# Patient Record
Sex: FEMALE | Race: WHITE | Employment: OTHER | URBAN - METROPOLITAN AREA
[De-identification: names, ages, dates, MRNs, and addresses within clinical notes are randomized per-mention and may not be internally consistent; named-entity substitution may affect disease eponyms.]

---

## 2018-04-26 ENCOUNTER — ED HISTORICAL/CONVERTED ENCOUNTER (OUTPATIENT)
Dept: OTHER | Age: 83
End: 2018-04-26

## 2018-10-27 ENCOUNTER — IP HISTORICAL/CONVERTED ENCOUNTER (OUTPATIENT)
Dept: OTHER | Age: 83
End: 2018-10-27

## 2019-01-01 ENCOUNTER — OP HISTORICAL/CONVERTED ENCOUNTER (OUTPATIENT)
Dept: OTHER | Age: 84
End: 2019-01-01

## 2019-01-12 ENCOUNTER — ED HISTORICAL/CONVERTED ENCOUNTER (OUTPATIENT)
Dept: OTHER | Age: 84
End: 2019-01-12

## 2019-11-08 ENCOUNTER — IP HISTORICAL/CONVERTED ENCOUNTER (OUTPATIENT)
Dept: OTHER | Age: 84
End: 2019-11-08

## 2020-12-08 ENCOUNTER — OFFICE VISIT (OUTPATIENT)
Dept: PODIATRY | Age: 85
End: 2020-12-08
Payer: MEDICARE

## 2020-12-08 VITALS
WEIGHT: 150 LBS | OXYGEN SATURATION: 98 % | HEIGHT: 60 IN | BODY MASS INDEX: 29.45 KG/M2 | SYSTOLIC BLOOD PRESSURE: 145 MMHG | TEMPERATURE: 96.7 F | HEART RATE: 61 BPM | DIASTOLIC BLOOD PRESSURE: 56 MMHG

## 2020-12-08 DIAGNOSIS — B35.1 ONYCHOMYCOSIS: Primary | ICD-10-CM

## 2020-12-08 PROCEDURE — 99203 OFFICE O/P NEW LOW 30 MIN: CPT | Performed by: PODIATRIST

## 2020-12-08 PROCEDURE — 11755 BIOPSY NAIL UNIT: CPT | Performed by: PODIATRIST

## 2020-12-08 RX ORDER — LANOLIN ALCOHOL/MO/W.PET/CERES
1000 CREAM (GRAM) TOPICAL DAILY
COMMUNITY
End: 2021-09-21 | Stop reason: ALTCHOICE

## 2020-12-08 RX ORDER — CARVEDILOL 3.12 MG/1
1 TABLET ORAL 2 TIMES DAILY
COMMUNITY
Start: 2020-09-28 | End: 2021-09-27

## 2020-12-08 RX ORDER — FOLIC ACID 1 MG/1
TABLET ORAL
COMMUNITY
Start: 2020-11-09

## 2020-12-08 RX ORDER — ALPRAZOLAM 0.25 MG/1
TABLET ORAL
COMMUNITY
Start: 2020-11-09 | End: 2022-01-10

## 2020-12-08 RX ORDER — LEVOTHYROXINE SODIUM 100 UG/1
TABLET ORAL
COMMUNITY
Start: 2020-11-16

## 2020-12-08 NOTE — PROGRESS NOTES
[facility]    Subjective:         Patient is a 80 y.o. female who is being seen as a new pt for thick/discolored toenails. Patient denies any pedal pain. She states that her toenails are thick/discolored but denies ever having testing performed to confirm a diagnosis. She denies any traumatic events. She denies any systemic signs of infection. She denies any other pedal    Past Medical History:   Diagnosis Date    Anemia     Heart attack ([address]     Hypertension     Thyroid disease     hypothyroidism     Past Surgical History:   Procedure Laterality Date    HX ORTHOPAEDIC  2018    total hip replacement    HX PACEMAKER         Family History   Problem Relation Age of Onset    Stroke Mother     Heart Disease Mother     Stroke Father     Heart Disease Father       Social History     Tobacco Use    Smoking status: Never Smoker   Substance Use Topics    Alcohol use: Never     Frequency: Never     No Known Allergies  Prior to Admission medications    Medication Sig Start Date End Date Taking? Authorizing Provider   carvediloL (COREG) 3.125 mg tablet Take 1 Tab by mouth two (2) times a day. 9/28/20  Yes Provider, Historical   folic acid (FOLVITE) 1 mg tablet TAKE 1 TABLET BY MOUTH EVERY DAY 11/9/20  Yes Provider, Historical   levothyroxine (SYNTHROID) 100 mcg tablet TAKE 1 TABLET BY MOUTH EVERY DAY 11/16/20  Yes Provider, Historical   ALPRAZolam (XANAX) 0.25 mg tablet TAKE 1 TABLET BY MOUTH EVERY 12 HOURS AS NEEDED 11/9/20  Yes Provider, Historical   cyanocobalamin 1,000 mcg tablet Take 1,000 mcg by mouth daily. Yes Provider, Historical       Review of Systems   Constitutional: Negative. HENT: Negative. Eyes: Negative. Respiratory: Negative. Cardiovascular: Negative. Gastrointestinal: Negative. Endocrine: Positive for cold intolerance. Genitourinary: Negative. Musculoskeletal: Positive for arthralgias. Skin: Negative. Allergic/Immunologic: Negative. Neurological: Negative. Hematological: Negative. Psychiatric/Behavioral: The patient is nervous/anxious. All other systems reviewed and are negative. Objective:     Visit Vitals  BP (!) 145/56 (BP 1 Location: Left arm, BP Patient Position: Sitting)   Pulse 61   Temp (!) 96.7 °F (35.9 °C) (Temporal)   Ht 5' (1.524 m)   Wt 150 lb (68 kg)   SpO2 98%   BMI 29.29 kg/m²       Physical Exam  Vitals signs reviewed. Constitutional:       Appearance: She is overweight. Cardiovascular:      Pulses:           Dorsalis pedis pulses are 2+ on the right side and 2+ on the left side. Posterior tibial pulses are 2+ on the right side and 2+ on the left side. Pulmonary:      Effort: Pulmonary effort is normal.   Musculoskeletal:      Right lower leg: No edema. Left lower leg: No edema. Right foot: Decreased range of motion. Deformity present. No bunion or Charcot foot. Left foot: Decreased range of motion. Deformity present. No bunion or Charcot foot. Feet:      Right foot:      Protective Sensation: 10 sites tested. 10 sites sensed. Skin integrity: Skin integrity normal.      Toenail Condition: Right toenails are abnormally thick. Fungal disease present. Left foot:      Protective Sensation: 10 sites tested. 10 sites sensed. Skin integrity: Skin integrity normal.      Toenail Condition: Left toenails are abnormally thick. Fungal disease present. Lymphadenopathy:      Lower Body: No right inguinal adenopathy. No left inguinal adenopathy. Skin:     General: Skin is warm. Capillary Refill: Capillary refill takes 2 to 3 seconds. Neurological:      Mental Status: She is alert and oriented to person, place, and time. Psychiatric:         Mood and Affect: Mood and affect normal.         Behavior: Behavior is cooperative. Data Review: No results found for this or any previous visit (from the past 24 hour(s)). Impression:       ICD-10-CM ICD-9-CM    1. Onychomycosis  B35.1 110.1 BIOPSY, NAIL UNIT         Recommendation:     Patient seen and evaluated in the office  Discussed educated patient regarding her medical condition  It was determined that a nail plate biopsy will be taken of the right hallux to confirm the presence of fungal elements. This was performed with a nail nipper without incident. Patient tolerated well no dressing was needed.   Instructed the patient that when pathology results return, will discuss treatment options in more depth

## 2021-01-14 DIAGNOSIS — B35.1 ONYCHOMYCOSIS: Primary | ICD-10-CM

## 2021-01-15 RX ORDER — CICLOPIROX 80 MG/ML
6.6 SOLUTION TOPICAL
Qty: 1 BOTTLE | Refills: 2 | Status: SHIPPED | OUTPATIENT
Start: 2021-01-15 | End: 2021-06-14

## 2021-01-24 ENCOUNTER — APPOINTMENT (OUTPATIENT)
Dept: GENERAL RADIOLOGY | Age: 86
End: 2021-01-24
Attending: FAMILY MEDICINE
Payer: MEDICARE

## 2021-01-24 ENCOUNTER — HOSPITAL ENCOUNTER (EMERGENCY)
Age: 86
Discharge: HOME | End: 2021-01-24
Attending: FAMILY MEDICINE
Payer: MEDICARE

## 2021-01-24 ENCOUNTER — APPOINTMENT (OUTPATIENT)
Dept: CT IMAGING | Age: 86
End: 2021-01-24
Attending: FAMILY MEDICINE
Payer: MEDICARE

## 2021-01-24 VITALS
TEMPERATURE: 97.9 F | DIASTOLIC BLOOD PRESSURE: 64 MMHG | RESPIRATION RATE: 20 BRPM | OXYGEN SATURATION: 100 % | HEIGHT: 62 IN | BODY MASS INDEX: 27.6 KG/M2 | WEIGHT: 150 LBS | HEART RATE: 87 BPM | SYSTOLIC BLOOD PRESSURE: 170 MMHG

## 2021-01-24 DIAGNOSIS — J44.1 COPD EXACERBATION (HCC): Primary | ICD-10-CM

## 2021-01-24 LAB
ANION GAP SERPL CALC-SCNC: 11 MMOL/L (ref 5–15)
BASOPHILS # BLD: 0 K/UL (ref 0–0.1)
BASOPHILS NFR BLD: 1 % (ref 0–1)
BUN SERPL-MCNC: 19 MG/DL (ref 6–20)
BUN/CREAT SERPL: 15 (ref 12–20)
CA-I BLD-MCNC: 8.8 MG/DL (ref 8.5–10.1)
CHLORIDE SERPL-SCNC: 99 MMOL/L (ref 97–108)
CO2 SERPL-SCNC: 25 MMOL/L (ref 21–32)
CREAT SERPL-MCNC: 1.25 MG/DL (ref 0.55–1.02)
DIFFERENTIAL METHOD BLD: ABNORMAL
EOSINOPHIL # BLD: 0.4 K/UL (ref 0–0.4)
EOSINOPHIL NFR BLD: 4 % (ref 0–7)
ERYTHROCYTE [DISTWIDTH] IN BLOOD BY AUTOMATED COUNT: 12.3 % (ref 11.5–14.5)
GLUCOSE SERPL-MCNC: 106 MG/DL (ref 65–100)
HCT VFR BLD AUTO: 36.7 % (ref 35–47)
HGB BLD-MCNC: 12.6 G/DL (ref 11.5–16)
IMM GRANULOCYTES # BLD AUTO: 0 K/UL (ref 0–0.04)
IMM GRANULOCYTES NFR BLD AUTO: 0 % (ref 0–0.5)
LYMPHOCYTES # BLD: 2 K/UL (ref 0.8–3.5)
LYMPHOCYTES NFR BLD: 24 % (ref 12–49)
MCH RBC QN AUTO: 32.4 PG (ref 26–34)
MCHC RBC AUTO-ENTMCNC: 34.3 G/DL (ref 30–36.5)
MCV RBC AUTO: 94.3 FL (ref 80–99)
MONOCYTES # BLD: 0.9 K/UL (ref 0–1)
MONOCYTES NFR BLD: 11 % (ref 5–13)
NEUTS SEG # BLD: 5 K/UL (ref 1.8–8)
NEUTS SEG NFR BLD: 60 % (ref 32–75)
PLATELET # BLD AUTO: 428 K/UL (ref 150–400)
PMV BLD AUTO: 9.6 FL (ref 8.9–12.9)
POTASSIUM SERPL-SCNC: 3.9 MMOL/L (ref 3.5–5.1)
RBC # BLD AUTO: 3.89 M/UL (ref 3.8–5.2)
SODIUM SERPL-SCNC: 135 MMOL/L (ref 136–145)
WBC # BLD AUTO: 8.4 K/UL (ref 3.6–11)

## 2021-01-24 PROCEDURE — 74011000250 HC RX REV CODE- 250: Performed by: FAMILY MEDICINE

## 2021-01-24 PROCEDURE — 71045 X-RAY EXAM CHEST 1 VIEW: CPT

## 2021-01-24 PROCEDURE — 93005 ELECTROCARDIOGRAM TRACING: CPT

## 2021-01-24 PROCEDURE — 71260 CT THORAX DX C+: CPT

## 2021-01-24 PROCEDURE — 74011000636 HC RX REV CODE- 636: Performed by: FAMILY MEDICINE

## 2021-01-24 PROCEDURE — 96374 THER/PROPH/DIAG INJ IV PUSH: CPT

## 2021-01-24 PROCEDURE — 85025 COMPLETE CBC W/AUTO DIFF WBC: CPT

## 2021-01-24 PROCEDURE — 36415 COLL VENOUS BLD VENIPUNCTURE: CPT

## 2021-01-24 PROCEDURE — 94640 AIRWAY INHALATION TREATMENT: CPT

## 2021-01-24 PROCEDURE — 80048 BASIC METABOLIC PNL TOTAL CA: CPT

## 2021-01-24 PROCEDURE — 74011250636 HC RX REV CODE- 250/636: Performed by: FAMILY MEDICINE

## 2021-01-24 PROCEDURE — 99285 EMERGENCY DEPT VISIT HI MDM: CPT

## 2021-01-24 RX ORDER — ALBUTEROL SULFATE 90 UG/1
2 AEROSOL, METERED RESPIRATORY (INHALATION) ONCE
Status: DISCONTINUED | OUTPATIENT
Start: 2021-01-24 | End: 2021-01-24 | Stop reason: RX

## 2021-01-24 RX ORDER — METHYLPREDNISOLONE 4 MG/1
TABLET ORAL
Qty: 1 DOSE PACK | Refills: 0 | Status: SHIPPED | OUTPATIENT
Start: 2021-01-24 | End: 2021-09-21 | Stop reason: ALTCHOICE

## 2021-01-24 RX ORDER — IPRATROPIUM BROMIDE AND ALBUTEROL SULFATE 2.5; .5 MG/3ML; MG/3ML
3 SOLUTION RESPIRATORY (INHALATION)
Status: DISCONTINUED | OUTPATIENT
Start: 2021-01-24 | End: 2021-01-24 | Stop reason: CLARIF

## 2021-01-24 RX ORDER — IPRATROPIUM BROMIDE AND ALBUTEROL SULFATE 2.5; .5 MG/3ML; MG/3ML
3 SOLUTION RESPIRATORY (INHALATION)
Status: COMPLETED | OUTPATIENT
Start: 2021-01-24 | End: 2021-01-24

## 2021-01-24 RX ORDER — ALBUTEROL SULFATE 90 UG/1
2 AEROSOL, METERED RESPIRATORY (INHALATION)
Qty: 1 INHALER | Refills: 0 | Status: SHIPPED | OUTPATIENT
Start: 2021-01-24 | End: 2021-09-21 | Stop reason: ALTCHOICE

## 2021-01-24 RX ORDER — IPRATROPIUM BROMIDE AND ALBUTEROL SULFATE 2.5; .5 MG/3ML; MG/3ML
3 SOLUTION RESPIRATORY (INHALATION)
Status: DISCONTINUED | OUTPATIENT
Start: 2021-01-24 | End: 2021-01-24 | Stop reason: SDUPTHER

## 2021-01-24 RX ADMIN — METHYLPREDNISOLONE SODIUM SUCCINATE 125 MG: 125 INJECTION, POWDER, FOR SOLUTION INTRAMUSCULAR; INTRAVENOUS at 16:08

## 2021-01-24 RX ADMIN — IPRATROPIUM BROMIDE AND ALBUTEROL SULFATE 3 ML: .5; 3 SOLUTION RESPIRATORY (INHALATION) at 16:08

## 2021-01-24 RX ADMIN — IOPAMIDOL 100 ML: 755 INJECTION, SOLUTION INTRAVENOUS at 17:30

## 2021-01-24 NOTE — ED PROVIDER NOTES
EMERGENCY DEPARTMENT HISTORY AND PHYSICAL EXAM      Date: 1/24/2021  Patient Name: Deyanira Bellamy      History of Presenting Illness     Chief Complaint   Patient presents with    Cough       History Provided By: Patient's Daughter and patient    HPI: Deyanira Bellamy, 80 y.o. female with a past medical history significant as documented below presents to the ED with cc of coughing. Patient had the symptoms for several weeks and was seen by primary care doctor 2 weeks ago was placed on albuterol and other medications. She was not tested for Covid. She ran out of her albuterol inhaler a few days ago and the daughter noticed that she is coughing more than usual. The daughter recently found out that the caretaker was possibly exposed to Covid and may have exposed her mother. Mother does not complain of chest pain, palpitations, weakness, fever, nausea, vomiting. Just feels short of breath from excessive coughing. There are no other complaints, changes, or physical findings at this time. PCP: Jonny Gonzalez MD    Current Outpatient Medications   Medication Sig Dispense Refill    albuterol (ProAir HFA) 90 mcg/actuation inhaler Take 2 Puffs by inhalation every four (4) hours as needed for Wheezing or Shortness of Breath. 1 Inhaler 0    methylPREDNISolone (Medrol, Brent,) 4 mg tablet Take as directed 1 Dose Pack 0    ciclopirox (PENLAC) 8 % solution Apply 6.6 mL to affected area nightly for 90 days. 1 Bottle 2    carvediloL (COREG) 3.125 mg tablet Take 1 Tab by mouth two (2) times a day.  folic acid (FOLVITE) 1 mg tablet TAKE 1 TABLET BY MOUTH EVERY DAY      levothyroxine (SYNTHROID) 100 mcg tablet TAKE 1 TABLET BY MOUTH EVERY DAY      ALPRAZolam (XANAX) 0.25 mg tablet TAKE 1 TABLET BY MOUTH EVERY 12 HOURS AS NEEDED      cyanocobalamin 1,000 mcg tablet Take 1,000 mcg by mouth daily.          Past History     Past Medical History:  Past Medical History:   Diagnosis Date    Anemia     [facility] [address] [address]     Heart attack [address] [address]     Hypertension     Thyroid disease     hypothyroidism       Past Surgical History:  Past Surgical History:   Procedure Laterality Date    HX ORTHOPAEDIC  2018    total hip replacement    HX PACEMAKER         Family History:  Family History   Problem Relation Age of Onset    Stroke Mother     Heart Disease Mother     Stroke Father     Heart Disease Father        Social History:  Social History     Tobacco Use    Smoking status: Never Smoker    Smokeless tobacco: Never Used   Substance Use Topics    Alcohol use: Never     Frequency: Never    Drug use: Never       Allergies:  No Known Allergies      Review of Systems     Review of Systems   Constitutional: Negative for chills and fever. HENT: Negative for congestion and sore throat. Eyes: Negative for photophobia and visual disturbance. Respiratory: Positive for cough and shortness of breath. Cardiovascular: Negative for chest pain and palpitations. Gastrointestinal: Negative for nausea and vomiting. Genitourinary: Negative for dysuria and flank pain. Musculoskeletal: Negative for arthralgias, back pain and myalgias. Skin: Negative for rash and wound. Allergic/Immunologic: Negative for environmental allergies and food allergies. Neurological: Negative for light-headedness and headaches. All other systems reviewed and are negative. Physical Exam     Physical Exam  Vitals signs and nursing note reviewed. Constitutional:       Appearance: Normal appearance. HENT:      Head: Normocephalic and atraumatic. Nose: Nose normal.      Mouth/Throat:      Mouth: Mucous membranes are moist.      Pharynx: Oropharynx is clear. No oropharyngeal exudate or posterior oropharyngeal erythema. Eyes:      Extraocular Movements: Extraocular movements intact. Conjunctiva/sclera: Conjunctivae normal.   Neck:      Musculoskeletal: Normal range of motion and neck supple.  No muscular tenderness. Cardiovascular:      Rate and Rhythm: Normal rate and regular rhythm. Pulses: Normal pulses. Heart sounds: Normal heart sounds. Pulmonary:      Effort: Pulmonary effort is normal.      Breath sounds: Examination of the right-upper field reveals wheezing. Examination of the left-upper field reveals wheezing. Examination of the right-middle field reveals wheezing. Examination of the left-middle field reveals wheezing. Examination of the right-lower field reveals wheezing. Examination of the left-lower field reveals wheezing. Wheezing present. No decreased breath sounds, rhonchi or rales. Abdominal:      General: Abdomen is flat. Bowel sounds are normal.      Palpations: Abdomen is soft. Lymphadenopathy:      Cervical: No cervical adenopathy. Skin:     General: Skin is warm. Capillary Refill: Capillary refill takes less than 2 seconds. Neurological:      General: No focal deficit present. Mental Status: She is alert and oriented to person, place, and time. Psychiatric:         Mood and Affect: Mood normal.         Behavior: Behavior normal.         Diagnostic Study Results     Labs -     Recent Results (from the past 12 hour(s))   CBC WITH AUTOMATED DIFF    Collection Time: 01/24/21  4:30 PM   Result Value Ref Range    WBC 8.4 3.6 - 11.0 K/uL    RBC 3.89 3.80 - 5.20 M/uL    HGB 12.6 11.5 - 16.0 g/dL    HCT 36.7 35.0 - 47.0 %    MCV 94.3 80.0 - 99.0 FL    MCH 32.4 26.0 - 34.0 PG    MCHC 34.3 30.0 - 36.5 g/dL    RDW 12.3 11.5 - 14.5 %    PLATELET 190 (H) 632 - 400 K/uL    MPV 9.6 8.9 - 12.9 FL    NEUTROPHILS 60 32 - 75 %    LYMPHOCYTES 24 12 - 49 %    MONOCYTES 11 5 - 13 %    EOSINOPHILS 4 0 - 7 %    BASOPHILS 1 0 - 1 %    IMMATURE GRANULOCYTES 0 0.0 - 0.5 %    ABS. NEUTROPHILS 5.0 1.8 - 8.0 K/UL    ABS. LYMPHOCYTES 2.0 0.8 - 3.5 K/UL    ABS. MONOCYTES 0.9 0.0 - 1.0 K/UL    ABS. EOSINOPHILS 0.4 0.0 - 0.4 K/UL    ABS. BASOPHILS 0.0 0.0 - 0.1 K/UL    ABS. IMM. GRANS. 0.0 0.00 - 0.04 K/UL    DF AUTOMATED     METABOLIC PANEL, BASIC    Collection Time: 01/24/21  4:30 PM   Result Value Ref Range    Sodium 135 (L) 136 - 145 mmol/L    Potassium 3.9 3.5 - 5.1 mmol/L    Chloride 99 97 - 108 mmol/L    CO2 25 21 - 32 mmol/L    Anion gap 11 5 - 15 mmol/L    Glucose 106 (H) 65 - 100 mg/dL    BUN 19 6 - 20 mg/dL    Creatinine 1.25 (H) 0.55 - 1.02 mg/dL    BUN/Creatinine ratio 15 12 - 20      GFR est AA 49 (L) >60 ml/min/1.73m2    GFR est non-AA 40 (L) >60 ml/min/1.73m2    Calcium 8.8 8.5 - 10.1 mg/dL       Radiologic Studies -   [unfilled]  CT Results  (Last 48 hours)               01/24/21 1730  CT CHEST W CONT Final result    Impression:  Clear lungs. No evidence of PE. 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 Narrative:  Contrast study was cc [id number]       Very mild subpleural fibrosis with otherwise completely clear lungs and patent   central airways. Pulmonary arteries are well-opacified and show no filling defect or distortion. Aorta shows normal dimensions, without dissection. Moderate atherosclerosis with   penetrating ulcer off the left side of the arch. 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 The aortic maximum diameter at this level is 3.3 cm. Patient age noted. No   mediastinal or hilar adenopathy. Coronary calcification. No cardiac finding. No pleural pericardial effusion. No   significant upper abdominal finding               CXR Results  (Last 48 hours)               01/24/21 1552  XR CHEST PORT Final result    Impression:  No evidence of focal airspace pneumonia. Narrative:  Portable chest, AP upright, 1546 hours. Comparison with 11/19/2019. Stable heart size. Calcified thoracic aorta. The lungs appear clear.  No evidence   of pulmonary edema, air space pneumonia, or pleural effusion. No evidence of   pneumothorax. Medical Decision Making and ED Course     Vital Signs-Reviewed the patient's vital signs. Patient Vitals for the past 12 hrs:   Temp Pulse Resp BP SpO2   01/24/21 1759  87 20 (!) 170/64 100 %   01/24/21 1648  60 20  100 %   01/24/21 1545     100 %   01/24/21 1537 97.9 °F (36.6 °C) 65  (!) 187/62 100 %         EKG interpretation: (Preliminary): Performed at 1/24/21 and read at 1641  Rhythm: sinus bradycardia; and regular . Rate (approx.): 48; Axis: normal; AZ interval: normal; QRS interval: normal ; ST/T wave: normal; Other findings: normal.      Records Reviewed: Nursing Notes and Old Medical Records    Provider Notes (Medical Decision Making):     MDM  Number of Diagnoses or Management Options  COPD exacerbation ([facility] [address]  Diagnosis management comments: Patient was treated with Solu-Medrol and albuterol nebulizer. Her symptoms improved. She was stable at 100% on room air. She was able to ambulate around without inducing dyspnea. Per H&P, is likely COPD exacerbation. Refilled patient's albuterol inhaler and started on Medrol dose pack. Patient is stable with no marked toxicity or distress. Results reviewed with the patient. All questions were answered and there are no apparent barriers to comprehension or communication. The patient verbalizes agreement with the diagnosis, treatment plan, and understanding of the follow-up instructions. The patient is appropriate for discharge; leaves the Emergency Department walking with a stable gait. Patient understands to return to the ED in 12-24 hours for any new or worsening symptoms or no  expected timely resolution of symptoms on mediations prescribed. ED Course:     - I am the first and primary provider for this patient.     - I reviewed the vital signs, available nursing notes, past medical history, past surgical history, family history and social history. Initial assessment performed. The patients presenting problems have been discussed, and they are in agreement with the care plan formulated and outlined with them. I have encouraged them to ask questions as they arise throughout their visit. Disposition     Disposition: Condition improved  DC- Adult Discharges: All of the diagnostic tests were reviewed and questions answered. Diagnosis, care plan and treatment options were discussed. The patient understands the instructions and will follow up as directed. The patients results have been reviewed with them. They have been counseled regarding their diagnosis. The patient verbally convey understanding and agreement of the signs, symptoms, diagnosis, treatment and prognosis and additionally agrees to follow up as recommended with their PCP in 24 - 48 hours. They also agree with the care-plan and convey that all of their questions have been answered. I have also put together some discharge instructions for them that include: 1) educational information regarding their diagnosis, 2) how to care for their diagnosis at home, as well a 3) list of reasons why they would want to return to the ED prior to their follow-up appointment, should their condition change. DISCHARGE PLAN:  1. Current Discharge Medication List      CONTINUE these medications which have NOT CHANGED    Details   ciclopirox (PENLAC) 8 % solution Apply 6.6 mL to affected area nightly for 90 days. Qty: 1 Bottle, Refills: 2    Associated Diagnoses: Onychomycosis      carvediloL (COREG) 3.125 mg tablet Take 1 Tab by mouth two (2) times a day. folic acid (FOLVITE) 1 mg tablet TAKE 1 TABLET BY MOUTH EVERY DAY      levothyroxine (SYNTHROID) 100 mcg tablet TAKE 1 TABLET BY MOUTH EVERY DAY      ALPRAZolam (XANAX) 0.25 mg tablet TAKE 1 TABLET BY MOUTH EVERY 12 HOURS AS NEEDED      cyanocobalamin 1,000 mcg tablet Take 1,000 mcg by mouth daily.            2.   Follow-up Information Follow up With Specialties Details Why Contact Info    Ana Simons MD Pulmonary Disease Schedule an appointment as soon as possible for a visit in 3 days  [address] [facility]  [phone]          3. Return to ED if worse   4. Discharge Medication List as of 1/24/2021  6:35 PM      START taking these medications    Details   albuterol (ProAir HFA) 90 mcg/actuation inhaler Take 2 Puffs by inhalation every four (4) hours as needed for Wheezing or Shortness of Breath., Normal, Disp-1 Inhaler, R-0      methylPREDNISolone (Medrol, Brent,) 4 mg tablet Take as directed, Normal, Disp-1 Dose Pack, R-0         CONTINUE these medications which have NOT CHANGED    Details   ciclopirox (PENLAC) 8 % solution Apply 6.6 mL to affected area nightly for 90 days. , Normal, Disp-1 Bottle, R-2      carvediloL (COREG) 3.125 mg tablet Take 1 Tab by mouth two (2) times a day., Historical Med      folic acid (FOLVITE) 1 mg tablet TAKE 1 TABLET BY MOUTH EVERY DAY, Historical Med      levothyroxine (SYNTHROID) 100 mcg tablet TAKE 1 TABLET BY MOUTH EVERY DAY, Historical Med      ALPRAZolam (XANAX) 0.25 mg tablet TAKE 1 TABLET BY MOUTH EVERY 12 HOURS AS NEEDED, Historical Med      cyanocobalamin 1,000 mcg tablet Take 1,000 mcg by mouth daily. , Historical Med             Diagnosis     Clinical Impression:   1. COPD exacerbation ([facility] [address]        Attestations:    Debbie Rodriguez, DO    Please note that this dictation was completed with [employer], the computer voice recognition software. Quite often unanticipated grammatical, syntax, homophones, and other interpretive errors are inadvertently transcribed by the computer software. Please disregard these errors. Please excuse any errors that have escaped final proofreading. Thank you.

## 2021-01-24 NOTE — ED TRIAGE NOTES
PT. TO ED WITH C/O COUGH FOR ABOUT 7 WEEKS. PT. WAS SEEN BY LUNG DOCTOR GIVEN PRESCRIPTIONS. LAST NEBULIZER TREATMENT  2 - 3 DAYS AGO, PER DAUGHTER , RAN OUT OF MEDICATION. QUESTIONABLE IF PT. WAS EXPOSED TO COVID BY CARGIVER. PT. HARD OF HEARING.

## 2021-01-24 NOTE — DISCHARGE INSTRUCTIONS
Thank you! Thank you for allowing me to care for you in the emergency department. I sincerely hope that you are satisfied with your visit today. It is my goal to provide you with excellent care. Below you will find a list of your labs and imaging from your visit today. Should you have any questions regarding these results please do not hesitate to call the emergency department. Labs -     Recent Results (from the past 12 hour(s))   CBC WITH AUTOMATED DIFF    Collection Time: 01/24/21  4:30 PM   Result Value Ref Range    WBC 8.4 3.6 - 11.0 K/uL    RBC 3.89 3.80 - 5.20 M/uL    HGB 12.6 11.5 - 16.0 g/dL    HCT 36.7 35.0 - 47.0 %    MCV 94.3 80.0 - 99.0 FL    MCH 32.4 26.0 - 34.0 PG    MCHC 34.3 30.0 - 36.5 g/dL    RDW 12.3 11.5 - 14.5 %    PLATELET 705 (H) 431 - 400 K/uL    MPV 9.6 8.9 - 12.9 FL    NEUTROPHILS 60 32 - 75 %    LYMPHOCYTES 24 12 - 49 %    MONOCYTES 11 5 - 13 %    EOSINOPHILS 4 0 - 7 %    BASOPHILS 1 0 - 1 %    IMMATURE GRANULOCYTES 0 0.0 - 0.5 %    ABS. NEUTROPHILS 5.0 1.8 - 8.0 K/UL    ABS. LYMPHOCYTES 2.0 0.8 - 3.5 K/UL    ABS. MONOCYTES 0.9 0.0 - 1.0 K/UL    ABS. EOSINOPHILS 0.4 0.0 - 0.4 K/UL    ABS. BASOPHILS 0.0 0.0 - 0.1 K/UL    ABS. IMM. GRANS. 0.0 0.00 - 0.04 K/UL    DF AUTOMATED     METABOLIC PANEL, BASIC    Collection Time: 01/24/21  4:30 PM   Result Value Ref Range    Sodium 135 (L) 136 - 145 mmol/L    Potassium 3.9 3.5 - 5.1 mmol/L    Chloride 99 97 - 108 mmol/L    CO2 25 21 - 32 mmol/L    Anion gap 11 5 - 15 mmol/L    Glucose 106 (H) 65 - 100 mg/dL    BUN 19 6 - 20 mg/dL    Creatinine 1.25 (H) 0.55 - 1.02 mg/dL    BUN/Creatinine ratio 15 12 - 20      GFR est AA 49 (L) >60 ml/min/1.73m2    GFR est non-AA 40 (L) >60 ml/min/1.73m2    Calcium 8.8 8.5 - 10.1 mg/dL       Radiologic Studies -   CT CHEST W CONT   Final Result   Clear lungs. No evidence of PE. 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       XR CHEST PORT   Final Result   No evidence of focal airspace pneumonia. CT Results  (Last 48 hours)                 01/24/21 1730  CT CHEST W CONT Final result    Impression:  Clear lungs. No evidence of PE. 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 Narrative:  Contrast study was cc [id number]       Very mild subpleural fibrosis with otherwise completely clear lungs and patent   central airways. Pulmonary arteries are well-opacified and show no filling defect or distortion. Aorta shows normal dimensions, without dissection. Moderate atherosclerosis with   penetrating ulcer off the left side of the arch. 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 The aortic maximum diameter at this level is 3.3 cm. Patient age noted. No   mediastinal or hilar adenopathy. Coronary calcification. No cardiac finding. No pleural pericardial effusion. No   significant upper abdominal finding                 CXR Results  (Last 48 hours)                 01/24/21 1552  XR CHEST PORT Final result    Impression:  No evidence of focal airspace pneumonia. Narrative:  Portable chest, AP upright, 1546 hours. Comparison with 11/19/2019. Stable heart size. Calcified thoracic aorta. The lungs appear clear. No evidence   of pulmonary edema, air space pneumonia, or pleural effusion. No evidence of   pneumothorax. If you feel that you have not received excellent quality care or timely care, please ask to speak to the nurse manager. Please choose us in the future for your continued health care needs.    ------------------------------------------------------------------------------------------------------------  The exam and treatment you received in the Emergency Department were for an urgent problem and are not intended as complete care. It is important that you follow-up with a doctor, nurse practitioner, or physician assistant to:  (1) confirm your diagnosis,  (2) re-evaluation of changes in your illness and treatment, and  (3) for ongoing care. If your symptoms become worse or you do not improve as expected and you are unable to reach your usual health care provider, you should return to the Emergency Department. We are available 24 hours a day. Please take your discharge instructions with you when you go to your follow-up appointment. If you have any problem arranging a follow-up appointment, contact the Emergency Department immediately. If a prescription has been provided, please have it filled as soon as possible to prevent a delay in treatment. Read the entire medication instruction sheet provided to you by the pharmacy. If you have any questions or reservations about taking the medication due to side effects or interactions with other medications, please call your primary care physician or contact the ER to speak with the charge nurse. Make an appointment with your family doctor or the physician you were referred to for follow-up of this visit as instructed on your discharge paperwork, as this is a mandatory follow-up. Return to the ER if you are unable to be seen or if you are unable to be seen in a timely manner. If you have any problem arranging the follow-up visit, contact the Emergency Department immediately.

## 2021-01-25 LAB
ATRIAL RATE: 48 BPM
CALCULATED P AXIS, ECG09: 88 DEGREES
CALCULATED R AXIS, ECG10: 3 DEGREES
CALCULATED T AXIS, ECG11: 25 DEGREES
DIAGNOSIS, 93000: NORMAL
P-R INTERVAL, ECG05: 204 MS
Q-T INTERVAL, ECG07: 460 MS
QRS DURATION, ECG06: 70 MS
QTC CALCULATION (BEZET), ECG08: 410 MS
VENTRICULAR RATE, ECG03: 48 BPM

## 2021-02-26 ENCOUNTER — VIRTUAL VISIT (OUTPATIENT)
Dept: PODIATRY | Age: 86
End: 2021-02-26
Payer: MEDICARE

## 2021-02-26 DIAGNOSIS — B35.1 ONYCHOMYCOSIS: Primary | ICD-10-CM

## 2021-02-26 PROCEDURE — 1090F PRES/ABSN URINE INCON ASSESS: CPT | Performed by: PODIATRIST

## 2021-02-26 PROCEDURE — 99212 OFFICE O/P EST SF 10 MIN: CPT | Performed by: PODIATRIST

## 2021-02-26 PROCEDURE — G8432 DEP SCR NOT DOC, RNG: HCPCS | Performed by: PODIATRIST

## 2021-02-26 PROCEDURE — G8428 CUR MEDS NOT DOCUMENT: HCPCS | Performed by: PODIATRIST

## 2021-02-26 PROCEDURE — G8536 NO DOC ELDER MAL SCRN: HCPCS | Performed by: PODIATRIST

## 2021-02-26 PROCEDURE — 1101F PT FALLS ASSESS-DOCD LE1/YR: CPT | Performed by: PODIATRIST

## 2021-02-26 PROCEDURE — G8419 CALC BMI OUT NRM PARAM NOF/U: HCPCS | Performed by: PODIATRIST

## 2021-02-28 NOTE — PROGRESS NOTES
[facility]    Subjective:         Patient is a 80 y.o. female who is being seen as a returning pt for continued care regarding her onychomycosis. Patient denies any pedal pain. She states she has been applying the topical nail lacquer that was prescribed last office visit. She denies any traumatic events. She denies any systemic signs of infection. She denies any other pedal    Past Medical History:   Diagnosis Date    Anemia     Chronic obstructive pulmonary disease ([address]     Heart attack ([address]     Hypertension     Thyroid disease     hypothyroidism     Past Surgical History:   Procedure Laterality Date    HX ORTHOPAEDIC  2018    total hip replacement    HX PACEMAKER         Family History   Problem Relation Age of Onset    Stroke Mother     Heart Disease Mother     Stroke Father     Heart Disease Father       Social History     Tobacco Use    Smoking status: Never Smoker    Smokeless tobacco: Never Used   Substance Use Topics    Alcohol use: Never     Frequency: Never     No Known Allergies  Prior to Admission medications    Medication Sig Start Date End Date Taking? Authorizing Provider   albuterol (ProAir HFA) 90 mcg/actuation inhaler Take 2 Puffs by inhalation every four (4) hours as needed for Wheezing or Shortness of Breath. 1/24/21   Abelino Floyd DO   methylPREDNISolone (Medrol, Brent,) 4 mg tablet Take as directed 1/24/21   Abelino Floyd DO   ciclopirox (PENLAC) 8 % solution Apply 6.6 mL to affected area nightly for 90 days. 1/15/21 4/15/21  Jeramie Garsia DPM   carvediloL (COREG) 3.125 mg tablet Take 1 Tab by mouth two (2) times a day.  9/28/20   Provider, Historical   folic acid (FOLVITE) 1 mg tablet TAKE 1 TABLET BY MOUTH EVERY DAY 11/9/20   Provider, Historical   levothyroxine (SYNTHROID) 100 mcg tablet TAKE 1 TABLET BY MOUTH EVERY DAY 11/16/20   Provider, Historical   ALPRAZolam (XANAX) 0.25 mg tablet TAKE 1 TABLET BY MOUTH EVERY 12 HOURS AS NEEDED 11/9/20   Provider, Historical   cyanocobalamin 1,000 mcg tablet Take 1,000 mcg by mouth daily. Provider, Historical       Review of Systems   Constitutional: Negative. HENT: Negative. Eyes: Negative. Respiratory: Negative. Cardiovascular: Negative. Gastrointestinal: Negative. Endocrine: Positive for cold intolerance. Genitourinary: Negative. Musculoskeletal: Positive for arthralgias. Skin: Negative. Allergic/Immunologic: Negative. Neurological: Negative. Hematological: Negative. Psychiatric/Behavioral: The patient is nervous/anxious. All other systems reviewed and are negative. Objective: There were no vitals taken for this visit. Physical Exam  Vitals signs reviewed. Constitutional:       Appearance: She is overweight. Pulmonary:      Effort: Pulmonary effort is normal.   Musculoskeletal:      Right lower leg: No edema. Left lower leg: No edema. Right foot: Decreased range of motion. Deformity present. No bunion or Charcot foot. Left foot: Decreased range of motion. Deformity present. No bunion or Charcot foot. Feet:      Right foot:      Skin integrity: Skin integrity normal.      Toenail Condition: Right toenails are abnormally thick. Fungal disease present. Left foot:      Skin integrity: Skin integrity normal.      Toenail Condition: Left toenails are abnormally thick. Fungal disease present. Skin:     Coloration: Skin is not ashen or cyanotic. Neurological:      Mental Status: She is alert and oriented to person, place, and time. Psychiatric:         Mood and Affect: Mood and affect normal.         Behavior: Behavior is cooperative. Data Review: No results found for this or any previous visit (from the past 24 hour(s)).       Impression:     Onychomycosis    Recommendation:     Patient seen and evaluated in the office  Discussed educated patient regarding her medical condition  Instructed patient/her daughter that they needed to remove the topical nail lacquer every week. They need to continue with the topical nail lacquer for a minimum of 9 months to achieve symptomatic relief      This visit was performed virtually with audio and visual capabilities via [employer] me

## 2021-06-02 ENCOUNTER — APPOINTMENT (OUTPATIENT)
Dept: GENERAL RADIOLOGY | Age: 86
End: 2021-06-02
Attending: EMERGENCY MEDICINE
Payer: MEDICARE

## 2021-06-02 ENCOUNTER — HOSPITAL ENCOUNTER (EMERGENCY)
Age: 86
Discharge: HOME | End: 2021-06-02
Attending: STUDENT IN AN ORGANIZED HEALTH CARE EDUCATION/TRAINING PROGRAM
Payer: MEDICARE

## 2021-06-02 VITALS
HEART RATE: 67 BPM | HEIGHT: 62 IN | DIASTOLIC BLOOD PRESSURE: 49 MMHG | TEMPERATURE: 98.2 F | OXYGEN SATURATION: 100 % | SYSTOLIC BLOOD PRESSURE: 120 MMHG | WEIGHT: 150 LBS | BODY MASS INDEX: 27.6 KG/M2 | RESPIRATION RATE: 20 BRPM

## 2021-06-02 DIAGNOSIS — F43.0 ANXIETY IN ACUTE STRESS REACTION: Primary | ICD-10-CM

## 2021-06-02 DIAGNOSIS — F41.1 ANXIETY IN ACUTE STRESS REACTION: Primary | ICD-10-CM

## 2021-06-02 LAB
ALBUMIN SERPL-MCNC: 3.4 G/DL (ref 3.5–5)
ALBUMIN/GLOB SERPL: 1 {RATIO} (ref 1.1–2.2)
ALP SERPL-CCNC: 87 U/L (ref 45–117)
ALT SERPL-CCNC: 15 U/L (ref 12–78)
ANION GAP SERPL CALC-SCNC: 9 MMOL/L (ref 5–15)
AST SERPL W P-5'-P-CCNC: 13 U/L (ref 15–37)
BASOPHILS # BLD: 0.1 K/UL (ref 0–0.1)
BASOPHILS NFR BLD: 1 % (ref 0–1)
BILIRUB SERPL-MCNC: 0.2 MG/DL (ref 0.2–1)
BUN SERPL-MCNC: 20 MG/DL (ref 6–20)
BUN/CREAT SERPL: 15 (ref 12–20)
CA-I BLD-MCNC: 8.8 MG/DL (ref 8.5–10.1)
CHLORIDE SERPL-SCNC: 109 MMOL/L (ref 97–108)
CK SERPL-CCNC: 127 NG/ML (ref 26–192)
CO2 SERPL-SCNC: 19 MMOL/L (ref 21–32)
CREAT SERPL-MCNC: 1.33 MG/DL (ref 0.55–1.02)
DIFFERENTIAL METHOD BLD: ABNORMAL
EOSINOPHIL # BLD: 0.6 K/UL (ref 0–0.4)
EOSINOPHIL NFR BLD: 6 % (ref 0–7)
ERYTHROCYTE [DISTWIDTH] IN BLOOD BY AUTOMATED COUNT: 11.9 % (ref 11.5–14.5)
GLOBULIN SER CALC-MCNC: 3.5 G/DL (ref 2–4)
GLUCOSE SERPL-MCNC: 102 MG/DL (ref 65–100)
HCT VFR BLD AUTO: 39.4 % (ref 35–47)
HGB BLD-MCNC: 13.3 G/DL (ref 11.5–16)
IMM GRANULOCYTES # BLD AUTO: 0 K/UL (ref 0–0.04)
IMM GRANULOCYTES NFR BLD AUTO: 0 % (ref 0–0.5)
LYMPHOCYTES # BLD: 2.6 K/UL (ref 0.8–3.5)
LYMPHOCYTES NFR BLD: 26 % (ref 12–49)
MCH RBC QN AUTO: 31.4 PG (ref 26–34)
MCHC RBC AUTO-ENTMCNC: 33.8 G/DL (ref 30–36.5)
MCV RBC AUTO: 92.9 FL (ref 80–99)
MONOCYTES # BLD: 1 K/UL (ref 0–1)
MONOCYTES NFR BLD: 10 % (ref 5–13)
NEUTS SEG # BLD: 5.9 K/UL (ref 1.8–8)
NEUTS SEG NFR BLD: 57 % (ref 32–75)
NRBC # BLD: 0 K/UL (ref 0–0.01)
NRBC BLD-RTO: 0 PER 100 WBC
PLATELET # BLD AUTO: 366 K/UL (ref 150–400)
PMV BLD AUTO: 9.9 FL (ref 8.9–12.9)
POTASSIUM SERPL-SCNC: 4.5 MMOL/L (ref 3.5–5.1)
PROT SERPL-MCNC: 6.9 G/DL (ref 6.4–8.2)
RBC # BLD AUTO: 4.24 M/UL (ref 3.8–5.2)
SODIUM SERPL-SCNC: 137 MMOL/L (ref 136–145)
TROPONIN I SERPL-MCNC: <0.05 NG/ML
WBC # BLD AUTO: 10.1 K/UL (ref 3.6–11)

## 2021-06-02 PROCEDURE — 93005 ELECTROCARDIOGRAM TRACING: CPT

## 2021-06-02 PROCEDURE — 71045 X-RAY EXAM CHEST 1 VIEW: CPT

## 2021-06-02 PROCEDURE — 84484 ASSAY OF TROPONIN QUANT: CPT

## 2021-06-02 PROCEDURE — 74011000250 HC RX REV CODE- 250: Performed by: STUDENT IN AN ORGANIZED HEALTH CARE EDUCATION/TRAINING PROGRAM

## 2021-06-02 PROCEDURE — 85025 COMPLETE CBC W/AUTO DIFF WBC: CPT

## 2021-06-02 PROCEDURE — 80053 COMPREHEN METABOLIC PANEL: CPT

## 2021-06-02 PROCEDURE — 36415 COLL VENOUS BLD VENIPUNCTURE: CPT

## 2021-06-02 PROCEDURE — 99284 EMERGENCY DEPT VISIT MOD MDM: CPT

## 2021-06-02 PROCEDURE — 94640 AIRWAY INHALATION TREATMENT: CPT

## 2021-06-02 PROCEDURE — 82550 ASSAY OF CK (CPK): CPT

## 2021-06-02 RX ORDER — ALBUTEROL SULFATE 2.5 MG/.5ML
2.5 SOLUTION RESPIRATORY (INHALATION)
Status: COMPLETED | OUTPATIENT
Start: 2021-06-02 | End: 2021-06-02

## 2021-06-02 RX ORDER — NEBULIZER AND COMPRESSOR
1 EACH MISCELLANEOUS
Qty: 1 EACH | Refills: 0 | Status: SHIPPED | OUTPATIENT
Start: 2021-06-02 | End: 2021-09-21 | Stop reason: ALTCHOICE

## 2021-06-02 RX ADMIN — ALBUTEROL SULFATE 2.5 MG: 2.5 SOLUTION RESPIRATORY (INHALATION) at 19:22

## 2021-06-02 NOTE — ED TRIAGE NOTES
Reported pt was given Delsym by caregiver this morning then every since pt has been c/o feeling anxious and sob

## 2021-06-03 NOTE — ED PROVIDER NOTES
EMERGENCY DEPARTMENT HISTORY AND PHYSICAL EXAM      Date: 6/2/2021  Patient Name: Juancarlos Hsu    History of Presenting Illness     Chief Complaint   Patient presents with    Anxiety    Shortness of Breath       History Provided By: Patient    HPI: Juancarlos Hsu, 80 y.o. female with a past medical history significant diabetes, hypertension and COPD presents to the ED with cc of shortness of breath, cough. Patient has history of anxiety, COPD, as per caretaker patient was with a different caretaker today who aggravated her, suddenly complained of shortness of breath. Denies any fevers chills, no chest pains, no cough. Simply states that she is having difficulty breathing. There are no other complaints, changes, or physical findings at this time. PCP: None    Current Outpatient Medications   Medication Sig Dispense Refill    Nebulizer & Compressor machine 1 Each by Does Not Apply route every six (6) hours as needed for Wheezing or Shortness of Breath. 1 Each 0    albuterol (ProAir HFA) 90 mcg/actuation inhaler Take 2 Puffs by inhalation every four (4) hours as needed for Wheezing or Shortness of Breath. 1 Inhaler 0    methylPREDNISolone (Medrol, Brent,) 4 mg tablet Take as directed 1 Dose Pack 0    carvediloL (COREG) 3.125 mg tablet Take 1 Tab by mouth two (2) times a day.  folic acid (FOLVITE) 1 mg tablet TAKE 1 TABLET BY MOUTH EVERY DAY      levothyroxine (SYNTHROID) 100 mcg tablet TAKE 1 TABLET BY MOUTH EVERY DAY      ALPRAZolam (XANAX) 0.25 mg tablet TAKE 1 TABLET BY MOUTH EVERY 12 HOURS AS NEEDED      cyanocobalamin 1,000 mcg tablet Take 1,000 mcg by mouth daily.          Past History     Past Medical History:  Past Medical History:   Diagnosis Date    Anemia     Chronic obstructive pulmonary disease ([address]     Heart attack ([address]     Hypertension     Thyroid disease     hypothyroidism       Past Surgical History:  Past Surgical History:   Procedure Laterality Date    HX ORTHOPAEDIC  2018    total hip replacement    HX PACEMAKER         Family History:  Family History   Problem Relation Age of Onset    Stroke Mother     Heart Disease Mother     Stroke Father     Heart Disease Father        Social History:  Social History     Tobacco Use    Smoking status: Never Smoker    Smokeless tobacco: Never Used   Substance Use Topics    Alcohol use: Never    Drug use: Never       Allergies:  No Known Allergies      Review of Systems     Review of Systems   Constitutional: Negative for activity change, chills, fatigue and fever. HENT: Negative for congestion and trouble swallowing. Eyes: Negative for photophobia and visual disturbance. Respiratory: Positive for shortness of breath. Negative for cough and chest tightness. Cardiovascular: Negative for chest pain and palpitations. Gastrointestinal: Negative for abdominal distention, abdominal pain, diarrhea, nausea and vomiting. Genitourinary: Negative for dysuria, flank pain and frequency. Musculoskeletal: Negative for arthralgias, back pain and myalgias. Skin: Negative for color change, pallor and rash. Neurological: Negative for dizziness, tremors, weakness and headaches. Psychiatric/Behavioral: Negative for confusion. Physical Exam     Physical Exam  Vitals and nursing note reviewed. Constitutional:       General: She is not in acute distress. Appearance: Normal appearance. She is normal weight. She is not ill-appearing. HENT:      Head: Normocephalic and atraumatic. Nose: Nose normal.      Mouth/Throat:      Mouth: Mucous membranes are moist.   Eyes:      Extraocular Movements: Extraocular movements intact. Pupils: Pupils are equal, round, and reactive to light. Cardiovascular:      Rate and Rhythm: Normal rate and regular rhythm. Pulses: Normal pulses. Pulmonary:      Effort: Pulmonary effort is normal. No tachypnea. Breath sounds: Decreased breath sounds present.    Chest: Chest wall: No tenderness or edema. Abdominal:      General: Abdomen is flat. Bowel sounds are normal.      Palpations: Abdomen is soft. Tenderness: There is no abdominal tenderness. There is no guarding. Musculoskeletal:         General: No tenderness. Normal range of motion. Cervical back: Normal range of motion and neck supple. No muscular tenderness. Skin:     General: Skin is warm and dry. Neurological:      General: No focal deficit present. Mental Status: She is alert and oriented to person, place, and time. Sensory: No sensory deficit. Motor: No weakness. Psychiatric:         Mood and Affect: Mood is anxious. Diagnostic Study Results     Labs -     Recent Results (from the past 12 hour(s))   CBC WITH AUTOMATED DIFF    Collection Time: 06/02/21  7:04 PM   Result Value Ref Range    WBC 10.1 3.6 - 11.0 K/uL    RBC 4.24 3.80 - 5.20 M/uL    HGB 13.3 11.5 - 16.0 g/dL    HCT 39.4 35.0 - 47.0 %    MCV 92.9 80.0 - 99.0 FL    MCH 31.4 26.0 - 34.0 PG    MCHC 33.8 30.0 - 36.5 g/dL    RDW 11.9 11.5 - 14.5 %    PLATELET 741 068 - 612 K/uL    MPV 9.9 8.9 - 12.9 FL    NRBC 0.0 0.0  WBC    ABSOLUTE NRBC 0.00 0.00 - 0.01 K/uL    NEUTROPHILS 57 32 - 75 %    LYMPHOCYTES 26 12 - 49 %    MONOCYTES 10 5 - 13 %    EOSINOPHILS 6 0 - 7 %    BASOPHILS 1 0 - 1 %    IMMATURE GRANULOCYTES 0 0 - 0.5 %    ABS. NEUTROPHILS 5.9 1.8 - 8.0 K/UL    ABS. LYMPHOCYTES 2.6 0.8 - 3.5 K/UL    ABS. MONOCYTES 1.0 0.0 - 1.0 K/UL    ABS. EOSINOPHILS 0.6 (H) 0.0 - 0.4 K/UL    ABS. BASOPHILS 0.1 0.0 - 0.1 K/UL    ABS. IMM.  GRANS. 0.0 0.00 - 0.04 K/UL    DF AUTOMATED     METABOLIC PANEL, COMPREHENSIVE    Collection Time: 06/02/21  7:04 PM   Result Value Ref Range    Sodium 137 136 - 145 mmol/L    Potassium 4.5 3.5 - 5.1 mmol/L    Chloride 109 (H) 97 - 108 mmol/L    CO2 19 (L) 21 - 32 mmol/L    Anion gap 9 5 - 15 mmol/L    Glucose 102 (H) 65 - 100 mg/dL    BUN 20 6 - 20 mg/dL    Creatinine 1.33 (H) 0.55 - 1.02 mg/dL    BUN/Creatinine ratio 15 12 - 20      GFR est AA 45 (L) >60 ml/min/1.73m2    GFR est non-AA 37 (L) >60 ml/min/1.73m2    Calcium 8.8 8.5 - 10.1 mg/dL    Bilirubin, total 0.2 0.2 - 1.0 mg/dL    AST (SGOT) 13 (L) 15 - 37 U/L    ALT (SGPT) 15 12 - 78 U/L    Alk. phosphatase 87 45 - 117 U/L    Protein, total 6.9 6.4 - 8.2 g/dL    Albumin 3.4 (L) 3.5 - 5.0 g/dL    Globulin 3.5 2.0 - 4.0 g/dL    A-G Ratio 1.0 (L) 1.1 - 2.2     CK W/ REFLX CKMB    Collection Time: 06/02/21  7:04 PM   Result Value Ref Range    .0 26 - 192 ng/mL   TROPONIN I    Collection Time: 06/02/21  7:04 PM   Result Value Ref Range    Troponin-I, Qt. <0.05 <0.05 ng/mL       Radiologic Studies -   [unfilled]  CT Results  (Last 48 hours)    None        CXR Results  (Last 48 hours)               06/02/21 1856  XR CHEST PORT Final result    Impression:  1. There are moderate atherosclerotic vascular changes of the thoracic aorta. The patient may also have coronary artery atherosclerotic vascular changes which   could be a source for chest pain. 2.  This examination is negative for acute pulmonary parenchymal pathology. There is a mild degree of chronic baseline peribronchial cuffing without change. Narrative: The study is a single view chest radiographic examination dated 6/2/2021. HISTORY: Shortness of breath. COMPARISON:  1/24/2021. FINDINGS: The heart size is normal. The pulmonary vessels are normal in caliber. The lung parenchyma is clear without an infiltrate or atelectasis. There is a   mild degree of chronic baseline peribronchial cuffing. The costophrenic angles   are maintained without pleural effusions or a pneumothorax. There is a midline   position to the trachea. There are atherosclerotic vascular changes of the   thoracic aorta. The pulmonary bia are symmetrical.       The bony thorax is intact without fractures/acute osseous abnormalities.                   Medical Decision Making and ED Course   I am the first provider for this patient. I reviewed the vital signs, available nursing notes, past medical history, past surgical history, family history and social history. Vital Signs-Reviewed the patient's vital signs. Patient Vitals for the past 12 hrs:   Temp Pulse Resp BP SpO2   06/02/21 1937     100 %   06/02/21 1923     100 %   06/02/21 1922  79      06/02/21 1833     100 %   06/02/21 1829 98.2 °F (36.8 °C) 69 20 (!) 120/49 99 %       EKG interpretation: (Preliminary)  Normal sinus rhythm 62, no ST elevations, normal axis      Records Reviewed: Nursing Notes and Old Medical Records    The patient presents with shortness of breath with a differential diagnosis of acute asthma exacerbation, ACS, pneumonia, anxiety reaction      Provider Notes (Medical Decision Making):     MDM   80-year-old female, history of COPD, hypertension, anxiety, presents to emergency department for shortness of breath acute anxiety reaction. Exam shows anxious female, complaining of shortness of breath however vital signs remained stable, oxygenating at 99 to 100% on room air, clear breath sounds bilateral    Basic lab work drawn including cardiac enzymes, chest x-ray ordered, 1 albuterol nebulizer ordered        ED Course:   Initial assessment performed. The patients presenting problems have been discussed, and they are in agreement with the care plan formulated and outlined with them. I have encouraged them to ask questions as they arise throughout their visit. 9:15 PM  Patient's lab work resulting, CMP shows normal electrolytes, CBC shows no leukocytosis, troponin negative, chest x-ray shows no acute abnormalities. Status post nebulizer treatment patient calm no distress, resting comfortably.   Discussed results with caretaker and with patient's daughter, will discharge patient home, caretaker requesting additional prescription for nebulizer machine as states her daughter has taken the machine. will discharge patient home with prescription for nebulizer machine, instructed to follow-up with primary care physician within next week as needed. Procedures         Disposition     Discharge      Remove if not discharged  DISCHARGE PLAN:  1. Current Discharge Medication List      CONTINUE these medications which have NOT CHANGED    Details   albuterol (ProAir HFA) 90 mcg/actuation inhaler Take 2 Puffs by inhalation every four (4) hours as needed for Wheezing or Shortness of Breath. Qty: 1 Inhaler, Refills: 0      methylPREDNISolone (Medrol, Brent,) 4 mg tablet Take as directed  Qty: 1 Dose Pack, Refills: 0      carvediloL (COREG) 3.125 mg tablet Take 1 Tab by mouth two (2) times a day. folic acid (FOLVITE) 1 mg tablet TAKE 1 TABLET BY MOUTH EVERY DAY      levothyroxine (SYNTHROID) 100 mcg tablet TAKE 1 TABLET BY MOUTH EVERY DAY      ALPRAZolam (XANAX) 0.25 mg tablet TAKE 1 TABLET BY MOUTH EVERY 12 HOURS AS NEEDED      cyanocobalamin 1,000 mcg tablet Take 1,000 mcg by mouth daily. 2.   Follow-up Information     Follow up With Specialties Details Why Contact Info    Your primary care physician            3. Return to ED if worse     Diagnosis     Clinical Impression:   1. Anxiety in acute stress reaction        Attestations:    Gustavo Vásquez MD    Please note that this dictation was completed with Webstep, the computer voice recognition software. Quite often unanticipated grammatical, syntax, homophones, and other interpretive errors are inadvertently transcribed by the computer software. Please disregard these errors. Please excuse any errors that have escaped final proofreading. Thank you.

## 2021-06-04 LAB
ATRIAL RATE: 62 BPM
CALCULATED P AXIS, ECG09: 63 DEGREES
CALCULATED R AXIS, ECG10: 11 DEGREES
CALCULATED T AXIS, ECG11: 57 DEGREES
DIAGNOSIS, 93000: NORMAL
P-R INTERVAL, ECG05: 162 MS
Q-T INTERVAL, ECG07: 444 MS
QRS DURATION, ECG06: 74 MS
QTC CALCULATION (BEZET), ECG08: 450 MS
VENTRICULAR RATE, ECG03: 62 BPM

## 2021-06-13 DIAGNOSIS — B35.1 ONYCHOMYCOSIS: ICD-10-CM

## 2021-06-14 RX ORDER — CICLOPIROX 80 MG/ML
6.6 SOLUTION TOPICAL
Qty: 6.6 ML | Refills: 2 | Status: SHIPPED | OUTPATIENT
Start: 2021-06-14 | End: 2021-09-12

## 2021-09-20 ENCOUNTER — APPOINTMENT (OUTPATIENT)
Dept: GENERAL RADIOLOGY | Age: 86
DRG: 280 | End: 2021-09-20
Attending: NURSE PRACTITIONER
Payer: MEDICARE

## 2021-09-20 ENCOUNTER — HOSPITAL ENCOUNTER (INPATIENT)
Age: 86
LOS: 7 days | Discharge: HOME HEALTH | DRG: 280 | End: 2021-09-27
Attending: FAMILY MEDICINE | Admitting: FAMILY MEDICINE
Payer: MEDICARE

## 2021-09-20 DIAGNOSIS — J44.1 CHRONIC OBSTRUCTIVE PULMONARY DISEASE WITH ACUTE EXACERBATION (HCC): ICD-10-CM

## 2021-09-20 DIAGNOSIS — R06.02 SOB (SHORTNESS OF BREATH): Primary | ICD-10-CM

## 2021-09-20 DIAGNOSIS — M79.606 PAIN OF LOWER EXTREMITY, UNSPECIFIED LATERALITY: ICD-10-CM

## 2021-09-20 DIAGNOSIS — R77.8 ELEVATED TROPONIN: ICD-10-CM

## 2021-09-20 PROBLEM — J44.9 COPD (CHRONIC OBSTRUCTIVE PULMONARY DISEASE) (HCC): Status: ACTIVE | Noted: 2021-09-20

## 2021-09-20 PROBLEM — I21.4 NSTEMI (NON-ST ELEVATED MYOCARDIAL INFARCTION) (HCC): Status: ACTIVE | Noted: 2021-09-20

## 2021-09-20 LAB
ABO + RH BLD: NORMAL
ALBUMIN SERPL-MCNC: 3.6 G/DL (ref 3.5–5)
ALBUMIN/GLOB SERPL: 0.8 {RATIO} (ref 1.1–2.2)
ALP SERPL-CCNC: 90 U/L (ref 45–117)
ALT SERPL-CCNC: 17 U/L (ref 12–78)
ANION GAP SERPL CALC-SCNC: 4 MMOL/L (ref 5–15)
APPEARANCE UR: CLEAR
APTT PPP: 31.2 SEC (ref 21.2–34.1)
ARTERIAL PATENCY WRIST A: ABNORMAL
AST SERPL W P-5'-P-CCNC: 34 U/L (ref 15–37)
BACTERIA URNS QL MICRO: ABNORMAL /HPF
BACTERIA URNS QL MICRO: ABNORMAL /HPF
BASE DEFICIT BLDA-SCNC: 0.3 MMOL/L (ref 0–2)
BASOPHILS # BLD: 0.1 K/UL (ref 0–0.1)
BASOPHILS NFR BLD: 0 % (ref 0–1)
BDY SITE: ABNORMAL
BILIRUB SERPL-MCNC: 0.4 MG/DL (ref 0.2–1)
BILIRUB UR QL: NEGATIVE
BLOOD GROUP ANTIBODIES SERPL: NEGATIVE
BNP SERPL-MCNC: 606 PG/ML
BUN SERPL-MCNC: 22 MG/DL (ref 6–20)
BUN/CREAT SERPL: 16 (ref 12–20)
CA-I BLD-MCNC: 9.1 MG/DL (ref 8.5–10.1)
CHLORIDE SERPL-SCNC: 105 MMOL/L (ref 97–108)
CO2 SERPL-SCNC: 25 MMOL/L (ref 21–32)
COLOR UR: ABNORMAL
CREAT SERPL-MCNC: 1.38 MG/DL (ref 0.55–1.02)
DIFFERENTIAL METHOD BLD: ABNORMAL
EOSINOPHIL # BLD: 0.6 K/UL (ref 0–0.4)
EOSINOPHIL NFR BLD: 3 % (ref 0–7)
ERYTHROCYTE [DISTWIDTH] IN BLOOD BY AUTOMATED COUNT: 12.4 % (ref 11.5–14.5)
FIO2 ON VENT: 21 %
GLOBULIN SER CALC-MCNC: 4.3 G/DL (ref 2–4)
GLUCOSE SERPL-MCNC: 207 MG/DL (ref 65–100)
GLUCOSE UR STRIP.AUTO-MCNC: NEGATIVE MG/DL
HCO3 BLDA-SCNC: 24 MMOL/L (ref 22–26)
HCT VFR BLD AUTO: 41.9 % (ref 35–47)
HGB BLD-MCNC: 13.7 G/DL (ref 11.5–16)
HGB UR QL STRIP: NEGATIVE
IMM GRANULOCYTES # BLD AUTO: 0.1 K/UL (ref 0–0.04)
IMM GRANULOCYTES NFR BLD AUTO: 0 % (ref 0–0.5)
INR PPP: 1 (ref 0.9–1.1)
KETONES UR QL STRIP.AUTO: NEGATIVE MG/DL
LEUKOCYTE ESTERASE UR QL STRIP.AUTO: ABNORMAL
LYMPHOCYTES # BLD: 2 K/UL (ref 0.8–3.5)
LYMPHOCYTES NFR BLD: 10 % (ref 12–49)
MCH RBC QN AUTO: 31.5 PG (ref 26–34)
MCHC RBC AUTO-ENTMCNC: 32.7 G/DL (ref 30–36.5)
MCV RBC AUTO: 96.3 FL (ref 80–99)
MONOCYTES # BLD: 1.5 K/UL (ref 0–1)
MONOCYTES NFR BLD: 7 % (ref 5–13)
NEUTS SEG # BLD: 15.9 K/UL (ref 1.8–8)
NEUTS SEG NFR BLD: 80 % (ref 32–75)
NITRITE UR QL STRIP.AUTO: POSITIVE
NRBC # BLD: 0 K/UL (ref 0–0.01)
NRBC BLD-RTO: 0 PER 100 WBC
PCO2 BLDA: 35 MMHG (ref 35–45)
PH BLDA: 7.44 [PH] (ref 7.35–7.45)
PH UR STRIP: 5 [PH] (ref 5–8)
PLATELET # BLD AUTO: 334 K/UL (ref 150–400)
PMV BLD AUTO: 11 FL (ref 8.9–12.9)
PO2 BLDA: 62 MMHG (ref 75–100)
POTASSIUM SERPL-SCNC: 5.8 MMOL/L (ref 3.5–5.1)
PROT SERPL-MCNC: 7.9 G/DL (ref 6.4–8.2)
PROT UR STRIP-MCNC: NEGATIVE MG/DL
PROTHROMBIN TIME: 12.8 SEC (ref 11.9–14.7)
RBC # BLD AUTO: 4.35 M/UL (ref 3.8–5.2)
RBC #/AREA URNS HPF: ABNORMAL /HPF (ref 0–5)
RBC #/AREA URNS HPF: ABNORMAL /HPF (ref 0–5)
SAO2 % BLD: 94 %
SAO2% DEVICE SAO2% SENSOR NAME: ABNORMAL
SODIUM SERPL-SCNC: 134 MMOL/L (ref 136–145)
SP GR UR REFRACTOMETRY: 1 (ref 1–1.03)
SPECIMEN EXP DATE BLD: NORMAL
THERAPEUTIC RANGE,PTTT: NORMAL SEC (ref 82–109)
TROPONIN I SERPL-MCNC: 0.19 NG/ML
UROBILINOGEN UR QL STRIP.AUTO: 0.1 EU/DL (ref 0.1–1)
WBC # BLD AUTO: 20.1 K/UL (ref 3.6–11)
WBC URNS QL MICRO: ABNORMAL /HPF (ref 0–4)
WBC URNS QL MICRO: ABNORMAL /HPF (ref 0–4)

## 2021-09-20 PROCEDURE — 82803 BLOOD GASES ANY COMBINATION: CPT

## 2021-09-20 PROCEDURE — 74011250637 HC RX REV CODE- 250/637: Performed by: NURSE PRACTITIONER

## 2021-09-20 PROCEDURE — 84484 ASSAY OF TROPONIN QUANT: CPT

## 2021-09-20 PROCEDURE — 74011250637 HC RX REV CODE- 250/637: Performed by: FAMILY MEDICINE

## 2021-09-20 PROCEDURE — 85025 COMPLETE CBC W/AUTO DIFF WBC: CPT

## 2021-09-20 PROCEDURE — 74011250636 HC RX REV CODE- 250/636: Performed by: INTERNAL MEDICINE

## 2021-09-20 PROCEDURE — 71045 X-RAY EXAM CHEST 1 VIEW: CPT

## 2021-09-20 PROCEDURE — 85730 THROMBOPLASTIN TIME PARTIAL: CPT

## 2021-09-20 PROCEDURE — 65270000029 HC RM PRIVATE

## 2021-09-20 PROCEDURE — 83880 ASSAY OF NATRIURETIC PEPTIDE: CPT

## 2021-09-20 PROCEDURE — 74011250636 HC RX REV CODE- 250/636: Performed by: NURSE PRACTITIONER

## 2021-09-20 PROCEDURE — 93005 ELECTROCARDIOGRAM TRACING: CPT

## 2021-09-20 PROCEDURE — 80053 COMPREHEN METABOLIC PANEL: CPT

## 2021-09-20 PROCEDURE — 85610 PROTHROMBIN TIME: CPT

## 2021-09-20 PROCEDURE — 74011250636 HC RX REV CODE- 250/636: Performed by: FAMILY MEDICINE

## 2021-09-20 PROCEDURE — 96374 THER/PROPH/DIAG INJ IV PUSH: CPT

## 2021-09-20 PROCEDURE — 86901 BLOOD TYPING SEROLOGIC RH(D): CPT

## 2021-09-20 PROCEDURE — 36600 WITHDRAWAL OF ARTERIAL BLOOD: CPT

## 2021-09-20 PROCEDURE — 74011000250 HC RX REV CODE- 250: Performed by: FAMILY MEDICINE

## 2021-09-20 PROCEDURE — 36415 COLL VENOUS BLD VENIPUNCTURE: CPT

## 2021-09-20 PROCEDURE — 99285 EMERGENCY DEPT VISIT HI MDM: CPT

## 2021-09-20 PROCEDURE — 81001 URINALYSIS AUTO W/SCOPE: CPT

## 2021-09-20 RX ORDER — FUROSEMIDE 10 MG/ML
40 INJECTION INTRAMUSCULAR; INTRAVENOUS
Status: COMPLETED | OUTPATIENT
Start: 2021-09-20 | End: 2021-09-20

## 2021-09-20 RX ORDER — ACETAMINOPHEN 650 MG/1
650 SUPPOSITORY RECTAL
Status: DISCONTINUED | OUTPATIENT
Start: 2021-09-20 | End: 2021-09-27 | Stop reason: HOSPADM

## 2021-09-20 RX ORDER — IPRATROPIUM BROMIDE AND ALBUTEROL SULFATE 2.5; .5 MG/3ML; MG/3ML
3 SOLUTION RESPIRATORY (INHALATION)
Status: DISCONTINUED | OUTPATIENT
Start: 2021-09-20 | End: 2021-09-23

## 2021-09-20 RX ORDER — ACETAMINOPHEN 325 MG/1
650 TABLET ORAL
Status: DISCONTINUED | OUTPATIENT
Start: 2021-09-20 | End: 2021-09-27 | Stop reason: HOSPADM

## 2021-09-20 RX ORDER — ATORVASTATIN CALCIUM 40 MG/1
40 TABLET, FILM COATED ORAL
Status: DISCONTINUED | OUTPATIENT
Start: 2021-09-20 | End: 2021-09-27 | Stop reason: HOSPADM

## 2021-09-20 RX ORDER — FOLIC ACID 1 MG/1
1 TABLET ORAL DAILY
Status: DISCONTINUED | OUTPATIENT
Start: 2021-09-21 | End: 2021-09-27 | Stop reason: HOSPADM

## 2021-09-20 RX ORDER — POLYETHYLENE GLYCOL 3350 17 G/17G
17 POWDER, FOR SOLUTION ORAL DAILY PRN
Status: DISCONTINUED | OUTPATIENT
Start: 2021-09-20 | End: 2021-09-27 | Stop reason: HOSPADM

## 2021-09-20 RX ORDER — LEVOFLOXACIN 5 MG/ML
500 INJECTION, SOLUTION INTRAVENOUS ONCE
Status: COMPLETED | OUTPATIENT
Start: 2021-09-20 | End: 2021-09-20

## 2021-09-20 RX ORDER — CARVEDILOL 3.12 MG/1
3.12 TABLET ORAL 2 TIMES DAILY
Status: DISCONTINUED | OUTPATIENT
Start: 2021-09-20 | End: 2021-09-22

## 2021-09-20 RX ORDER — ALPRAZOLAM 0.5 MG/1
0.25 TABLET ORAL 2 TIMES DAILY
Status: DISCONTINUED | OUTPATIENT
Start: 2021-09-20 | End: 2021-09-27 | Stop reason: HOSPADM

## 2021-09-20 RX ORDER — LANOLIN ALCOHOL/MO/W.PET/CERES
1000 CREAM (GRAM) TOPICAL DAILY
Status: DISCONTINUED | OUTPATIENT
Start: 2021-09-21 | End: 2021-09-20

## 2021-09-20 RX ORDER — BUDESONIDE AND FORMOTEROL FUMARATE DIHYDRATE 160; 4.5 UG/1; UG/1
2 AEROSOL RESPIRATORY (INHALATION) 2 TIMES DAILY
Status: DISCONTINUED | OUTPATIENT
Start: 2021-09-20 | End: 2021-09-21

## 2021-09-20 RX ORDER — ONDANSETRON 2 MG/ML
4 INJECTION INTRAMUSCULAR; INTRAVENOUS
Status: DISCONTINUED | OUTPATIENT
Start: 2021-09-20 | End: 2021-09-27 | Stop reason: HOSPADM

## 2021-09-20 RX ORDER — ASPIRIN 325 MG
325 TABLET ORAL DAILY
Status: DISCONTINUED | OUTPATIENT
Start: 2021-09-21 | End: 2021-09-21

## 2021-09-20 RX ORDER — ASPIRIN 325 MG
325 TABLET ORAL
Status: COMPLETED | OUTPATIENT
Start: 2021-09-20 | End: 2021-09-20

## 2021-09-20 RX ORDER — HEPARIN SODIUM 10000 [USP'U]/100ML
12-25 INJECTION, SOLUTION INTRAVENOUS
Status: DISCONTINUED | OUTPATIENT
Start: 2021-09-20 | End: 2021-09-24

## 2021-09-20 RX ORDER — LEVOTHYROXINE SODIUM 100 UG/1
100 TABLET ORAL DAILY
Status: DISCONTINUED | OUTPATIENT
Start: 2021-09-21 | End: 2021-09-27 | Stop reason: HOSPADM

## 2021-09-20 RX ORDER — ONDANSETRON 4 MG/1
4 TABLET, ORALLY DISINTEGRATING ORAL
Status: DISCONTINUED | OUTPATIENT
Start: 2021-09-20 | End: 2021-09-27 | Stop reason: HOSPADM

## 2021-09-20 RX ADMIN — ALPRAZOLAM 0.25 MG: 0.5 TABLET ORAL at 23:35

## 2021-09-20 RX ADMIN — METHYLPREDNISOLONE SODIUM SUCCINATE 125 MG: 125 INJECTION, POWDER, FOR SOLUTION INTRAMUSCULAR; INTRAVENOUS at 21:17

## 2021-09-20 RX ADMIN — LEVOFLOXACIN 500 MG: 5 INJECTION, SOLUTION INTRAVENOUS at 20:31

## 2021-09-20 RX ADMIN — CARVEDILOL 3.12 MG: 3.12 TABLET, FILM COATED ORAL at 23:35

## 2021-09-20 RX ADMIN — IPRATROPIUM BROMIDE AND ALBUTEROL SULFATE 3 ML: .5; 2.5 SOLUTION RESPIRATORY (INHALATION) at 21:17

## 2021-09-20 RX ADMIN — ATORVASTATIN CALCIUM 40 MG: 40 TABLET, FILM COATED ORAL at 23:35

## 2021-09-20 RX ADMIN — ASPIRIN 325 MG ORAL TABLET 325 MG: 325 PILL ORAL at 18:10

## 2021-09-20 RX ADMIN — FUROSEMIDE 40 MG: 10 INJECTION, SOLUTION INTRAMUSCULAR; INTRAVENOUS at 18:32

## 2021-09-20 RX ADMIN — HEPARIN SODIUM AND DEXTROSE 12 UNITS/KG/HR: 10000; 5 INJECTION INTRAVENOUS at 23:39

## 2021-09-20 NOTE — ED TRIAGE NOTES
EMS dispatched for c/o SOB onset today. Ems' arrival pt was tripoding. IV started and Magnesium given as well as NRB initiated by ems.

## 2021-09-20 NOTE — Clinical Note
Status[de-identified] INPATIENT [101]   Type of Bed: Remote Telemetry [29]   Cardiac Monitoring Required?: Yes   Inpatient Hospitalization Certified Necessary for the Following Reasons: 3.  Patient receiving treatment that can only be provided in an inpatient setting (further clarification in H&P documentation)   Admitting Diagnosis: SOB (shortness of breath) [069085]   Admitting Diagnosis: COPD exacerbation [facility]) [[id number]]   Admitting Physician: Yuko Trotter [[id number]]   Attending Physician: Yuko Trotter [[id number]]   Estimated Length of Stay: 3-4 Midnights   Discharge Plan[de-identified] Home with Office Follow-up

## 2021-09-20 NOTE — ED NOTES
1900 assumed care of pt. Pt appears to be resting comfortably. Denies pain currently. No signs of acute or respiratory distress noted. VS stable    2045 contacted Dr. Bennett Geiger in regards to pt condition. Orders received. Mya Geiger in regards to pt elevated troponin, on-call cardiologist LIDIA Wong paged with no answer. 0170 contacted Dr. Bennett Geiger in regards to pt PTT results.  No orders

## 2021-09-21 ENCOUNTER — APPOINTMENT (OUTPATIENT)
Dept: NON INVASIVE DIAGNOSTICS | Age: 86
DRG: 280 | End: 2021-09-21
Attending: FAMILY MEDICINE
Payer: MEDICARE

## 2021-09-21 LAB
ALBUMIN SERPL-MCNC: 3 G/DL (ref 3.5–5)
ALBUMIN/GLOB SERPL: 0.8 {RATIO} (ref 1.1–2.2)
ALP SERPL-CCNC: 75 U/L (ref 45–117)
ALT SERPL-CCNC: 14 U/L (ref 12–78)
ANION GAP SERPL CALC-SCNC: 8 MMOL/L (ref 5–15)
APTT PPP: 32.7 SEC (ref 21.2–34.1)
APTT PPP: 45.2 SEC (ref 21.2–34.1)
APTT PPP: 72.1 SEC (ref 21.2–34.1)
APTT PPP: >153 SEC (ref 21.2–34.1)
AST SERPL W P-5'-P-CCNC: 18 U/L (ref 15–37)
ATRIAL RATE: 104 BPM
BASOPHILS # BLD: 0 K/UL (ref 0–0.1)
BASOPHILS NFR BLD: 0 % (ref 0–1)
BILIRUB SERPL-MCNC: 0.3 MG/DL (ref 0.2–1)
BNP SERPL-MCNC: 4201 PG/ML
BUN SERPL-MCNC: 22 MG/DL (ref 6–20)
BUN/CREAT SERPL: 16 (ref 12–20)
CA-I BLD-MCNC: 8.4 MG/DL (ref 8.5–10.1)
CALCULATED T AXIS, ECG11: 94 DEGREES
CHLORIDE SERPL-SCNC: 104 MMOL/L (ref 97–108)
CO2 SERPL-SCNC: 24 MMOL/L (ref 21–32)
CREAT SERPL-MCNC: 1.41 MG/DL (ref 0.55–1.02)
D DIMER PPP FEU-MCNC: 0.93 UG/ML(FEU)
DIAGNOSIS, 93000: NORMAL
DIFFERENTIAL METHOD BLD: ABNORMAL
ECHO AR MAX VEL PISA: 384 CM/S
ECHO AV AREA PEAK VELOCITY: 1.03 CM2
ECHO AV AREA VTI: 1.11 CM2
ECHO AV AREA/BSA PEAK VELOCITY: 0.6 CM2/M2
ECHO AV AREA/BSA VTI: 0.6 CM2/M2
ECHO AV MEAN GRADIENT: 5 MMHG
ECHO AV MEAN VELOCITY: 94.8 CM/S
ECHO AV PEAK GRADIENT: 9 MMHG
ECHO AV PEAK VELOCITY: 154 CM/S
ECHO AV REGURGITANT PHT: 321 MS
ECHO AV VTI: 27.7 CM
ECHO EST RA PRESSURE: 3 MMHG
ECHO LA AREA 4C: 12.8 CM2
ECHO LA MAJOR AXIS: 3.2 CM
ECHO LA MINOR AXIS: 1.86 CM
ECHO LV E' SEPTAL VELOCITY: 5.17 CM/S
ECHO LV EDV A2C: 65.9 CM3
ECHO LV EDV A4C: 48 CM3
ECHO LV ESV A2C: 27 CM3
ECHO LV ESV A4C: 36 CM3
ECHO LV INTERNAL DIMENSION DIASTOLIC: 4.04 CM (ref 3.9–5.3)
ECHO LV INTERNAL DIMENSION SYSTOLIC: 3 CM
ECHO LV IVSD: 1.24 CM (ref 0.6–0.9)
ECHO LV MASS 2D: 154.8 G (ref 67–162)
ECHO LV MASS INDEX 2D: 90 G/M2 (ref 43–95)
ECHO LV POSTERIOR WALL DIASTOLIC: 1.03 CM (ref 0.6–0.9)
ECHO LVOT DIAM: 1.4 CM
ECHO LVOT PEAK GRADIENT: 4 MMHG
ECHO LVOT PEAK VELOCITY: 103 CM/S
ECHO LVOT SV: 31 CM3
ECHO LVOT VTI: 20 CM
ECHO MV A VELOCITY: 168 CM/S
ECHO MV AREA PHT: 4.49 CM2
ECHO MV E VELOCITY: 97.5 CM/S
ECHO MV E/A RATIO: 0.58
ECHO MV E/E' SEPTAL: 18.86
ECHO MV PRESSURE HALF TIME (PHT): 49 MS
ECHO MV REGURGITANT VTIA: 181 CM
ECHO PV MAX VELOCITY: 102 CM/S
ECHO PV MEAN GRADIENT: 3 MMHG
ECHO PV PEAK INSTANTANEOUS GRADIENT SYSTOLIC: 4 MMHG
ECHO PV VTI: 17.2 CM
ECHO RA AREA 4C: 8.84 CM2
ECHO RIGHT VENTRICULAR SYSTOLIC PRESSURE (RVSP): 32 MMHG
ECHO RV TAPSE: 1.9 CM (ref 1.5–2)
ECHO TV MAX VELOCITY: 271 CM/S
ECHO TV MEAN GRADIENT: 68 MMHG
ECHO TV REGURGITANT PEAK GRADIENT: 29 MMHG
EOSINOPHIL # BLD: 0 K/UL (ref 0–0.4)
EOSINOPHIL NFR BLD: 0 % (ref 0–7)
ERYTHROCYTE [DISTWIDTH] IN BLOOD BY AUTOMATED COUNT: 12.3 % (ref 11.5–14.5)
GLOBULIN SER CALC-MCNC: 3.8 G/DL (ref 2–4)
GLUCOSE SERPL-MCNC: 235 MG/DL (ref 65–100)
HCT VFR BLD AUTO: 37.6 % (ref 35–47)
HGB BLD-MCNC: 12.5 G/DL (ref 11.5–16)
IMM GRANULOCYTES # BLD AUTO: 0 K/UL (ref 0–0.04)
IMM GRANULOCYTES NFR BLD AUTO: 0 % (ref 0–0.5)
LA VOL DISK BP: 30 CM3 (ref 22–52)
LVOT MG: 2 MMHG
LYMPHOCYTES # BLD: 1.1 K/UL (ref 0.8–3.5)
LYMPHOCYTES NFR BLD: 11 % (ref 12–49)
MCH RBC QN AUTO: 31.2 PG (ref 26–34)
MCHC RBC AUTO-ENTMCNC: 33.2 G/DL (ref 30–36.5)
MCV RBC AUTO: 93.8 FL (ref 80–99)
MONOCYTES # BLD: 0.1 K/UL (ref 0–1)
MONOCYTES NFR BLD: 1 % (ref 5–13)
MV DEC SLOPE: 5820 MM/S2
MV DEC SLOPE: 5820 MM/S2
NEUTS SEG # BLD: 8.4 K/UL (ref 1.8–8)
NEUTS SEG NFR BLD: 88 % (ref 32–75)
NRBC # BLD: 0 K/UL (ref 0–0.01)
NRBC BLD-RTO: 0 PER 100 WBC
P-R INTERVAL, ECG05: 208 MS
PLATELET # BLD AUTO: 316 K/UL (ref 150–400)
PMV BLD AUTO: 10.3 FL (ref 8.9–12.9)
POTASSIUM SERPL-SCNC: 4 MMOL/L (ref 3.5–5.1)
PROT SERPL-MCNC: 6.8 G/DL (ref 6.4–8.2)
Q-T INTERVAL, ECG07: 370 MS
QRS DURATION, ECG06: 122 MS
QTC CALCULATION (BEZET), ECG08: 486 MS
RBC # BLD AUTO: 4.01 M/UL (ref 3.8–5.2)
SODIUM SERPL-SCNC: 136 MMOL/L (ref 136–145)
THERAPEUTIC RANGE,PTTT: ABNORMAL SEC (ref 82–109)
THERAPEUTIC RANGE,PTTT: NORMAL SEC (ref 82–109)
TROPONIN I SERPL-MCNC: 2.51 NG/ML
VENTRICULAR RATE, ECG03: 104 BPM
WBC # BLD AUTO: 9.6 K/UL (ref 3.6–11)

## 2021-09-21 PROCEDURE — 84484 ASSAY OF TROPONIN QUANT: CPT

## 2021-09-21 PROCEDURE — 74011000250 HC RX REV CODE- 250: Performed by: FAMILY MEDICINE

## 2021-09-21 PROCEDURE — 94640 AIRWAY INHALATION TREATMENT: CPT

## 2021-09-21 PROCEDURE — 36415 COLL VENOUS BLD VENIPUNCTURE: CPT

## 2021-09-21 PROCEDURE — 85025 COMPLETE CBC W/AUTO DIFF WBC: CPT

## 2021-09-21 PROCEDURE — 83880 ASSAY OF NATRIURETIC PEPTIDE: CPT

## 2021-09-21 PROCEDURE — 74011250637 HC RX REV CODE- 250/637: Performed by: FAMILY MEDICINE

## 2021-09-21 PROCEDURE — 93970 EXTREMITY STUDY: CPT

## 2021-09-21 PROCEDURE — 85379 FIBRIN DEGRADATION QUANT: CPT

## 2021-09-21 PROCEDURE — 94640 AIRWAY INHALATION TREATMENT: CPT | Performed by: FAMILY MEDICINE

## 2021-09-21 PROCEDURE — 65270000029 HC RM PRIVATE

## 2021-09-21 PROCEDURE — 85730 THROMBOPLASTIN TIME PARTIAL: CPT

## 2021-09-21 PROCEDURE — 93306 TTE W/DOPPLER COMPLETE: CPT

## 2021-09-21 PROCEDURE — 80053 COMPREHEN METABOLIC PANEL: CPT

## 2021-09-21 PROCEDURE — 93005 ELECTROCARDIOGRAM TRACING: CPT

## 2021-09-21 PROCEDURE — 74011250636 HC RX REV CODE- 250/636: Performed by: FAMILY MEDICINE

## 2021-09-21 RX ORDER — GUAIFENESIN 100 MG/5ML
81 LIQUID (ML) ORAL DAILY
Status: DISCONTINUED | OUTPATIENT
Start: 2021-09-22 | End: 2021-09-27 | Stop reason: HOSPADM

## 2021-09-21 RX ORDER — LEVOFLOXACIN 5 MG/ML
750 INJECTION, SOLUTION INTRAVENOUS
Status: DISCONTINUED | OUTPATIENT
Start: 2021-09-22 | End: 2021-09-23

## 2021-09-21 RX ORDER — CIPROFLOXACIN 500 MG/1
500 TABLET ORAL 2 TIMES DAILY
COMMUNITY
Start: 2021-09-16 | End: 2021-09-27

## 2021-09-21 RX ORDER — LEVOFLOXACIN 5 MG/ML
500 INJECTION, SOLUTION INTRAVENOUS EVERY 24 HOURS
Status: DISCONTINUED | OUTPATIENT
Start: 2021-09-21 | End: 2021-09-21

## 2021-09-21 RX ORDER — ALBUTEROL SULFATE 0.83 MG/ML
3 SOLUTION RESPIRATORY (INHALATION)
COMMUNITY
Start: 2021-09-17

## 2021-09-21 RX ORDER — BUDESONIDE AND FORMOTEROL FUMARATE DIHYDRATE 160; 4.5 UG/1; UG/1
2 AEROSOL RESPIRATORY (INHALATION)
Status: DISCONTINUED | OUTPATIENT
Start: 2021-09-21 | End: 2021-09-27 | Stop reason: HOSPADM

## 2021-09-21 RX ADMIN — BUDESONIDE AND FORMOTEROL FUMARATE DIHYDRATE 2 PUFF: 160; 4.5 AEROSOL RESPIRATORY (INHALATION) at 07:54

## 2021-09-21 RX ADMIN — METHYLPREDNISOLONE SODIUM SUCCINATE 60 MG: 40 INJECTION, POWDER, FOR SOLUTION INTRAMUSCULAR; INTRAVENOUS at 18:12

## 2021-09-21 RX ADMIN — ALPRAZOLAM 0.25 MG: 0.5 TABLET ORAL at 20:54

## 2021-09-21 RX ADMIN — METHYLPREDNISOLONE SODIUM SUCCINATE 60 MG: 40 INJECTION, POWDER, FOR SOLUTION INTRAMUSCULAR; INTRAVENOUS at 05:10

## 2021-09-21 RX ADMIN — METHYLPREDNISOLONE SODIUM SUCCINATE 60 MG: 40 INJECTION, POWDER, FOR SOLUTION INTRAMUSCULAR; INTRAVENOUS at 12:24

## 2021-09-21 RX ADMIN — BUDESONIDE AND FORMOTEROL FUMARATE DIHYDRATE 2 PUFF: 160; 4.5 AEROSOL RESPIRATORY (INHALATION) at 20:59

## 2021-09-21 RX ADMIN — IPRATROPIUM BROMIDE AND ALBUTEROL SULFATE 3 ML: .5; 2.5 SOLUTION RESPIRATORY (INHALATION) at 07:54

## 2021-09-21 RX ADMIN — BUDESONIDE AND FORMOTEROL FUMARATE DIHYDRATE 2 PUFF: 160; 4.5 AEROSOL RESPIRATORY (INHALATION) at 00:14

## 2021-09-21 RX ADMIN — ACETAMINOPHEN 650 MG: 325 TABLET ORAL at 22:34

## 2021-09-21 RX ADMIN — CARVEDILOL 3.12 MG: 3.12 TABLET, FILM COATED ORAL at 09:38

## 2021-09-21 RX ADMIN — ALPRAZOLAM 0.25 MG: 0.5 TABLET ORAL at 09:38

## 2021-09-21 RX ADMIN — FOLIC ACID 1 MG: 1 TABLET ORAL at 09:38

## 2021-09-21 RX ADMIN — IPRATROPIUM BROMIDE AND ALBUTEROL SULFATE 3 ML: .5; 2.5 SOLUTION RESPIRATORY (INHALATION) at 00:28

## 2021-09-21 RX ADMIN — IPRATROPIUM BROMIDE AND ALBUTEROL SULFATE 3 ML: .5; 2.5 SOLUTION RESPIRATORY (INHALATION) at 20:59

## 2021-09-21 RX ADMIN — ASPIRIN 325 MG ORAL TABLET 325 MG: 325 PILL ORAL at 09:38

## 2021-09-21 RX ADMIN — CARVEDILOL 3.12 MG: 3.12 TABLET, FILM COATED ORAL at 20:53

## 2021-09-21 RX ADMIN — ATORVASTATIN CALCIUM 40 MG: 40 TABLET, FILM COATED ORAL at 22:34

## 2021-09-21 RX ADMIN — IPRATROPIUM BROMIDE AND ALBUTEROL SULFATE 3 ML: .5; 2.5 SOLUTION RESPIRATORY (INHALATION) at 13:02

## 2021-09-21 NOTE — ED PROVIDER NOTES
EMERGENCY DEPARTMENT HISTORY AND PHYSICAL EXAM      Date: 9/20/2021  Patient Name: True Hanson    History of Presenting Illness     Chief Complaint   Patient presents with    Shortness of Breath       History Provided By: Patient    HPI: True Hanson, 80 y.o. female with a past medical history significant COPD and Hypertension, hypothyroid presents to the ED with cc of shortness of breath. Patient started having shortness of breath today. Per EMS patient was tripoding when they arrived. Patient was given IV magnesium and put on a nonrebreather by EMS. Moderate severity, no known exacerbating or relieving factors, no other associated signs and symptoms. Patient specifically denies fever, chills, chest pain, shortness of breath, abdominal pain, nausea, vomiting, diarrhea. There are no other complaints, changes, or physical findings at this time. PCP: None    No current facility-administered medications on file prior to encounter. Current Outpatient Medications on File Prior to Encounter   Medication Sig Dispense Refill    Nebulizer & Compressor machine 1 Each by Does Not Apply route every six (6) hours as needed for Wheezing or Shortness of Breath. 1 Each 0    albuterol (ProAir HFA) 90 mcg/actuation inhaler Take 2 Puffs by inhalation every four (4) hours as needed for Wheezing or Shortness of Breath. 1 Inhaler 0    methylPREDNISolone (Medrol, Brent,) 4 mg tablet Take as directed 1 Dose Pack 0    carvediloL (COREG) 3.125 mg tablet Take 1 Tab by mouth two (2) times a day.  folic acid (FOLVITE) 1 mg tablet TAKE 1 TABLET BY MOUTH EVERY DAY      levothyroxine (SYNTHROID) 100 mcg tablet TAKE 1 TABLET BY MOUTH EVERY DAY      ALPRAZolam (XANAX) 0.25 mg tablet TAKE 1 TABLET BY MOUTH EVERY 12 HOURS AS NEEDED      cyanocobalamin 1,000 mcg tablet Take 1,000 mcg by mouth daily.          Past History     Past Medical History:  Past Medical History:   Diagnosis Date    Anemia     [facility] disease ([facility] [address]     Heart attack ([facility] [address]     Hypertension     Thyroid disease     hypothyroidism       Past Surgical History:  Past Surgical History:   Procedure Laterality Date    HX ORTHOPAEDIC  2018    total hip replacement    HX PACEMAKER         Family History:  Family History   Problem Relation Age of Onset    Stroke Mother     Heart Disease Mother     Stroke Father     Heart Disease Father        Social History:  Social History     Tobacco Use    Smoking status: Never Smoker    Smokeless tobacco: Never Used   Substance Use Topics    Alcohol use: Never    Drug use: Never       Allergies:  No Known Allergies      Review of Systems     Review of Systems   Constitutional: Negative for chills, fatigue and fever. HENT: Negative for congestion, sinus pressure and trouble swallowing. Eyes: Negative for photophobia and pain. Respiratory: Positive for cough and shortness of breath. Cardiovascular: Negative for chest pain and leg swelling. Gastrointestinal: Negative for abdominal pain, diarrhea, nausea and vomiting. Endocrine: Negative for polydipsia, polyphagia and polyuria. Genitourinary: Negative for decreased urine volume, difficulty urinating, dysuria, hematuria and urgency. Musculoskeletal: Negative for back pain, gait problem, myalgias and neck pain. Skin: Negative for pallor and rash. Allergic/Immunologic: Negative for environmental allergies and food allergies. Neurological: Negative for dizziness, facial asymmetry, speech difficulty, numbness and headaches. Hematological: Negative for adenopathy. Does not bruise/bleed easily. Psychiatric/Behavioral: Negative for agitation, self-injury and suicidal ideas. The patient is not nervous/anxious. Physical Exam     Physical Exam  Vitals and nursing note reviewed. Constitutional:       Appearance: Normal appearance. HENT:      Head: Atraumatic.       Right Ear: Tympanic membrane and external ear normal.      Left Ear: Tympanic membrane and external ear normal.      Nose: Nose normal.      Mouth/Throat:      Mouth: Mucous membranes are moist.   Eyes:      Extraocular Movements: Extraocular movements intact. Pupils: Pupils are equal, round, and reactive to light. Cardiovascular:      Rate and Rhythm: Normal rate and regular rhythm. Pulses: Normal pulses. Heart sounds: Normal heart sounds. Pulmonary:      Effort: Tachypnea, accessory muscle usage and respiratory distress present. Breath sounds: Decreased breath sounds present. Abdominal:      General: Abdomen is flat. Palpations: Abdomen is soft. Musculoskeletal:         General: Normal range of motion. Cervical back: Normal range of motion and neck supple. Skin:     General: Skin is warm and dry. Capillary Refill: Capillary refill takes less than 2 seconds. Neurological:      General: No focal deficit present. Mental Status: She is alert and oriented to person, place, and time. Mental status is at baseline. Psychiatric:         Mood and Affect: Mood normal.         Behavior: Behavior normal.         Lab and Diagnostic Study Results     Labs -     Recent Results (from the past 12 hour(s))   CBC WITH AUTOMATED DIFF    Collection Time: 09/20/21  6:21 PM   Result Value Ref Range    WBC 20.1 (H) 3.6 - 11.0 K/uL    RBC 4.35 3.80 - 5.20 M/uL    HGB 13.7 11.5 - 16.0 g/dL    HCT 41.9 35.0 - 47.0 %    MCV 96.3 80.0 - 99.0 FL    MCH 31.5 26.0 - 34.0 PG    MCHC 32.7 30.0 - 36.5 g/dL    RDW 12.4 11.5 - 14.5 %    PLATELET 734 666 - 958 K/uL    MPV 11.0 8.9 - 12.9 FL    NRBC 0.0 0.0  WBC    ABSOLUTE NRBC 0.00 0.00 - 0.01 K/uL    NEUTROPHILS 80 (H) 32 - 75 %    LYMPHOCYTES 10 (L) 12 - 49 %    MONOCYTES 7 5 - 13 %    EOSINOPHILS 3 0 - 7 %    BASOPHILS 0 0 - 1 %    IMMATURE GRANULOCYTES 0 0 - 0.5 %    ABS. NEUTROPHILS 15.9 (H) 1.8 - 8.0 K/UL    ABS. LYMPHOCYTES 2.0 0.8 - 3.5 K/UL    ABS.  MONOCYTES 1.5 (H) 0.0 - 1.0 K/UL    ABS. EOSINOPHILS 0.6 (H) 0.0 - 0.4 K/UL    ABS. BASOPHILS 0.1 0.0 - 0.1 K/UL    ABS. IMM. GRANS. 0.1 (H) 0.00 - 0.04 K/UL    DF AUTOMATED     TROPONIN I    Collection Time: 09/20/21  6:21 PM   Result Value Ref Range    Troponin-I, Qt. 0.19 (H) <7.54 ng/mL   METABOLIC PANEL, COMPREHENSIVE    Collection Time: 09/20/21  6:21 PM   Result Value Ref Range    Sodium 134 (L) 136 - 145 mmol/L    Potassium 5.8 (H) 3.5 - 5.1 mmol/L    Chloride 105 97 - 108 mmol/L    CO2 25 21 - 32 mmol/L    Anion gap 4 (L) 5 - 15 mmol/L    Glucose 207 (H) 65 - 100 mg/dL    BUN 22 (H) 6 - 20 mg/dL    Creatinine 1.38 (H) 0.55 - 1.02 mg/dL    BUN/Creatinine ratio 16 12 - 20      GFR est AA 43 (L) >60 ml/min/1.73m2    GFR est non-AA 36 (L) >60 ml/min/1.73m2    Calcium 9.1 8.5 - 10.1 mg/dL    Bilirubin, total 0.4 0.2 - 1.0 mg/dL    AST (SGOT) 34 15 - 37 U/L    ALT (SGPT) 17 12 - 78 U/L    Alk. phosphatase 90 45 - 117 U/L    Protein, total 7.9 6.4 - 8.2 g/dL    Albumin 3.6 3.5 - 5.0 g/dL    Globulin 4.3 (H) 2.0 - 4.0 g/dL    A-G Ratio 0.8 (L) 1.1 - 2.2     BNP    Collection Time: 09/20/21  6:21 PM   Result Value Ref Range    NT pro- (H) <450 pg/mL   PROTHROMBIN TIME + INR    Collection Time: 09/20/21  7:00 PM   Result Value Ref Range    Prothrombin time 12.8 11.9 - 14.7 sec    INR 1.0 0.9 - 1.1     TYPE & SCREEN    Collection Time: 09/20/21  7:12 PM   Result Value Ref Range    Crossmatch Expiration 09/23/2021,2359     ABO/Rh(D) [place] Positive     Antibody screen Negative        Radiologic Studies -   @[email or web address]@  CT Results  (Last 48 hours)    None        CXR Results  (Last 48 hours)               09/20/21 1816  XR CHEST PORT Final result    Impression:  Normal findings. Narrative:  History: Evidence of breath       A single view of the chest was obtained. Heart size is stable. There is some   atherosclerotic change of the aorta. Lungs are clear. No effusions or   pneumothorax. Bony structures are within normal limits. Medical Decision Making   - I am the first provider for this patient. - I reviewed the vital signs, available nursing notes, past medical history, past surgical history, family history and social history. - Initial assessment performed. The patients presenting problems have been discussed, and they are in agreement with the care plan formulated and outlined with them. I have encouraged them to ask questions as they arise throughout their visit. Vital Signs-Reviewed the patient's vital signs. Patient Vitals for the past 12 hrs:   Temp Pulse Resp BP SpO2   09/20/21 2050 -- 94 19 130/61 97 %   09/20/21 1919 -- -- -- -- 97 %   09/20/21 1918 -- 98 27 (!) 144/59 97 %   09/20/21 1832 -- (!) 106 -- (!) 157/54 --   09/20/21 1825 -- (!) 105 24 (!) 167/73 96 %   09/20/21 1804 -- (!) 102 25 (!) 150/62 100 %   09/20/21 1740 97.5 °F (36.4 °C) (!) 101 25 (!) 145/57 100 %       Records Reviewed: Nursing Notes and Old Medical Records          ED Course:          Provider Notes (Medical Decision Making):   DDx includes STEMI, NSTEMI, Angina, PE, Aortic Pathology, Chest Wall Pain, Pleurisy, Pneumonia, GERD/esophagitis, Anxiety. No cough/fever or focal lung findings to suggest pneumonia. No tachycardia, hypoxia or pleuritic component to suggest PE. Pulses symmetric and no extremely elevated BP/asymmetry or classic tearing sensation to suggest Aortic Dissection. Also, no neuro findings. No wretching/forceful vomiting to suggest esophageal disaster. Denies IV drug abuse, has native valves, no fevers/murmurs or skin lesions to suggest endocarditis. Will evaluate with EKG, labs, cardiac enzymes, chest x-ray. Will provide pain control and reassess.     MDM       Procedures   Medical Decision Makingedical Decision Making  Performed by: Maris Dubois NP  PROCEDURES:  Procedures       Disposition   Disposition: Admitted to Floor Stepdown Unit the case was discussed with the admitting physician Admitted    DISCHARGE PLAN:  1. Current Discharge Medication List      CONTINUE these medications which have NOT CHANGED    Details   Nebulizer & Compressor machine 1 Each by Does Not Apply route every six (6) hours as needed for Wheezing or Shortness of Breath. Qty: 1 Each, Refills: 0      albuterol (ProAir HFA) 90 mcg/actuation inhaler Take 2 Puffs by inhalation every four (4) hours as needed for Wheezing or Shortness of Breath. Qty: 1 Inhaler, Refills: 0      methylPREDNISolone (Medrol, Brent,) 4 mg tablet Take as directed  Qty: 1 Dose Pack, Refills: 0      carvediloL (COREG) 3.125 mg tablet Take 1 Tab by mouth two (2) times a day. folic acid (FOLVITE) 1 mg tablet TAKE 1 TABLET BY MOUTH EVERY DAY      levothyroxine (SYNTHROID) 100 mcg tablet TAKE 1 TABLET BY MOUTH EVERY DAY      ALPRAZolam (XANAX) 0.25 mg tablet TAKE 1 TABLET BY MOUTH EVERY 12 HOURS AS NEEDED      cyanocobalamin 1,000 mcg tablet Take 1,000 mcg by mouth daily. 2.   Follow-up Information    None       3. Return to ED if worse   4. Current Discharge Medication List            Diagnosis     Clinical Impression:   1. SOB (shortness of breath)    2. Chronic obstructive pulmonary disease with acute exacerbation (HCC)    3. Elevated troponin        Attestations:    Martell Cohn NP    Please note that this dictation was completed with "[employer]", the computer voice recognition software. Quite often unanticipated grammatical, syntax, homophones, and other interpretive errors are inadvertently transcribed by the computer software. Please disregard these errors. Please excuse any errors that have escaped final proofreading. Thank you.

## 2021-09-21 NOTE — ED NOTES
Bedside shift change report given to Natalie Bryan RN  (oncoming nurse) by James Sawant RN  (offgoing nurse). Report included the following information SBAR and ED Summary.

## 2021-09-21 NOTE — H&P
History and Physical    Subjective:     Jessi Harris is a 80 y.o. white female with a history of COPD, HTN, hypothyroid who presents with SOB x 1 day. Patient notes that the onset was sudden. She endorses having dizziness. Patient lives alone, with daughter nearby. She explains that she called her daughter when she began having the SOB. EMS services were then contacted. On arrival it was noted that the patient was tripoding. She was given IV magnesium and placed on a non rebreathe. Patient denies fever, chills, chest pain, or abdominal pain. On admission: WBC 20.1  K+- 5.8  AST- 34  Troponin 0.19    GFR: 36    Blood gases: pH: 7.44, CO2:35 PO2: 62 Bicarb: 24 O2 sat: 94 RA   CXR: nl    UA: + nitrites, trace Leukocyte esterase  5-10 WBC  1+ bacteria      INR: 1.0  PTT: 12.8  aPTT: 31.2  Past Medical History:   Diagnosis Date    Anemia     [facility] ([facility] [address]     Heart attack ([facility] [address]     Hypertension     Thyroid disease     hypothyroidism      Past Surgical History:   Procedure Laterality Date    HX ORTHOPAEDIC  2018    total hip replacement    HX PACEMAKER       Family History   Problem Relation Age of Onset    Stroke Mother     Heart Disease Mother     Stroke Father     Heart Disease Father       Social History     Tobacco Use    Smoking status: Never Smoker    Smokeless tobacco: Never Used   Substance Use Topics    Alcohol use: Never       Prior to Admission medications    Medication Sig Start Date End Date Taking? Authorizing Provider   Nebulizer & Compressor machine 1 Each by Does Not Apply route every six (6) hours as needed for Wheezing or Shortness of Breath.  6/2/21   Claudette Days, MD   albuterol (ProAir HFA) 90 mcg/actuation inhaler Take 2 Puffs by inhalation every four (4) hours as needed for Wheezing or Shortness of Breath. 1/24/21   Brenda Rodriguez DO   methylPREDNISolone (Medrol, Brent,) 4 mg tablet Take as directed 1/24/21   Brenda Rodriguez DO   carvediloL (COREG) 3.125 mg tablet Take 1 Tab by mouth two (2) times a day. 9/28/20   Provider, Historical   folic acid (FOLVITE) 1 mg tablet TAKE 1 TABLET BY MOUTH EVERY DAY 11/9/20   Provider, Historical   levothyroxine (SYNTHROID) 100 mcg tablet TAKE 1 TABLET BY MOUTH EVERY DAY 11/16/20   Provider, Historical   ALPRAZolam (XANAX) 0.25 mg tablet TAKE 1 TABLET BY MOUTH EVERY 12 HOURS AS NEEDED 11/9/20   Provider, Historical   cyanocobalamin 1,000 mcg tablet Take 1,000 mcg by mouth daily. Provider, Historical     No Known Allergies     Review of Systems:      Review of Systems   Constitutional: Negative for chills, fatigue and fever. HENT: Negative for congestion, sinus pressure and trouble swallowing. Eyes: Negative for photophobia and pain. Respiratory: Positive for cough and shortness of breath. Cardiovascular: Negative for chest pain and leg swelling. Gastrointestinal: Negative for abdominal pain, diarrhea, nausea and vomiting. Endocrine: Negative for polydipsia, polyphagia and polyuria. Genitourinary: Negative for decreased urine volume, difficulty urinating, dysuria, hematuria and urgency. Musculoskeletal: Negative for back pain, gait problem, myalgias and neck pain. Skin: Negative for pallor and rash. Allergic/Immunologic: Negative for environmental allergies and food allergies. Neurological: Negative for dizziness, facial asymmetry, speech difficulty, numbness and headaches. Hematological: Negative for adenopathy. Does not bruise/bleed easily. Psychiatric/Behavioral: Negative for agitation, self-injury and suicidal ideas. The patient is not nervous/anxious.         Objective:   Physical Exam  Vitals and nursing note reviewed. Constitutional:       Appearance: Normal appearance. HENT:      Head: Atraumatic.       Right Ear: Tympanic membrane and external ear normal.      Left Ear: Tympanic membrane and external ear normal.      Nose: Nose normal.      Mouth/Throat: Mouth: Mucous membranes are moist.   Eyes:      Extraocular Movements: Extraocular movements intact. Pupils: Pupils are equal, round, and reactive to light. Cardiovascular:      Rate and Rhythm: Normal rate and regular rhythm. Pulses: Normal pulses. Heart sounds: Normal heart sounds. Pulmonary:      Effort: Tachypnea, accessory muscle usage and respiratory distress present. Breath sounds: Decreased breath sounds present. Abdominal:      General: Abdomen is flat. Palpations: Abdomen is soft. Musculoskeletal:         General: Normal range of motion. Cervical back: Normal range of motion and neck supple. Skin:     General: Skin is warm and dry. Capillary Refill: Capillary refill takes less than 2 seconds. Neurological:      General: No focal deficit present. Mental Status: She is alert and oriented to person, place, and time. Mental status is at baseline. Psychiatric:         Mood and Affect: Mood normal.         Behavior: Behavior normal.         Intake and Output:    No intake/output data recorded. No intake/output data recorded. Data Review:   Recent Results (from the past 24 hour(s))   CBC WITH AUTOMATED DIFF    Collection Time: 09/20/21  6:21 PM   Result Value Ref Range    WBC 20.1 (H) 3.6 - 11.0 K/uL    RBC 4.35 3.80 - 5.20 M/uL    HGB 13.7 11.5 - 16.0 g/dL    HCT 41.9 35.0 - 47.0 %    MCV 96.3 80.0 - 99.0 FL    MCH 31.5 26.0 - 34.0 PG    MCHC 32.7 30.0 - 36.5 g/dL    RDW 12.4 11.5 - 14.5 %    PLATELET 380 430 - 029 K/uL    MPV 11.0 8.9 - 12.9 FL    NRBC 0.0 0.0  WBC    ABSOLUTE NRBC 0.00 0.00 - 0.01 K/uL    NEUTROPHILS 80 (H) 32 - 75 %    LYMPHOCYTES 10 (L) 12 - 49 %    MONOCYTES 7 5 - 13 %    EOSINOPHILS 3 0 - 7 %    BASOPHILS 0 0 - 1 %    IMMATURE GRANULOCYTES 0 0 - 0.5 %    ABS. NEUTROPHILS 15.9 (H) 1.8 - 8.0 K/UL    ABS. LYMPHOCYTES 2.0 0.8 - 3.5 K/UL    ABS. MONOCYTES 1.5 (H) 0.0 - 1.0 K/UL    ABS.  EOSINOPHILS 0.6 (H) 0.0 - 0.4 K/UL ABS. BASOPHILS 0.1 0.0 - 0.1 K/UL    ABS. IMM. GRANS. 0.1 (H) 0.00 - 0.04 K/UL    DF AUTOMATED     TROPONIN I    Collection Time: 09/20/21  6:21 PM   Result Value Ref Range    Troponin-I, Qt. 0.19 (H) <3.13 ng/mL   METABOLIC PANEL, COMPREHENSIVE    Collection Time: 09/20/21  6:21 PM   Result Value Ref Range    Sodium 134 (L) 136 - 145 mmol/L    Potassium 5.8 (H) 3.5 - 5.1 mmol/L    Chloride 105 97 - 108 mmol/L    CO2 25 21 - 32 mmol/L    Anion gap 4 (L) 5 - 15 mmol/L    Glucose 207 (H) 65 - 100 mg/dL    BUN 22 (H) 6 - 20 mg/dL    Creatinine 1.38 (H) 0.55 - 1.02 mg/dL    BUN/Creatinine ratio 16 12 - 20      GFR est AA 43 (L) >60 ml/min/1.73m2    GFR est non-AA 36 (L) >60 ml/min/1.73m2    Calcium 9.1 8.5 - 10.1 mg/dL    Bilirubin, total 0.4 0.2 - 1.0 mg/dL    AST (SGOT) 34 15 - 37 U/L    ALT (SGPT) 17 12 - 78 U/L    Alk.  phosphatase 90 45 - 117 U/L    Protein, total 7.9 6.4 - 8.2 g/dL    Albumin 3.6 3.5 - 5.0 g/dL    Globulin 4.3 (H) 2.0 - 4.0 g/dL    A-G Ratio 0.8 (L) 1.1 - 2.2     BNP    Collection Time: 09/20/21  6:21 PM   Result Value Ref Range    NT pro- (H) <450 pg/mL   PROTHROMBIN TIME + INR    Collection Time: 09/20/21  7:00 PM   Result Value Ref Range    Prothrombin time 12.8 11.9 - 14.7 sec    INR 1.0 0.9 - 1.1     TYPE & SCREEN    Collection Time: 09/20/21  7:12 PM   Result Value Ref Range    Crossmatch Expiration 09/23/2021,2359     ABO/Rh(D) [place] Positive     Antibody screen Negative    URINALYSIS W/ RFLX MICROSCOPIC    Collection Time: 09/20/21  8:52 PM   Result Value Ref Range    Color Yellow/Straw      Appearance Clear Clear      Specific gravity 1.005 1.003 - 1.030      pH (UA) 5.0 5.0 - 8.0      Protein Negative Negative mg/dL    Glucose Negative Negative mg/dL    Ketone Negative Negative mg/dL    Bilirubin Negative Negative      Blood Negative Negative      Urobilinogen 0.1 0.1 - 1.0 EU/dL    Nitrites Positive (A) Negative      Leukocyte Esterase Trace (A) Negative      WBC 5-10 0 - 4 /hpf RBC 0-5 0 - 5 /hpf    Bacteria 1+ (A) Negative /hpf   URINE MICROSCOPIC    Collection Time: 09/20/21  8:52 PM   Result Value Ref Range    WBC 5-10 0 - 4 /hpf    RBC 0-5 0 - 5 /hpf    Bacteria 1+ (A) Negative /hpf   BLOOD GAS, ARTERIAL    Collection Time: 09/20/21  9:30 PM   Result Value Ref Range    pH 7.44 7.35 - 7.45      PCO2 35 35 - 45 mmHg    PO2 62 (L) 75 - 100 mmHg    O2 SAT 94 (L) >95 %    BICARBONATE 24 22 - 26 mmol/L    BASE DEFICIT 0.3 0 - 2 mmol/L    O2 METHOD Room air      FIO2 21.0 %    SITE Right Radial      YAQUELIN'S TEST PASS     PTT    Collection Time: 09/20/21 10:42 PM   Result Value Ref Range    aPTT 31.2 21.2 - 34.1 sec    aPTT, therapeutic range   82 - 109 sec   TROPONIN I    Collection Time: 09/21/21  2:53 AM   Result Value Ref Range    Troponin-I, Qt. 2.51 (H) <0.05 ng/mL   PTT    Collection Time: 09/21/21  4:00 AM   Result Value Ref Range    aPTT >153.0 (HH) 21.2 - 34.1 sec    aPTT, therapeutic range   82 - 072 sec   METABOLIC PANEL, COMPREHENSIVE    Collection Time: 09/21/21  5:00 AM   Result Value Ref Range    Sodium 136 136 - 145 mmol/L    Potassium 4.0 3.5 - 5.1 mmol/L    Chloride 104 97 - 108 mmol/L    CO2 24 21 - 32 mmol/L    Anion gap 8 5 - 15 mmol/L    Glucose 235 (H) 65 - 100 mg/dL    BUN 22 (H) 6 - 20 mg/dL    Creatinine 1.41 (H) 0.55 - 1.02 mg/dL    BUN/Creatinine ratio 16 12 - 20      GFR est AA 42 (L) >60 ml/min/1.73m2    GFR est non-AA 35 (L) >60 ml/min/1.73m2    Calcium 8.4 (L) 8.5 - 10.1 mg/dL    Bilirubin, total 0.3 0.2 - 1.0 mg/dL    AST (SGOT) 18 15 - 37 U/L    ALT (SGPT) 14 12 - 78 U/L    Alk.  phosphatase 75 45 - 117 U/L    Protein, total 6.8 6.4 - 8.2 g/dL    Albumin 3.0 (L) 3.5 - 5.0 g/dL    Globulin 3.8 2.0 - 4.0 g/dL    A-G Ratio 0.8 (L) 1.1 - 2.2     CBC WITH AUTOMATED DIFF    Collection Time: 09/21/21  5:00 AM   Result Value Ref Range    WBC 9.6 3.6 - 11.0 K/uL    RBC 4.01 3.80 - 5.20 M/uL    HGB 12.5 11.5 - 16.0 g/dL    HCT 37.6 35.0 - 47.0 %    MCV 93.8 80.0 - 99.0 FL    MCH 31.2 26.0 - 34.0 PG    MCHC 33.2 30.0 - 36.5 g/dL    RDW 12.3 11.5 - 14.5 %    PLATELET 465 141 - 849 K/uL    MPV 10.3 8.9 - 12.9 FL    NRBC 0.0 0.0  WBC    ABSOLUTE NRBC 0.00 0.00 - 0.01 K/uL    NEUTROPHILS 88 (H) 32 - 75 %    LYMPHOCYTES 11 (L) 12 - 49 %    MONOCYTES 1 (L) 5 - 13 %    EOSINOPHILS 0 0 - 7 %    BASOPHILS 0 0 - 1 %    IMMATURE GRANULOCYTES 0 0 - 0.5 %    ABS. NEUTROPHILS 8.4 (H) 1.8 - 8.0 K/UL    ABS. LYMPHOCYTES 1.1 0.8 - 3.5 K/UL    ABS. MONOCYTES 0.1 0.0 - 1.0 K/UL    ABS. EOSINOPHILS 0.0 0.0 - 0.4 K/UL    ABS. BASOPHILS 0.0 0.0 - 0.1 K/UL    ABS. IMM.  GRANS. 0.0 0.00 - 0.04 K/UL    DF AUTOMATED     PTT    Collection Time: 09/21/21  9:30 AM   Result Value Ref Range    aPTT 32.7 21.2 - 34.1 sec    aPTT, therapeutic range   82 - 109 sec           Assessment:   NSTEMI (elevated troponin)  Troponin 0.19    Acute Hypoxemic respiratory distress  Blood gases: pH: 7.44, CO2:35 PO2: 62 Bicarb: 24 O2 sat: 94 RA  COPD exacerbation  HTN  Hypothyroidism  History of MI  Anemia  Asymptomatic Urinary tract infection  Leukocytosis  WBC: 20.1    Bilateral leg pains            Plan:   EKG  ECHO  D-dimer  Order Doppler studies    Begin aspirin 325 mg PO every day  Begin Lipitor 40 mg PO QHS  Begin heparin 25.000 U in D5W 250 ml infusion  Begin Levaquin 500 mg in D5W IVPB 500 mg, IV once  Begin duo-neb 2.5 mg- 0.5 mg/ 3ml neb Q6H  Begin symbicort 160-4.5 mcg 2 puff BID  Begin synthroid tablet 100 mcg PO every day  Begin solumedrol 60 mg IV Q6H  Begin folvite tablet 1 mg PO every day  Begin Carvedilol tablet 3.125 mg PO BID    Monitor aPTT levels, monitor for signs of bleeding  Continue to monitor troponin levels  Monitor leukocytosis        Consult pulmonology  Consult cardiology

## 2021-09-21 NOTE — CONSULTS
PULMONARY CONSULT  [facility]    Name: [facility] MRN: [id number]   : 1930 Hospital: [address] [place] [address]   Date: 2021  Admission date: 2021 Hospital Day: 2       HPI:     Hospital Problems  Date Reviewed: 2020        Codes Class Noted POA    SOB (shortness of breath) ICD-10-CM: R06.02  ICD-9-CM: 786.05  2021 Unknown        COPD exacerbation ([facility] ICD-10-CM: J44.1  ICD-9-CM: 491.21  2021 Unknown        COPD (chronic obstructive pulmonary disease) ([facility] ICD-10-CM: J44.9  ICD-9-CM: 417  2021 Unknown        NSTEMI (non-ST elevated myocardial infarction) [facility]) ICD-10-CM: I21.4  ICD-9-CM: 410.70  2021 Unknown                   [x] High complexity decision making was performed  [x] See my orders for details      Subjective/Initial History:     I was asked by Lucie Mercedes MD to see [facility]  a 80 y.o.  female in consultation     Excerpts from admission 2021 or consult notes as follows:   80 y.o. female with PMH significant for COPD, HTN, diabetes, and hypothyroidism who presented to the ED via EMS with chief complaint of SOB. Patient developed onset of moderate SOB sometime earlier today without any relieving/palliative factors. Patient additionally mentioned some left-sided chest pain. Upon EMS arrival, patient was tripoding -- he received IV Magnesium and placed on NRB. At the time, patient denied any fever, chills, abdominal pain, nausea/vomiting, or diarrhea. Labs in the ED were significant for troponin 0.19 and Pro-. CXR revealed no acute findings. ABG showed pO2 62. Patient was admitted to [address] [place] [address] for further evaluation and Pulmonology was consulted regarding additional management. Patient is very hard of hearing and somewhat of a poor historian.       No Known Allergies     MAR reviewed and pertinent medications noted or modified as needed     Current Facility-Administered Medications Medication    budesonide-formoteroL (SYMBICORT) 160-4.5 mcg/actuation HFA inhaler 2 Puff    methylPREDNISolone (PF) (SOLU-MEDROL) injection 60 mg    albuterol-ipratropium (DUO-NEB) 2.5 MG-0.5 MG/3 ML    ALPRAZolam (XANAX) tablet 0.25 mg    carvediloL (COREG) tablet 0.763 mg    folic acid (FOLVITE) tablet 1 mg    levothyroxine (SYNTHROID) tablet 100 mcg    acetaminophen (TYLENOL) tablet 650 mg    Or    acetaminophen (TYLENOL) suppository 650 mg    polyethylene glycol (MIRALAX) packet 17 g    ondansetron (ZOFRAN ODT) tablet 4 mg    Or    ondansetron (ZOFRAN) injection 4 mg    aspirin tablet 325 mg    atorvastatin (LIPITOR) tablet 40 mg    heparin 25,000 units in D5W 250 ml infusion     Current Outpatient Medications   Medication Sig    Nebulizer & Compressor machine 1 Each by Does Not Apply route every six (6) hours as needed for Wheezing or Shortness of Breath.  albuterol (ProAir HFA) 90 mcg/actuation inhaler Take 2 Puffs by inhalation every four (4) hours as needed for Wheezing or Shortness of Breath.  methylPREDNISolone (Medrol, Brent,) 4 mg tablet Take as directed    carvediloL (COREG) 3.125 mg tablet Take 1 Tab by mouth two (2) times a day.  folic acid (FOLVITE) 1 mg tablet TAKE 1 TABLET BY MOUTH EVERY DAY    levothyroxine (SYNTHROID) 100 mcg tablet TAKE 1 TABLET BY MOUTH EVERY DAY    ALPRAZolam (XANAX) 0.25 mg tablet TAKE 1 TABLET BY MOUTH EVERY 12 HOURS AS NEEDED    cyanocobalamin 1,000 mcg tablet Take 1,000 mcg by mouth daily. Patient PCP: None  PMH:  has a past medical history of Anemia, Chronic obstructive pulmonary disease ([address] Heart attack ([address] Hypertension, and Thyroid disease. PSH:   has a past surgical history that includes hx orthopaedic (2018) and hx pacemaker. FHX: family history includes Heart Disease in her father and mother; Stroke in her father and mother. SHX:  reports that she has never smoked.  She has never used smokeless tobacco. She reports that she does not drink alcohol and does not use drugs. ROS:  Constitutional: Negative for chills and fever. Eyes: Negative for discharge. Respiratory: Positive for shortness of breath. Cardiovascular: Positive for chest pain. Gastrointestinal: Negative for abdominal pain, nausea and vomiting. Skin: Negative for rash. Neurological: Negative for headaches. Objective:     Vital Signs: Telemetry:    normal sinus rhythm Intake/Output:   Visit Vitals  BP (!) 140/58   Pulse 84   Temp 97.5 °F (36.4 °C)   Resp 18   Ht 5' 2\" (1.575 m)   Wt 70.3 kg (155 lb)   SpO2 99%   BMI 28.35 kg/m²       Temp (24hrs), Av.5 °F (36.4 °C), Min:97.5 °F (36.4 °C), Max:97.5 °F (36.4 °C)        O2 Device: Nasal cannula O2 Flow Rate (L/min): 2 l/min       Wt Readings from Last 4 Encounters:   21 70.3 kg (155 lb)   21 68 kg (150 lb)   21 68 kg (150 lb)   20 68 kg (150 lb)        No intake or output data in the 24 hours ending 21 1018    Last shift:      No intake/output data recorded. Last 3 shifts: No intake/output data recorded. Physical Exam:   Constitutional:       General: She is not in acute distress. Comments: Elderly   HENT:      Head: Normocephalic and atraumatic. Ears:      Comments: Hard of hearing. Eyes:      Extraocular Movements: Extraocular movements intact. Pupils: Pupils are equal, round, and reactive to light. Cardiovascular:      Rate and Rhythm: Normal rate and regular rhythm. Pulses: Normal pulses. Heart sounds: Murmur (2/6 systolic murmur, LUSB) heard. Pulmonary:      Effort: Pulmonary effort is normal.      Breath sounds: Wheezing present. Abdominal:      Palpations: Abdomen is soft. Tenderness: There is no abdominal tenderness. Musculoskeletal:         General: Swelling and tenderness present. Normal range of motion. Cervical back: Normal range of motion. Skin:     Coloration: Skin is not jaundiced.    Neurological: General: No focal deficit present. Mental Status: She is alert. Psychiatric:         Mood and Affect: Mood normal.       Labs:    Recent Labs     09/21/21  0500 09/21/21  0400 09/20/21  2242 09/20/21  1900 09/20/21  1821   WBC 9.6  --   --   --  20.1*   HGB 12.5  --   --   --  13.7     --   --   --  334   INR  --   --   --  1.0  --    APTT  --  >153.0* 31.2  --   --      Recent Labs     09/21/21  0500 09/20/21  1821    134*   K 4.0 5.8*    105   CO2 24 25   * 207*   BUN 22* 22*   CREA 1.41* 1.38*   CA 8.4* 9.1   ALB 3.0* 3.6   ALT 14 17     Recent Labs     09/20/21  2130   PH 7.44   PCO2 35   PO2 62*   HCO3 24   FIO2 21.0     Recent Labs     09/21/21  0253 09/20/21  1821   TROIQ 2.51* 0.19*     No results found for: BNPP, BNP   No results found for: CULTNo results found for: TSH, TSHEXT    Imaging:    CXR Results  (Last 48 hours)               09/20/21 1816  XR CHEST PORT Final result    Impression:  Normal findings. Narrative:  History: Evidence of breath       A single view of the chest was obtained. Heart size is stable. There is some   atherosclerotic change of the aorta. Lungs are clear. No effusions or   pneumothorax. Bony structures are within normal limits. Results from Hospital Encounter encounter on 09/20/21    XR CHEST PORT    Narrative  History: Evidence of breath    A single view of the chest was obtained. Heart size is stable. There is some  atherosclerotic change of the aorta. Lungs are clear. No effusions or  pneumothorax. Bony structures are within normal limits. Impression  Normal findings. Results from Hospital Encounter encounter on 06/02/21    XR CHEST PORT    Narrative  The study is a single view chest radiographic examination dated 6/2/2021. HISTORY: Shortness of breath. COMPARISON:  1/24/2021. FINDINGS: The heart size is normal. The pulmonary vessels are normal in caliber.     The lung parenchyma is clear without an infiltrate or atelectasis. There is a  mild degree of chronic baseline peribronchial cuffing. The costophrenic angles  are maintained without pleural effusions or a pneumothorax. There is a midline  position to the trachea. There are atherosclerotic vascular changes of the  thoracic aorta. The pulmonary bia are symmetrical.    The bony thorax is intact without fractures/acute osseous abnormalities. Impression  1. There are moderate atherosclerotic vascular changes of the thoracic aorta. The patient may also have coronary artery atherosclerotic vascular changes which  could be a source for chest pain. 2.  This examination is negative for acute pulmonary parenchymal pathology. There is a mild degree of chronic baseline peribronchial cuffing without change. Results from [place] encounter on 01/24/21    XR CHEST PORT    Narrative  Portable chest, AP upright, 1546 hours. Comparison with 11/19/2019. Stable heart size. Calcified thoracic aorta. The lungs appear clear. No evidence  of pulmonary edema, air space pneumonia, or pleural effusion. No evidence of  pneumothorax. Impression  No evidence of focal airspace pneumonia. Results from [place] encounter on 01/24/21    CT CHEST W CONT    Addendum 2/25/2021 12:51 PM  Addendum: CT dose reduction was achieved through use of a standardized protocol  tailored for this examination and automatic exposure control for dose  modulation. Narrative  Contrast study was cc [id number]    Very mild subpleural fibrosis with otherwise completely clear lungs and patent  central airways. Pulmonary arteries are well-opacified and show no filling defect or distortion. Aorta shows normal dimensions, without dissection. Moderate atherosclerosis with  penetrating ulcer off the left side of the arch.  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 The aortic maximum diameter at this level is 3.3 cm. Patient age noted. No  mediastinal or hilar adenopathy. Coronary calcification. No cardiac finding. No pleural pericardial effusion. No  significant upper abdominal finding    Impression  Clear lungs. No evidence of PE. 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 IMPRESSION:   1. Acute on chronic respiratory failure with hypoxia  · ABG (9/20): Room Air // pH 7.44 // pCO2 35 // pO2 62 // Bicarb 24 // SpO2 94%  2. COPD exacerbation  · CXR (9/20): normal findings. 3. NSTEMI, likely type 2.  4. H/o MI and s/p pacemaker. 5. H/o anemia  6. H/o HTN  7. H/o Hypothyroidism  8. Diabetes Mellitus  9. Hypocalcemia, 9.2 mg/dL when corrected for hypoalbuminemia. 10. Hypoalbuminemia  11. Pt is requiring Drug therapy requiring intensive monitoring for toxicity  12. Prognosis guarded      RECOMMENDATIONS/PLAN:   1. Currently on 2 L/min NC oxygen as salvage oxygen delivery device to provide high concentration of oxygen to overcome refractory hypoxia. 2. Agree with Empiric IV antibiotics pending culture results   3. Follow culture results  4. Awaiting echo results. 5. Order ECG. 6. Current Breathing Management  · DUO-NEB  · SYMBICORT  · SOLU-MEDROL  7. Current Antimicrobials  · LEVAQUIN  8. Supplemental O2 to keep sats > 93%  9. Aspiration precautions  10. Labs to follow electrolytes, renal function and and blood counts  11. Glucose monitoring and SSI  12. Bronchial hygiene with respiratory therapy techniques, bronchodilators  13.  DVT, SUP prophylaxis       This care involved high complexity medical decision making: I personally:  · Reviewed the flowsheet and previous days notes  · Reviewed and summarized records or history from previous days note or discussions with staff, family  · High Risk Drug therapy requiring intensive monitoring for toxicity: eg steroids, pressors, antibiotics  · Reviewed and/or ordered Clinical lab tests  · Reviewed images and/or ordered Radiology tests  · Reviewed the patients ECG / Telemetry  · Reviewed and/or adjusted NiPPV settings  · Called and arranged for Radiologic procedures or interventions  · performed or ordered Diagnostic endoscopies with identified risk factors.   · discussed my assessment/management with : Nursing, Hospitalist and Family for coordination of care          Peng Becker MD

## 2021-09-21 NOTE — ACP (ADVANCE CARE PLANNING)
Advance Care Planning   Healthcare Decision Maker:       Primary Decision Maker: Tiffany Counter - Daughter - [phone] English

## 2021-09-21 NOTE — CONSULTS
Consult    Patient: Urmila Elizabeth MRN: [id number]  SSN: [id number]    YOB: 1930  Age: 80 y.o. Sex: female       Subjective:      Date of  Admission: 9/20/2021     Admission type: Emergency    Urmila Elizabeth is a 80 y.o. female with a history of hypertension, COPD who was admitted to the hospital with complaints of chest pain, shortness of breath. Patient is very hard of hearing and is somewhat poor historian. History is obtained from chart review. Patient admits to having chest pain, unclear if it is related to exertion. She also complains of shortness of breath. There is no history of orthopnea, PND, dizziness, lightheadedness or syncope. She was evaluated by pulmonary and is on empiric antibiotics pending blood cultures as well as on steroids.     Primary Care Provider: None  Past Medical History:   Diagnosis Date    Anemia     Chronic obstructive pulmonary disease ([address]     Heart attack ([address]     Hypertension     Thyroid disease     hypothyroidism      Past Surgical History:   Procedure Laterality Date    HX ORTHOPAEDIC  2018    total hip replacement    HX PACEMAKER       Family History   Problem Relation Age of Onset    Stroke Mother     Heart Disease Mother     Stroke Father     Heart Disease Father       Social History     Tobacco Use    Smoking status: Never Smoker    Smokeless tobacco: Never Used   Substance Use Topics    Alcohol use: Never      Current Facility-Administered Medications   Medication Dose Route Frequency    budesonide-formoteroL (SYMBICORT) 160-4.5 mcg/actuation HFA inhaler 2 Puff  2 Puff Inhalation BID RT    [START ON 9/22/2021] levoFLOXacin (LEVAQUIN) 750 mg in D5W IVPB  750 mg IntraVENous Q48H    methylPREDNISolone (PF) (SOLU-MEDROL) injection 60 mg  60 mg IntraVENous Q6H    albuterol-ipratropium (DUO-NEB) 2.5 MG-0.5 MG/3 ML  3 mL Nebulization Q6H RT    ALPRAZolam (XANAX) tablet 0.25 mg  0.25 mg Oral BID    carvediloL (COREG) tablet 3.125 mg 3.125 mg Oral BID    folic acid (FOLVITE) tablet 1 mg  1 mg Oral DAILY    levothyroxine (SYNTHROID) tablet 100 mcg  100 mcg Oral DAILY    acetaminophen (TYLENOL) tablet 650 mg  650 mg Oral Q6H PRN    Or    acetaminophen (TYLENOL) suppository 650 mg  650 mg Rectal Q6H PRN    polyethylene glycol (MIRALAX) packet 17 g  17 g Oral DAILY PRN    ondansetron (ZOFRAN ODT) tablet 4 mg  4 mg Oral Q8H PRN    Or    ondansetron (ZOFRAN) injection 4 mg  4 mg IntraVENous Q6H PRN    aspirin tablet 325 mg  325 mg Oral DAILY    atorvastatin (LIPITOR) tablet 40 mg  40 mg Oral QHS    heparin 25,000 units in D5W 250 ml infusion  12-25 Units/kg/hr IntraVENous TITRATE     Current Outpatient Medications   Medication Sig    Nebulizer & Compressor machine 1 Each by Does Not Apply route every six (6) hours as needed for Wheezing or Shortness of Breath.  albuterol (ProAir HFA) 90 mcg/actuation inhaler Take 2 Puffs by inhalation every four (4) hours as needed for Wheezing or Shortness of Breath.  methylPREDNISolone (Medrol, Brent,) 4 mg tablet Take as directed    carvediloL (COREG) 3.125 mg tablet Take 1 Tab by mouth two (2) times a day.  folic acid (FOLVITE) 1 mg tablet TAKE 1 TABLET BY MOUTH EVERY DAY    levothyroxine (SYNTHROID) 100 mcg tablet TAKE 1 TABLET BY MOUTH EVERY DAY    ALPRAZolam (XANAX) 0.25 mg tablet TAKE 1 TABLET BY MOUTH EVERY 12 HOURS AS NEEDED    cyanocobalamin 1,000 mcg tablet Take 1,000 mcg by mouth daily. No Known Allergies     Review of Systems:  Review of systems not obtained due to patient factors.        Subjective:     Visit Vitals  BP (!) 140/58   Pulse 70   Temp 97.5 °F (36.4 °C)   Resp 22   Ht 5' 2\" (1.575 m)   Wt 70.3 kg (155 lb)   SpO2 97%   BMI 28.35 kg/m²        Physical Exam:  Visit Vitals  BP (!) 140/58   Pulse 70   Temp 97.5 °F (36.4 °C)   Resp 22   Ht 5' 2\" (1.575 m)   Wt 70.3 kg (155 lb)   SpO2 97%   BMI 28.35 kg/m²     General Appearance:  Well developed, well nourished,alert and oriented x 3, and individual in no acute distress. Ears/Nose/Mouth/Throat:   Hearing grossly normal.         Neck: Supple. Chest:   Reduced sounds b/l   Cardiovascular:  Regular rate and rhythm, S1, S2 normal, diastolic murmur in aortic area   Abdomen:   Soft, non-tender, bowel sounds are active. Extremities: No edema bilaterally. Skin: Warm and dry.              Neck: supple, symmetrical, trachea midline and no JVD  Heart: S1, S2 normal  Lungs: diminished breath sounds bilaterally    Cardiographics:  Telemetry: normal sinus rhythm  ECG: normal EKG, normal sinus rhythm, septal infarct (unchanged from prior study)  Echo (2015): Normal LVEF 55 to 60%, aortic sclerosis without stenosis, moderate AI    Data Reviewed:   BMP:   Lab Results   Component Value Date/Time     09/21/2021 05:00 AM    K 4.0 09/21/2021 05:00 AM     09/21/2021 05:00 AM    CO2 24 09/21/2021 05:00 AM    AGAP 8 09/21/2021 05:00 AM     (H) 09/21/2021 05:00 AM    BUN 22 (H) 09/21/2021 05:00 AM    CREA 1.41 (H) 09/21/2021 05:00 AM    GFRAA 42 (L) 09/21/2021 05:00 AM    GFRNA 35 (L) 09/21/2021 05:00 AM     CMP:   Lab Results   Component Value Date/Time     09/21/2021 05:00 AM    K 4.0 09/21/2021 05:00 AM     09/21/2021 05:00 AM    CO2 24 09/21/2021 05:00 AM    AGAP 8 09/21/2021 05:00 AM     (H) 09/21/2021 05:00 AM    BUN 22 (H) 09/21/2021 05:00 AM    CREA 1.41 (H) 09/21/2021 05:00 AM    GFRAA 42 (L) 09/21/2021 05:00 AM    GFRNA 35 (L) 09/21/2021 05:00 AM    CA 8.4 (L) 09/21/2021 05:00 AM    ALB 3.0 (L) 09/21/2021 05:00 AM    TP 6.8 09/21/2021 05:00 AM    GLOB 3.8 09/21/2021 05:00 AM    AGRAT 0.8 (L) 09/21/2021 05:00 AM    ALT 14 09/21/2021 05:00 AM     CBC:   Lab Results   Component Value Date/Time    WBC 9.6 09/21/2021 05:00 AM    HGB 12.5 09/21/2021 05:00 AM    HCT 37.6 09/21/2021 05:00 AM     09/21/2021 05:00 AM     All Cardiac Markers in the last 24 hours:   Lab Results   Component Value Date/Time    TROIQ 2.51 (H) 09/21/2021 02:53 AM    TROIQ 0.19 (H) 09/20/2021 06:21 PM     Recent Glucose Results:   Lab Results   Component Value Date/Time     (H) 09/21/2021 05:00 AM     (H) 09/20/2021 06:21 PM     ABG:   Lab Results   Component Value Date/Time    PH 7.44 09/20/2021 09:30 PM    PCO2 35 09/20/2021 09:30 PM    PO2 62 (L) 09/20/2021 09:30 PM    HCO3 24 09/20/2021 09:30 PM    FIO2 21.0 09/20/2021 09:30 PM     COAGS:   Lab Results   Component Value Date/Time    APTT 32.7 09/21/2021 09:30 AM    PTP 12.8 09/20/2021 07:00 PM    INR 1.0 09/20/2021 07:00 PM     Liver Panel:   Lab Results   Component Value Date/Time    ALB 3.0 (L) 09/21/2021 05:00 AM    TP 6.8 09/21/2021 05:00 AM    GLOB 3.8 09/21/2021 05:00 AM    AGRAT 0.8 (L) 09/21/2021 05:00 AM    ALT 14 09/21/2021 05:00 AM    AP 75 09/21/2021 05:00 AM        Assessment:         Hospital Problems  Date Reviewed: 12/16/2020        Codes Class Noted POA    SOB (shortness of breath) ICD-10-CM: R06.02  ICD-9-CM: 786.05  9/20/2021 Unknown        COPD exacerbation ([facility] ICD-10-CM: J44.1  ICD-9-CM: 491.21  9/20/2021 Unknown        COPD (chronic obstructive pulmonary disease) ([facility] ICD-10-CM: J44.9  ICD-9-CM: 496  9/20/2021 Unknown        NSTEMI (non-ST elevated myocardial infarction) [facility]) ICD-10-CM: I21.4  ICD-9-CM: 410.70  9/20/2021 Unknown               Plan:   NSTEMI Type I vs Type II  Acute on COPD  Hypertension  Moderate AI    -Agree with heparin infusion for now. Continue to trend troponins. She is chest pain-free for now.  -Continue aspirin at 81 mg p.o. daily. Continue Coreg, high intensity statin.  -I reviewed her outpatient cardiology notes as well as health telephone conversation with her daughter Alex Trejo. As per outpatient records, patient and her daughter have not been interested in pursuing invasive procedures given her age, elevated risk. I discussed this again with the patient as well as her daughter.   For now we will get a transthoracic echocardiogram to evaluate LV and RV function. PE is also in the differential however she would be at increased risk of contrast-induced nephropathy due to chronic kidney disease.  -she has a prior history of a penetrating aortic ulcer which will increase her risk during cardiac catheterization  -She is chest pain-free at this time and on appropriate medical therapy. We will decide on further management based on clinical course, echo findings. Patient and her daughter are in agreement. Thank you for allowing us to participate in the care of your patient. Please do not hesitate to contact me with any questions. Jeet HIGGINS  [facility]  [phone]

## 2021-09-21 NOTE — H&P
History and Physical    Subjective:     Evens Echavarria is a 80 y.o. white female with a history of COPD, HTN, hypothyroid who presents with SOB x 1 day. Patient notes that the onset was sudden. She endorses having dizziness. Patient lives alone, with daughter nearby. She explains that she called her daughter when she began having the SOB. EMS services were then contacted. On arrival it was noted that the patient was tripoding. She was given IV magnesium and placed on a non rebreathe. Patient denies fever, chills, chest pain, or abdominal pain. On admission: WBC 20.1  K+- 5.8  AST- 34  Troponin 0.19    GFR: 36    Blood gases: pH: 7.44, CO2:35 PO2: 62 Bicarb: 24 O2 sat: 94 RA   CXR: nl    UA: + nitrites, trace Leukocyte esterase  5-10 WBC  1+ bacteria      INR: 1.0  PTT: 12.8  aPTT: 31.2  Past Medical History:   Diagnosis Date    Anemia     [facility] obstructive [facility] [address]     Heart attack [facility] [address]     Hypertension     Thyroid disease     hypothyroidism      Past Surgical History:   Procedure Laterality Date    HX ORTHOPAEDIC  2018    total hip replacement    HX PACEMAKER       Family History   Problem Relation Age of Onset    Stroke Mother     Heart Disease Mother     Stroke Father     Heart Disease Father       Social History     Tobacco Use    Smoking status: Never Smoker    Smokeless tobacco: Never Used   Substance Use Topics    Alcohol use: Never       Prior to Admission medications    Medication Sig Start Date End Date Taking? Authorizing Provider   Nebulizer & Compressor machine 1 Each by Does Not Apply route every six (6) hours as needed for Wheezing or Shortness of Breath.  6/2/21   Nahid Mustafa MD   albuterol (ProAir HFA) 90 mcg/actuation inhaler Take 2 Puffs by inhalation every four (4) hours as needed for Wheezing or Shortness of Breath. 1/24/21   Ilean Rae, DO   methylPREDNISolone (Medrol, Brent,) 4 mg tablet Take as directed 1/24/21   Ilean Rae, DO   carvediloL (COREG) 3.125 mg tablet Take 1 Tab by mouth two (2) times a day. 9/28/20   Provider, Historical   folic acid (FOLVITE) 1 mg tablet TAKE 1 TABLET BY MOUTH EVERY DAY 11/9/20   Provider, Historical   levothyroxine (SYNTHROID) 100 mcg tablet TAKE 1 TABLET BY MOUTH EVERY DAY 11/16/20   Provider, Historical   ALPRAZolam (XANAX) 0.25 mg tablet TAKE 1 TABLET BY MOUTH EVERY 12 HOURS AS NEEDED 11/9/20   Provider, Historical   cyanocobalamin 1,000 mcg tablet Take 1,000 mcg by mouth daily. Provider, Historical     No Known Allergies     Review of Systems:      Review of Systems   Constitutional: Negative for chills, fatigue and fever. HENT: Negative for congestion, sinus pressure and trouble swallowing. Eyes: Negative for photophobia and pain. Respiratory: Positive for cough and shortness of breath. Cardiovascular: Negative for chest pain and leg swelling. Gastrointestinal: Negative for abdominal pain, diarrhea, nausea and vomiting. Endocrine: Negative for polydipsia, polyphagia and polyuria. Genitourinary: Negative for decreased urine volume, difficulty urinating, dysuria, hematuria and urgency. Musculoskeletal: Negative for back pain, gait problem, myalgias and neck pain. Skin: Negative for pallor and rash. Allergic/Immunologic: Negative for environmental allergies and food allergies. Neurological: Negative for dizziness, facial asymmetry, speech difficulty, numbness and headaches. Hematological: Negative for adenopathy. Does not bruise/bleed easily. Psychiatric/Behavioral: Negative for agitation, self-injury and suicidal ideas. The patient is not nervous/anxious.         Objective:   Physical Exam  Vitals and nursing note reviewed. Constitutional:       Appearance: Normal appearance. HENT:      Head: Atraumatic.       Right Ear: Tympanic membrane and external ear normal.      Left Ear: Tympanic membrane and external ear normal.      Nose: Nose normal.      Mouth/Throat: Mouth: Mucous membranes are moist.   Eyes:      Extraocular Movements: Extraocular movements intact. Pupils: Pupils are equal, round, and reactive to light. Cardiovascular:      Rate and Rhythm: Normal rate and regular rhythm. Pulses: Normal pulses. Heart sounds: Normal heart sounds. Pulmonary:      Effort: Tachypnea, accessory muscle usage and respiratory distress present. Breath sounds: Decreased breath sounds present. Abdominal:      General: Abdomen is flat. Palpations: Abdomen is soft. Musculoskeletal:         General: Normal range of motion. Cervical back: Normal range of motion and neck supple. Skin:     General: Skin is warm and dry. Capillary Refill: Capillary refill takes less than 2 seconds. Neurological:      General: No focal deficit present. Mental Status: She is alert and oriented to person, place, and time. Mental status is at baseline. Psychiatric:         Mood and Affect: Mood normal.         Behavior: Behavior normal.         Intake and Output:    No intake/output data recorded. No intake/output data recorded. Data Review:   Recent Results (from the past 24 hour(s))   CBC WITH AUTOMATED DIFF    Collection Time: 09/20/21  6:21 PM   Result Value Ref Range    WBC 20.1 (H) 3.6 - 11.0 K/uL    RBC 4.35 3.80 - 5.20 M/uL    HGB 13.7 11.5 - 16.0 g/dL    HCT 41.9 35.0 - 47.0 %    MCV 96.3 80.0 - 99.0 FL    MCH 31.5 26.0 - 34.0 PG    MCHC 32.7 30.0 - 36.5 g/dL    RDW 12.4 11.5 - 14.5 %    PLATELET 627 711 - 635 K/uL    MPV 11.0 8.9 - 12.9 FL    NRBC 0.0 0.0  WBC    ABSOLUTE NRBC 0.00 0.00 - 0.01 K/uL    NEUTROPHILS 80 (H) 32 - 75 %    LYMPHOCYTES 10 (L) 12 - 49 %    MONOCYTES 7 5 - 13 %    EOSINOPHILS 3 0 - 7 %    BASOPHILS 0 0 - 1 %    IMMATURE GRANULOCYTES 0 0 - 0.5 %    ABS. NEUTROPHILS 15.9 (H) 1.8 - 8.0 K/UL    ABS. LYMPHOCYTES 2.0 0.8 - 3.5 K/UL    ABS. MONOCYTES 1.5 (H) 0.0 - 1.0 K/UL    ABS.  EOSINOPHILS 0.6 (H) 0.0 - 0.4 K/UL ABS. BASOPHILS 0.1 0.0 - 0.1 K/UL    ABS. IMM. GRANS. 0.1 (H) 0.00 - 0.04 K/UL    DF AUTOMATED     TROPONIN I    Collection Time: 09/20/21  6:21 PM   Result Value Ref Range    Troponin-I, Qt. 0.19 (H) <5.97 ng/mL   METABOLIC PANEL, COMPREHENSIVE    Collection Time: 09/20/21  6:21 PM   Result Value Ref Range    Sodium 134 (L) 136 - 145 mmol/L    Potassium 5.8 (H) 3.5 - 5.1 mmol/L    Chloride 105 97 - 108 mmol/L    CO2 25 21 - 32 mmol/L    Anion gap 4 (L) 5 - 15 mmol/L    Glucose 207 (H) 65 - 100 mg/dL    BUN 22 (H) 6 - 20 mg/dL    Creatinine 1.38 (H) 0.55 - 1.02 mg/dL    BUN/Creatinine ratio 16 12 - 20      GFR est AA 43 (L) >60 ml/min/1.73m2    GFR est non-AA 36 (L) >60 ml/min/1.73m2    Calcium 9.1 8.5 - 10.1 mg/dL    Bilirubin, total 0.4 0.2 - 1.0 mg/dL    AST (SGOT) 34 15 - 37 U/L    ALT (SGPT) 17 12 - 78 U/L    Alk.  phosphatase 90 45 - 117 U/L    Protein, total 7.9 6.4 - 8.2 g/dL    Albumin 3.6 3.5 - 5.0 g/dL    Globulin 4.3 (H) 2.0 - 4.0 g/dL    A-G Ratio 0.8 (L) 1.1 - 2.2     BNP    Collection Time: 09/20/21  6:21 PM   Result Value Ref Range    NT pro- (H) <450 pg/mL   PROTHROMBIN TIME + INR    Collection Time: 09/20/21  7:00 PM   Result Value Ref Range    Prothrombin time 12.8 11.9 - 14.7 sec    INR 1.0 0.9 - 1.1     TYPE & SCREEN    Collection Time: 09/20/21  7:12 PM   Result Value Ref Range    Crossmatch Expiration 09/23/2021,2359     ABO/Rh(D) Kathee Cruise Positive     Antibody screen Negative    URINALYSIS W/ RFLX MICROSCOPIC    Collection Time: 09/20/21  8:52 PM   Result Value Ref Range    Color Yellow/Straw      Appearance Clear Clear      Specific gravity 1.005 1.003 - 1.030      pH (UA) 5.0 5.0 - 8.0      Protein Negative Negative mg/dL    Glucose Negative Negative mg/dL    Ketone Negative Negative mg/dL    Bilirubin Negative Negative      Blood Negative Negative      Urobilinogen 0.1 0.1 - 1.0 EU/dL    Nitrites Positive (A) Negative      Leukocyte Esterase Trace (A) Negative      WBC 5-10 0 - 4 /hpf RBC 0-5 0 - 5 /hpf    Bacteria 1+ (A) Negative /hpf   URINE MICROSCOPIC    Collection Time: 09/20/21  8:52 PM   Result Value Ref Range    WBC 5-10 0 - 4 /hpf    RBC 0-5 0 - 5 /hpf    Bacteria 1+ (A) Negative /hpf   BLOOD GAS, ARTERIAL    Collection Time: 09/20/21  9:30 PM   Result Value Ref Range    pH 7.44 7.35 - 7.45      PCO2 35 35 - 45 mmHg    PO2 62 (L) 75 - 100 mmHg    O2 SAT 94 (L) >95 %    BICARBONATE 24 22 - 26 mmol/L    BASE DEFICIT 0.3 0 - 2 mmol/L    O2 METHOD Room air      FIO2 21.0 %    SITE Right Radial      YAQUELIN'S TEST PASS     PTT    Collection Time: 09/20/21 10:42 PM   Result Value Ref Range    aPTT 31.2 21.2 - 34.1 sec    aPTT, therapeutic range   82 - 109 sec   TROPONIN I    Collection Time: 09/21/21  2:53 AM   Result Value Ref Range    Troponin-I, Qt. 2.51 (H) <0.05 ng/mL   PTT    Collection Time: 09/21/21  4:00 AM   Result Value Ref Range    aPTT >153.0 (HH) 21.2 - 34.1 sec    aPTT, therapeutic range   82 - 522 sec   METABOLIC PANEL, COMPREHENSIVE    Collection Time: 09/21/21  5:00 AM   Result Value Ref Range    Sodium 136 136 - 145 mmol/L    Potassium 4.0 3.5 - 5.1 mmol/L    Chloride 104 97 - 108 mmol/L    CO2 24 21 - 32 mmol/L    Anion gap 8 5 - 15 mmol/L    Glucose 235 (H) 65 - 100 mg/dL    BUN 22 (H) 6 - 20 mg/dL    Creatinine 1.41 (H) 0.55 - 1.02 mg/dL    BUN/Creatinine ratio 16 12 - 20      GFR est AA 42 (L) >60 ml/min/1.73m2    GFR est non-AA 35 (L) >60 ml/min/1.73m2    Calcium 8.4 (L) 8.5 - 10.1 mg/dL    Bilirubin, total 0.3 0.2 - 1.0 mg/dL    AST (SGOT) 18 15 - 37 U/L    ALT (SGPT) 14 12 - 78 U/L    Alk.  phosphatase 75 45 - 117 U/L    Protein, total 6.8 6.4 - 8.2 g/dL    Albumin 3.0 (L) 3.5 - 5.0 g/dL    Globulin 3.8 2.0 - 4.0 g/dL    A-G Ratio 0.8 (L) 1.1 - 2.2     CBC WITH AUTOMATED DIFF    Collection Time: 09/21/21  5:00 AM   Result Value Ref Range    WBC 9.6 3.6 - 11.0 K/uL    RBC 4.01 3.80 - 5.20 M/uL    HGB 12.5 11.5 - 16.0 g/dL    HCT 37.6 35.0 - 47.0 %    MCV 93.8 80.0 - 99.0 FL    MCH 31.2 26.0 - 34.0 PG    MCHC 33.2 30.0 - 36.5 g/dL    RDW 12.3 11.5 - 14.5 %    PLATELET 760 981 - 466 K/uL    MPV 10.3 8.9 - 12.9 FL    NRBC 0.0 0.0  WBC    ABSOLUTE NRBC 0.00 0.00 - 0.01 K/uL    NEUTROPHILS 88 (H) 32 - 75 %    LYMPHOCYTES 11 (L) 12 - 49 %    MONOCYTES 1 (L) 5 - 13 %    EOSINOPHILS 0 0 - 7 %    BASOPHILS 0 0 - 1 %    IMMATURE GRANULOCYTES 0 0 - 0.5 %    ABS. NEUTROPHILS 8.4 (H) 1.8 - 8.0 K/UL    ABS. LYMPHOCYTES 1.1 0.8 - 3.5 K/UL    ABS. MONOCYTES 0.1 0.0 - 1.0 K/UL    ABS. EOSINOPHILS 0.0 0.0 - 0.4 K/UL    ABS. BASOPHILS 0.0 0.0 - 0.1 K/UL    ABS. IMM.  GRANS. 0.0 0.00 - 0.04 K/UL    DF AUTOMATED             Assessment:   NSTEMI (elevated troponin)  Troponin 0.19    Hypoxemic respiratory distress  Blood gases: pH: 7.44, CO2:35 PO2: 62 Bicarb: 24 O2 sat: 94 RA  R/O PE  CXR nl  SOB  COPD exacerbation  HTN  Hypothyroidism  History of MI  Anemia  Asymptomatic Urinary tract infection  Leukocytosis  WBC: 20.1            Plan:   EKG  ECHO  D-dimer  CTA    Begin aspirin 325 mg PO every day  Begin Lipitor 40 mg PO QHS  Begin heparin 25.000 U in D5W 250 ml infusion  Begin Levaquin 500 mg in D5W IVPB 500 mg, IV once  Begin duo-neb 2.5 mg- 0.5 mg/ 3ml neb Q6H  Begin symbicort 160-4.5 mcg 2 puff BID  Begin synthroid tablet 100 mcg PO every day  Begin solumedrol 60 mg IV Q6H  Begin folvite tablet 1 mg PO every day  Begin Carvedilol tablet 3.125 mg PO BID    Monitor aPTT levels, monitor for signs of bleeding  Continue to monitor troponin levels  Monitor leukocytosis    Consider beginning fosfomycin 3grams PO x 1 dose for UTI    Consult pulmonology  Consult cardiology

## 2021-09-21 NOTE — MED STUDENT NOTES
PULMONARY CONSULT  [facility]    Name: Nelson Bobby MRN: [id number]   : 1930 Hospital: [facility]   Date: 2021  Admission date: 2021 Hospital Day: 2       HPI:     Hospital Problems  Date Reviewed: 2020        Codes Class Noted POA    SOB (shortness of breath) ICD-10-CM: R06.02  ICD-9-CM: 786.05  2021 Unknown        COPD exacerbation ([facility] ICD-10-CM: J44.1  ICD-9-CM: 491.21  2021 Unknown        COPD (chronic obstructive pulmonary disease) ([facility] ICD-10-CM: J44.9  ICD-9-CM: 724  2021 Unknown        NSTEMI (non-ST elevated myocardial infarction) [facility]) ICD-10-CM: I21.4  ICD-9-CM: 410.70  2021 Unknown                   [x] High complexity decision making was performed  [x] See my orders for details      Subjective/Initial History:     I was asked by Saul Hawk MD to see Nelson Bobby  a 80 y.o.  female in consultation     Excerpts from admission 2021 or consult notes as follows:     Nelson Bobby is a 80 y.o. female with PMH significant for COPD, HTN, diabetes, and hypothyroidism who presented to the ED via EMS with chief complaint of SOB. Patient developed onset of moderate SOB sometime earlier today without any relieving/palliative factors. Patient additionally mentioned some left-sided chest pain. Upon EMS arrival, patient was tripoding -- he received IV Magnesium and placed on NRB. At the time, patient denied any fever, chills, abdominal pain, nausea/vomiting, or diarrhea. Labs in the ED were significant for troponin 0.19 and Pro-. CXR revealed no acute findings. ABG showed pO2 62. Patient was admitted to [address] [place] [address] for further evaluation and Pulmonology was consulted regarding additional management.   Patient is very hard of hearing and somewhat of a poor historian.      ---------------------------------------------------------------------------------------------------------  PROGRESS NOTE (9/21/2021 9:42 AM)    Patient is currently on 2 L/min NC and does not complain of any SOB or chest pain at this time. She does, however, complain of bilateral \"aching\" calf pain. Today, her labs are significant for aPTT >153, glucose 235, and troponin increased to 2.51. No Known Allergies     MAR reviewed and pertinent medications noted or modified as needed     Current Facility-Administered Medications   Medication    budesonide-formoteroL (SYMBICORT) 160-4.5 mcg/actuation HFA inhaler 2 Puff    methylPREDNISolone (PF) (SOLU-MEDROL) injection 60 mg    albuterol-ipratropium (DUO-NEB) 2.5 MG-0.5 MG/3 ML    ALPRAZolam (XANAX) tablet 0.25 mg    carvediloL (COREG) tablet 1.228 mg    folic acid (FOLVITE) tablet 1 mg    levothyroxine (SYNTHROID) tablet 100 mcg    acetaminophen (TYLENOL) tablet 650 mg    Or    acetaminophen (TYLENOL) suppository 650 mg    polyethylene glycol (MIRALAX) packet 17 g    ondansetron (ZOFRAN ODT) tablet 4 mg    Or    ondansetron (ZOFRAN) injection 4 mg    aspirin tablet 325 mg    atorvastatin (LIPITOR) tablet 40 mg    heparin 25,000 units in D5W 250 ml infusion     Current Outpatient Medications   Medication Sig    Nebulizer & Compressor machine 1 Each by Does Not Apply route every six (6) hours as needed for Wheezing or Shortness of Breath.  albuterol (ProAir HFA) 90 mcg/actuation inhaler Take 2 Puffs by inhalation every four (4) hours as needed for Wheezing or Shortness of Breath.  methylPREDNISolone (Medrol, Brent,) 4 mg tablet Take as directed    carvediloL (COREG) 3.125 mg tablet Take 1 Tab by mouth two (2) times a day.  folic acid (FOLVITE) 1 mg tablet TAKE 1 TABLET BY MOUTH EVERY DAY    levothyroxine (SYNTHROID) 100 mcg tablet TAKE 1 TABLET BY MOUTH EVERY DAY    ALPRAZolam (XANAX) 0.25 mg tablet TAKE 1 TABLET BY MOUTH EVERY 12 HOURS AS NEEDED    cyanocobalamin 1,000 mcg tablet Take 1,000 mcg by mouth daily.       Patient PCP: None  PMH:  has a past medical history of Anemia, Chronic obstructive pulmonary disease ([facility] Heart attack ([facility] Hypertension, and Thyroid disease. PSH:   has a past surgical history that includes hx orthopaedic (2018) and hx pacemaker. FHX: family history includes Heart Disease in her father and mother; Stroke in her father and mother. SHX:  reports that she has never smoked. She has never used smokeless tobacco. She reports that she does not drink alcohol and does not use drugs. ROS:    Review of Systems   Constitutional: Negative for chills and fever. Eyes: Negative for discharge. Respiratory: Positive for shortness of breath. Cardiovascular: Positive for chest pain. Gastrointestinal: Negative for abdominal pain, nausea and vomiting. Skin: Negative for rash. Neurological: Negative for headaches. Objective:     Vital Signs: Telemetry:    normal sinus rhythm Intake/Output:   Visit Vitals  BP (!) 140/73 (BP 1 Location: Right upper arm, BP Patient Position: At rest)   Pulse 80   Temp 97.5 °F (36.4 °C)   Resp 18   Ht 5' 2\" (1.575 m)   Wt 155 lb (70.3 kg)   SpO2 99%   BMI 28.35 kg/m²       Temp (24hrs), Av.5 °F (36.4 °C), Min:97.5 °F (36.4 °C), Max:97.5 °F (36.4 °C)        O2 Device: Nasal cannula O2 Flow Rate (L/min): 2 l/min       Wt Readings from Last 4 Encounters:   21 155 lb (70.3 kg)   21 150 lb (68 kg)   21 150 lb (68 kg)   20 150 lb (68 kg)        No intake or output data in the 24 hours ending 21 0747    Last shift:      No intake/output data recorded. Last 3 shifts: No intake/output data recorded. Physical Exam:     Physical Exam  Vitals and nursing note reviewed. Constitutional:       General: She is not in acute distress. Comments: Elderly   HENT:      Head: Normocephalic and atraumatic. Ears:      Comments: Hard of hearing. Eyes:      Extraocular Movements: Extraocular movements intact. Pupils: Pupils are equal, round, and reactive to light. Cardiovascular:      Rate and Rhythm: Normal rate and regular rhythm. Pulses: Normal pulses. Heart sounds: Murmur (2/6 systolic murmur, LUSB) heard. Pulmonary:      Effort: Pulmonary effort is normal.      Breath sounds: Wheezing present. Abdominal:      Palpations: Abdomen is soft. Tenderness: There is no abdominal tenderness. Musculoskeletal:         General: Swelling and tenderness present. Normal range of motion. Cervical back: Normal range of motion. Skin:     Coloration: Skin is not jaundiced. Neurological:      General: No focal deficit present. Mental Status: She is alert. Psychiatric:         Mood and Affect: Mood normal.          Labs:    Recent Labs     09/21/21  0500 09/21/21  0400 09/20/21  2242 09/20/21  1900 09/20/21  1821   WBC 9.6  --   --   --  20.1*   HGB 12.5  --   --   --  13.7     --   --   --  334   INR  --   --   --  1.0  --    APTT  --  >153.0* 31.2  --   --      Recent Labs     09/21/21  0500 09/20/21  1821    134*   K 4.0 5.8*    105   CO2 24 25   * 207*   BUN 22* 22*   CREA 1.41* 1.38*   CA 8.4* 9.1   ALB 3.0* 3.6   ALT 14 17     Recent Labs     09/20/21  2130   PH 7.44   PCO2 35   PO2 62*   HCO3 24   FIO2 21.0     Recent Labs     09/21/21  0253 09/20/21  1821   TROIQ 2.51* 0.19*     No results found for: BNPP, BNP   No results found for: CULTNo results found for: TSH, TSHEXT    Imaging:    CXR Results  (Last 48 hours)               09/20/21 1816  XR CHEST PORT Final result    Impression:  Normal findings. Narrative:  History: Evidence of breath       A single view of the chest was obtained. Heart size is stable. There is some   atherosclerotic change of the aorta. Lungs are clear. No effusions or   pneumothorax. Bony structures are within normal limits.                Results from Hospital Encounter encounter on 09/20/21    XR CHEST PORT    Narrative  History: Evidence of breath    A single view of the chest was obtained. Heart size is stable. There is some  atherosclerotic change of the aorta. Lungs are clear. No effusions or  pneumothorax. Bony structures are within normal limits. Impression  Normal findings. Results from Hospital Encounter encounter on 06/02/21    XR CHEST PORT    Narrative  The study is a single view chest radiographic examination dated 6/2/2021. HISTORY: Shortness of breath. COMPARISON:  1/24/2021. FINDINGS: The heart size is normal. The pulmonary vessels are normal in caliber. The lung parenchyma is clear without an infiltrate or atelectasis. There is a  mild degree of chronic baseline peribronchial cuffing. The costophrenic angles  are maintained without pleural effusions or a pneumothorax. There is a midline  position to the trachea. There are atherosclerotic vascular changes of the  thoracic aorta. The pulmonary bia are symmetrical.    The bony thorax is intact without fractures/acute osseous abnormalities. Impression  1. There are moderate atherosclerotic vascular changes of the thoracic aorta. The patient may also have coronary artery atherosclerotic vascular changes which  could be a source for chest pain. 2.  This examination is negative for acute pulmonary parenchymal pathology. There is a mild degree of chronic baseline peribronchial cuffing without change. Results from [place] encounter on 01/24/21    XR CHEST PORT    Narrative  Portable chest, AP upright, 1546 hours. Comparison with 11/19/2019. Stable heart size. Calcified thoracic aorta. The lungs appear clear. No evidence  of pulmonary edema, air space pneumonia, or pleural effusion. No evidence of  pneumothorax. Impression  No evidence of focal airspace pneumonia.     Results from [place] encounter on 01/24/21    CT CHEST W CONT    Addendum 2/25/2021 12:51 PM  Addendum: CT dose reduction was achieved through use of a standardized protocol  tailored for this examination and automatic exposure control for dose  modulation. Narrative  Contrast study was cc [id number]    Very mild subpleural fibrosis with otherwise completely clear lungs and patent  central airways. Pulmonary arteries are well-opacified and show no filling defect or distortion. Aorta shows normal dimensions, without dissection. Moderate atherosclerosis with  penetrating ulcer off the left side of the arch. 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 The aortic maximum diameter at this level is 3.3 cm. Patient age noted. No  mediastinal or hilar adenopathy. Coronary calcification. No cardiac finding. No pleural pericardial effusion. No  significant upper abdominal finding    Impression  Clear lungs. No evidence of PE. 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 IMPRESSION:     1. Acute on chronic respiratory failure with hypoxia  · ABG (9/20): Room Air // pH 7.44 // pCO2 35 // pO2 62 // Bicarb 24 // SpO2 94%  2. COPD exacerbation  · CXR (9/20): normal findings. 3. NSTEMI, likely type 2.  4. H/o MI and s/p pacemaker. 5. H/o anemia  6. H/o HTN  7. H/o Hypothyroidism  8. Diabetes Mellitus  9. Hypocalcemia, 9.2 mg/dL when corrected for hypoalbuminemia. 10. Hypoalbuminemia  11. Pt is requiring Drug therapy requiring intensive monitoring for toxicity  12. Prognosis guarded        RECOMMENDATIONS/PLAN:     1. Currently on 2 L/min NC oxygen as salvage oxygen delivery device to provide high concentration of oxygen to overcome refractory hypoxia. 2. Agree with Empiric IV antibiotics pending culture results   3. Follow culture results  4. Awaiting echo results. 5. Order ECG. 6. Current Breathing Management  · DUO-NEB  · SYMBICORT  · SOLU-MEDROL  7. Current Antimicrobials  · LEVAQUIN  8.  Supplemental O2 to keep sats > 93%  9. Aspiration precautions  10. Labs to follow electrolytes, renal function and and blood counts  11. Glucose monitoring and SSI  12. Bronchial hygiene with respiratory therapy techniques, bronchodilators  13. DVT, SUP prophylaxis  14. Smoking cessation counseling done  15. Pt needs IV fluids with additives and Drug therapy requiring intensive monitoring for toxicity  16. Prescription drug management with home med reconciliation reviewed         This care involved high complexity medical decision making: I personally:  · Reviewed the flowsheet and previous days notes  · Reviewed and summarized records or history from previous days note or discussions with staff, family  · High Risk Drug therapy requiring intensive monitoring for toxicity: eg steroids, pressors, antibiotics  · Reviewed and/or ordered Clinical lab tests  · Reviewed images and/or ordered Radiology tests  · Reviewed the patients ECG / Telemetry  · Reviewed and/or adjusted NiPPV settings  · Called and arranged for Radiologic procedures or interventions  · performed or ordered Diagnostic endoscopies with identified risk factors.   · discussed my assessment/management with : Nursing, Hospitalist and Family for coordination of care          Brittany Uribe

## 2021-09-22 LAB
ALBUMIN SERPL-MCNC: 3.3 G/DL (ref 3.5–5)
ALBUMIN/GLOB SERPL: 0.8 {RATIO} (ref 1.1–2.2)
ALP SERPL-CCNC: 79 U/L (ref 45–117)
ALT SERPL-CCNC: 13 U/L (ref 12–78)
ANION GAP SERPL CALC-SCNC: 9 MMOL/L (ref 5–15)
APTT PPP: 84.5 SEC (ref 21.2–34.1)
APTT PPP: >153 SEC (ref 21.2–34.1)
AST SERPL W P-5'-P-CCNC: 25 U/L (ref 15–37)
BILIRUB SERPL-MCNC: 0.2 MG/DL (ref 0.2–1)
BUN SERPL-MCNC: 30 MG/DL (ref 6–20)
BUN/CREAT SERPL: 23 (ref 12–20)
CA-I BLD-MCNC: 8.7 MG/DL (ref 8.5–10.1)
CHLORIDE SERPL-SCNC: 101 MMOL/L (ref 97–108)
CO2 SERPL-SCNC: 24 MMOL/L (ref 21–32)
CREAT SERPL-MCNC: 1.29 MG/DL (ref 0.55–1.02)
ERYTHROCYTE [DISTWIDTH] IN BLOOD BY AUTOMATED COUNT: 12.2 % (ref 11.5–14.5)
GLOBULIN SER CALC-MCNC: 4 G/DL (ref 2–4)
GLUCOSE SERPL-MCNC: 163 MG/DL (ref 65–100)
HCT VFR BLD AUTO: 38.3 % (ref 35–47)
HGB BLD-MCNC: 12.8 G/DL (ref 11.5–16)
MAGNESIUM SERPL-MCNC: 2.5 MG/DL (ref 1.6–2.4)
MCH RBC QN AUTO: 31 PG (ref 26–34)
MCHC RBC AUTO-ENTMCNC: 33.4 G/DL (ref 30–36.5)
MCV RBC AUTO: 92.7 FL (ref 80–99)
NRBC # BLD: 0 K/UL (ref 0–0.01)
NRBC BLD-RTO: 0 PER 100 WBC
PLATELET # BLD AUTO: 303 K/UL (ref 150–400)
PMV BLD AUTO: 10.8 FL (ref 8.9–12.9)
POTASSIUM SERPL-SCNC: 3.9 MMOL/L (ref 3.5–5.1)
PROT SERPL-MCNC: 7.3 G/DL (ref 6.4–8.2)
RBC # BLD AUTO: 4.13 M/UL (ref 3.8–5.2)
SODIUM SERPL-SCNC: 134 MMOL/L (ref 136–145)
THERAPEUTIC RANGE,PTTT: ABNORMAL SEC (ref 82–109)
THERAPEUTIC RANGE,PTTT: ABNORMAL SEC (ref 82–109)
TROPONIN I SERPL-MCNC: 1 NG/ML
TROPONIN I SERPL-MCNC: 1.27 NG/ML
WBC # BLD AUTO: 20.2 K/UL (ref 3.6–11)

## 2021-09-22 PROCEDURE — 77010033678 HC OXYGEN DAILY

## 2021-09-22 PROCEDURE — 83735 ASSAY OF MAGNESIUM: CPT

## 2021-09-22 PROCEDURE — 65270000029 HC RM PRIVATE

## 2021-09-22 PROCEDURE — 94640 AIRWAY INHALATION TREATMENT: CPT

## 2021-09-22 PROCEDURE — 74011250637 HC RX REV CODE- 250/637: Performed by: INTERNAL MEDICINE

## 2021-09-22 PROCEDURE — 85730 THROMBOPLASTIN TIME PARTIAL: CPT

## 2021-09-22 PROCEDURE — 36415 COLL VENOUS BLD VENIPUNCTURE: CPT

## 2021-09-22 PROCEDURE — 74011000250 HC RX REV CODE- 250: Performed by: FAMILY MEDICINE

## 2021-09-22 PROCEDURE — 74011250636 HC RX REV CODE- 250/636: Performed by: FAMILY MEDICINE

## 2021-09-22 PROCEDURE — 93005 ELECTROCARDIOGRAM TRACING: CPT

## 2021-09-22 PROCEDURE — 93970 EXTREMITY STUDY: CPT | Performed by: SURGERY

## 2021-09-22 PROCEDURE — 94760 N-INVAS EAR/PLS OXIMETRY 1: CPT

## 2021-09-22 PROCEDURE — 85027 COMPLETE CBC AUTOMATED: CPT

## 2021-09-22 PROCEDURE — 74011250637 HC RX REV CODE- 250/637: Performed by: FAMILY MEDICINE

## 2021-09-22 PROCEDURE — 80053 COMPREHEN METABOLIC PANEL: CPT

## 2021-09-22 PROCEDURE — 74011000250 HC RX REV CODE- 250: Performed by: INTERNAL MEDICINE

## 2021-09-22 PROCEDURE — 84484 ASSAY OF TROPONIN QUANT: CPT

## 2021-09-22 RX ORDER — CHOLECALCIFEROL (VITAMIN D3) 125 MCG
5 CAPSULE ORAL
Status: DISCONTINUED | OUTPATIENT
Start: 2021-09-22 | End: 2021-09-27 | Stop reason: HOSPADM

## 2021-09-22 RX ORDER — IPRATROPIUM BROMIDE AND ALBUTEROL SULFATE 2.5; .5 MG/3ML; MG/3ML
3 SOLUTION RESPIRATORY (INHALATION)
Status: DISCONTINUED | OUTPATIENT
Start: 2021-09-22 | End: 2021-09-23

## 2021-09-22 RX ORDER — METOPROLOL TARTRATE 25 MG/1
25 TABLET, FILM COATED ORAL 2 TIMES DAILY
Status: DISCONTINUED | OUTPATIENT
Start: 2021-09-22 | End: 2021-09-27

## 2021-09-22 RX ORDER — HEPARIN SODIUM 1000 [USP'U]/ML
4000 INJECTION, SOLUTION INTRAVENOUS; SUBCUTANEOUS AS NEEDED
Status: DISCONTINUED | OUTPATIENT
Start: 2021-09-22 | End: 2021-09-27 | Stop reason: HOSPADM

## 2021-09-22 RX ORDER — METOPROLOL TARTRATE 5 MG/5ML
5 INJECTION INTRAVENOUS ONCE
Status: COMPLETED | OUTPATIENT
Start: 2021-09-22 | End: 2021-09-22

## 2021-09-22 RX ORDER — HEPARIN SODIUM 1000 [USP'U]/ML
2000 INJECTION, SOLUTION INTRAVENOUS; SUBCUTANEOUS AS NEEDED
Status: DISCONTINUED | OUTPATIENT
Start: 2021-09-22 | End: 2021-09-27 | Stop reason: HOSPADM

## 2021-09-22 RX ADMIN — METHYLPREDNISOLONE SODIUM SUCCINATE 60 MG: 40 INJECTION, POWDER, FOR SOLUTION INTRAMUSCULAR; INTRAVENOUS at 13:26

## 2021-09-22 RX ADMIN — FOLIC ACID 1 MG: 1 TABLET ORAL at 08:46

## 2021-09-22 RX ADMIN — METOPROLOL TARTRATE 25 MG: 25 TABLET, FILM COATED ORAL at 21:48

## 2021-09-22 RX ADMIN — IPRATROPIUM BROMIDE AND ALBUTEROL SULFATE 3 ML: .5; 2.5 SOLUTION RESPIRATORY (INHALATION) at 08:08

## 2021-09-22 RX ADMIN — ALPRAZOLAM 0.25 MG: 0.5 TABLET ORAL at 21:48

## 2021-09-22 RX ADMIN — METHYLPREDNISOLONE SODIUM SUCCINATE 60 MG: 40 INJECTION, POWDER, FOR SOLUTION INTRAMUSCULAR; INTRAVENOUS at 18:15

## 2021-09-22 RX ADMIN — BUDESONIDE AND FORMOTEROL FUMARATE DIHYDRATE 2 PUFF: 160; 4.5 AEROSOL RESPIRATORY (INHALATION) at 08:08

## 2021-09-22 RX ADMIN — MELATONIN TAB 5 MG 5 MG: 5 TAB at 21:47

## 2021-09-22 RX ADMIN — LEVOFLOXACIN 750 MG: 5 INJECTION, SOLUTION INTRAVENOUS at 08:48

## 2021-09-22 RX ADMIN — LEVOTHYROXINE SODIUM 100 MCG: 0.1 TABLET ORAL at 08:46

## 2021-09-22 RX ADMIN — ALPRAZOLAM 0.25 MG: 0.5 TABLET ORAL at 08:46

## 2021-09-22 RX ADMIN — HEPARIN SODIUM AND DEXTROSE 13 UNITS/KG/HR: 10000; 5 INJECTION INTRAVENOUS at 09:00

## 2021-09-22 RX ADMIN — IPRATROPIUM BROMIDE AND ALBUTEROL SULFATE 3 ML: .5; 2.5 SOLUTION RESPIRATORY (INHALATION) at 19:40

## 2021-09-22 RX ADMIN — METHYLPREDNISOLONE SODIUM SUCCINATE 60 MG: 40 INJECTION, POWDER, FOR SOLUTION INTRAMUSCULAR; INTRAVENOUS at 06:09

## 2021-09-22 RX ADMIN — CARVEDILOL 3.12 MG: 3.12 TABLET, FILM COATED ORAL at 08:46

## 2021-09-22 RX ADMIN — HEPARIN SODIUM AND DEXTROSE 10 UNITS/KG/HR: 10000; 5 INJECTION INTRAVENOUS at 19:10

## 2021-09-22 RX ADMIN — METOPROLOL TARTRATE 5 MG: 1 INJECTION, SOLUTION INTRAVENOUS at 13:49

## 2021-09-22 RX ADMIN — ATORVASTATIN CALCIUM 40 MG: 40 TABLET, FILM COATED ORAL at 21:47

## 2021-09-22 RX ADMIN — ASPIRIN 81 MG CHEWABLE TABLET 81 MG: 81 TABLET CHEWABLE at 08:46

## 2021-09-22 RX ADMIN — METHYLPREDNISOLONE SODIUM SUCCINATE 60 MG: 40 INJECTION, POWDER, FOR SOLUTION INTRAMUSCULAR; INTRAVENOUS at 00:19

## 2021-09-22 RX ADMIN — IPRATROPIUM BROMIDE AND ALBUTEROL SULFATE 3 ML: .5; 2.5 SOLUTION RESPIRATORY (INHALATION) at 14:02

## 2021-09-22 RX ADMIN — HEPARIN SODIUM 2000 UNITS: 1000 INJECTION, SOLUTION INTRAVENOUS; SUBCUTANEOUS at 01:44

## 2021-09-22 RX ADMIN — IPRATROPIUM BROMIDE AND ALBUTEROL SULFATE 3 ML: .5; 2.5 SOLUTION RESPIRATORY (INHALATION) at 01:30

## 2021-09-22 RX ADMIN — BUDESONIDE AND FORMOTEROL FUMARATE DIHYDRATE 2 PUFF: 160; 4.5 AEROSOL RESPIRATORY (INHALATION) at 19:41

## 2021-09-22 NOTE — PROGRESS NOTES
09/22/21 1358   Vital Signs   Temp 97.6 °F (36.4 °C)   Pulse (Heart Rate) (!) 117   Heart Rate Source Monitor   Cardiac Rhythm Atrial Fib; Atrial Flutter   Resp Rate 20   O2 Sat (%) 97 %   Level of Consciousness Alert (0)   BP (!) 142/65   MAP (Calculated) 91   BP 1 Method Automatic   BP 1 Location Right upper arm   BP Patient Position Semi fowlers   MEWS Score 3     Post Lopressor IV patient reports some relief.

## 2021-09-22 NOTE — PROGRESS NOTES
Patient report SOB, trouble sleeping, patient went into AFIB, on call provider and cardiology notified, advised to monitor patient, 5 mg melatonin ordered and DouNeb nebulization treatment ordered PRN. Will continue to monitor.

## 2021-09-22 NOTE — PROGRESS NOTES
Hospitalist Progress Note    Subjective:   Daily Progress Note: 9/22/2021 10:03 AM    Vicki Cruz is a 80 y.o. white female with a history of COPD, HTN, hypothyroid who presents with SOB x 1 day. Patient notes that the onset was sudden. She endorses having dizziness. Patient lives alone, with daughter nearby. She explains that she called her daughter when she began having the SOB. EMS services were then contacted. On arrival it was noted that the patient was tripoding. She was given IV magnesium and placed on a non rebreathe. Patient denies fever, chills, chest pain, or abdominal pain.      Today, patient is lying in bed sleeping. On awakening she notes that she is not having chest pain, her breathing has improved. She has no further complaints at this time.     Current Facility-Administered Medications   Medication Dose Route Frequency    heparin (porcine) 1,000 unit/mL injection 4,000 Units  4,000 Units IntraVENous PRN    Or    heparin (porcine) 1,000 unit/mL injection 2,000 Units  2,000 Units IntraVENous PRN    melatonin tablet 5 mg  5 mg Oral QHS    albuterol-ipratropium (DUO-NEB) 2.5 MG-0.5 MG/3 ML  3 mL Nebulization Q6H PRN    budesonide-formoteroL (SYMBICORT) 160-4.5 mcg/actuation HFA inhaler 2 Puff  2 Puff Inhalation BID RT    levoFLOXacin (LEVAQUIN) 750 mg in D5W IVPB  750 mg IntraVENous Q48H    aspirin chewable tablet 81 mg  81 mg Oral DAILY    methylPREDNISolone (PF) (SOLU-MEDROL) injection 60 mg  60 mg IntraVENous Q6H    albuterol-ipratropium (DUO-NEB) 2.5 MG-0.5 MG/3 ML  3 mL Nebulization Q6H RT    ALPRAZolam (XANAX) tablet 0.25 mg  0.25 mg Oral BID    carvediloL (COREG) tablet 3.125 mg  3.125 mg Oral BID    folic acid (FOLVITE) tablet 1 mg  1 mg Oral DAILY    levothyroxine (SYNTHROID) tablet 100 mcg  100 mcg Oral DAILY    acetaminophen (TYLENOL) tablet 650 mg  650 mg Oral Q6H PRN    Or    acetaminophen (TYLENOL) suppository 650 mg  650 mg Rectal Q6H PRN    polyethylene glycol (MIRALAX) packet 17 g  17 g Oral DAILY PRN    ondansetron (ZOFRAN ODT) tablet 4 mg  4 mg Oral Q8H PRN    Or    ondansetron (ZOFRAN) injection 4 mg  4 mg IntraVENous Q6H PRN    atorvastatin (LIPITOR) tablet 40 mg  40 mg Oral QHS    heparin 25,000 units in D5W 250 ml infusion  12-25 Units/kg/hr IntraVENous TITRATE        Review of Systems  Review of Systems   Constitutional: Negative for chills, fatigue and fever. HENT: Negative for congestion, sinus pressure and trouble swallowing.    Eyes: Negative for photophobia and pain. Respiratory: + cough, + minimal SOB    Cardiovascular: Negative for chest pain and leg swelling. Gastrointestinal: Negative for abdominal pain, diarrhea, nausea and vomiting. Endocrine: Negative for polydipsia, polyphagia and polyuria. Genitourinary: Negative for decreased urine volume, difficulty urinating, dysuria, hematuria and urgency. Musculoskeletal: Negative for back pain, gait problem, myalgias and neck pain. Skin: Negative for pallor and rash. Allergic/Immunologic: Negative for environmental allergies and food allergies. Neurological: Negative for dizziness, facial asymmetry, speech difficulty, numbness and headaches. Hematological: Negative for adenopathy. Does not bruise/bleed easily. Psychiatric/Behavioral: Negative for agitation, self-injury and suicidal ideas. The patient is not nervous/anxious. Objective:     Constitutional:       General: She is not in acute distress. HENT:      Head: Normocephalic and atraumatic.      Ears: Patient is unable to hear well, must speak directly into her ears. Eyes:      Extraocular Movements: Extraocular movements intact.      Pupils: Pupils are equal, round, and reactive to light. Cardiovascular:      Rate and Rhythm: Normal rate and regular rhythm.      Pulses: Normal pulses.      Heart sounds:Murmur. Pulmonary:      Effort: Pulmonary effort is normal.      Breath sounds: mild wheezing present.    Abdominal:    Palpations: Abdomen is soft.      Tenderness: There is no abdominal tenderness. .   Neurological:      General: No focal deficit present.      Mental Status: She is alert. Psychiatric:         Mood and Affect: Mood normal.      Visit Vitals  BP (!) 140/65 (BP Patient Position: Lying; At rest)   Pulse 89   Temp 98 °F (36.7 °C)   Resp 20   Ht 5' 2\" (1.575 m)   Wt 155 lb (70.3 kg)   SpO2 99%   BMI 28.35 kg/m²    O2 Flow Rate (L/min): 2 l/min O2 Device: Nasal cannula    Temp (24hrs), Av.8 °F (36.6 °C), Min:97.6 °F (36.4 °C), Max:98 °F (36.7 °C)      No intake/output data recorded.    1901 -  0700  In: -   Out: 1000 [Urine:1000]      Data Review    Recent Results (from the past 24 hour(s))   ECHO ADULT COMPLETE    Collection Time: 21 11:00 AM   Result Value Ref Range    LV ED Vol A4C 48.00 cm3    LV ED Vol A2C 65.90 cm3    LV ES Vol A4C 36.00 cm3    LV ES Vol A2C 27.00 cm3    IVSd 1.24 (A) 0.60 - 0.90 cm    LVIDd 4.04 3.90 - 5.30 cm    LVIDs 3.00 cm    LVOT d 1.40 cm    Left Ventricular Outflow Tract Mean Gradient 2.00 mmHg    LVOT VTI 20.00 cm    LVOT Peak Velocity 103.00 cm/s    LVOT Peak Gradient 4.00 mmHg    LVPWd 1.03 (A) 0.60 - 0.90 cm    LV E' Septal Velocity 5.17 cm/s    E/E' septal 18.86     LVOT SV 31.0 cm3    Left Atrium Major Axis 3.20 cm    LA Area 4C 12.80 cm2    LA Volume DISK BP 30.00 22.0 - 52.0 cm3    Aortic Regurgitant Pressure Half-time 321.00 ms    AR Max Reed 384.00 cm/s    Aortic Valve Systolic Peak Velocity 353.19 cm/s    AoV PG 9.00 mmHg    Aortic Valve Systolic Mean Gradient 8.68 mmHg    AoV VTI 27.70 cm    Aortic valve mean velocity 94.80 cm/s    Aortic Valve Area by Continuity of VTI 1.11 cm2    Aortic Valve Area by Continuity of Peak Velocity 1.03 cm2    TV MG 68.00 mmHg    Mitral Regurgitant Velocity Time Integral 181.00 cm    Mitral Valve Deceleration Sharp 5,820.00 mm/s2    Mitral Valve Deceleration Sharp 5,820.00 mm/s2    Mitral Valve Pressure Half-time 49.00 ms MV A Reed 168.00 cm/s    MV E Reed 97.50 cm/s    MVA (PHT) 4.49 cm2    MV E/A 0.58     Pulmonic Valve Systolic Mean Gradient 0.85 mmHg    Pulmonic Valve Systolic Velocity Time Integral 17.20 cm    Pulmonic Valve Max Velocity 102.00 cm/s    Pulmonic Valve Systolic Peak Instantaneous Gradient 4.00 mmHg    Tricuspid Valve Max Velocity 271.00 cm/s    Triscuspid Valve Regurgitation Peak Gradient 29.00 mmHg    Tapse 1.90 1.50 - 2.00 cm    Right Atrial Area 4C 8.84 cm2    LV Mass .8 67.0 - 162.0 g    LV Mass AL Index 90.0 43.0 - 95.0 g/m2    RVSP 32.0 mmHg    Est. RA Pressure 3.0 mmHg    Left Atrium Minor Axis 1.86 cm    PRO/BSA Pk Reed 0.6 cm2/m2    PRO/BSA VTI 0.6 cm2/m2   PTT    Collection Time: 09/21/21  4:30 PM   Result Value Ref Range    aPTT 45.2 (H) 21.2 - 34.1 sec    aPTT, therapeutic range   82 - 109 sec   PTT    Collection Time: 09/21/21 10:07 PM   Result Value Ref Range    aPTT 72.1 (H) 21.2 - 34.1 sec    aPTT, therapeutic range   82 - 109 sec   CBC W/O DIFF    Collection Time: 09/22/21  8:41 AM   Result Value Ref Range    WBC 20.2 (H) 3.6 - 11.0 K/uL    RBC 4.13 3.80 - 5.20 M/uL    HGB 12.8 11.5 - 16.0 g/dL    HCT 38.3 35.0 - 47.0 %    MCV 92.7 80.0 - 99.0 FL    MCH 31.0 26.0 - 34.0 PG    MCHC 33.4 30.0 - 36.5 g/dL    RDW 12.2 11.5 - 14.5 %    PLATELET 240 274 - 664 K/uL    MPV 10.8 8.9 - 12.9 FL    NRBC 0.0 0.0  WBC    ABSOLUTE NRBC 0.00 0.00 - 4.75 K/uL   METABOLIC PANEL, COMPREHENSIVE    Collection Time: 09/22/21  8:41 AM   Result Value Ref Range    Sodium 134 (L) 136 - 145 mmol/L    Potassium 3.9 3.5 - 5.1 mmol/L    Chloride 101 97 - 108 mmol/L    CO2 24 21 - 32 mmol/L    Anion gap 9 5 - 15 mmol/L    Glucose 163 (H) 65 - 100 mg/dL    BUN 30 (H) 6 - 20 mg/dL    Creatinine 1.29 (H) 0.55 - 1.02 mg/dL    BUN/Creatinine ratio 23 (H) 12 - 20      GFR est AA 47 (L) >60 ml/min/1.73m2    GFR est non-AA 39 (L) >60 ml/min/1.73m2    Calcium 8.7 8.5 - 10.1 mg/dL    Bilirubin, total 0.2 0.2 - 1.0 mg/dL    AST (SGOT) 25 15 - 37 U/L    ALT (SGPT) 13 12 - 78 U/L    Alk. phosphatase 79 45 - 117 U/L    Protein, total 7.3 6.4 - 8.2 g/dL    Albumin 3.3 (L) 3.5 - 5.0 g/dL    Globulin 4.0 2.0 - 4.0 g/dL    A-G Ratio 0.8 (L) 1.1 - 2.2     TROPONIN I    Collection Time: 09/22/21  8:41 AM   Result Value Ref Range    Troponin-I, Qt. 1.27 (H) <0.05 ng/mL         Assessment/Plan:     NSTEMI (elevated troponin)  Troponin today 1.27 vs yesterday 2.51  BNP yesterday 4,201 vs previous 606  CXR: Nl  EKG: sinus tachy, LBBB  · ECHO: LV: Estimated LVEF is 40 - 45%. Normal cavity size, wall thickness and systolic function (ejection fraction normal). Moderate hypokinesis of the mid anteroseptal, apical septal and apical anterior wall(s). Mild (grade 1) left ventricular diastolic dysfunction. · AV: Aortic valve leaflet calcification present without reduced excursion. Mild to moderate aortic valve regurgitation is present. · MV: Mild to moderate mitral valve regurgitation is present. · TV: Right Ventricular Arterial Pressure (RVSP) is 32 mmHg. Acute Hypoxemic respiratory distress  Blood gases: pH: 7.44, CO2:35 PO2: 62 Bicarb: 24 O2 sat: 94 RA  COPD exacerbation  HTN  Hypothyroidism  History of MI  Anemia  Asymptomatic Urinary tract infection  Levoquin 750 mg in D5W IVPB  Leukocytosis  WBC: 20.2     Bilateral leg pains  D-dimer 0.93  Doppler studies show no evidence of DVT in L LE or R LE      Plan:       Per pulm: Continue empiric AB, pending culture     Per Cardiology: Agree with heparin infusion for now. Continue to trend troponins. Continue medication. DEV to evaluate LV/RV function. Will decide further management based on clinical course and echo findings.     Order Blood cultures    Continue:   aspirin 325 mg PO every day   Lipitor 40 mg PO QHS   heparin 25.000 U in D5W 250 ml infusion   Levaquin 750 mg in D5W IVPB  Q48 hours x 3 doses    duo-neb 2.5 mg- 0.5 mg/ 3ml neb Q6H   symbicort 160-4.5 mcg 2 puff BID   synthroid tablet 100 mcg PO every day   solumedrol 60 mg IV Q6H   folvite tablet 1 mg PO every day   Carvedilol tablet 3.125 mg PO BID  Xanax tablet 0/25 mg PO BID  melatonin tablet PO 5 mg QHS     Monitor aPTT levels, monitor for signs of bleeding  Continue to monitor troponin levels  Monitor leukocytosis             Continue to follow with pulmonology  Continue to follow with cardiology

## 2021-09-22 NOTE — PROGRESS NOTES
Hospitalist Progress Note    Subjective:   Daily Progress Note: 9/22/2021 10:03 AM    Abril Hansen is a 80 y.o. white female with a history of COPD, HTN, hypothyroid who presents with SOB x 1 day. Patient notes that the onset was sudden. She endorses having dizziness. Patient lives alone, with daughter nearby. She explains that she called her daughter when she began having the SOB. EMS services were then contacted. On arrival it was noted that the patient was tripoding. She was given IV magnesium and placed on a non rebreathe. Patient denies fever, chills, chest pain, or abdominal pain.      Today, patient is lying in bed sleeping. On awakening she notes that she is not having chest pain, her breathing has improved. She has no further complaints at this time.     Current Facility-Administered Medications   Medication Dose Route Frequency    heparin (porcine) 1,000 unit/mL injection 4,000 Units  4,000 Units IntraVENous PRN    Or    heparin (porcine) 1,000 unit/mL injection 2,000 Units  2,000 Units IntraVENous PRN    melatonin tablet 5 mg  5 mg Oral QHS    albuterol-ipratropium (DUO-NEB) 2.5 MG-0.5 MG/3 ML  3 mL Nebulization Q6H PRN    budesonide-formoteroL (SYMBICORT) 160-4.5 mcg/actuation HFA inhaler 2 Puff  2 Puff Inhalation BID RT    levoFLOXacin (LEVAQUIN) 750 mg in D5W IVPB  750 mg IntraVENous Q48H    aspirin chewable tablet 81 mg  81 mg Oral DAILY    methylPREDNISolone (PF) (SOLU-MEDROL) injection 60 mg  60 mg IntraVENous Q6H    albuterol-ipratropium (DUO-NEB) 2.5 MG-0.5 MG/3 ML  3 mL Nebulization Q6H RT    ALPRAZolam (XANAX) tablet 0.25 mg  0.25 mg Oral BID    carvediloL (COREG) tablet 3.125 mg  3.125 mg Oral BID    folic acid (FOLVITE) tablet 1 mg  1 mg Oral DAILY    levothyroxine (SYNTHROID) tablet 100 mcg  100 mcg Oral DAILY    acetaminophen (TYLENOL) tablet 650 mg  650 mg Oral Q6H PRN    Or    acetaminophen (TYLENOL) suppository 650 mg  650 mg Rectal Q6H PRN    polyethylene glycol (MIRALAX) packet 17 g  17 g Oral DAILY PRN    ondansetron (ZOFRAN ODT) tablet 4 mg  4 mg Oral Q8H PRN    Or    ondansetron (ZOFRAN) injection 4 mg  4 mg IntraVENous Q6H PRN    atorvastatin (LIPITOR) tablet 40 mg  40 mg Oral QHS    heparin 25,000 units in D5W 250 ml infusion  12-25 Units/kg/hr IntraVENous TITRATE        Review of Systems  Review of Systems   Constitutional: Negative for chills, fatigue and fever. HENT: Negative for congestion, sinus pressure and trouble swallowing.    Eyes: Negative for photophobia and pain. Respiratory: + cough, + minimal SOB    Cardiovascular: Negative for chest pain and leg swelling. Gastrointestinal: Negative for abdominal pain, diarrhea, nausea and vomiting. Endocrine: Negative for polydipsia, polyphagia and polyuria. Genitourinary: Negative for decreased urine volume, difficulty urinating, dysuria, hematuria and urgency. Musculoskeletal: Negative for back pain, gait problem, myalgias and neck pain. Skin: Negative for pallor and rash. Allergic/Immunologic: Negative for environmental allergies and food allergies. Neurological: Negative for dizziness, facial asymmetry, speech difficulty, numbness and headaches. Hematological: Negative for adenopathy. Does not bruise/bleed easily. Psychiatric/Behavioral: Negative for agitation, self-injury and suicidal ideas. The patient is not nervous/anxious. Objective:     Constitutional:       General: She is not in acute distress. HENT:      Head: Normocephalic and atraumatic.      Ears: Patient is unable to hear well, must speak directly into her ears. Eyes:      Extraocular Movements: Extraocular movements intact.      Pupils: Pupils are equal, round, and reactive to light. Cardiovascular:      Rate and Rhythm: Normal rate and regular rhythm.      Pulses: Normal pulses.      Heart sounds:Murmur. Pulmonary:      Effort: Pulmonary effort is normal.      Breath sounds: mild wheezing present.    Abdominal:    Palpations: Abdomen is soft.      Tenderness: There is no abdominal tenderness. .   Neurological:      General: No focal deficit present.      Mental Status: She is alert. Psychiatric:         Mood and Affect: Mood normal.      Visit Vitals  BP (!) 140/65 (BP Patient Position: Lying; At rest)   Pulse 89   Temp 98 °F (36.7 °C)   Resp 20   Ht 5' 2\" (1.575 m)   Wt 155 lb (70.3 kg)   SpO2 99%   BMI 28.35 kg/m²    O2 Flow Rate (L/min): 2 l/min O2 Device: Nasal cannula    Temp (24hrs), Av.8 °F (36.6 °C), Min:97.6 °F (36.4 °C), Max:98 °F (36.7 °C)      No intake/output data recorded.    1901 -  0700  In: -   Out: 1000 [Urine:1000]      Data Review    Recent Results (from the past 24 hour(s))   ECHO ADULT COMPLETE    Collection Time: 21 11:00 AM   Result Value Ref Range    LV ED Vol A4C 48.00 cm3    LV ED Vol A2C 65.90 cm3    LV ES Vol A4C 36.00 cm3    LV ES Vol A2C 27.00 cm3    IVSd 1.24 (A) 0.60 - 0.90 cm    LVIDd 4.04 3.90 - 5.30 cm    LVIDs 3.00 cm    LVOT d 1.40 cm    Left Ventricular Outflow Tract Mean Gradient 2.00 mmHg    LVOT VTI 20.00 cm    LVOT Peak Velocity 103.00 cm/s    LVOT Peak Gradient 4.00 mmHg    LVPWd 1.03 (A) 0.60 - 0.90 cm    LV E' Septal Velocity 5.17 cm/s    E/E' septal 18.86     LVOT SV 31.0 cm3    Left Atrium Major Axis 3.20 cm    LA Area 4C 12.80 cm2    LA Volume DISK BP 30.00 22.0 - 52.0 cm3    Aortic Regurgitant Pressure Half-time 321.00 ms    AR Max Reed 384.00 cm/s    Aortic Valve Systolic Peak Velocity 162.79 cm/s    AoV PG 9.00 mmHg    Aortic Valve Systolic Mean Gradient 0.80 mmHg    AoV VTI 27.70 cm    Aortic valve mean velocity 94.80 cm/s    Aortic Valve Area by Continuity of VTI 1.11 cm2    Aortic Valve Area by Continuity of Peak Velocity 1.03 cm2    TV MG 68.00 mmHg    Mitral Regurgitant Velocity Time Integral 181.00 cm    Mitral Valve Deceleration [place] 5,820.00 mm/s2    Mitral Valve Deceleration [place] 5,820.00 mm/s2    Mitral Valve Pressure Half-time 49.00 ms MV A Reed 168.00 cm/s    MV E Reed 97.50 cm/s    MVA (PHT) 4.49 cm2    MV E/A 0.58     Pulmonic Valve Systolic Mean Gradient 5.71 mmHg    Pulmonic Valve Systolic Velocity Time Integral 17.20 cm    Pulmonic Valve Max Velocity 102.00 cm/s    Pulmonic Valve Systolic Peak Instantaneous Gradient 4.00 mmHg    Tricuspid Valve Max Velocity 271.00 cm/s    Triscuspid Valve Regurgitation Peak Gradient 29.00 mmHg    Tapse 1.90 1.50 - 2.00 cm    Right Atrial Area 4C 8.84 cm2    LV Mass .8 67.0 - 162.0 g    LV Mass AL Index 90.0 43.0 - 95.0 g/m2    RVSP 32.0 mmHg    Est. RA Pressure 3.0 mmHg    Left Atrium Minor Axis 1.86 cm    PRO/BSA Pk Reed 0.6 cm2/m2    PRO/BSA VTI 0.6 cm2/m2   PTT    Collection Time: 09/21/21  4:30 PM   Result Value Ref Range    aPTT 45.2 (H) 21.2 - 34.1 sec    aPTT, therapeutic range   82 - 109 sec   PTT    Collection Time: 09/21/21 10:07 PM   Result Value Ref Range    aPTT 72.1 (H) 21.2 - 34.1 sec    aPTT, therapeutic range   82 - 109 sec   CBC W/O DIFF    Collection Time: 09/22/21  8:41 AM   Result Value Ref Range    WBC 20.2 (H) 3.6 - 11.0 K/uL    RBC 4.13 3.80 - 5.20 M/uL    HGB 12.8 11.5 - 16.0 g/dL    HCT 38.3 35.0 - 47.0 %    MCV 92.7 80.0 - 99.0 FL    MCH 31.0 26.0 - 34.0 PG    MCHC 33.4 30.0 - 36.5 g/dL    RDW 12.2 11.5 - 14.5 %    PLATELET 694 395 - 208 K/uL    MPV 10.8 8.9 - 12.9 FL    NRBC 0.0 0.0  WBC    ABSOLUTE NRBC 0.00 0.00 - 0.65 K/uL   METABOLIC PANEL, COMPREHENSIVE    Collection Time: 09/22/21  8:41 AM   Result Value Ref Range    Sodium 134 (L) 136 - 145 mmol/L    Potassium 3.9 3.5 - 5.1 mmol/L    Chloride 101 97 - 108 mmol/L    CO2 24 21 - 32 mmol/L    Anion gap 9 5 - 15 mmol/L    Glucose 163 (H) 65 - 100 mg/dL    BUN 30 (H) 6 - 20 mg/dL    Creatinine 1.29 (H) 0.55 - 1.02 mg/dL    BUN/Creatinine ratio 23 (H) 12 - 20      GFR est AA 47 (L) >60 ml/min/1.73m2    GFR est non-AA 39 (L) >60 ml/min/1.73m2    Calcium 8.7 8.5 - 10.1 mg/dL    Bilirubin, total 0.2 0.2 - 1.0 mg/dL    AST (SGOT) 25 15 - 37 U/L    ALT (SGPT) 13 12 - 78 U/L    Alk. phosphatase 79 45 - 117 U/L    Protein, total 7.3 6.4 - 8.2 g/dL    Albumin 3.3 (L) 3.5 - 5.0 g/dL    Globulin 4.0 2.0 - 4.0 g/dL    A-G Ratio 0.8 (L) 1.1 - 2.2     TROPONIN I    Collection Time: 09/22/21  8:41 AM   Result Value Ref Range    Troponin-I, Qt. 1.27 (H) <0.05 ng/mL         Assessment/Plan:     NSTEMI (elevated troponin)  Troponin today 1.27 vs yesterday 2.51  BNP yesterday 4,201 vs previous 606  CXR: Nl  EKG: sinus tachy, LBBB  · ECHO: LV: Estimated LVEF is 40 - 45%. Normal cavity size, wall thickness and systolic function (ejection fraction normal). Moderate hypokinesis of the mid anteroseptal, apical septal and apical anterior wall(s). Mild (grade 1) left ventricular diastolic dysfunction. · AV: Aortic valve leaflet calcification present without reduced excursion. Mild to moderate aortic valve regurgitation is present. · MV: Mild to moderate mitral valve regurgitation is present. · TV: Right Ventricular Arterial Pressure (RVSP) is 32 mmHg. Acute Hypoxemic respiratory distress  Blood gases: pH: 7.44, CO2:35 PO2: 62 Bicarb: 24 O2 sat: 94 RA  COPD exacerbation  HTN  Hypothyroidism  History of MI  Anemia  Asymptomatic Urinary tract infection  Levoquin 750 mg in D5W IVPB  Leukocytosis  WBC: 20.2     Bilateral leg pains  D-dimer 0.93  Doppler studies show no evidence of DVT in L LE or R LE      Plan:       Per pulm: Continue empiric AB, pending culture     Per Cardiology: Agree with heparin infusion for now. Continue to trend troponins. Continue medication. DEV to evaluate LV/RV function. Will decide further management based on clinical course and echo findings.       Continue:   aspirin 325 mg PO every day   Lipitor 40 mg PO QHS   heparin 25.000 U in D5W 250 ml infusion   Levaquin 750 mg in D5W IVPB  Q48 hours x 3 doses    duo-neb 2.5 mg- 0.5 mg/ 3ml neb Q6H   symbicort 160-4.5 mcg 2 puff BID   synthroid tablet 100 mcg PO every day solumedrol 60 mg IV Q6H   folvite tablet 1 mg PO every day   Carvedilol tablet 3.125 mg PO BID  Xanax tablet 0/25 mg PO BID  melatonin tablet PO 5 mg QHS     Monitor aPTT levels, monitor for signs of bleeding  Continue to monitor troponin levels  Monitor leukocytosis           Continue to follow with pulmonology  Continue to follow with cardiology

## 2021-09-22 NOTE — PROGRESS NOTES
09/22/21 1320   Vital Signs   Temp 97.7 °F (36.5 °C)   Pulse (Heart Rate) (!) 136   Heart Rate Source Monitor   Resp Rate 18   O2 Sat (%) 97 %   Level of Consciousness Alert (0)   /72   MAP (Calculated) 90   MEWS Score 4   Patient reports bilateral arm burning, EKG ordered signifying aflutter with RVR. Cardiologist notified and Lopressor 5 mg IV administered.

## 2021-09-22 NOTE — PROGRESS NOTES
Hospitalist Progress Note    Subjective:   Daily Progress Note: 9/22/2021 10:03 AM    Raleigh Valverde is a 80 y.o. white female with a history of COPD, HTN, hypothyroid who presents with SOB x 1 day. Patient notes that the onset was sudden. She endorses having dizziness. Patient lives alone, with daughter nearby. She explains that she called her daughter when she began having the SOB. EMS services were then contacted. On arrival it was noted that the patient was tripoding. She was given IV magnesium and placed on a non rebreathe. Patient denies fever, chills, chest pain, or abdominal pain.      Today, patient is lying in bed sleeping. On awakening she notes that she is not having chest pain, her breathing has improved. She has no further complaints at this time.     Current Facility-Administered Medications   Medication Dose Route Frequency    heparin (porcine) 1,000 unit/mL injection 4,000 Units  4,000 Units IntraVENous PRN    Or    heparin (porcine) 1,000 unit/mL injection 2,000 Units  2,000 Units IntraVENous PRN    melatonin tablet 5 mg  5 mg Oral QHS    albuterol-ipratropium (DUO-NEB) 2.5 MG-0.5 MG/3 ML  3 mL Nebulization Q6H PRN    budesonide-formoteroL (SYMBICORT) 160-4.5 mcg/actuation HFA inhaler 2 Puff  2 Puff Inhalation BID RT    levoFLOXacin (LEVAQUIN) 750 mg in D5W IVPB  750 mg IntraVENous Q48H    aspirin chewable tablet 81 mg  81 mg Oral DAILY    methylPREDNISolone (PF) (SOLU-MEDROL) injection 60 mg  60 mg IntraVENous Q6H    albuterol-ipratropium (DUO-NEB) 2.5 MG-0.5 MG/3 ML  3 mL Nebulization Q6H RT    ALPRAZolam (XANAX) tablet 0.25 mg  0.25 mg Oral BID    carvediloL (COREG) tablet 3.125 mg  3.125 mg Oral BID    folic acid (FOLVITE) tablet 1 mg  1 mg Oral DAILY    levothyroxine (SYNTHROID) tablet 100 mcg  100 mcg Oral DAILY    acetaminophen (TYLENOL) tablet 650 mg  650 mg Oral Q6H PRN    Or    acetaminophen (TYLENOL) suppository 650 mg  650 mg Rectal Q6H PRN    polyethylene glycol (MIRALAX) packet 17 g  17 g Oral DAILY PRN    ondansetron (ZOFRAN ODT) tablet 4 mg  4 mg Oral Q8H PRN    Or    ondansetron (ZOFRAN) injection 4 mg  4 mg IntraVENous Q6H PRN    atorvastatin (LIPITOR) tablet 40 mg  40 mg Oral QHS    heparin 25,000 units in D5W 250 ml infusion  12-25 Units/kg/hr IntraVENous TITRATE        Review of Systems  Review of Systems   Constitutional: Negative for chills, fatigue and fever. HENT: Negative for congestion, sinus pressure and trouble swallowing.    Eyes: Negative for photophobia and pain. Respiratory: + cough, + minimal SOB    Cardiovascular: Negative for chest pain and leg swelling. Gastrointestinal: Negative for abdominal pain, diarrhea, nausea and vomiting. Endocrine: Negative for polydipsia, polyphagia and polyuria. Genitourinary: Negative for decreased urine volume, difficulty urinating, dysuria, hematuria and urgency. Musculoskeletal: Negative for back pain, gait problem, myalgias and neck pain. Skin: Negative for pallor and rash. Allergic/Immunologic: Negative for environmental allergies and food allergies. Neurological: Negative for dizziness, facial asymmetry, speech difficulty, numbness and headaches. Hematological: Negative for adenopathy. Does not bruise/bleed easily. Psychiatric/Behavioral: Negative for agitation, self-injury and suicidal ideas. The patient is not nervous/anxious. Objective:     Constitutional:       General: She is not in acute distress. HENT:      Head: Normocephalic and atraumatic.      Ears: Patient is unable to hear well, must speak directly into her ears. Eyes:      Extraocular Movements: Extraocular movements intact.      Pupils: Pupils are equal, round, and reactive to light. Cardiovascular:      Rate and Rhythm: Normal rate and regular rhythm.      Pulses: Normal pulses.      Heart sounds:Murmur. Pulmonary:      Effort: Pulmonary effort is normal.      Breath sounds: mild wheezing present.    Abdominal:    Palpations: Abdomen is soft.      Tenderness: There is no abdominal tenderness. .   Neurological:      General: No focal deficit present.      Mental Status: She is alert. Psychiatric:         Mood and Affect: Mood normal.      Visit Vitals  BP (!) 129/55 (BP Patient Position: Semi fowlers; At rest)   Pulse 82   Temp 97.6 °F (36.4 °C)   Resp 20   Ht 5' 2\" (1.575 m)   Wt 70.3 kg (155 lb)   SpO2 98%   BMI 28.35 kg/m²    O2 Flow Rate (L/min): 2 l/min O2 Device: Nasal cannula    Temp (24hrs), Av.7 °F (36.5 °C), Min:97.6 °F (36.4 °C), Max:98 °F (36.7 °C)      No intake/output data recorded.    1901 -  0700  In: -   Out: 1000 [Urine:1000]      Data Review    Recent Results (from the past 24 hour(s))   PTT    Collection Time: 21  4:30 PM   Result Value Ref Range    aPTT 45.2 (H) 21.2 - 34.1 sec    aPTT, therapeutic range   82 - 109 sec   PTT    Collection Time: 21 10:07 PM   Result Value Ref Range    aPTT 72.1 (H) 21.2 - 34.1 sec    aPTT, therapeutic range   82 - 109 sec   CBC W/O DIFF    Collection Time: 21  8:41 AM   Result Value Ref Range    WBC 20.2 (H) 3.6 - 11.0 K/uL    RBC 4.13 3.80 - 5.20 M/uL    HGB 12.8 11.5 - 16.0 g/dL    HCT 38.3 35.0 - 47.0 %    MCV 92.7 80.0 - 99.0 FL    MCH 31.0 26.0 - 34.0 PG    MCHC 33.4 30.0 - 36.5 g/dL    RDW 12.2 11.5 - 14.5 %    PLATELET 116 204 - 259 K/uL    MPV 10.8 8.9 - 12.9 FL    NRBC 0.0 0.0  WBC    ABSOLUTE NRBC 0.00 0.00 - 5.79 K/uL   METABOLIC PANEL, COMPREHENSIVE    Collection Time: 21  8:41 AM   Result Value Ref Range    Sodium 134 (L) 136 - 145 mmol/L    Potassium 3.9 3.5 - 5.1 mmol/L    Chloride 101 97 - 108 mmol/L    CO2 24 21 - 32 mmol/L    Anion gap 9 5 - 15 mmol/L    Glucose 163 (H) 65 - 100 mg/dL    BUN 30 (H) 6 - 20 mg/dL    Creatinine 1.29 (H) 0.55 - 1.02 mg/dL    BUN/Creatinine ratio 23 (H) 12 - 20      GFR est AA 47 (L) >60 ml/min/1.73m2    GFR est non-AA 39 (L) >60 ml/min/1.73m2    Calcium 8.7 8.5 - 10.1 mg/dL Bilirubin, total 0.2 0.2 - 1.0 mg/dL    AST (SGOT) 25 15 - 37 U/L    ALT (SGPT) 13 12 - 78 U/L    Alk. phosphatase 79 45 - 117 U/L    Protein, total 7.3 6.4 - 8.2 g/dL    Albumin 3.3 (L) 3.5 - 5.0 g/dL    Globulin 4.0 2.0 - 4.0 g/dL    A-G Ratio 0.8 (L) 1.1 - 2.2     PTT    Collection Time: 09/22/21  8:41 AM   Result Value Ref Range    aPTT >153.0 (HH) 21.2 - 34.1 sec    aPTT, therapeutic range   82 - 109 sec   TROPONIN I    Collection Time: 09/22/21  8:41 AM   Result Value Ref Range    Troponin-I, Qt. 1.27 (H) <0.05 ng/mL   MAGNESIUM    Collection Time: 09/22/21  8:41 AM   Result Value Ref Range    Magnesium 2.5 (H) 1.6 - 2.4 mg/dL         Assessment/Plan:     NSTEMI (elevated troponin)  Troponin today 1.27 vs yesterday 2.51  BNP yesterday 4,201 vs previous 606  CXR: Nl  EKG: sinus tachy, LBBB  · ECHO: LV: Estimated LVEF is 40 - 45%. Normal cavity size, wall thickness and systolic function (ejection fraction normal). Moderate hypokinesis of the mid anteroseptal, apical septal and apical anterior wall(s). Mild (grade 1) left ventricular diastolic dysfunction. · AV: Aortic valve leaflet calcification present without reduced excursion. Mild to moderate aortic valve regurgitation is present. · MV: Mild to moderate mitral valve regurgitation is present. · TV: Right Ventricular Arterial Pressure (RVSP) is 32 mmHg. Acute Hypoxemic respiratory distress  Blood gases: pH: 7.44, CO2:35 PO2: 62 Bicarb: 24 O2 sat: 94 RA  COPD exacerbation  HTN  Hypothyroidism  History of MI  Anemia  Asymptomatic Urinary tract infection  Levoquin 750 mg in D5W IVPB  Leukocytosis  WBC: 20.2     Bilateral leg pains  D-dimer 0.93  Doppler studies show no evidence of DVT in L LE or R LE      Plan:       Per pulm: Continue empiric AB, pending culture     Per Cardiology: Agree with heparin infusion for now. Continue to trend troponins. Continue medication. DEV to evaluate LV/RV function.  Will decide further management based on clinical course and echo findings.       Continue:   aspirin 325 mg PO every day   Lipitor 40 mg PO QHS   heparin 25.000 U in D5W 250 ml infusion   Levaquin 750 mg in D5W IVPB  Q48 hours x 3 doses    duo-neb 2.5 mg- 0.5 mg/ 3ml neb Q6H   symbicort 160-4.5 mcg 2 puff BID   synthroid tablet 100 mcg PO every day   solumedrol 60 mg IV Q6H   folvite tablet 1 mg PO every day   Carvedilol tablet 3.125 mg PO BID  Xanax tablet 0/25 mg PO BID  melatonin tablet PO 5 mg QHS     Monitor aPTT levels, monitor for signs of bleeding  Continue to monitor troponin levels  Monitor leukocytosis    PT OT consult             Continue to follow with pulmonology  Continue to follow with cardiology

## 2021-09-22 NOTE — ROUTINE PROCESS
REPORT CALLED TO CLAIR Cooney, NOTIFIED THAT REPEAT PTT DUE AT 2230, RESPIRATORY IN ROOM, [facility] PRIOR TO TRANSPORT TO FLOOR, O2 AT 2 LITERS,

## 2021-09-22 NOTE — PROGRESS NOTES
Reason for Admission:  COPD                     RUR Score:          16           Plan for utilizing home health:      Agrees    PCP: First and Last name:  Dr. Dipika Presley      Name of Practice:     Are you a current patient: Yes/No:  Yes    Approximate date of last visit:    Can you participate in a virtual visit with your PCP:                     Current Advanced Directive/Advance Care Plan: Full Code      Healthcare Decision Maker:   Click here to complete [address] including selection of the Healthcare Decision Maker Relationship (ie \"Primary\")             Primary Decision MakerCjeremiah Zamudio - Leon[place] - [phone]                  Transition of Care Plan:                      CM met with patient at bedside to complete bedside assessment. Patient is very hard of hearing. She states she lives alone but she has caregivers that are with her 24/7 including her daughter Jolie Cabrera. Patient states she is able to walk with a walker at home. She thinks she has oxygen at home but is unsure. CM called [address] [place] [address] [phone] to clarify assessment, no answer to telephone, CM left voicemail requesting return telephone call. Current dispo is home. Daughter Jolie Cabrera [phone] called CM back this morning. Patient does not use oxygen at home. She does live at home alone with 24/7 care at home. CM inquired about skilled home health. This was politely declined at this time. Patient will go home, home/self. Family is requesting a podiatry consult while she is inpatient.

## 2021-09-22 NOTE — PROGRESS NOTES
Dual skin assessment done with Kayley Valente RN. Skin concerns noted include blanchable redness to her coccyx. Bruise to her bilateral upper medial thigh, and skin tear to her right thigh.

## 2021-09-22 NOTE — PROGRESS NOTES
Patient reports bilateral arm pain burning sensation. Remote telemetry department reports patient in afib  with a current rate of 117. EKG ordered- aflutter with RVR. Dr. Tori Schlatter notified. Cardiologist Dr. Nikhil Cheung notified and order for Lopressor 5 mg IV provided.

## 2021-09-22 NOTE — PROGRESS NOTES
PULMONARY NOTE  [facility]    Name: Harish Zhao MRN: [id number]   : 1930 Hospital: [facility]   Date: 2021  Admission date: 2021 Hospital Day: 3       HPI:     Hospital Problems  Date Reviewed: 2020        Codes Class Noted POA    SOB (shortness of breath) ICD-10-CM: R06.02  ICD-9-CM: 786.05  2021 Unknown        COPD exacerbation ([facility] [address] ICD-10-CM: J44.1  ICD-9-CM: 491.21  2021 Unknown        COPD (chronic obstructive pulmonary disease) ([facility] ICD-10-CM: J44.9  ICD-9-CM: 610  2021 Unknown        NSTEMI (non-ST elevated myocardial infarction) [facility]) ICD-10-CM: I21.4  ICD-9-CM: 410.70  2021 Unknown                   [x] High complexity decision making was performed  [x] See my orders for details      Subjective/Initial History:     I was asked by Duarte Quesada MD to see Harish Zhao  a 80 y.o.  female in consultation     Excerpts from admission 2021 or consult notes as follows:   80 y.o. female with PMH significant for COPD, HTN, diabetes, and hypothyroidism who presented to the ED via EMS with chief complaint of SOB. Patient developed onset of moderate SOB sometime earlier today without any relieving/palliative factors. Patient additionally mentioned some left-sided chest pain. Upon EMS arrival, patient was tripoding -- he received IV Magnesium and placed on NRB. At the time, patient denied any fever, chills, abdominal pain, nausea/vomiting, or diarrhea. Labs in the ED were significant for troponin 0.19 and Pro-. CXR revealed no acute findings. ABG showed pO2 62. Patient was admitted to [address] [place] [address] for further evaluation and Pulmonology was consulted regarding additional management. Patient is very hard of hearing and somewhat of a poor historian.       No Known Allergies     MAR reviewed and pertinent medications noted or modified as needed     Current Facility-Administered Medications Medication    heparin (porcine) 1,000 unit/mL injection 4,000 Units    Or    heparin (porcine) 1,000 unit/mL injection 2,000 Units    melatonin tablet 5 mg    albuterol-ipratropium (DUO-NEB) 2.5 MG-0.5 MG/3 ML    budesonide-formoteroL (SYMBICORT) 160-4.5 mcg/actuation HFA inhaler 2 Puff    levoFLOXacin (LEVAQUIN) 750 mg in D5W IVPB    aspirin chewable tablet 81 mg    methylPREDNISolone (PF) (SOLU-MEDROL) injection 60 mg    albuterol-ipratropium (DUO-NEB) 2.5 MG-0.5 MG/3 ML    ALPRAZolam (XANAX) tablet 0.25 mg    carvediloL (COREG) tablet 9.858 mg    folic acid (FOLVITE) tablet 1 mg    levothyroxine (SYNTHROID) tablet 100 mcg    acetaminophen (TYLENOL) tablet 650 mg    Or    acetaminophen (TYLENOL) suppository 650 mg    polyethylene glycol (MIRALAX) packet 17 g    ondansetron (ZOFRAN ODT) tablet 4 mg    Or    ondansetron (ZOFRAN) injection 4 mg    atorvastatin (LIPITOR) tablet 40 mg    heparin 25,000 units in D5W 250 ml infusion      Patient PCP: None  PMH:  has a past medical history of Anemia, Chronic obstructive pulmonary disease ([address] Heart attack ([facility] Hypertension, and Thyroid disease. PSH:   has a past surgical history that includes hx orthopaedic (2018) and hx pacemaker. FHX: family history includes Heart Disease in her father and mother; Stroke in her father and mother. SHX:  reports that she has never smoked. She has never used smokeless tobacco. She reports that she does not drink alcohol and does not use drugs. ROS:  Constitutional: Negative for chills and fever. Eyes: Negative for discharge. Respiratory: Positive for shortness of breath. Cardiovascular: Positive for chest pain. Gastrointestinal: Negative for abdominal pain, nausea and vomiting. Skin: Negative for rash. Neurological: Negative for headaches. Objective:     Vital Signs: Telemetry:    normal sinus rhythm Intake/Output:   Visit Vitals  BP (!) 140/65 (BP Patient Position: Lying; At rest) Pulse 89   Temp 98 °F (36.7 °C)   Resp 20   Ht 5' 2\" (1.575 m)   Wt 70.3 kg (155 lb)   SpO2 99%   BMI 28.35 kg/m²       Temp (24hrs), Av.8 °F (36.6 °C), Min:97.6 °F (36.4 °C), Max:98 °F (36.7 °C)        O2 Device: Nasal cannula O2 Flow Rate (L/min): 2 l/min       Wt Readings from Last 4 Encounters:   21 70.3 kg (155 lb)   21 68 kg (150 lb)   21 68 kg (150 lb)   20 68 kg (150 lb)          Intake/Output Summary (Last 24 hours) at 2021 1026  Last data filed at 2021 2100  Gross per 24 hour   Intake --   Output 1000 ml   Net -1000 ml       Last shift:      No intake/output data recorded. Last 3 shifts: 1901 -  0700  In: -   Out: 1000 [Urine:1000]       Physical Exam:   Constitutional:       General: She is not in acute distress. Comments: Elderly   HENT:      Head: Normocephalic and atraumatic. Ears:      Comments: Hard of hearing. Eyes:      Extraocular Movements: Extraocular movements intact. Pupils: Pupils are equal, round, and reactive to light. Cardiovascular:      Rate and Rhythm: Normal rate and regular rhythm. Pulses: Normal pulses. Heart sounds: Murmur (2/6 systolic murmur, LUSB) heard. Pulmonary:      Effort: Pulmonary effort is normal.      Breath sounds: Wheezing present. Abdominal:      Palpations: Abdomen is soft. Tenderness: There is no abdominal tenderness. Musculoskeletal:         General: Swelling and tenderness present. Normal range of motion. Cervical back: Normal range of motion. Skin:     Coloration: Skin is not jaundiced. Neurological:      General: No focal deficit present. Mental Status: She is alert.    Psychiatric:         Mood and Affect: Mood normal.       Labs:    Recent Labs     21  0841 21  2207 21  1630 21  0930 21  0500 21  0400 21  2242 21  1900 21  1821   WBC 20.2*  --   --   --  9.6  --   --   --  20.1*   HGB 12.8  --   --   -- 12. 5  --   --   --  13.7     --   --   --  316  --   --   --  334   INR  --   --   --   --   --   --   --  1.0  --    APTT  --  72.1* 45.2* 32.7  --    < >   < >  --   --     < > = values in this interval not displayed. Recent Labs     09/22/21  0841 09/21/21  0500 09/20/21  1821   * 136 134*   K 3.9 4.0 5.8*    104 105   CO2 24 24 25   * 235* 207*   BUN 30* 22* 22*   CREA 1.29* 1.41* 1.38*   CA 8.7 8.4* 9.1   ALB 3.3* 3.0* 3.6   ALT 13 14 17     Recent Labs     09/20/21  2130   PH 7.44   PCO2 35   PO2 62*   HCO3 24   FIO2 21.0     Recent Labs     09/22/21  0841 09/21/21  0253 09/20/21  1821   TROIQ 1.27* 2.51* 0.19*     No results found for: BNPP, BNP   No results found for: CULTNo results found for: TSH, TSHEXT, TSHEXT    Imaging:    CXR Results  (Last 48 hours)               09/20/21 1816  XR CHEST PORT Final result    Impression:  Normal findings. Narrative:  History: Evidence of breath       A single view of the chest was obtained. Heart size is stable. There is some   atherosclerotic change of the aorta. Lungs are clear. No effusions or   pneumothorax. Bony structures are within normal limits. Results from Hospital Encounter encounter on 09/20/21    XR CHEST PORT    Narrative  History: Evidence of breath    A single view of the chest was obtained. Heart size is stable. There is some  atherosclerotic change of the aorta. Lungs are clear. No effusions or  pneumothorax. Bony structures are within normal limits. Impression  Normal findings. Results from Hospital Encounter encounter on 06/02/21    XR CHEST PORT    Narrative  The study is a single view chest radiographic examination dated 6/2/2021. HISTORY: Shortness of breath. COMPARISON:  1/24/2021. FINDINGS: The heart size is normal. The pulmonary vessels are normal in caliber. The lung parenchyma is clear without an infiltrate or atelectasis.  There is a  mild degree of chronic baseline peribronchial cuffing. The costophrenic angles  are maintained without pleural effusions or a pneumothorax. There is a midline  position to the trachea. There are atherosclerotic vascular changes of the  thoracic aorta. The pulmonary bia are symmetrical.    The bony thorax is intact without fractures/acute osseous abnormalities. Impression  1. There are moderate atherosclerotic vascular changes of the thoracic aorta. The patient may also have coronary artery atherosclerotic vascular changes which  could be a source for chest pain. 2.  This examination is negative for acute pulmonary parenchymal pathology. There is a mild degree of chronic baseline peribronchial cuffing without change. Results from [place] encounter on 01/24/21    XR CHEST PORT    Narrative  Portable chest, AP upright, 1546 hours. Comparison with 11/19/2019. Stable heart size. Calcified thoracic aorta. The lungs appear clear. No evidence  of pulmonary edema, air space pneumonia, or pleural effusion. No evidence of  pneumothorax. Impression  No evidence of focal airspace pneumonia. Results from [place] encounter on 01/24/21    CT CHEST W CONT    Addendum 2/25/2021 12:51 PM  Addendum: CT dose reduction was achieved through use of a standardized protocol  tailored for this examination and automatic exposure control for dose  modulation. Narrative  Contrast study was cc [id number]    Very mild subpleural fibrosis with otherwise completely clear lungs and patent  central airways. Pulmonary arteries are well-opacified and show no filling defect or distortion. Aorta shows normal dimensions, without dissection. Moderate atherosclerosis with  penetrating ulcer off the left side of the arch. 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   The aortic maximum diameter at this level is 3.3 cm. Patient age noted. No  mediastinal or hilar adenopathy. Coronary calcification. No cardiac finding. No pleural pericardial effusion. No  significant upper abdominal finding    Impression  Clear lungs. No evidence of PE. 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 IMPRESSION:   1. Acute on chronic respiratory failure with hypoxia  · ABG (9/20): Room Air // pH 7.44 // pCO2 35 // pO2 62 // Bicarb 24 // SpO2 94%  2. Asthma  · CXR (9/20): normal findings. 3. NSTEMI, likely type 2.  4. H/o MI and s/p pacemaker. 5. H/o anemia  6. H/o HTN  7. H/o Hypothyroidism  8. Diabetes Mellitus  9. Hypocalcemia, 9.2 mg/dL when corrected for hypoalbuminemia. 10. Hypoalbuminemia      RECOMMENDATIONS/PLAN:   1. Currently on 2 L/min NC oxygen as salvage oxygen delivery device to provide high concentration of oxygen to overcome refractory hypoxia. 2. Current Breathing Management  · DUO-NEB  · SYMBICORT  · SOLU-MEDROL  7. Current Antimicrobials  · LEVAQUIN  8. Supplemental O2 to keep sats > 93%  9.  Aspiration precautions         ·           Conrado Beckman MD

## 2021-09-22 NOTE — ED NOTES
Pt c/o itching at tape site of iv, scratched at localized irritation, accidentally pilled iv out left upper arm, no other sites of hives, or sob, cool compresses to lacal site of irritation, pt states she feels better

## 2021-09-22 NOTE — PROGRESS NOTES
Progress Note      9/22/2021 10:00 AM  NAME: Urmila Elizabeth   MRN:  [id number]   Admit Diagnosis: SOB (shortness of breath) [R06.02]  COPD exacerbation (HCC) [J44.1]  NSTEMI (non-ST elevated myocardial infarction) ([facility] [I21.4]  COPD (chronic obstructive pulmonary disease) ([facility] [J44.9]      Problem List:   - SOB  - COPD exacerbation  - NSTEMI  - moderate AI     Assessment/Plan:   9/22/21  - patient observed resting in bed with eyes opened. Denies complaints of chest pain, shortness of breath, palpitations, dizziness. No acute distress noted. - sinus rhythm on telemetry currently with PAC's averaging between 77-97bpm.  - episode of 5 beat run of wide complex tachycardia overnight, will check magnesium level.  - vital signs are hemodynamically stable  - Hgb 12.8/Hct 38.3 9/22/21  - Bun 30/Creat 1.29 9/22/21  - pro BNP 4,201  - troponin trending downward 1.27  - EF of 40%-45% 9/21/21 with mild to moderate aortic valve and mitral valve regurgitation noted. - we will trend cardiac enzymes, check magnesium level, and make changes to plan of care as clinically warranted. - will continue to follow patient during hospitalization along with the team.     I have discussed the patient with MARCY Cool and agree with the note. Please refer to the advanced practitioner's note for detailed plan of care. I have seen and evaluated the patient. Patient remains chest pain free. TTE findings reviewed. Troponins are trending down. Given advanced age, h/o penetrating aortic ulcer, advanced risk of NANCIE due to the above as well as CKD, will manage the patient conservatively. Continue heparin infusion for a total of 48 hours. Continue optimal medical therapy. We will follow. [facility])      [x]       High complexity decision making was performed in this patient at high risk for decompensation with multiple organ involvement.     Subjective:     History of Present Illness: Lakshmi Rivera Charli Koch is a 80 y.o. female with a history of hypertension, COPD who was admitted to the hospital with complaints of chest pain, shortness of breath. Patient is very hard of hearing and is somewhat poor historian. History is obtained from chart review. Patient admits to having chest pain, unclear if it is related to exertion. She also complains of shortness of breath. There is no history of orthopnea, PND, dizziness, lightheadedness or syncope. She was evaluated by pulmonary and is on empiric antibiotics pending blood cultures as well as on steroids. Review of Systems:    Symptom Y/N Comments  Symptom Y/N Comments   Fever/Chills N   Chest Pain N    Poor Appetite N   Edema N    Cough N   Abdominal Pain N    Sputum N   Joint Pain N    SOB/JEFFERSON N   Pruritis/Rash N    Nausea/vomit N   Tolerating PT/OT Y    Diarrhea N   Tolerating Diet Y    Constipation N   Other       Could NOT obtain due to:      Objective:      Physical Exam:    Last 24hrs VS reviewed since prior progress note. Most recent are:    Visit Vitals  BP (!) 140/65 (BP Patient Position: Lying; At rest)   Pulse 89   Temp 98 °F (36.7 °C)   Resp 20   Ht 5' 2\" (1.575 m)   Wt 70.3 kg (155 lb)   SpO2 99%   BMI 28.35 kg/m²       Intake/Output Summary (Last 24 hours) at 9/22/2021 1014  Last data filed at 9/21/2021 2100  Gross per 24 hour   Intake --   Output 1000 ml   Net -1000 ml        General Appearance: frail, oriented to person, no acute distress  Ears/Nose/Mouth/Throat: Hearing grossly normal.  Neck: Supple. Chest: Lungs diminished to auscultation bilaterally. Cardiovascular: Regular rate and rhythm, S1S2 normal, no murmur. Abdomen: Soft, non-tender, bowel sounds are active. Extremities: trace, non-pitting edema bilaterally. Skin: Warm and dry.     PMH/SH reviewed - no change compared to H&P    Data Review    Telemetry: sinus rhythm with PAC's    EKG:   []  No new EKG for review  DUPLEX LOWER EXT VENOUS BILAT   Final Result      XR CHEST PORT Final Result   Normal findings. Echocardiogram 9/21/21 Result status: Final result   · LV: Estimated LVEF is 40 - 45%. Normal cavity size, wall thickness and systolic function (ejection fraction normal). Moderate hypokinesis of the mid anteroseptal, apical septal and apical anterior wall(s). Mild (grade 1) left ventricular diastolic dysfunction. · AV: Aortic valve leaflet calcification present without reduced excursion. Mild to moderate aortic valve regurgitation is present. · MV: Mild to moderate mitral valve regurgitation is present. · TV: Right Ventricular Arterial Pressure (RVSP) is 32 mmHg. Echo Findings    Left Ventricle Normal cavity size, wall thickness and systolic function (ejection fraction normal). Moderate hypokinesis of the mid anteroseptal, apical septal and apical anterior wall(s). The estimated EF is 40 - 45%. There is mild (grade 1) left ventricular diastolic dysfunction. Left Atrium Normal cavity size. Left Atrium volume index is 18.8 mL/m2. The volume is mildly increased. Right Ventricle Normal cavity size, wall thickness and global systolic function. Right Atrium Normal cavity size. Interatrial Septum Interatrial septum not well visualized   Aortic Valve No stenosis. There is leaflet calcification without reduced excursion. Mild to moderate aortic valve regurgitation. Mitral Valve Normal valve structure and no stenosis. Mild to moderate regurgitation. ERO  . 3 cm2   Tricuspid Valve Normal valve structure and no stenosis. Trace regurgitation. Right Ventricular Arterial Pressure (RVSP) is 32 mmHg. Pulmonic Valve Normal valve structure, no stenosis and no regurgitation. Aorta Normal aortic root, ascending aortic, and aortic arch. Normal aortic root. Pulmonary Artery Pulmonary arteries not well visualized. IVC/Hepatic Veins Normal structure. Normal central venous pressure (3 mmHg); IVC diameter is less than 21 mm and collapses more than 50% with respiration. Pericardium No evidence of pericardial effusion. Lab Data Personally Reviewed:    Recent Labs     09/22/21  0841 09/21/21  0500   WBC 20.2* 9.6   HGB 12.8 12.5   HCT 38.3 37.6    316     Recent Labs     09/21/21  2207 09/21/21  1630 09/21/21  0930 09/20/21  2242 09/20/21  1900   INR  --   --   --   --  1.0   PTP  --   --   --   --  12.8   APTT 72.1* 45.2* 32.7   < >  --     < > = values in this interval not displayed.       Recent Labs     09/22/21  0841 09/21/21  0500 09/20/21  1821   * 136 134*   K 3.9 4.0 5.8*    104 105   CO2 24 24 25   BUN 30* 22* 22*   CREA 1.29* 1.41* 1.38*   * 235* 207*   CA 8.7 8.4* 9.1     Recent Labs     09/22/21  0841 09/21/21  0253 09/20/21  1821   TROIQ 1.27* 2.51* 0.19*     No results found for: CHOL, CHOLX, CHLST, CHOLV, HDL, HDLP, LDL, LDLC, DLDLP, Flaquita Sanju, CHHD, CHHDX    Recent Labs     09/22/21  0841 09/21/21  0500 09/20/21  1821   AP 79 75 90   TP 7.3 6.8 7.9   ALB 3.3* 3.0* 3.6   GLOB 4.0 3.8 4.3*     Recent Labs     09/20/21  2130   PH 7.44   PCO2 35   PO2 62*       Medications Personally Reviewed:    Current Facility-Administered Medications   Medication Dose Route Frequency    heparin (porcine) 1,000 unit/mL injection 4,000 Units  4,000 Units IntraVENous PRN    Or    heparin (porcine) 1,000 unit/mL injection 2,000 Units  2,000 Units IntraVENous PRN    melatonin tablet 5 mg  5 mg Oral QHS    albuterol-ipratropium (DUO-NEB) 2.5 MG-0.5 MG/3 ML  3 mL Nebulization Q6H PRN    budesonide-formoteroL (SYMBICORT) 160-4.5 mcg/actuation HFA inhaler 2 Puff  2 Puff Inhalation BID RT    levoFLOXacin (LEVAQUIN) 750 mg in D5W IVPB  750 mg IntraVENous Q48H    aspirin chewable tablet 81 mg  81 mg Oral DAILY    methylPREDNISolone (PF) (SOLU-MEDROL) injection 60 mg  60 mg IntraVENous Q6H    albuterol-ipratropium (DUO-NEB) 2.5 MG-0.5 MG/3 ML  3 mL Nebulization Q6H RT    ALPRAZolam (XANAX) tablet 0.25 mg  0.25 mg Oral BID    carvediloL (COREG) tablet 3.125 mg  3.125 mg Oral BID    folic acid (FOLVITE) tablet 1 mg  1 mg Oral DAILY    levothyroxine (SYNTHROID) tablet 100 mcg  100 mcg Oral DAILY    acetaminophen (TYLENOL) tablet 650 mg  650 mg Oral Q6H PRN    Or    acetaminophen (TYLENOL) suppository 650 mg  650 mg Rectal Q6H PRN    polyethylene glycol (MIRALAX) packet 17 g  17 g Oral DAILY PRN    ondansetron (ZOFRAN ODT) tablet 4 mg  4 mg Oral Q8H PRN    Or    ondansetron (ZOFRAN) injection 4 mg  4 mg IntraVENous Q6H PRN    atorvastatin (LIPITOR) tablet 40 mg  40 mg Oral QHS    heparin 25,000 units in D5W 250 ml infusion  12-25 Units/kg/hr IntraVENous TITRATE         Sally Boyle NP

## 2021-09-23 LAB
ALBUMIN SERPL-MCNC: 2.8 G/DL (ref 3.5–5)
ALBUMIN/GLOB SERPL: 0.8 {RATIO} (ref 1.1–2.2)
ALP SERPL-CCNC: 68 U/L (ref 45–117)
ALT SERPL-CCNC: 14 U/L (ref 12–78)
ANION GAP SERPL CALC-SCNC: 9 MMOL/L (ref 5–15)
APTT PPP: 75.4 SEC (ref 21.2–34.1)
APTT PPP: 84.1 SEC (ref 21.2–34.1)
AST SERPL W P-5'-P-CCNC: 21 U/L (ref 15–37)
ATRIAL RATE: 138 BPM
ATRIAL RATE: 80 BPM
BASOPHILS # BLD: 0 K/UL (ref 0–0.1)
BASOPHILS NFR BLD: 0 % (ref 0–1)
BILIRUB SERPL-MCNC: 0.2 MG/DL (ref 0.2–1)
BUN SERPL-MCNC: 31 MG/DL (ref 6–20)
BUN/CREAT SERPL: 25 (ref 12–20)
CA-I BLD-MCNC: 8.4 MG/DL (ref 8.5–10.1)
CALCULATED P AXIS, ECG09: 83 DEGREES
CALCULATED R AXIS, ECG10: 16 DEGREES
CALCULATED R AXIS, ECG10: 7 DEGREES
CALCULATED T AXIS, ECG11: 133 DEGREES
CALCULATED T AXIS, ECG11: 61 DEGREES
CHLORIDE SERPL-SCNC: 104 MMOL/L (ref 97–108)
CO2 SERPL-SCNC: 24 MMOL/L (ref 21–32)
CREAT SERPL-MCNC: 1.24 MG/DL (ref 0.55–1.02)
DIAGNOSIS, 93000: NORMAL
DIAGNOSIS, 93000: NORMAL
DIFFERENTIAL METHOD BLD: ABNORMAL
EOSINOPHIL # BLD: 0 K/UL (ref 0–0.4)
EOSINOPHIL NFR BLD: 0 % (ref 0–7)
ERYTHROCYTE [DISTWIDTH] IN BLOOD BY AUTOMATED COUNT: 12.3 % (ref 11.5–14.5)
GLOBULIN SER CALC-MCNC: 3.7 G/DL (ref 2–4)
GLUCOSE SERPL-MCNC: 155 MG/DL (ref 65–100)
HCT VFR BLD AUTO: 33.9 % (ref 35–47)
HGB BLD-MCNC: 11.5 G/DL (ref 11.5–16)
IMM GRANULOCYTES # BLD AUTO: 0.1 K/UL (ref 0–0.04)
IMM GRANULOCYTES NFR BLD AUTO: 1 % (ref 0–0.5)
LYMPHOCYTES # BLD: 1 K/UL (ref 0.8–3.5)
LYMPHOCYTES NFR BLD: 6 % (ref 12–49)
MCH RBC QN AUTO: 31.7 PG (ref 26–34)
MCHC RBC AUTO-ENTMCNC: 33.9 G/DL (ref 30–36.5)
MCV RBC AUTO: 93.4 FL (ref 80–99)
MONOCYTES # BLD: 0.4 K/UL (ref 0–1)
MONOCYTES NFR BLD: 3 % (ref 5–13)
NEUTS SEG # BLD: 14.2 K/UL (ref 1.8–8)
NEUTS SEG NFR BLD: 90 % (ref 32–75)
NRBC # BLD: 0 K/UL (ref 0–0.01)
NRBC BLD-RTO: 0 PER 100 WBC
P-R INTERVAL, ECG05: 208 MS
PLATELET # BLD AUTO: 300 K/UL (ref 150–400)
PMV BLD AUTO: 10.8 FL (ref 8.9–12.9)
POTASSIUM SERPL-SCNC: 4 MMOL/L (ref 3.5–5.1)
PROT SERPL-MCNC: 6.5 G/DL (ref 6.4–8.2)
Q-T INTERVAL, ECG07: 368 MS
Q-T INTERVAL, ECG07: 428 MS
QRS DURATION, ECG06: 84 MS
QRS DURATION, ECG06: 90 MS
QTC CALCULATION (BEZET), ECG08: 493 MS
QTC CALCULATION (BEZET), ECG08: 528 MS
RBC # BLD AUTO: 3.63 M/UL (ref 3.8–5.2)
SODIUM SERPL-SCNC: 137 MMOL/L (ref 136–145)
THERAPEUTIC RANGE,PTTT: ABNORMAL SEC (ref 82–109)
THERAPEUTIC RANGE,PTTT: ABNORMAL SEC (ref 82–109)
VENTRICULAR RATE, ECG03: 124 BPM
VENTRICULAR RATE, ECG03: 80 BPM
WBC # BLD AUTO: 15.7 K/UL (ref 3.6–11)

## 2021-09-23 PROCEDURE — 74011250636 HC RX REV CODE- 250/636: Performed by: FAMILY MEDICINE

## 2021-09-23 PROCEDURE — 65270000029 HC RM PRIVATE

## 2021-09-23 PROCEDURE — 85025 COMPLETE CBC W/AUTO DIFF WBC: CPT

## 2021-09-23 PROCEDURE — 74011000250 HC RX REV CODE- 250: Performed by: FAMILY MEDICINE

## 2021-09-23 PROCEDURE — 97165 OT EVAL LOW COMPLEX 30 MIN: CPT

## 2021-09-23 PROCEDURE — 77010033678 HC OXYGEN DAILY

## 2021-09-23 PROCEDURE — 74011000250 HC RX REV CODE- 250: Performed by: INTERNAL MEDICINE

## 2021-09-23 PROCEDURE — 74011250637 HC RX REV CODE- 250/637: Performed by: INTERNAL MEDICINE

## 2021-09-23 PROCEDURE — 97161 PT EVAL LOW COMPLEX 20 MIN: CPT

## 2021-09-23 PROCEDURE — 74011250637 HC RX REV CODE- 250/637: Performed by: FAMILY MEDICINE

## 2021-09-23 PROCEDURE — 94640 AIRWAY INHALATION TREATMENT: CPT

## 2021-09-23 PROCEDURE — 97530 THERAPEUTIC ACTIVITIES: CPT

## 2021-09-23 PROCEDURE — 36415 COLL VENOUS BLD VENIPUNCTURE: CPT

## 2021-09-23 PROCEDURE — 94760 N-INVAS EAR/PLS OXIMETRY 1: CPT

## 2021-09-23 PROCEDURE — 80053 COMPREHEN METABOLIC PANEL: CPT

## 2021-09-23 PROCEDURE — 85730 THROMBOPLASTIN TIME PARTIAL: CPT

## 2021-09-23 RX ORDER — IPRATROPIUM BROMIDE AND ALBUTEROL SULFATE 2.5; .5 MG/3ML; MG/3ML
3 SOLUTION RESPIRATORY (INHALATION)
Status: DISCONTINUED | OUTPATIENT
Start: 2021-09-23 | End: 2021-09-27 | Stop reason: HOSPADM

## 2021-09-23 RX ADMIN — ATORVASTATIN CALCIUM 40 MG: 40 TABLET, FILM COATED ORAL at 22:12

## 2021-09-23 RX ADMIN — LEVOTHYROXINE SODIUM 100 MCG: 0.1 TABLET ORAL at 11:51

## 2021-09-23 RX ADMIN — ALPRAZOLAM 0.25 MG: 0.5 TABLET ORAL at 11:52

## 2021-09-23 RX ADMIN — ALPRAZOLAM 0.25 MG: 0.5 TABLET ORAL at 20:33

## 2021-09-23 RX ADMIN — METOPROLOL TARTRATE 25 MG: 25 TABLET, FILM COATED ORAL at 11:51

## 2021-09-23 RX ADMIN — METHYLPREDNISOLONE SODIUM SUCCINATE 60 MG: 40 INJECTION, POWDER, FOR SOLUTION INTRAMUSCULAR; INTRAVENOUS at 00:51

## 2021-09-23 RX ADMIN — BUDESONIDE AND FORMOTEROL FUMARATE DIHYDRATE 2 PUFF: 160; 4.5 AEROSOL RESPIRATORY (INHALATION) at 09:08

## 2021-09-23 RX ADMIN — METOPROLOL TARTRATE 25 MG: 25 TABLET, FILM COATED ORAL at 20:36

## 2021-09-23 RX ADMIN — BUDESONIDE AND FORMOTEROL FUMARATE DIHYDRATE 2 PUFF: 160; 4.5 AEROSOL RESPIRATORY (INHALATION) at 20:15

## 2021-09-23 RX ADMIN — METHYLPREDNISOLONE SODIUM SUCCINATE 60 MG: 40 INJECTION, POWDER, FOR SOLUTION INTRAMUSCULAR; INTRAVENOUS at 11:50

## 2021-09-23 RX ADMIN — IPRATROPIUM BROMIDE AND ALBUTEROL SULFATE 3 ML: .5; 2.5 SOLUTION RESPIRATORY (INHALATION) at 09:08

## 2021-09-23 RX ADMIN — FOLIC ACID 1 MG: 1 TABLET ORAL at 11:51

## 2021-09-23 RX ADMIN — MELATONIN TAB 5 MG 5 MG: 5 TAB at 22:12

## 2021-09-23 RX ADMIN — ACETAMINOPHEN 650 MG: 325 TABLET ORAL at 20:35

## 2021-09-23 RX ADMIN — IPRATROPIUM BROMIDE AND ALBUTEROL SULFATE 3 ML: .5; 2.5 SOLUTION RESPIRATORY (INHALATION) at 14:04

## 2021-09-23 RX ADMIN — METHYLPREDNISOLONE SODIUM SUCCINATE 60 MG: 40 INJECTION, POWDER, FOR SOLUTION INTRAMUSCULAR; INTRAVENOUS at 18:58

## 2021-09-23 RX ADMIN — ASPIRIN 81 MG CHEWABLE TABLET 81 MG: 81 TABLET CHEWABLE at 11:50

## 2021-09-23 RX ADMIN — METHYLPREDNISOLONE SODIUM SUCCINATE 60 MG: 40 INJECTION, POWDER, FOR SOLUTION INTRAMUSCULAR; INTRAVENOUS at 05:01

## 2021-09-23 NOTE — PROGRESS NOTES
Cardiology Progress Note      9/23/2021 1:49 PM    Admit Date: 9/20/2021      Subjective:     Patient seen and examined. She remains chest pain-free. Went into atrial flutter again last evening, resolved with one dose of IV lopressor. Later she was started on oral lopressor. Visit Vitals  BP (!) 155/59 Comment: rn notfied   Pulse 74   Temp 97.7 °F (36.5 °C)   Resp 16   Ht 5' 2\" (1.575 m)   Wt 64.4 kg (141 lb 15.6 oz)   SpO2 97%   BMI 25.97 kg/m²     Current Facility-Administered Medications   Medication Dose Route Frequency    albuterol-ipratropium (DUO-NEB) 2.5 MG-0.5 MG/3 ML  3 mL Nebulization Q6H PRN    heparin (porcine) 1,000 unit/mL injection 4,000 Units  4,000 Units IntraVENous PRN    Or    heparin (porcine) 1,000 unit/mL injection 2,000 Units  2,000 Units IntraVENous PRN    melatonin tablet 5 mg  5 mg Oral QHS    metoprolol tartrate (LOPRESSOR) tablet 25 mg  25 mg Oral BID    budesonide-formoteroL (SYMBICORT) 160-4.5 mcg/actuation HFA inhaler 2 Puff  2 Puff Inhalation BID RT    aspirin chewable tablet 81 mg  81 mg Oral DAILY    methylPREDNISolone (PF) (SOLU-MEDROL) injection 60 mg  60 mg IntraVENous Q6H    ALPRAZolam (XANAX) tablet 0.25 mg  0.25 mg Oral BID    folic acid (FOLVITE) tablet 1 mg  1 mg Oral DAILY    levothyroxine (SYNTHROID) tablet 100 mcg  100 mcg Oral DAILY    acetaminophen (TYLENOL) tablet 650 mg  650 mg Oral Q6H PRN    Or    acetaminophen (TYLENOL) suppository 650 mg  650 mg Rectal Q6H PRN    polyethylene glycol (MIRALAX) packet 17 g  17 g Oral DAILY PRN    ondansetron (ZOFRAN ODT) tablet 4 mg  4 mg Oral Q8H PRN    Or    ondansetron (ZOFRAN) injection 4 mg  4 mg IntraVENous Q6H PRN    atorvastatin (LIPITOR) tablet 40 mg  40 mg Oral QHS    heparin 25,000 units in D5W 250 ml infusion  12-25 Units/kg/hr IntraVENous TITRATE         Objective:      Physical Exam:  Neck: no JVD  Heart: irregularly irregular rhythm  Lungs: dminished sounds b/l  Abdomen: soft, non-tender.  Bowel sounds normal. No masses,  no organomegaly  Extremities: extremities normal, atraumatic, no cyanosis or edema  Pulses: 2+ and symmetric  Neurologic: Grossly normal    Data Review:   Labs:    Recent Results (from the past 24 hour(s))   TROPONIN I    Collection Time: 09/22/21  3:27 PM   Result Value Ref Range    Troponin-I, Qt. 1.00 (H) <0.05 ng/mL   PTT    Collection Time: 09/22/21  6:49 PM   Result Value Ref Range    aPTT 84.5 (H) 21.2 - 34.1 sec    aPTT, therapeutic range   82 - 109 sec   CBC WITH AUTOMATED DIFF    Collection Time: 09/23/21  8:10 AM   Result Value Ref Range    WBC 15.7 (H) 3.6 - 11.0 K/uL    RBC 3.63 (L) 3.80 - 5.20 M/uL    HGB 11.5 11.5 - 16.0 g/dL    HCT 33.9 (L) 35.0 - 47.0 %    MCV 93.4 80.0 - 99.0 FL    MCH 31.7 26.0 - 34.0 PG    MCHC 33.9 30.0 - 36.5 g/dL    RDW 12.3 11.5 - 14.5 %    PLATELET 671 499 - 377 K/uL    MPV 10.8 8.9 - 12.9 FL    NRBC 0.0 0.0  WBC    ABSOLUTE NRBC 0.00 0.00 - 0.01 K/uL    NEUTROPHILS 90 (H) 32 - 75 %    LYMPHOCYTES 6 (L) 12 - 49 %    MONOCYTES 3 (L) 5 - 13 %    EOSINOPHILS 0 0 - 7 %    BASOPHILS 0 0 - 1 %    IMMATURE GRANULOCYTES 1 (H) 0 - 0.5 %    ABS. NEUTROPHILS 14.2 (H) 1.8 - 8.0 K/UL    ABS. LYMPHOCYTES 1.0 0.8 - 3.5 K/UL    ABS. MONOCYTES 0.4 0.0 - 1.0 K/UL    ABS. EOSINOPHILS 0.0 0.0 - 0.4 K/UL    ABS. BASOPHILS 0.0 0.0 - 0.1 K/UL    ABS. IMM.  GRANS. 0.1 (H) 0.00 - 0.04 K/UL    DF AUTOMATED     METABOLIC PANEL, COMPREHENSIVE    Collection Time: 09/23/21  8:10 AM   Result Value Ref Range    Sodium 137 136 - 145 mmol/L    Potassium 4.0 3.5 - 5.1 mmol/L    Chloride 104 97 - 108 mmol/L    CO2 24 21 - 32 mmol/L    Anion gap 9 5 - 15 mmol/L    Glucose 155 (H) 65 - 100 mg/dL    BUN 31 (H) 6 - 20 mg/dL    Creatinine 1.24 (H) 0.55 - 1.02 mg/dL    BUN/Creatinine ratio 25 (H) 12 - 20      GFR est AA 49 (L) >60 ml/min/1.73m2    GFR est non-AA 41 (L) >60 ml/min/1.73m2    Calcium 8.4 (L) 8.5 - 10.1 mg/dL    Bilirubin, total 0.2 0.2 - 1.0 mg/dL    AST (SGOT) 21 15 - 37 U/L    ALT (SGPT) 14 12 - 78 U/L    Alk. phosphatase 68 45 - 117 U/L    Protein, total 6.5 6.4 - 8.2 g/dL    Albumin 2.8 (L) 3.5 - 5.0 g/dL    Globulin 3.7 2.0 - 4.0 g/dL    A-G Ratio 0.8 (L) 1.1 - 2.2     PTT    Collection Time: 09/23/21  8:10 AM   Result Value Ref Range    aPTT 84.1 (H) 21.2 - 34.1 sec    aPTT, therapeutic range   82 - 109 sec         Assessment:   NSTEMI  Acute on COPD  Atrial Flutter  Hypertension  Moderate AI      Plan:   -Continue heparin infusion for 48 hours. Continue beta-blocker, statin.  -Heart rate is better controlled continue Lopressor for now. Transition to Toprol-XL 75 mg p.o. daily upon discharge  -Kendell Ax is about 5 and she will need to be on anticoagulation for stroke prevention. She would be high risk for bleeding on triple therapy. Starting tomorrow, will transition to aspirin 81 mg p.o. daily and Eliquis 2.5 mg p.o. twice daily  -She has remained chest pain-free, has had no malignant arrhythmias, and troponins have trended down. She also has a history of penetrating aortic ulcer. Given her advanced age, comorbidities and the above, will manage conservatively with no plans for invasive evaluation.   Will observe in the hospital for at least 1 more day  -We will follow

## 2021-09-23 NOTE — PROGRESS NOTES
Hospitalist Progress Note    Subjective:   Daily Progress Note: 9/23/2021 10:34 AM  Otilia Harper is a 80 y. o. white female with a history of COPD, HTN, hypothyroid who presents with SOB x 1 day. Patient notes that the onset was sudden. She endorses having dizziness. Patient lives alone, with daughter nearby. She explains that she called her daughter when she began having the SOB. EMS services were then contacted. On arrival it was noted that the patient was tripoding. She was given IV magnesium and placed on a non rebreathe. Patient denies fever, chills, chest pain, or abdominal pain. Today, Patient is seen lying in her bed, on 1 L NC in no acute distress. She endorses having mild shortness of breath, however denies Chest pain, headache, dizziness or palpitations.     Current Facility-Administered Medications   Medication Dose Route Frequency    albuterol-ipratropium (DUO-NEB) 2.5 MG-0.5 MG/3 ML  3 mL Nebulization Q6H PRN    heparin (porcine) 1,000 unit/mL injection 4,000 Units  4,000 Units IntraVENous PRN    Or    heparin (porcine) 1,000 unit/mL injection 2,000 Units  2,000 Units IntraVENous PRN    melatonin tablet 5 mg  5 mg Oral QHS    metoprolol tartrate (LOPRESSOR) tablet 25 mg  25 mg Oral BID    budesonide-formoteroL (SYMBICORT) 160-4.5 mcg/actuation HFA inhaler 2 Puff  2 Puff Inhalation BID RT    levoFLOXacin (LEVAQUIN) 750 mg in D5W IVPB  750 mg IntraVENous Q48H    aspirin chewable tablet 81 mg  81 mg Oral DAILY    methylPREDNISolone (PF) (SOLU-MEDROL) injection 60 mg  60 mg IntraVENous Q6H    ALPRAZolam (XANAX) tablet 0.25 mg  0.25 mg Oral BID    folic acid (FOLVITE) tablet 1 mg  1 mg Oral DAILY    levothyroxine (SYNTHROID) tablet 100 mcg  100 mcg Oral DAILY    acetaminophen (TYLENOL) tablet 650 mg  650 mg Oral Q6H PRN    Or    acetaminophen (TYLENOL) suppository 650 mg  650 mg Rectal Q6H PRN    polyethylene glycol (MIRALAX) packet 17 g  17 g Oral DAILY PRN    ondansetron (ZOFRAN ODT) tablet 4 mg  4 mg Oral Q8H PRN    Or    ondansetron (ZOFRAN) injection 4 mg  4 mg IntraVENous Q6H PRN    atorvastatin (LIPITOR) tablet 40 mg  40 mg Oral QHS    heparin 25,000 units in D5W 250 ml infusion  12-25 Units/kg/hr IntraVENous TITRATE        Review of Systems  Review of Systems   Constitutional: Negative for chills, fatigue and fever. HENT: Negative for congestion, sinus pressure and trouble swallowing.    Eyes: Negative for photophobia and pain. Respiratory: + cough, + minimal SOB    Cardiovascular: Negative for chest pain and leg swelling. Gastrointestinal: Negative for abdominal pain, diarrhea, nausea and vomiting. Endocrine: Negative for polydipsia, polyphagia and polyuria. Genitourinary: Negative for decreased urine volume, difficulty urinating, dysuria, hematuria and urgency. Musculoskeletal: Negative for back pain, gait problem, myalgias and neck pain. Skin: Negative for pallor and rash. Allergic/Immunologic: Negative for environmental allergies and food allergies. Neurological: Negative for dizziness, facial asymmetry, speech difficulty, numbness and headaches. Hematological: Negative for adenopathy. Does not bruise/bleed easily. Psychiatric/Behavioral: Negative for agitation, self-injury and suicidal ideas. The patient is not nervous/anxious. Objective:     Constitutional:       General: She is not in acute distress. HENT:      Head: Normocephalic and atraumatic.      Ears: Patient is unable to hear well, must speak directly into her ears. Eyes:      Extraocular Movements: Extraocular movements intact.      Pupils: Pupils are equal, round, and reactive to light. Cardiovascular:      Rate and Rhythm: Normal rate and regular rhythm.      Pulses: Normal pulses.      Heart sounds:Murmur. Pulmonary:      Effort: Pulmonary effort is normal.      Breath sounds: mild wheezing present. Abdominal:      Palpations: Abdomen is soft.      Tenderness:  There is no abdominal tenderness. .   Neurological:      General: No focal deficit present.      Mental Status: She is alert. Psychiatric:         Mood and Affect: Mood normal.     Visit Vitals  BP (!) 135/54 (BP 1 Location: Right upper arm, BP Patient Position: Semi fowlers)   Pulse 70   Temp 97.8 °F (36.6 °C)   Resp 17   Ht 5' 2\" (1.575 m)   Wt 155 lb (70.3 kg)   SpO2 97%   BMI 28.35 kg/m²    O2 Flow Rate (L/min): 0 l/min O2 Device: None (Room air)    Temp (24hrs), Av.8 °F (36.6 °C), Min:97.5 °F (36.4 °C), Max:98.1 °F (36.7 °C)      No intake/output data recorded.  1901 -  0700  In: 653.6 [P.O.:240; I.V.:413.6]  Out: 1000 [Urine:1000]    Data Review    Recent Results (from the past 24 hour(s))   TROPONIN I    Collection Time: 21  3:27 PM   Result Value Ref Range    Troponin-I, Qt. 1.00 (H) <0.05 ng/mL   PTT    Collection Time: 21  6:49 PM   Result Value Ref Range    aPTT 84.5 (H) 21.2 - 34.1 sec    aPTT, therapeutic range   82 - 109 sec   CBC WITH AUTOMATED DIFF    Collection Time: 21  8:10 AM   Result Value Ref Range    WBC 15.7 (H) 3.6 - 11.0 K/uL    RBC 3.63 (L) 3.80 - 5.20 M/uL    HGB 11.5 11.5 - 16.0 g/dL    HCT 33.9 (L) 35.0 - 47.0 %    MCV 93.4 80.0 - 99.0 FL    MCH 31.7 26.0 - 34.0 PG    MCHC 33.9 30.0 - 36.5 g/dL    RDW 12.3 11.5 - 14.5 %    PLATELET 830 283 - 929 K/uL    MPV 10.8 8.9 - 12.9 FL    NRBC 0.0 0.0  WBC    ABSOLUTE NRBC 0.00 0.00 - 0.01 K/uL    NEUTROPHILS 90 (H) 32 - 75 %    LYMPHOCYTES 6 (L) 12 - 49 %    MONOCYTES 3 (L) 5 - 13 %    EOSINOPHILS 0 0 - 7 %    BASOPHILS 0 0 - 1 %    IMMATURE GRANULOCYTES 1 (H) 0 - 0.5 %    ABS. NEUTROPHILS 14.2 (H) 1.8 - 8.0 K/UL    ABS. LYMPHOCYTES 1.0 0.8 - 3.5 K/UL    ABS. MONOCYTES 0.4 0.0 - 1.0 K/UL    ABS. EOSINOPHILS 0.0 0.0 - 0.4 K/UL    ABS. BASOPHILS 0.0 0.0 - 0.1 K/UL    ABS. IMM.  GRANS. 0.1 (H) 0.00 - 0.04 K/UL    DF AUTOMATED     METABOLIC PANEL, COMPREHENSIVE    Collection Time: 21  8:10 AM   Result Value Ref Range Sodium 137 136 - 145 mmol/L    Potassium 4.0 3.5 - 5.1 mmol/L    Chloride 104 97 - 108 mmol/L    CO2 24 21 - 32 mmol/L    Anion gap 9 5 - 15 mmol/L    Glucose 155 (H) 65 - 100 mg/dL    BUN 31 (H) 6 - 20 mg/dL    Creatinine 1.24 (H) 0.55 - 1.02 mg/dL    BUN/Creatinine ratio 25 (H) 12 - 20      GFR est AA 49 (L) >60 ml/min/1.73m2    GFR est non-AA 41 (L) >60 ml/min/1.73m2    Calcium 8.4 (L) 8.5 - 10.1 mg/dL    Bilirubin, total 0.2 0.2 - 1.0 mg/dL    AST (SGOT) 21 15 - 37 U/L    ALT (SGPT) 14 12 - 78 U/L    Alk. phosphatase 68 45 - 117 U/L    Protein, total 6.5 6.4 - 8.2 g/dL    Albumin 2.8 (L) 3.5 - 5.0 g/dL    Globulin 3.7 2.0 - 4.0 g/dL    A-G Ratio 0.8 (L) 1.1 - 2.2     PTT    Collection Time: 09/23/21  8:10 AM   Result Value Ref Range    aPTT 84.1 (H) 21.2 - 34.1 sec    aPTT, therapeutic range   82 - 109 sec         Assessment/Plan:     NSTEMI (elevated troponin)  Trending down  Troponin yesterday 1.27  BNP previous: 4,201  CXR: nL  EKG: sinus tachy, LBBB  ECHO: LV: Estimated LVEF is 40 - 45%. Normal cavity size, wall thickness and systolic function (ejection fraction normal). Moderate hypokinesis of the mid anteroseptal, apical septal and apical anterior wall(s). Mild (grade 1) left ventricular diastolic dysfunction. · AV: Aortic valve leaflet calcification present without reduced excursion. Mild to moderate aortic valve regurgitation is present. · MV: Mild to moderate mitral valve regurgitation is present.   · TV: Right Ventricular Arterial Pressure (RVSP) is 32 mmHg.     Acute Hypoxemic respiratory distress  Blood gases: pH: 7.44, CO2:35 PO2: 62 Bicarb: 24 O2 sat: 94 RA  COPD exacerbation  HTN  Hypothyroidism  History of MI  Anemia  Asymptomatic Urinary tract infection  Levoquin 750 mg in D5W IVPB  Leukocytosis  Trending down   WBC: 15.7 today     Bilateral leg pains  D-dimer 0.93  Doppler studies show no evidence of DVT in L LE or R LE        Plan:     Per pulm: Continue empiric AB, pending culture      Per Cardiology: Echo reviewed. Plan to trend cardiac enzymes, and magnesium level, will makes changes as needed.  Manage conservatively based on patient increased risk, continue heparin for total 48 hours          Monitor aPTT levels, monitor for signs of bleeding  REPEAT TROPONIN  MAG level  Monitor leukocytosis     PT OT consult     Continue:   aspirin 325 mg PO every day   Lipitor 40 mg PO QHS   heparin 25.000 U in D5W 250 ml infusion   Levaquin 750 mg in D5W IVPB  Q48 hours x 3 doses    duo-neb 2.5 mg- 0.5 mg/ 3ml neb Q6H   symbicort 160-4.5 mcg 2 puff BID   synthroid tablet 100 mcg PO every day   solumedrol 60 mg IV Q6H   folvite tablet 1 mg PO every day   Carvedilol tablet 3.125 mg PO BID  Xanax tablet 0/25 mg PO BID  melatonin tablet PO 5 mg QHS    Possible discharge in the next 24 hours.

## 2021-09-23 NOTE — PROGRESS NOTES
Hospitalist Progress Note    Subjective:   Daily Progress Note: 9/23/2021 10:34 AM  Otilia Harper is a 80 y. o. white female with a history of COPD, HTN, hypothyroid who presents with SOB x 1 day. Patient notes that the onset was sudden. She endorses having dizziness. Patient lives alone, with daughter nearby. She explains that she called her daughter when she began having the SOB. EMS services were then contacted. On arrival it was noted that the patient was tripoding. She was given IV magnesium and placed on a non rebreathe. Patient denies fever, chills, chest pain, or abdominal pain. Today, Patient is seen lying in her bed, on 1 L NC in no acute distress. She endorses having mild shortness of breath, however denies Chest pain, headache, dizziness or palpitations.     Current Facility-Administered Medications   Medication Dose Route Frequency    albuterol-ipratropium (DUO-NEB) 2.5 MG-0.5 MG/3 ML  3 mL Nebulization Q6H PRN    heparin (porcine) 1,000 unit/mL injection 4,000 Units  4,000 Units IntraVENous PRN    Or    heparin (porcine) 1,000 unit/mL injection 2,000 Units  2,000 Units IntraVENous PRN    melatonin tablet 5 mg  5 mg Oral QHS    metoprolol tartrate (LOPRESSOR) tablet 25 mg  25 mg Oral BID    budesonide-formoteroL (SYMBICORT) 160-4.5 mcg/actuation HFA inhaler 2 Puff  2 Puff Inhalation BID RT    aspirin chewable tablet 81 mg  81 mg Oral DAILY    methylPREDNISolone (PF) (SOLU-MEDROL) injection 60 mg  60 mg IntraVENous Q6H    ALPRAZolam (XANAX) tablet 0.25 mg  0.25 mg Oral BID    folic acid (FOLVITE) tablet 1 mg  1 mg Oral DAILY    levothyroxine (SYNTHROID) tablet 100 mcg  100 mcg Oral DAILY    acetaminophen (TYLENOL) tablet 650 mg  650 mg Oral Q6H PRN    Or    acetaminophen (TYLENOL) suppository 650 mg  650 mg Rectal Q6H PRN    polyethylene glycol (MIRALAX) packet 17 g  17 g Oral DAILY PRN    ondansetron (ZOFRAN ODT) tablet 4 mg  4 mg Oral Q8H PRN    Or    ondansetron (ZOFRAN) injection 4 mg  4 mg IntraVENous Q6H PRN    atorvastatin (LIPITOR) tablet 40 mg  40 mg Oral QHS    heparin 25,000 units in D5W 250 ml infusion  12-25 Units/kg/hr IntraVENous TITRATE        Review of Systems  Review of Systems   Constitutional: Negative for chills, fatigue and fever. HENT: Negative for congestion, sinus pressure and trouble swallowing.    Eyes: Negative for photophobia and pain. Respiratory: + cough, + minimal SOB    Cardiovascular: Negative for chest pain and leg swelling. Gastrointestinal: Negative for abdominal pain, diarrhea, nausea and vomiting. Endocrine: Negative for polydipsia, polyphagia and polyuria. Genitourinary: Negative for decreased urine volume, difficulty urinating, dysuria, hematuria and urgency. Musculoskeletal: Negative for back pain, gait problem, myalgias and neck pain. Skin: Negative for pallor and rash. Allergic/Immunologic: Negative for environmental allergies and food allergies. Neurological: Negative for dizziness, facial asymmetry, speech difficulty, numbness and headaches. Hematological: Negative for adenopathy. Does not bruise/bleed easily. Psychiatric/Behavioral: Negative for agitation, self-injury and suicidal ideas. The patient is not nervous/anxious. Objective:     Constitutional:       General: She is not in acute distress. HENT:      Head: Normocephalic and atraumatic.      Ears: Patient is unable to hear well, must speak directly into her ears. Eyes:      Extraocular Movements: Extraocular movements intact.      Pupils: Pupils are equal, round, and reactive to light. Cardiovascular:      Rate and Rhythm: Normal rate and regular rhythm.      Pulses: Normal pulses.      Heart sounds:Murmur. Pulmonary:      Effort: Pulmonary effort is normal.      Breath sounds: mild wheezing present. Abdominal:      Palpations: Abdomen is soft.      Tenderness: There is no abdominal tenderness.  .   Neurological:      General: No focal deficit present.      Mental Status: She is alert. Psychiatric:         Mood and Affect: Mood normal.     Visit Vitals  BP (!) 155/59 Comment: rn notfied   Pulse 74   Temp 97.7 °F (36.5 °C)   Resp 16   Ht 5' 2\" (1.575 m)   Wt 64.4 kg (141 lb 15.6 oz)   SpO2 97%   BMI 25.97 kg/m²    O2 Flow Rate (L/min): 0 l/min O2 Device: None (Room air)    Temp (24hrs), Av.8 °F (36.6 °C), Min:97.5 °F (36.4 °C), Max:98.1 °F (36.7 °C)      701 - 1900  In: 60 [P.O.:60]  Out: 400 [Urine:400]  1901 -  0700  In: 653.6 [P.O.:240; I.V.:413.6]  Out: 1000 [Urine:1000]    Data Review    Recent Results (from the past 24 hour(s))   TROPONIN I    Collection Time: 21  3:27 PM   Result Value Ref Range    Troponin-I, Qt. 1.00 (H) <0.05 ng/mL   PTT    Collection Time: 21  6:49 PM   Result Value Ref Range    aPTT 84.5 (H) 21.2 - 34.1 sec    aPTT, therapeutic range   82 - 109 sec   CBC WITH AUTOMATED DIFF    Collection Time: 21  8:10 AM   Result Value Ref Range    WBC 15.7 (H) 3.6 - 11.0 K/uL    RBC 3.63 (L) 3.80 - 5.20 M/uL    HGB 11.5 11.5 - 16.0 g/dL    HCT 33.9 (L) 35.0 - 47.0 %    MCV 93.4 80.0 - 99.0 FL    MCH 31.7 26.0 - 34.0 PG    MCHC 33.9 30.0 - 36.5 g/dL    RDW 12.3 11.5 - 14.5 %    PLATELET 409 485 - 514 K/uL    MPV 10.8 8.9 - 12.9 FL    NRBC 0.0 0.0  WBC    ABSOLUTE NRBC 0.00 0.00 - 0.01 K/uL    NEUTROPHILS 90 (H) 32 - 75 %    LYMPHOCYTES 6 (L) 12 - 49 %    MONOCYTES 3 (L) 5 - 13 %    EOSINOPHILS 0 0 - 7 %    BASOPHILS 0 0 - 1 %    IMMATURE GRANULOCYTES 1 (H) 0 - 0.5 %    ABS. NEUTROPHILS 14.2 (H) 1.8 - 8.0 K/UL    ABS. LYMPHOCYTES 1.0 0.8 - 3.5 K/UL    ABS. MONOCYTES 0.4 0.0 - 1.0 K/UL    ABS. EOSINOPHILS 0.0 0.0 - 0.4 K/UL    ABS. BASOPHILS 0.0 0.0 - 0.1 K/UL    ABS. IMM.  GRANS. 0.1 (H) 0.00 - 0.04 K/UL    DF AUTOMATED     METABOLIC PANEL, COMPREHENSIVE    Collection Time: 21  8:10 AM   Result Value Ref Range    Sodium 137 136 - 145 mmol/L    Potassium 4.0 3.5 - 5.1 mmol/L Chloride 104 97 - 108 mmol/L    CO2 24 21 - 32 mmol/L    Anion gap 9 5 - 15 mmol/L    Glucose 155 (H) 65 - 100 mg/dL    BUN 31 (H) 6 - 20 mg/dL    Creatinine 1.24 (H) 0.55 - 1.02 mg/dL    BUN/Creatinine ratio 25 (H) 12 - 20      GFR est AA 49 (L) >60 ml/min/1.73m2    GFR est non-AA 41 (L) >60 ml/min/1.73m2    Calcium 8.4 (L) 8.5 - 10.1 mg/dL    Bilirubin, total 0.2 0.2 - 1.0 mg/dL    AST (SGOT) 21 15 - 37 U/L    ALT (SGPT) 14 12 - 78 U/L    Alk. phosphatase 68 45 - 117 U/L    Protein, total 6.5 6.4 - 8.2 g/dL    Albumin 2.8 (L) 3.5 - 5.0 g/dL    Globulin 3.7 2.0 - 4.0 g/dL    A-G Ratio 0.8 (L) 1.1 - 2.2     PTT    Collection Time: 09/23/21  8:10 AM   Result Value Ref Range    aPTT 84.1 (H) 21.2 - 34.1 sec    aPTT, therapeutic range   82 - 109 sec         Assessment/Plan:     NSTEMI (elevated troponin)  Trending down  Troponin yesterday 1.27  BNP previous: 4,201  CXR: nL  EKG: sinus tachy, LBBB  ECHO: LV: Estimated LVEF is 40 - 45%. Normal cavity size, wall thickness and systolic function (ejection fraction normal). Moderate hypokinesis of the mid anteroseptal, apical septal and apical anterior wall(s). Mild (grade 1) left ventricular diastolic dysfunction. · AV: Aortic valve leaflet calcification present without reduced excursion. Mild to moderate aortic valve regurgitation is present. · MV: Mild to moderate mitral valve regurgitation is present. · TV: Right Ventricular Arterial Pressure (RVSP) is 32 mmHg.     Acute Hypoxemic respiratory distress  Blood gases: pH: 7.44, CO2:35 PO2: 62 Bicarb: 24 O2 sat: 94 RA  COPD exacerbation  HTN  Hypothyroidism  History of MI  Anemia  Asymptomatic Urinary tract infection  Levoquin 750 mg in D5W IVPB  Leukocytosis  Trending down   WBC: 15.7 today     Bilateral leg pains  D-dimer 0.93  Doppler studies show no evidence of DVT in L LE or R LE        Plan:       Per Cardiology: Echo reviewed. Plan to trend cardiac enzymes, and magnesium level, will makes changes as needed.  Manage conservatively based on patient increased risk, continue heparin for total 48 hours          Monitor aPTT levels, monitor for signs of bleeding  REPEAT TROPONIN  MAG level  Monitor leukocytosis     PT OT consult     Continue:   aspirin 325 mg PO every day   Lipitor 40 mg PO QHS   heparin 25.000 U in D5W 250 ml infusion   Levaquin 750 mg in D5W IVPB  Q48 hours x 3 doses    duo-neb 2.5 mg- 0.5 mg/ 3ml neb Q6H   symbicort 160-4.5 mcg 2 puff BID   synthroid tablet 100 mcg PO every day   solumedrol 60 mg IV Q6H   folvite tablet 1 mg PO every day   Carvedilol tablet 3.125 mg PO BID  Xanax tablet 0/25 mg PO BID  melatonin tablet PO 5 mg QHS    Possible discharge in the next 24 hours.     O2 SAT 97% ON ROOM AIR AT REST  PATIENT APPEARED TO BE A LITTLE ANXIOUS, AFTER REMOVING NASAL CANULA,  O2 SATS STILL 97% ON ROOM AIR  NASAL CANULA REPLACED, BUT O2 OFF

## 2021-09-23 NOTE — PROGRESS NOTES
PULMONARY NOTE  [facility]    Name: Brenda Houge MRN: [id number]   : 1930 Hospital: [address] [place] [address]   Date: 2021  Admission date: 2021 Hospital Day: 4       HPI:     Hospital Problems  Date Reviewed: 2020        Codes Class Noted POA    SOB (shortness of breath) ICD-10-CM: R06.02  ICD-9-CM: 786.05  2021 Unknown        COPD exacerbation ([facility] ICD-10-CM: J44.1  ICD-9-CM: 491.21  2021 Unknown        COPD (chronic obstructive pulmonary disease) ([facility] ICD-10-CM: J44.9  ICD-9-CM: 166  2021 Unknown        NSTEMI (non-ST elevated myocardial infarction) [facility]) ICD-10-CM: I21.4  ICD-9-CM: 410.70  2021 Unknown                   [x] High complexity decision making was performed  [x] See my orders for details      Subjective/Initial History:     I was asked by Leonid Edwards MD to see Brenda Hogue  a 80 y.o.  female in consultation     Excerpts from admission 2021 or consult notes as follows:   80 y.o. female with PMH significant for COPD, HTN, diabetes, and hypothyroidism who presented to the ED via EMS with chief complaint of SOB. Patient developed onset of moderate SOB sometime earlier today without any relieving/palliative factors. Patient additionally mentioned some left-sided chest pain. Upon EMS arrival, patient was tripoding -- he received IV Magnesium and placed on NRB. At the time, patient denied any fever, chills, abdominal pain, nausea/vomiting, or diarrhea. Labs in the ED were significant for troponin 0.19 and Pro-. CXR revealed no acute findings. ABG showed pO2 62. Patient was admitted to [address] [place] [address] for further evaluation and Pulmonology was consulted regarding additional management.   Patient is very hard of hearing and somewhat of a poor historian.        ---------------------------------------------------------------------------------------------------------  PROGRESS NOTE (2021 8:41 AM)    Patient went into A-Flutter w/RVR yesterday afternoon (~13:20) and received IV Metoprolol 5 mg. I spoke with the patient today, who appears to be doing better and does not complain of any palpitations or chest pain at this time. She continues to do well on 1 L/min NC O2 and does not appear dyspneic. No new or worsening complaints at this time. No Known Allergies     MAR reviewed and pertinent medications noted or modified as needed     Current Facility-Administered Medications   Medication    albuterol-ipratropium (DUO-NEB) 2.5 MG-0.5 MG/3 ML    heparin (porcine) 1,000 unit/mL injection 4,000 Units    Or    heparin (porcine) 1,000 unit/mL injection 2,000 Units    melatonin tablet 5 mg    metoprolol tartrate (LOPRESSOR) tablet 25 mg    budesonide-formoteroL (SYMBICORT) 160-4.5 mcg/actuation HFA inhaler 2 Puff    levoFLOXacin (LEVAQUIN) 750 mg in D5W IVPB    aspirin chewable tablet 81 mg    methylPREDNISolone (PF) (SOLU-MEDROL) injection 60 mg    ALPRAZolam (XANAX) tablet 7.54 mg    folic acid (FOLVITE) tablet 1 mg    levothyroxine (SYNTHROID) tablet 100 mcg    acetaminophen (TYLENOL) tablet 650 mg    Or    acetaminophen (TYLENOL) suppository 650 mg    polyethylene glycol (MIRALAX) packet 17 g    ondansetron (ZOFRAN ODT) tablet 4 mg    Or    ondansetron (ZOFRAN) injection 4 mg    atorvastatin (LIPITOR) tablet 40 mg    heparin 25,000 units in D5W 250 ml infusion      Patient PCP: None  PMH:  has a past medical history of Anemia, Chronic obstructive pulmonary disease ([address] Heart attack ([address] Hypertension, and Thyroid disease. PSH:   has a past surgical history that includes hx orthopaedic (2018) and hx pacemaker. FHX: family history includes Heart Disease in her father and mother; Stroke in her father and mother. SHX:  reports that she has never smoked. She has never used smokeless tobacco. She reports that she does not drink alcohol and does not use drugs. ROS:  Constitutional: Negative for chills and fever. Eyes: Negative for discharge. Respiratory: Positive for shortness of breath. Cardiovascular: Positive for chest pain. Gastrointestinal: Negative for abdominal pain, nausea and vomiting. Skin: Negative for rash. Neurological: Negative for headaches. Objective:     Vital Signs: Telemetry:    normal sinus rhythm Intake/Output:   Visit Vitals  BP (!) 135/54 (BP 1 Location: Right upper arm, BP Patient Position: Semi fowlers)   Pulse 70   Temp 97.8 °F (36.6 °C)   Resp 17   Ht 5' 2\" (1.575 m)   Wt 64.4 kg (141 lb 15.6 oz)   SpO2 97%   BMI 25.97 kg/m²       Temp (24hrs), Av.8 °F (36.6 °C), Min:97.5 °F (36.4 °C), Max:98.1 °F (36.7 °C)        O2 Device: None (Room air) O2 Flow Rate (L/min): 0 l/min       Wt Readings from Last 4 Encounters:   21 64.4 kg (141 lb 15.6 oz)   21 68 kg (150 lb)   21 68 kg (150 lb)   20 68 kg (150 lb)          Intake/Output Summary (Last 24 hours) at 2021 1101  Last data filed at 2021 1031  Gross per 24 hour   Intake 473.55 ml   Output 400 ml   Net 73.55 ml       Last shift:      701 - 1900  In: 60 [P.O.:60]  Out: 400 [Urine:400]  Last 3 shifts: 1901 -  0700  In: 653.6 [P.O.:240; I.V.:413.6]  Out: 1000 [Urine:1000]       Physical Exam:   Constitutional:       General: She is not in acute distress. Comments: Elderly   HENT:      Head: Normocephalic and atraumatic. Ears:      Comments: Hard of hearing. Eyes:      Extraocular Movements: Extraocular movements intact. Pupils: Pupils are equal, round, and reactive to light. Cardiovascular:      Rate and Rhythm: Normal rate and regular rhythm. Pulses: Normal pulses. Heart sounds: Murmur (2/6 systolic murmur, LUSB) heard. Pulmonary:      Effort: Pulmonary effort is normal.      Breath sounds: coarse breath sounds anteriorly. Abdominal:      Palpations: Abdomen is soft. Tenderness:  There is no abdominal tenderness. Musculoskeletal:         General: Normal range of motion. Cervical back: Normal range of motion. Skin:     Coloration: Skin is not jaundiced. Neurological:      General: No focal deficit present. Mental Status: She is alert. Psychiatric:         Mood and Affect: Mood normal.       Labs:    Recent Labs     09/23/21  0810 09/22/21  1849 09/22/21  0841 09/21/21  0930 09/21/21  0500 09/20/21  2242 09/20/21  1900   WBC 15.7*  --  20.2*  --  9.6  --   --    HGB 11.5  --  12.8  --  12.5  --   --      --  303  --  316  --   --    INR  --   --   --   --   --   --  1.0   APTT 84.1* 84.5* >153.0*   < >  --    < >  --     < > = values in this interval not displayed. Recent Labs     09/23/21  0810 09/22/21  0841 09/21/21  0500    134* 136   K 4.0 3.9 4.0    101 104   CO2 24 24 24   * 163* 235*   BUN 31* 30* 22*   CREA 1.24* 1.29* 1.41*   CA 8.4* 8.7 8.4*   MG  --  2.5*  --    ALB 2.8* 3.3* 3.0*   ALT 14 13 14     Recent Labs     09/20/21  2130   PH 7.44   PCO2 35   PO2 62*   HCO3 24   FIO2 21.0     Recent Labs     09/22/21  1527 09/22/21  0841 09/21/21  0253   TROIQ 1.00* 1.27* 2.51*     No results found for: BNPP, BNP   No results found for: CULTNo results found for: TSH, TSHEXT, TSHEXT    Imaging:    CXR Results  (Last 48 hours)    None        Results from Hospital Encounter encounter on 09/20/21    XR CHEST PORT    Narrative  History: Evidence of breath    A single view of the chest was obtained. Heart size is stable. There is some  atherosclerotic change of the aorta. Lungs are clear. No effusions or  pneumothorax. Bony structures are within normal limits. Impression  Normal findings. Results from Hospital Encounter encounter on 06/02/21    XR CHEST PORT    Narrative  The study is a single view chest radiographic examination dated 6/2/2021. HISTORY: Shortness of breath. COMPARISON:  1/24/2021.     FINDINGS: The heart size is normal. The pulmonary vessels are normal in caliber. The lung parenchyma is clear without an infiltrate or atelectasis. There is a  mild degree of chronic baseline peribronchial cuffing. The costophrenic angles  are maintained without pleural effusions or a pneumothorax. There is a midline  position to the trachea. There are atherosclerotic vascular changes of the  thoracic aorta. The pulmonary bia are symmetrical.    The bony thorax is intact without fractures/acute osseous abnormalities. Impression  1. There are moderate atherosclerotic vascular changes of the thoracic aorta. The patient may also have coronary artery atherosclerotic vascular changes which  could be a source for chest pain. 2.  This examination is negative for acute pulmonary parenchymal pathology. There is a mild degree of chronic baseline peribronchial cuffing without change. Results from [place] encounter on 01/24/21    XR CHEST PORT    Narrative  Portable chest, AP upright, 1546 hours. Comparison with 11/19/2019. Stable heart size. Calcified thoracic aorta. The lungs appear clear. No evidence  of pulmonary edema, air space pneumonia, or pleural effusion. No evidence of  pneumothorax. Impression  No evidence of focal airspace pneumonia. Results from [place] encounter on 01/24/21    CT CHEST W CONT    Addendum 2/25/2021 12:51 PM  Addendum: CT dose reduction was achieved through use of a standardized protocol  tailored for this examination and automatic exposure control for dose  modulation. Narrative  Contrast study was cc [id number]    Very mild subpleural fibrosis with otherwise completely clear lungs and patent  central airways. Pulmonary arteries are well-opacified and show no filling defect or distortion. Aorta shows normal dimensions, without dissection. Moderate atherosclerosis with  penetrating ulcer off the left side of the arch.  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 The aortic maximum diameter at this level is 3.3 cm. Patient age noted. No  mediastinal or hilar adenopathy. Coronary calcification. No cardiac finding. No pleural pericardial effusion. No  significant upper abdominal finding    Impression  Clear lungs. No evidence of PE. 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 IMPRESSION:     1. Acute on chronic respiratory failure with hypoxia  · ABG (9/20): Room Air // pH 7.44 // pCO2 35 // pO2 62 // Bicarb 24 // SpO2 94%  · Venous Duplex (9/21): negative for DVT  2. Asthma  · CXR (9/20): normal findings. 3. NSTEMI, likely type 2.  4. Acute on chronic diastolic CHF  · Echo (8/00): LVEF 40-45%, mild (grade 1) LV diastolic dysfunction, moderate AV leaflet calcification, mild-to-moderate AV & MV regurgitation. RVSP 32 mm Hg. · Pro-BNP 4201  5. H/o MI and s/p pacemaker  6. H/o anemia  7. H/o HTN  8. H/o Hypothyroidism  9. Diabetes Mellitus  10. Hypoalbuminemia        RECOMMENDATIONS/PLAN:     1. Currently doing well on 1 L/min NC O2. Will wean off of oxygen as tolerated. 2. Current Breathing Management  · DUO-NEB  · SYMBICORT  · SOLU-MEDROL  3. Current Antimicrobials  · LEVAQUIN  4. Currently on IV Heparin. 5. Supplemental O2 to keep sats > 93%  6. Aspiration precautions   7.  Will discontinue Archana Brandon MD

## 2021-09-23 NOTE — PROGRESS NOTES
O2 SAT 97% ON ROOM AIR AT REST  PATIENT APPEARED TO BE A LITTLE ANXIOUS, AFTER REMOVING NASAL CANULA,  O2 SATS STILL 97% ON ROOM AIR  NASAL CANULA REPLACED, BUT O2 OFF

## 2021-09-23 NOTE — PROGRESS NOTES
Problem: Falls - Risk of  Goal: *Absence of Falls  Description: Document Nhung Garcia Fall Risk and appropriate interventions in the flowsheet.   Outcome: Progressing Towards Goal  Note: Fall Risk Interventions:  Mobility Interventions: Bed/chair exit alarm         Medication Interventions: Bed/chair exit alarm, Patient to call before getting OOB    Elimination Interventions: Call light in reach, Bed/chair exit alarm, Patient to call for help with toileting needs              Problem: Patient Education: Go to Patient Education Activity  Goal: Patient/Family Education  Outcome: Progressing Towards Goal

## 2021-09-23 NOTE — PROGRESS NOTES
OCCUPATIONAL THERAPY EVALUATION  Patient: Wiliam Calderon (73 y.o. female)  Date: 9/23/2021  Primary Diagnosis: SOB (shortness of breath) [R06.02]  COPD exacerbation (HCC) [J44.1]  NSTEMI (non-ST elevated myocardial infarction) ([facility]) [I21.4]  COPD (chronic obstructive pulmonary disease) ([facility] [J44.9]        Precautions: fall risk      ASSESSMENT  Pt is a 79 y/o F with PMH significant for COPD, HTN, hypothyroidism, presenting to [facility] ED with cc of SOB, admitted 9/20/21 and being treated for SOB, COPD exacerbation, NSTEMI. Pt received semi-supine in bed upon arrival, AXO x4, and agreeable to OT evaluation at this time. Per pt report, pt lives alone, however daughter lives next door and comes by to check on her frequently and pt reports she also has four nurses/caregivers that stay with her 24/7 at [facility]. Pt states her home is one-story with 3 MARINA and B HR, required assist for ADLs from caregivers and was Mod I using SPC/RW for mobility at [facility]. Other DME owned includes: shower chair. Pt currently on 1L O2 via NC, pt states she does not wear O2 at home. Based on current observations, pt presents with deficits in generalized strength/AROM, static/dynamic standing balance, and functional activity tolerance impacting overall performance of ADLs and functional transfers/mobility. Pt currently requires total A to don socks in long sitting, SBA for supine>sit to EOB with good sitting balance noted. Pt STS from EOB with RW, gait belt and min A, ambulated to bathroom with min A for balance/safety and min A for toilet transfer. Pt max for toileting routine/bladder hygiene in standing with UE support on Rw/grab bar. Pt returned to bedside min A at end of session and CGA sit>supine. Overall, pt tolerates session fair today; she appears close to her functional baseline. Pt c/o feeling SOB with activity however O2 sats remained 95%-98% throughout session today.  Pt would benefit from continued skilled OT services to address current impairments and improve overall IND and safety with self cares and functional transfers/mobility. Recommend discharge home with Mally Mole and continued 24/7 family care once medically appropriate. Other factors to consider for discharge: good family/social support, DME, time since onset     Patient will benefit from skilled therapy intervention to address the above noted impairments. PLAN :  Recommendations and Planned Interventions: self care training, functional mobility training, therapeutic exercise, balance training, therapeutic activities, endurance activities, patient education, home safety training, and family training/education    Frequency/Duration: Patient will be followed by physical therapy:  2-3x/week to address goals. Recommendation for discharge: (in order for the patient to meet his/her long term goals)  Home with Mally Mole and continued 24/7 care    This discharge recommendation:  Has been made in collaboration with the attending provider and/or case management    IF patient discharges home will need the following DME: patient owns DME required for discharge       SUBJECTIVE:   Patient stated I feel a little short of breath when I move around a lot.     OBJECTIVE DATA SUMMARY:   HISTORY:   Past Medical History:   Diagnosis Date    Anemia     Chronic obstructive pulmonary disease ([facility]     Heart attack ([facility]     Hypertension     Thyroid disease     hypothyroidism     Past Surgical History:   Procedure Laterality Date    HX ORTHOPAEDIC  2018    total hip replacement    HX PACEMAKER         Expanded or extensive additional review of patient history:     Home Situation  Home Environment: Private residence  [address] [facility] Name: [facility]  # Steps to Enter: 3  Rails to Enter: Yes  [employer] : Bilateral  One/Two Story Residence: One story  Living Alone: Yes (Per pt has 24/7 care)  Support Systems: Caregiver/Home Care Staff, Other Family Member(s)  Patient Expects to be Discharged to[de-identified] House  Current DME Used/Available at Home: Cane, straight, Wheelchair    EXAMINATION OF PERFORMANCE DEFICITS:  Cognitive/Behavioral Status:  Neurologic State: Alert  Orientation Level: Oriented X4  Cognition: Follows commands    Hearing: Auditory  Auditory Impairment: Hard of hearing, bilateral    Range of Motion:  AROM: Within functional limits    Strength:  Strength: Generally decreased, functional    Coordination:  Coordination: Generally decreased, functional  Fine Motor Skills-Upper: Left Intact; Right Intact    Gross Motor Skills-Upper: Left Intact; Right Intact    Balance:  Sitting: Intact; With support  Standing: Impaired; Without support  Standing - Static: Fair  Standing - Dynamic : Fair    Functional Mobility and Transfers for ADLs:  Bed Mobility:  Rolling: Stand-by assistance  Supine to Sit: Stand-by assistance  Sit to Supine: Contact guard assistance  Scooting: Stand-by assistance    Transfers:  Sit to Stand: Minimum assistance  Stand to Sit: Minimum assistance  Bathroom Mobility: Minimum assistance  Toilet Transfer : Minimum assistance    ADL Intervention and task modifications:  Upper Body [address] Gown: Minimum  assistance    Lower Body Dressing Assistance  Socks: Total assistance (dependent)  Position Performed: Long sitting on bed    Toileting  Toileting Assistance: Maximum assistance (standing)  Bladder Hygiene: Maximum assistance  Adaptive Equipment: Grab bars; Walker    Therapeutic Exercise:  Pt would benefit from UE HEP to improve overall UE AROM/strength and can be further educated in next treatment session. Functional Measure:    [address] [facility] [address] [address] AM-PACTM \"6 Clicks\"                                                       Daily Activity Inpatient Short Form  How much help from another person does the patient currently need. .. Total; A Lot A Little None   1. Putting on and taking off regular lower body clothing? []  1 [x]  2 []  3 []  4   2.   Bathing (including washing, rinsing, drying)? []  1 []  2 [x]  3 []  4   3. Toileting, which includes using toilet, bedpan or urinal? [] 1 [x]  2 []  3 []  4   4. Putting on and taking off regular upper body clothing? []  1 []  2 [x]  3 []  4   5. Taking care of personal grooming such as brushing teeth? []  1 []  2 [x]  3 []  4   6. Eating meals? []  1 []  2 [x]  3 []  4   © , Trustees [address] [address], under license to [employer]. All rights reserved     Score:      Interpretation of Tool:  Represents clinically-significant functional categories (i.e. Activities of daily living). Percentage of Impairment CH    0%   CI    1-19% CJ    20-39% CK    40-59% CL    60-79% CM    80-99% CN     100%   [facility]  Score 6-24 24 23 20-22 15-19 10-14 7-9 6        Occupational Therapy Evaluation Charge Determination   History Examination Decision-Making   LOW Complexity : Brief history review  LOW Complexity : 1-3 performance deficits relating to physical, cognitive , or psychosocial skils that result in activity limitations and / or participation restrictions  MEDIUM Complexity : Patient may present with comorbidities that affect occupational performnce. Miniml to moderate modification of tasks or assistance (eg, physical or verbal ) with assesment(s) is necessary to enable patient to complete evaluation       Based on the above components, the patient evaluation is determined to be of the following complexity level: LOW   Pain Ratin/10    Activity Tolerance:   Fair    After treatment patient left in no apparent distress:    Supine in bed, Call bell within reach, Bed / chair alarm activated, and Side rails x 3    COMMUNICATION/EDUCATION:   The patients plan of care was discussed with: Physical therapist, Registered nurse, and Case management. Patient/family have participated as able in goal setting and plan of care. and Patient/family agree to work toward stated goals and plan of care.     This patients plan of care is appropriate for delegation to [facility] [place].     Thank you for this referral.  Minetta Spurling  Time Calculation: 23 mins   Problem: Self Care Deficits Care Plan (Adult)  Goal: *Acute Goals and Plan of Care (Insert Text)  Description: Pt will be IND sup <> sit in prep for EOB ADLs  Pt will be SBA grooming standing sink side LRAD  Pt will be min A LE dressing sitting EOB/long sit  Pt will be SBA sit <>  prep for toileting LRAD  Pt will be SBA toileting/toilet transfer/cloth mgmt LRAD  Pt will be IND following UE HEP in prep for self care tasks   Outcome: Not Met

## 2021-09-23 NOTE — PROGRESS NOTES
PHYSICAL THERAPY EVALUATION  Patient: Alex Garg (89 y.o. female)  Date: 9/23/2021  Primary Diagnosis: SOB (shortness of breath) [R06.02]  COPD exacerbation (HCC) [J44.1]  NSTEMI (non-ST elevated myocardial infarction) ([facility]) [I21.4]  COPD (chronic obstructive pulmonary disease) ([facility] [J44.9]        Precautions: falls    ASSESSMENT  This is a 79 y/o female who came to  [facility]  ED with c/o  SOB x 1 day associated with dizziness , admitted on 9/20/21 for SOB , COPD exacerbation and  NSTEMI . Pt has PMH of COPD, HTN, and  hypothyroidism. Pt received semi-supine in bed , caregiver present in the room , pt agreeable for PT eval/tx . Pt is A& O x 4 , per pt report , she lives  alone (  however daughter lives next door and comes by to check on her frequently and pt reports she also has four nurses/caregivers that stay with her 24/7 ) in a single story home with 3 steps to enter , HR on bilateral sides ,  needed assist with ADL's and mod I for functional transfers/mobility with RW prior to admission . Other DME owned : [facility] , show chair    Based on the objective data described below, currently pt on 1L O2 via NC , presents with  decreased strength , -3/5 grossly in  b/l LE ,[place],  balance deficits , generalized weakness , decreased activity tolerance and increased need for assist with functional mobility ( needs SBA for rolling from side to side  , SBA for supine <>sit transfers , intact static sitting balance , Rambo for sit <>stand and SPT , fair static  standing balance with support, is able to ambulate - 22' with RW, CGA/Rambo with slow kuldeep and  decreased step length  b/l Le ) . Pt would benefit from continued skilled PT services to address current impairments and improve overall IND and safety with  functional transfers/mobility . Recommend d/c to home with HHPT and 24 x7 care when medically appropriate .      Current Level of Function Impacting Discharge (mobility/balance): Requires Rambo for transfers and CGA/Rambo for ambulation    Functional Outcome Measure: The patient scored 18 on the AM PAC basic mobility outcome measure which is indicative of medium complexity. Other factors to consider for discharge: 24 x 7 caregiver  support , PLOF          PLAN :  Recommendations and Planned Interventions: bed mobility training, transfer training, gait training, therapeutic exercises, neuromuscular re-education, patient and family training/education and therapeutic activities      Frequency/Duration: Patient will be followed by physical therapy:  2-3x/week to address goals. Recommendation for discharge: (in order for the patient to meet his/her long term goals)  Home with [address] [place] [address] with 24 x7 care    This discharge recommendation:  Has been made in collaboration with the attending provider and/or case management    IF patient discharges home will need the following DME: none         SUBJECTIVE:   Patient stated I am hard of hearing     OBJECTIVE DATA SUMMARY:   HISTORY:    Past Medical History:   Diagnosis Date    Anemia     Chronic obstructive pulmonary disease ([address]     [facility]     Hypertension     Thyroid disease     hypothyroidism     Past Surgical History:   Procedure Laterality Date    HX ORTHOPAEDIC  2018    total hip replacement    HX PACEMAKER         Personal factors and/or comorbidities impacting plan of care:     Home Situation  Home Environment: Private residence  [address] [place] [address] Name: [facility]  # Steps to Enter: 3  Rails to Enter: Yes  [employer] : Bilateral  One/Two Story Residence: One story  Living Alone: Yes (Per pt has 24/7 care)  [place]: Caregiver/Home Care Staff, Other Family Member(s)  Patient Expects to be Discharged to[de-identified] House  Current DME Used/Available at Home: Cane, straight, Wheelchair    PLOF: Pt needed assist for ADLS/IADLS, mod I for  Mobility with RW prior to admission.      EXAMINATION/PRESENTATION/DECISION MAKING:   Critical Behavior:  Neurologic State: Alert  Orientation Level: Oriented X4  Cognition: Follows commands     Hearing: Auditory  Auditory Impairment: Hard of hearing, bilateral  Skin:  Intact where exposed    Range Of Motion:  AROM: Generally decreased, functional   Strength:    Strength: Generally decreased, functional (-3/5 grossly for b/l LE)    Coordination:  Coordination: Generally decreased, functional  Functional Mobility:  Bed Mobility:  Rolling: Stand-by assistance  Supine to Sit: Stand-by assistance  Sit to Supine: Stand-by assistance  Scooting: Stand-by assistance  Transfers:  Sit to Stand: Minimum assistance  Stand to Sit: Minimum assistance  Stand Pivot Transfers: Minimum assistance     Balance:   Sitting: Intact; With support  Standing: Impaired; Without support  Standing - Static: Fair;Constant support  Standing - Dynamic : Fair;Constant support  Ambulation/Gait Training:  Distance (ft): 25 Feet (ft)  Assistive Device: Walker, rolling  Ambulation - Level of Assistance: Contact guard assistance;Minimal assistance  Base of Support: Narrowed     Speed/Vilma: Slow  Step Length: Left shortened;Right shortened    Functional Measure:    [facility] [address] [address] AM-PAC 6 Clicks         Basic Mobility Inpatient Short Form  How much difficulty does the patient currently have. .. Unable A Lot A Little None   1. Turning over in bed (including adjusting bedclothes, sheets and blankets)? [] 1   [] 2   [x] 3   [] 4   2. Sitting down on and standing up from a chair with arms ( e.g., wheelchair, bedside commode, etc.)   [] 1   [] 2   [x] 3   [] 4   3. Moving from lying on back to sitting on the side of the bed? [] 1   [] 2   [x] 3   [] 4          How much help from another person does the patient currently need. .. Total A Lot A Little None   4. Moving to and from a bed to a chair (including a wheelchair)? [] 1   [] 2   [x] 3   [] 4   5. Need to walk in hospital room? [] 1   [] 2   [x] 3   [] 4   6.   Climbing 3-5 steps with a railing? [] 1   [] 2   [x] 3   [] 4   © , Trustees of [address] [place] [address] [address] [address], under license to [employer]. All rights reserved     Score:  Initial: 18 Most Recent: X (Date: -21- )   Interpretation of Tool:  Represents activities that are increasingly more difficult (i.e. Bed mobility, Transfers, Gait). Score 24 23 22-20 19-15 14-10 9-7 6   Modifier CH CI CJ CK CL CM CN          Physical Therapy Evaluation Charge Determination   History Examination Presentation Decision-Making   MEDIUM  Complexity : 1-2 comorbidities / personal factors will impact the outcome/ POC  MEDIUM Complexity : 3 Standardized tests and measures addressing body structure, function, activity limitation and / or participation in recreation  LOW Complexity : Stable, uncomplicated  Other Functional Measure [facility] 6 medium complexity      Based on the above components, the patient evaluation is determined to be of the following complexity level: LOW     Pain Ratin/10    Activity Tolerance:   Fair  Please refer to the flowsheet for vital signs taken during this treatment. After treatment patient left in no apparent distress:   Supine in bed, Call bell within reach, Bed / chair alarm activated and Caregiver / family present    COMMUNICATION/EDUCATION:   The patients plan of care was discussed with: Registered nurse. Fall prevention education was provided and the patient/caregiver indicated understanding. and Patient/family agree to work toward stated goals and plan of care. Problem: Mobility Impaired (Adult and Pediatric)  Goal: *Acute Goals and Plan of Care (Insert Text)  Description: Pt will be I with LE HEP in 7 days. Pt will perform bed mobility with mod I in 7 days. Pt will perform transfers with mod I in 7 days. Pt will amb -100 feet with LRAD safely with mod I in 7 days.    Outcome: Not Met       Thank you for this referral.  Manuela Petersen   Time Calculation: 43 mins

## 2021-09-23 NOTE — PROGRESS NOTES
O2 SAT 96% ON ROOM AIR AT REST    NASAL CANULA IS IN THE PATIEINT'S NOSE, BUT NO FLOW RATE AT THIS TIME.     PATIENT RESTING COMFORTABLY AT THIS TIME

## 2021-09-23 NOTE — PROGRESS NOTES
PULMONARY NOTE  [facility]    Name: Ray Irizarry MRN: [id number]   : 1930 Hospital: [address] [place] [address]   Date: 2021  Admission date: 2021 Hospital Day: 4       HPI:     Hospital Problems  Date Reviewed: 2020        Codes Class Noted POA    SOB (shortness of breath) ICD-10-CM: R06.02  ICD-9-CM: 786.05  2021 Unknown        COPD exacerbation ([facility] ICD-10-CM: J44.1  ICD-9-CM: 491.21  2021 Unknown        COPD (chronic obstructive pulmonary disease) ([facility] ICD-10-CM: J44.9  ICD-9-CM: 283  2021 Unknown        NSTEMI (non-ST elevated myocardial infarction) [facility]) ICD-10-CM: I21.4  ICD-9-CM: 410.70  2021 Unknown                   [x] High complexity decision making was performed  [x] See my orders for details      Subjective/Initial History:     I was asked by Brady Su MD to see Ray Irizarry  a 80 y.o.  female in consultation     Excerpts from admission 2021 or consult notes as follows:   80 y.o. female with PMH significant for COPD, HTN, diabetes, and hypothyroidism who presented to the ED via EMS with chief complaint of SOB. Patient developed onset of moderate SOB sometime earlier today without any relieving/palliative factors. Patient additionally mentioned some left-sided chest pain. Upon EMS arrival, patient was tripoding -- he received IV Magnesium and placed on NRB. At the time, patient denied any fever, chills, abdominal pain, nausea/vomiting, or diarrhea. Labs in the ED were significant for troponin 0.19 and Pro-. CXR revealed no acute findings. ABG showed pO2 62. Patient was admitted to [address] [place] [address] for further evaluation and Pulmonology was consulted regarding additional management.   Patient is very hard of hearing and somewhat of a poor historian.        ---------------------------------------------------------------------------------------------------------  PROGRESS NOTE (2021 8:41 AM)    Patient went into A-Flutter w/RVR yesterday afternoon (~13:20) and received IV Metoprolol 5 mg. I spoke with the patient today, who appears to be doing better and does not complain of any palpitations or chest pain at this time. She continues to do well on 1 L/min NC O2 and does not appear dyspneic. No new or worsening complaints at this time. No Known Allergies     MAR reviewed and pertinent medications noted or modified as needed     Current Facility-Administered Medications   Medication    heparin (porcine) 1,000 unit/mL injection 4,000 Units    Or    heparin (porcine) 1,000 unit/mL injection 2,000 Units    melatonin tablet 5 mg    albuterol-ipratropium (DUO-NEB) 2.5 MG-0.5 MG/3 ML    metoprolol tartrate (LOPRESSOR) tablet 25 mg    budesonide-formoteroL (SYMBICORT) 160-4.5 mcg/actuation HFA inhaler 2 Puff    levoFLOXacin (LEVAQUIN) 750 mg in D5W IVPB    aspirin chewable tablet 81 mg    methylPREDNISolone (PF) (SOLU-MEDROL) injection 60 mg    albuterol-ipratropium (DUO-NEB) 2.5 MG-0.5 MG/3 ML    ALPRAZolam (XANAX) tablet 3.02 mg    folic acid (FOLVITE) tablet 1 mg    levothyroxine (SYNTHROID) tablet 100 mcg    acetaminophen (TYLENOL) tablet 650 mg    Or    acetaminophen (TYLENOL) suppository 650 mg    polyethylene glycol (MIRALAX) packet 17 g    ondansetron (ZOFRAN ODT) tablet 4 mg    Or    ondansetron (ZOFRAN) injection 4 mg    atorvastatin (LIPITOR) tablet 40 mg    heparin 25,000 units in D5W 250 ml infusion      Patient PCP: None  PMH:  has a past medical history of Anemia, Chronic obstructive pulmonary disease ([address] Heart attack ([address] Hypertension, and Thyroid disease. PSH:   has a past surgical history that includes hx orthopaedic (2018) and hx pacemaker. FHX: family history includes Heart Disease in her father and mother; Stroke in her father and mother. SHX:  reports that she has never smoked.  She has never used smokeless tobacco. She reports that she does not drink alcohol and does not use drugs. ROS:  Constitutional: Negative for chills and fever. Eyes: Negative for discharge. Respiratory: Positive for shortness of breath. Cardiovascular: Positive for chest pain. Gastrointestinal: Negative for abdominal pain, nausea and vomiting. Skin: Negative for rash. Neurological: Negative for headaches. Objective:     Vital Signs: Telemetry:    normal sinus rhythm Intake/Output:   Visit Vitals  BP (!) 130/52 (BP 1 Location: Right upper arm)   Pulse 69   Temp 98.1 °F (36.7 °C)   Resp 16   Ht 5' 2\" (1.575 m)   Wt 155 lb (70.3 kg)   SpO2 98%   BMI 28.35 kg/m²       Temp (24hrs), Av.8 °F (36.6 °C), Min:97.5 °F (36.4 °C), Max:98.1 °F (36.7 °C)        O2 Device: Nasal cannula O2 Flow Rate (L/min): 1 l/min       Wt Readings from Last 4 Encounters:   21 155 lb (70.3 kg)   21 150 lb (68 kg)   21 150 lb (68 kg)   20 150 lb (68 kg)          Intake/Output Summary (Last 24 hours) at 2021 0744  Last data filed at 2021 1207  Gross per 24 hour   Intake 653.55 ml   Output --   Net 653.55 ml       Last shift:      No intake/output data recorded. Last 3 shifts:  1901 -  0700  In: 653.6 [P.O.:240; I.V.:413.6]  Out: 1000 [Urine:1000]       Physical Exam:   Constitutional:       General: She is not in acute distress. Comments: Elderly   HENT:      Head: Normocephalic and atraumatic. Ears:      Comments: Hard of hearing. Eyes:      Extraocular Movements: Extraocular movements intact. Pupils: Pupils are equal, round, and reactive to light. Cardiovascular:      Rate and Rhythm: Normal rate and regular rhythm. Pulses: Normal pulses. Heart sounds: Murmur (2/6 systolic murmur, LUSB) heard. Pulmonary:      Effort: Pulmonary effort is normal.      Breath sounds: coarse breath sounds anteriorly. Abdominal:      Palpations: Abdomen is soft. Tenderness: There is no abdominal tenderness.    Musculoskeletal: General: Normal range of motion. Cervical back: Normal range of motion. Skin:     Coloration: Skin is not jaundiced. Neurological:      General: No focal deficit present. Mental Status: She is alert. Psychiatric:         Mood and Affect: Mood normal.       Labs:    Recent Labs     09/22/21  1849 09/22/21  0841 09/21/21  2207 09/21/21  0930 09/21/21  0500 09/20/21  2242 09/20/21  1900 09/20/21  1821   WBC  --  20.2*  --   --  9.6  --   --  20.1*   HGB  --  12.8  --   --  12.5  --   --  13.7   PLT  --  303  --   --  316  --   --  334   INR  --   --   --   --   --   --  1.0  --    APTT 84.5* >153.0* 72.1*   < >  --    < >  --   --     < > = values in this interval not displayed. Recent Labs     09/22/21  0841 09/21/21  0500 09/20/21  1821   * 136 134*   K 3.9 4.0 5.8*    104 105   CO2 24 24 25   * 235* 207*   BUN 30* 22* 22*   CREA 1.29* 1.41* 1.38*   CA 8.7 8.4* 9.1   MG 2.5*  --   --    ALB 3.3* 3.0* 3.6   ALT 13 14 17     Recent Labs     09/20/21  2130   PH 7.44   PCO2 35   PO2 62*   HCO3 24   FIO2 21.0     Recent Labs     09/22/21  1527 09/22/21  0841 09/21/21  0253   TROIQ 1.00* 1.27* 2.51*     No results found for: BNPP, BNP   No results found for: CULTNo results found for: TSH, TSHEXT, TSHEXT    Imaging:    CXR Results  (Last 48 hours)    None        Results from Hospital Encounter encounter on 09/20/21    XR CHEST PORT    Narrative  History: Evidence of breath    A single view of the chest was obtained. Heart size is stable. There is some  atherosclerotic change of the aorta. Lungs are clear. No effusions or  pneumothorax. Bony structures are within normal limits. Impression  Normal findings. Results from Hospital Encounter encounter on 06/02/21    XR CHEST PORT    Narrative  The study is a single view chest radiographic examination dated 6/2/2021. HISTORY: Shortness of breath. COMPARISON:  1/24/2021.     FINDINGS: The heart size is normal. The pulmonary vessels are normal in caliber. The lung parenchyma is clear without an infiltrate or atelectasis. There is a  mild degree of chronic baseline peribronchial cuffing. The costophrenic angles  are maintained without pleural effusions or a pneumothorax. There is a midline  position to the trachea. There are atherosclerotic vascular changes of the  thoracic aorta. The pulmonary bia are symmetrical.    The bony thorax is intact without fractures/acute osseous abnormalities. Impression  1. There are moderate atherosclerotic vascular changes of the thoracic aorta. The patient may also have coronary artery atherosclerotic vascular changes which  could be a source for chest pain. 2.  This examination is negative for acute pulmonary parenchymal pathology. There is a mild degree of chronic baseline peribronchial cuffing without change. Results from [place] encounter on 01/24/21    XR CHEST PORT    Narrative  Portable chest, AP upright, 1546 hours. Comparison with 11/19/2019. Stable heart size. Calcified thoracic aorta. The lungs appear clear. No evidence  of pulmonary edema, air space pneumonia, or pleural effusion. No evidence of  pneumothorax. Impression  No evidence of focal airspace pneumonia. Results from [place] encounter on 01/24/21    CT CHEST W CONT    Addendum 2/25/2021 12:51 PM  Addendum: CT dose reduction was achieved through use of a standardized protocol  tailored for this examination and automatic exposure control for dose  modulation. Narrative  Contrast study was cc [id number]    Very mild subpleural fibrosis with otherwise completely clear lungs and patent  central airways. Pulmonary arteries are well-opacified and show no filling defect or distortion. Aorta shows normal dimensions, without dissection. Moderate atherosclerosis with  penetrating ulcer off the left side of the arch.  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 The aortic maximum diameter at this level is 3.3 cm. Patient age noted. No  mediastinal or hilar adenopathy. Coronary calcification. No cardiac finding. No pleural pericardial effusion. No  significant upper abdominal finding    Impression  Clear lungs. No evidence of PE. 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 IMPRESSION:     1. Acute on chronic respiratory failure with hypoxia  · ABG (9/20): Room Air // pH 7.44 // pCO2 35 // pO2 62 // Bicarb 24 // SpO2 94%  · Venous Duplex (9/21): negative for DVT  2. Asthma  · CXR (9/20): normal findings. 3. NSTEMI, likely type 2.  4. Acute on chronic diastolic CHF  · Echo (5/21): LVEF 40-45%, mild (grade 1) LV diastolic dysfunction, moderate AV leaflet calcification, mild-to-moderate AV & MV regurgitation. RVSP 32 mm Hg. · Pro-BNP 4201  5. H/o MI and s/p pacemaker  6. H/o anemia  7. H/o HTN  8. H/o Hypothyroidism  9. Diabetes Mellitus  10. Hypoalbuminemia        RECOMMENDATIONS/PLAN:     1. Currently doing well on 1 L/min NC O2. Will wean off of oxygen as tolerated. 2. Current Breathing Management  · DUO-NEB  · SYMBICORT  · SOLU-MEDROL  3. Current Antimicrobials  · LEVAQUIN  4. Currently on IV Heparin. 5. Supplemental O2 to keep sats > 93%  6.  Aspiration precautions            Nunu Murrieta

## 2021-09-24 LAB
ALBUMIN SERPL-MCNC: 2.8 G/DL (ref 3.5–5)
ALBUMIN/GLOB SERPL: 0.9 {RATIO} (ref 1.1–2.2)
ALP SERPL-CCNC: 77 U/L (ref 45–117)
ALT SERPL-CCNC: 19 U/L (ref 12–78)
ANION GAP SERPL CALC-SCNC: 10 MMOL/L (ref 5–15)
APTT PPP: 145.9 SEC (ref 21.2–34.1)
AST SERPL W P-5'-P-CCNC: 22 U/L (ref 15–37)
ATRIAL RATE: 131 BPM
BASOPHILS # BLD: 0 K/UL (ref 0–0.1)
BASOPHILS NFR BLD: 0 % (ref 0–1)
BILIRUB SERPL-MCNC: 0.2 MG/DL (ref 0.2–1)
BUN SERPL-MCNC: 34 MG/DL (ref 6–20)
BUN/CREAT SERPL: 28 (ref 12–20)
CA-I BLD-MCNC: 8.5 MG/DL (ref 8.5–10.1)
CALCULATED R AXIS, ECG10: 21 DEGREES
CALCULATED T AXIS, ECG11: 174 DEGREES
CHLORIDE SERPL-SCNC: 104 MMOL/L (ref 97–108)
CO2 SERPL-SCNC: 24 MMOL/L (ref 21–32)
CREAT SERPL-MCNC: 1.23 MG/DL (ref 0.55–1.02)
DIAGNOSIS, 93000: NORMAL
DIFFERENTIAL METHOD BLD: ABNORMAL
EOSINOPHIL # BLD: 0 K/UL (ref 0–0.4)
EOSINOPHIL NFR BLD: 0 % (ref 0–7)
ERYTHROCYTE [DISTWIDTH] IN BLOOD BY AUTOMATED COUNT: 12.3 % (ref 11.5–14.5)
GLOBULIN SER CALC-MCNC: 3.2 G/DL (ref 2–4)
GLUCOSE SERPL-MCNC: 165 MG/DL (ref 65–100)
HCT VFR BLD AUTO: 33.4 % (ref 35–47)
HGB BLD-MCNC: 11.6 G/DL (ref 11.5–16)
IMM GRANULOCYTES # BLD AUTO: 0.2 K/UL (ref 0–0.04)
IMM GRANULOCYTES NFR BLD AUTO: 1 % (ref 0–0.5)
LYMPHOCYTES # BLD: 0.9 K/UL (ref 0.8–3.5)
LYMPHOCYTES NFR BLD: 6 % (ref 12–49)
MCH RBC QN AUTO: 31.7 PG (ref 26–34)
MCHC RBC AUTO-ENTMCNC: 34.7 G/DL (ref 30–36.5)
MCV RBC AUTO: 91.3 FL (ref 80–99)
MONOCYTES # BLD: 0.5 K/UL (ref 0–1)
MONOCYTES NFR BLD: 3 % (ref 5–13)
NEUTS SEG # BLD: 12.5 K/UL (ref 1.8–8)
NEUTS SEG NFR BLD: 90 % (ref 32–75)
NRBC # BLD: 0 K/UL (ref 0–0.01)
NRBC BLD-RTO: 0 PER 100 WBC
PLATELET # BLD AUTO: 312 K/UL (ref 150–400)
PMV BLD AUTO: 11.2 FL (ref 8.9–12.9)
POTASSIUM SERPL-SCNC: 4.1 MMOL/L (ref 3.5–5.1)
PROT SERPL-MCNC: 6 G/DL (ref 6.4–8.2)
Q-T INTERVAL, ECG07: 368 MS
QRS DURATION, ECG06: 98 MS
QTC CALCULATION (BEZET), ECG08: 498 MS
RBC # BLD AUTO: 3.66 M/UL (ref 3.8–5.2)
SODIUM SERPL-SCNC: 138 MMOL/L (ref 136–145)
THERAPEUTIC RANGE,PTTT: ABNORMAL SEC (ref 82–109)
VENTRICULAR RATE, ECG03: 110 BPM
WBC # BLD AUTO: 14.1 K/UL (ref 3.6–11)

## 2021-09-24 PROCEDURE — 74011250636 HC RX REV CODE- 250/636: Performed by: INTERNAL MEDICINE

## 2021-09-24 PROCEDURE — 74011250637 HC RX REV CODE- 250/637: Performed by: INTERNAL MEDICINE

## 2021-09-24 PROCEDURE — 94640 AIRWAY INHALATION TREATMENT: CPT

## 2021-09-24 PROCEDURE — 94760 N-INVAS EAR/PLS OXIMETRY 1: CPT

## 2021-09-24 PROCEDURE — 36415 COLL VENOUS BLD VENIPUNCTURE: CPT

## 2021-09-24 PROCEDURE — 93005 ELECTROCARDIOGRAM TRACING: CPT

## 2021-09-24 PROCEDURE — 74011000250 HC RX REV CODE- 250: Performed by: INTERNAL MEDICINE

## 2021-09-24 PROCEDURE — 74011250636 HC RX REV CODE- 250/636: Performed by: FAMILY MEDICINE

## 2021-09-24 PROCEDURE — 80053 COMPREHEN METABOLIC PANEL: CPT

## 2021-09-24 PROCEDURE — 97530 THERAPEUTIC ACTIVITIES: CPT | Performed by: PHYSICAL THERAPIST

## 2021-09-24 PROCEDURE — 97116 GAIT TRAINING THERAPY: CPT | Performed by: PHYSICAL THERAPIST

## 2021-09-24 PROCEDURE — 74011250637 HC RX REV CODE- 250/637: Performed by: FAMILY MEDICINE

## 2021-09-24 PROCEDURE — 65270000029 HC RM PRIVATE

## 2021-09-24 PROCEDURE — 85730 THROMBOPLASTIN TIME PARTIAL: CPT

## 2021-09-24 PROCEDURE — 77010033678 HC OXYGEN DAILY

## 2021-09-24 PROCEDURE — 85025 COMPLETE CBC W/AUTO DIFF WBC: CPT

## 2021-09-24 RX ORDER — DIGOXIN 0.25 MG/ML
0.25 INJECTION INTRAMUSCULAR; INTRAVENOUS
Status: COMPLETED | OUTPATIENT
Start: 2021-09-24 | End: 2021-09-24

## 2021-09-24 RX ORDER — PREDNISONE 5 MG/1
10 TABLET ORAL
Status: DISCONTINUED | OUTPATIENT
Start: 2021-09-25 | End: 2021-09-27 | Stop reason: HOSPADM

## 2021-09-24 RX ADMIN — BUDESONIDE AND FORMOTEROL FUMARATE DIHYDRATE 2 PUFF: 160; 4.5 AEROSOL RESPIRATORY (INHALATION) at 20:25

## 2021-09-24 RX ADMIN — ALPRAZOLAM 0.25 MG: 0.5 TABLET ORAL at 21:23

## 2021-09-24 RX ADMIN — ASPIRIN 81 MG CHEWABLE TABLET 81 MG: 81 TABLET CHEWABLE at 09:04

## 2021-09-24 RX ADMIN — FOLIC ACID 1 MG: 1 TABLET ORAL at 09:04

## 2021-09-24 RX ADMIN — ATORVASTATIN CALCIUM 40 MG: 40 TABLET, FILM COATED ORAL at 21:23

## 2021-09-24 RX ADMIN — APIXABAN 2.5 MG: 2.5 TABLET, FILM COATED ORAL at 09:04

## 2021-09-24 RX ADMIN — BUDESONIDE AND FORMOTEROL FUMARATE DIHYDRATE 2 PUFF: 160; 4.5 AEROSOL RESPIRATORY (INHALATION) at 09:21

## 2021-09-24 RX ADMIN — IPRATROPIUM BROMIDE AND ALBUTEROL SULFATE 3 ML: .5; 2.5 SOLUTION RESPIRATORY (INHALATION) at 09:20

## 2021-09-24 RX ADMIN — DIGOXIN 250 MCG: 250 INJECTION, SOLUTION INTRAMUSCULAR; INTRAVENOUS at 17:03

## 2021-09-24 RX ADMIN — HEPARIN SODIUM AND DEXTROSE 12 UNITS/KG/HR: 10000; 5 INJECTION INTRAVENOUS at 03:08

## 2021-09-24 RX ADMIN — METOPROLOL TARTRATE 25 MG: 25 TABLET, FILM COATED ORAL at 09:04

## 2021-09-24 RX ADMIN — METHYLPREDNISOLONE SODIUM SUCCINATE 60 MG: 40 INJECTION, POWDER, FOR SOLUTION INTRAMUSCULAR; INTRAVENOUS at 00:02

## 2021-09-24 RX ADMIN — MELATONIN TAB 5 MG 5 MG: 5 TAB at 21:23

## 2021-09-24 RX ADMIN — LEVOTHYROXINE SODIUM 100 MCG: 0.1 TABLET ORAL at 09:04

## 2021-09-24 RX ADMIN — HEPARIN SODIUM 2000 UNITS: 1000 INJECTION, SOLUTION INTRAVENOUS; SUBCUTANEOUS at 03:17

## 2021-09-24 RX ADMIN — ALPRAZOLAM 0.25 MG: 0.5 TABLET ORAL at 09:05

## 2021-09-24 RX ADMIN — APIXABAN 2.5 MG: 2.5 TABLET, FILM COATED ORAL at 21:23

## 2021-09-24 RX ADMIN — METOPROLOL TARTRATE 25 MG: 25 TABLET, FILM COATED ORAL at 21:23

## 2021-09-24 RX ADMIN — METHYLPREDNISOLONE SODIUM SUCCINATE 60 MG: 40 INJECTION, POWDER, FOR SOLUTION INTRAMUSCULAR; INTRAVENOUS at 05:08

## 2021-09-24 NOTE — PROGRESS NOTES
Patient's Daughter Mrs. Anil Young wants Cardiologist taking care of Patient to give her a call, Please at the [phone]. Please follow up.

## 2021-09-24 NOTE — PROGRESS NOTES
Hospitalist Progress Note    Subjective:   Daily Progress Note: 9/24/2021 10:23 AM  Melissa Harper is a 80 y. o. white female with a history of COPD, HTN, hypothyroid who presents with SOB x 1 day. Patient notes that the onset was sudden. She endorses having dizziness. Patient lives alone, with daughter nearby. She explains that she called her daughter when she began having the SOB. EMS services were then contacted. On arrival it was noted that the patient was tripoding. She was given IV magnesium and placed on a non rebreathe. Patient denies fever, chills, chest pain, or abdominal pain. Today, Patient is seen resting in her bed with NC in nose but no flow rate. Patient is in no acute distress, and does not display any signs of dyspnea. She complains of not being able to breath well, O2 sats this am 95%. She denies any chest pain, dizziness , headache, nausea or vomiting. Patient has no further concerns at this time.     Current Facility-Administered Medications   Medication Dose Route Frequency    apixaban (ELIQUIS) tablet 2.5 mg  2.5 mg Oral BID    albuterol-ipratropium (DUO-NEB) 2.5 MG-0.5 MG/3 ML  3 mL Nebulization Q6H PRN    heparin (porcine) 1,000 unit/mL injection 4,000 Units  4,000 Units IntraVENous PRN    Or    heparin (porcine) 1,000 unit/mL injection 2,000 Units  2,000 Units IntraVENous PRN    melatonin tablet 5 mg  5 mg Oral QHS    metoprolol tartrate (LOPRESSOR) tablet 25 mg  25 mg Oral BID    budesonide-formoteroL (SYMBICORT) 160-4.5 mcg/actuation HFA inhaler 2 Puff  2 Puff Inhalation BID RT    aspirin chewable tablet 81 mg  81 mg Oral DAILY    methylPREDNISolone (PF) (SOLU-MEDROL) injection 60 mg  60 mg IntraVENous Q6H    ALPRAZolam (XANAX) tablet 0.25 mg  0.25 mg Oral BID    folic acid (FOLVITE) tablet 1 mg  1 mg Oral DAILY    levothyroxine (SYNTHROID) tablet 100 mcg  100 mcg Oral DAILY    acetaminophen (TYLENOL) tablet 650 mg  650 mg Oral Q6H PRN    Or    acetaminophen (TYLENOL) suppository 650 mg  650 mg Rectal Q6H PRN    polyethylene glycol (MIRALAX) packet 17 g  17 g Oral DAILY PRN    ondansetron (ZOFRAN ODT) tablet 4 mg  4 mg Oral Q8H PRN    Or    ondansetron (ZOFRAN) injection 4 mg  4 mg IntraVENous Q6H PRN    atorvastatin (LIPITOR) tablet 40 mg  40 mg Oral QHS        Review of Systems  Constitutional: Negative for chills, fatigue and fever. HENT: Negative for congestion, sinus pressure and trouble swallowing.    Eyes: Negative for photophobia and pain. Respiratory: +minimal SOB    Cardiovascular: Negative for chest pain and leg swelling. Gastrointestinal: Negative for abdominal pain, diarrhea, nausea and vomiting. Endocrine: Negative for polydipsia, polyphagia and polyuria. Genitourinary: Negative for decreased urine volume, difficulty urinating, dysuria, hematuria and urgency. Musculoskeletal: Negative for back pain, gait problem, myalgias and neck pain. Skin: Negative for pallor and rash. Allergic/Immunologic: Negative for environmental allergies and food allergies. Neurological: Negative for dizziness, facial asymmetry, speech difficulty, numbness and headaches. Hematological: Negative for adenopathy. Does not bruise/bleed easily. Psychiatric/Behavioral: Negative for agitation, self-injury and suicidal ideas. The patient is not nervous/anxious. Objective:     Constitutional:       General: She is not in acute distress. HENT:      Head: Normocephalic and atraumatic.      Ears: Patient is unable to hear well, must speak directly into her ears. Eyes:      Extraocular Movements: Extraocular movements intact.      Pupils: Pupils are equal, round, and reactive to light. Cardiovascular:      Rate and Rhythm: Normal rate and regular rhythm.      Pulses: Normal pulses.      Heart sounds:Murmur. Pulmonary:      Effort: Pulmonary effort is normal.      Breath sounds: mild wheezing present. Abdominal:      Palpations: Abdomen is soft.      Tenderness:  There is no abdominal tenderness. .   Neurological:      General: No focal deficit present.      Mental Status: She is alert. Psychiatric:         Mood and Affect: Mood normal.   Visit Vitals  BP (!) 165/57 (BP Patient Position: At rest;Lying left side)   Pulse 70   Temp 97.5 °F (36.4 °C)   Resp 18   Ht 5' 2\" (1.575 m)   Wt 141 lb 15.6 oz (64.4 kg)   SpO2 98%   BMI 25.97 kg/m²    O2 Flow Rate (L/min): 1 l/min O2 Device: Nasal cannula    Temp (24hrs), Av.8 °F (36.6 °C), Min:97.5 °F (36.4 °C), Max:98.1 °F (36.7 °C)      No intake/output data recorded.  1901 -  0700  In: 61 [P.O.:60]  Out: 400 [Urine:400]      Data Review    Recent Results (from the past 24 hour(s))   PTT    Collection Time: 21  9:35 PM   Result Value Ref Range    aPTT 75.4 (H) 21.2 - 34.1 sec    aPTT, therapeutic range   82 - 109 sec   CBC WITH AUTOMATED DIFF    Collection Time: 21  7:20 AM   Result Value Ref Range    WBC 14.1 (H) 3.6 - 11.0 K/uL    RBC 3.66 (L) 3.80 - 5.20 M/uL    HGB 11.6 11.5 - 16.0 g/dL    HCT 33.4 (L) 35.0 - 47.0 %    MCV 91.3 80.0 - 99.0 FL    MCH 31.7 26.0 - 34.0 PG    MCHC 34.7 30.0 - 36.5 g/dL    RDW 12.3 11.5 - 14.5 %    PLATELET 609 583 - 185 K/uL    MPV 11.2 8.9 - 12.9 FL    NRBC 0.0 0.0  WBC    ABSOLUTE NRBC 0.00 0.00 - 0.01 K/uL    NEUTROPHILS 90 (H) 32 - 75 %    LYMPHOCYTES 6 (L) 12 - 49 %    MONOCYTES 3 (L) 5 - 13 %    EOSINOPHILS 0 0 - 7 %    BASOPHILS 0 0 - 1 %    IMMATURE GRANULOCYTES 1 (H) 0 - 0.5 %    ABS. NEUTROPHILS 12.5 (H) 1.8 - 8.0 K/UL    ABS. LYMPHOCYTES 0.9 0.8 - 3.5 K/UL    ABS. MONOCYTES 0.5 0.0 - 1.0 K/UL    ABS. EOSINOPHILS 0.0 0.0 - 0.4 K/UL    ABS. BASOPHILS 0.0 0.0 - 0.1 K/UL    ABS. IMM.  GRANS. 0.2 (H) 0.00 - 0.04 K/UL    DF AUTOMATED     METABOLIC PANEL, COMPREHENSIVE    Collection Time: 21  7:20 AM   Result Value Ref Range    Sodium 138 136 - 145 mmol/L    Potassium 4.1 3.5 - 5.1 mmol/L    Chloride 104 97 - 108 mmol/L    CO2 24 21 - 32 mmol/L    Anion gap 10 5 - 15 mmol/L    Glucose 165 (H) 65 - 100 mg/dL    BUN 34 (H) 6 - 20 mg/dL    Creatinine 1.23 (H) 0.55 - 1.02 mg/dL    BUN/Creatinine ratio 28 (H) 12 - 20      GFR est AA 50 (L) >60 ml/min/1.73m2    GFR est non-AA 41 (L) >60 ml/min/1.73m2    Calcium 8.5 8.5 - 10.1 mg/dL    Bilirubin, total 0.2 0.2 - 1.0 mg/dL    AST (SGOT) 22 15 - 37 U/L    ALT (SGPT) 19 12 - 78 U/L    Alk. phosphatase 77 45 - 117 U/L    Protein, total 6.0 (L) 6.4 - 8.2 g/dL    Albumin 2.8 (L) 3.5 - 5.0 g/dL    Globulin 3.2 2.0 - 4.0 g/dL    A-G Ratio 0.9 (L) 1.1 - 2.2           Assessment/Plan:     NSTEMI (elevated troponin)  Trending down  Troponin yesterday 1.27  BNP previous: 4,201  CXR: nL  EKG: sinus tachy, LBBB  ECHO: LV: Estimated LVEF is 40 - 45%. Normal cavity size, wall thickness and systolic function (ejection fraction normal). Moderate hypokinesis of the mid anteroseptal, apical septal and apical anterior wall(s). Mild (grade 1) left ventricular diastolic dysfunction. · AV: Aortic valve leaflet calcification present without reduced excursion. Mild to moderate aortic valve regurgitation is present. · MV: Mild to moderate mitral valve regurgitation is present. · TV: Right Ventricular Arterial Pressure (RVSP) is 32 mmHg.     Acute Hypoxemic respiratory distress  Blood gases: pH: 7.44, CO2:35 PO2: 62 Bicarb: 24 O2 sat: 94 RA  COPD exacerbation  HTN  Hypothyroidism  History of MI  Anemia  Asymptomatic Urinary tract infection  Levoquin 750 mg in D5W IVPB  Leukocytosis  Trending down   WBC: 14.1 today     Bilateral leg pains  D-dimer 0.93  Doppler studies show no evidence of DVT in L LE or R LE    Plan:        Per Cardiology: Continue heparin 48 hours, Continue BB, statin, lopressor for now. Transition to Toprol XL 75 mg P.O on discharge. [id number] = 5, needs anticoagulation. Starting today transition to aspirin 81 mg PO and Eliquis 2.5 mg Po BID.  Observe in hospital for one more day     PER RT:  9/23 O2 sat 96% on RA at rest. NC in nose but no flow at this time.  Patient is resting comfortably.       PT/OT: discharge home with [facility] 24/7 care    Monitor aPTT levels, monitor for signs of bleeding  REPEAT TROPONIN  MAG level  Monitor leukocytosis     Begin aspirin 81 mg PO every day  Begin Eliquis 2.5 mg PO BID      Continue:     Lipitor 40 mg PO QHS   Levaquin 750 mg in D5W IVPB  Q48 hours x 3 doses    duo-neb 2.5 mg- 0.5 mg/ 3ml neb Q6H   symbicort 160-4.5 mcg 2 puff BID   synthroid tablet 100 mcg PO every day   solumedrol 60 mg IV Q6H   folvite tablet 1 mg PO every day  Xanax tablet 0.25 mg PO BID  melatonin tablet PO 5 mg QHS  Lopressor 25 mg PO BID     Consider home O2, PT/OT ambulatory O2 monitoring  Discharge tomorrow

## 2021-09-24 NOTE — PROGRESS NOTES
PULMONARY NOTE  [facility]    Name: Corina Charles MRN: [id number]   : 1930 Hospital: [address] [place] [address]   Date: 2021  Admission date: 2021 Hospital Day: 5       HPI:     Hospital Problems  Date Reviewed: 2020        Codes Class Noted POA    SOB (shortness of breath) ICD-10-CM: R06.02  ICD-9-CM: 786.05  2021 Unknown        COPD exacerbation ([facility] ICD-10-CM: J44.1  ICD-9-CM: 491.21  2021 Unknown        COPD (chronic obstructive pulmonary disease) ([facility] ICD-10-CM: J44.9  ICD-9-CM: 454  2021 Unknown        NSTEMI (non-ST elevated myocardial infarction) [facility]) ICD-10-CM: I21.4  ICD-9-CM: 410.70  2021 Unknown                   [x] High complexity decision making was performed  [x] See my orders for details      Subjective/Initial History:     I was asked by Alex Chambers MD to see Corina Charles  a 80 y.o.  female in consultation     Excerpts from admission 2021 or consult notes as follows:   80 y.o. female with PMH significant for COPD, HTN, diabetes, and hypothyroidism who presented to the ED via EMS with chief complaint of SOB. Patient developed onset of moderate SOB sometime earlier today without any relieving/palliative factors. Patient additionally mentioned some left-sided chest pain. Upon EMS arrival, patient was tripoding -- he received IV Magnesium and placed on NRB. At the time, patient denied any fever, chills, abdominal pain, nausea/vomiting, or diarrhea. Labs in the ED were significant for troponin 0.19 and Pro-. CXR revealed no acute findings. ABG showed pO2 62. Patient was admitted to [address] [place] [address] for further evaluation and Pulmonology was consulted regarding additional management.   Patient is very hard of hearing and somewhat of a poor historian.        ---------------------------------------------------------------------------------------------------------  PROGRESS NOTE (2021 8:41 AM)    Patient went into A-Flutter w/RVR yesterday afternoon (~13:20) and received IV Metoprolol 5 mg. I spoke with the patient today, who appears to be doing better and does not complain of any palpitations or chest pain at this time. She continues to do well on 1 L/min NC O2 and does not appear dyspneic. No new or worsening complaints at this time. No Known Allergies     MAR reviewed and pertinent medications noted or modified as needed     Current Facility-Administered Medications   Medication    apixaban (ELIQUIS) tablet 2.5 mg    albuterol-ipratropium (DUO-NEB) 2.5 MG-0.5 MG/3 ML    heparin (porcine) 1,000 unit/mL injection 4,000 Units    Or    heparin (porcine) 1,000 unit/mL injection 2,000 Units    melatonin tablet 5 mg    metoprolol tartrate (LOPRESSOR) tablet 25 mg    budesonide-formoteroL (SYMBICORT) 160-4.5 mcg/actuation HFA inhaler 2 Puff    aspirin chewable tablet 81 mg    methylPREDNISolone (PF) (SOLU-MEDROL) injection 60 mg    ALPRAZolam (XANAX) tablet 9.55 mg    folic acid (FOLVITE) tablet 1 mg    levothyroxine (SYNTHROID) tablet 100 mcg    acetaminophen (TYLENOL) tablet 650 mg    Or    acetaminophen (TYLENOL) suppository 650 mg    polyethylene glycol (MIRALAX) packet 17 g    ondansetron (ZOFRAN ODT) tablet 4 mg    Or    ondansetron (ZOFRAN) injection 4 mg    atorvastatin (LIPITOR) tablet 40 mg      Patient PCP: None  PMH:  has a past medical history of Anemia, Chronic obstructive pulmonary disease ([address] Heart attack ([facility] Hypertension, and Thyroid disease. PSH:   has a past surgical history that includes hx orthopaedic (2018) and hx pacemaker. FHX: family history includes Heart Disease in her father and mother; Stroke in her father and mother. SHX:  reports that she has never smoked. She has never used smokeless tobacco. She reports that she does not drink alcohol and does not use drugs. ROS:  Constitutional: Negative for chills and fever.    Eyes: Negative for discharge. Respiratory: Positive for shortness of breath. Cardiovascular: Positive for chest pain. Gastrointestinal: Negative for abdominal pain, nausea and vomiting. Skin: Negative for rash. Neurological: Negative for headaches. Objective:     Vital Signs: Telemetry:    normal sinus rhythm Intake/Output:   Visit Vitals  BP (!) 160/55 (BP Patient Position: Semi fowlers)   Pulse 61   Temp 97.4 °F (36.3 °C)   Resp 18   Ht 5' 2\" (1.575 m)   Wt 64.4 kg (141 lb 15.6 oz)   SpO2 96%   BMI 25.97 kg/m²       Temp (24hrs), Av.8 °F (36.6 °C), Min:97.4 °F (36.3 °C), Max:98.1 °F (36.7 °C)        O2 Device: Nasal cannula O2 Flow Rate (L/min): 1 l/min       Wt Readings from Last 4 Encounters:   21 64.4 kg (141 lb 15.6 oz)   21 68 kg (150 lb)   21 68 kg (150 lb)   20 68 kg (150 lb)        No intake or output data in the 24 hours ending 21 1203    Last shift:      No intake/output data recorded. Last 3 shifts:  1901 -  0700  In: 60 [P.O.:60]  Out: 400 [Urine:400]       Physical Exam:   Constitutional:       General: She is not in acute distress. Comments: Elderly   HENT:      Head: Normocephalic and atraumatic. Ears:      Comments: Hard of hearing. Eyes:      Extraocular Movements: Extraocular movements intact. Pupils: Pupils are equal, round, and reactive to light. Cardiovascular:      Rate and Rhythm: Normal rate and regular rhythm. Pulses: Normal pulses. Heart sounds: Murmur (2/6 systolic murmur, LUSB) heard. Pulmonary:      Effort: Pulmonary effort is normal.      Breath sounds: coarse breath sounds anteriorly. Abdominal:      Palpations: Abdomen is soft. Tenderness: There is no abdominal tenderness. Musculoskeletal:         General: Normal range of motion. Cervical back: Normal range of motion. Skin:     Coloration: Skin is not jaundiced. Neurological:      General: No focal deficit present.       Mental Status: She is alert.   Psychiatric:         Mood and Affect: Mood normal.       Labs:    Recent Labs     09/24/21  0935 09/24/21  0720 09/23/21  2135 09/23/21  0810 09/22/21  1849 09/22/21  0841   WBC  --  14.1*  --  15.7*  --  20.2*   HGB  --  11.6  --  11.5  --  12.8   PLT  --  312  --  300  --  303   APTT 145.9*  --  75.4* 84.1*   < > >153.0*    < > = values in this interval not displayed. Recent Labs     09/24/21  0720 09/23/21  0810 09/22/21  0841    137 134*   K 4.1 4.0 3.9    104 101   CO2 24 24 24   * 155* 163*   BUN 34* 31* 30*   CREA 1.23* 1.24* 1.29*   CA 8.5 8.4* 8.7   MG  --   --  2.5*   ALB 2.8* 2.8* 3.3*   ALT 19 14 13     No results for input(s): PH, PCO2, PO2, HCO3, FIO2 in the last 72 hours. Recent Labs     09/22/21  1527 09/22/21  0841   TROIQ 1.00* 1.27*     No results found for: BNPP, BNP   No results found for: CULTNo results found for: TSH, TSHEXT, TSHEXT    Imaging:    CXR Results  (Last 48 hours)    None        Results from Hospital Encounter encounter on 09/20/21    XR CHEST PORT    Narrative  History: Evidence of breath    A single view of the chest was obtained. Heart size is stable. There is some  atherosclerotic change of the aorta. Lungs are clear. No effusions or  pneumothorax. Bony structures are within normal limits. Impression  Normal findings. Results from Hospital Encounter encounter on 06/02/21    XR CHEST PORT    Narrative  The study is a single view chest radiographic examination dated 6/2/2021. HISTORY: Shortness of breath. COMPARISON:  1/24/2021. FINDINGS: The heart size is normal. The pulmonary vessels are normal in caliber. The lung parenchyma is clear without an infiltrate or atelectasis. There is a  mild degree of chronic baseline peribronchial cuffing. The costophrenic angles  are maintained without pleural effusions or a pneumothorax. There is a midline  position to the trachea.  There are atherosclerotic vascular changes of the  thoracic aorta. The pulmonary bia are symmetrical.    The bony thorax is intact without fractures/acute osseous abnormalities. Impression  1. There are moderate atherosclerotic vascular changes of the thoracic aorta. The patient may also have coronary artery atherosclerotic vascular changes which  could be a source for chest pain. 2.  This examination is negative for acute pulmonary parenchymal pathology. There is a mild degree of chronic baseline peribronchial cuffing without change. Results from [place] encounter on 01/24/21    XR CHEST PORT    Narrative  Portable chest, AP upright, 1546 hours. Comparison with 11/19/2019. Stable heart size. Calcified thoracic aorta. The lungs appear clear. No evidence  of pulmonary edema, air space pneumonia, or pleural effusion. No evidence of  pneumothorax. Impression  No evidence of focal airspace pneumonia. Results from [place] encounter on 01/24/21    CT CHEST W CONT    Addendum 2/25/2021 12:51 PM  Addendum: CT dose reduction was achieved through use of a standardized protocol  tailored for this examination and automatic exposure control for dose  modulation. Narrative  Contrast study was cc [id number]    Very mild subpleural fibrosis with otherwise completely clear lungs and patent  central airways. Pulmonary arteries are well-opacified and show no filling defect or distortion. Aorta shows normal dimensions, without dissection. Moderate atherosclerosis with  penetrating ulcer off the left side of the arch. 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 The aortic maximum diameter at this level is 3.3 cm. Patient age noted. No  mediastinal or hilar adenopathy. Coronary calcification. No cardiac finding. No pleural pericardial effusion.  No  significant upper abdominal finding    Impression  Clear lungs. No evidence of PE. 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 IMPRESSION:     1. Acute on chronic respiratory failure with hypoxia  · ABG (9/20): Room Air // pH 7.44 // pCO2 35 // pO2 62 // Bicarb 24 // SpO2 94%  · Venous Duplex (9/21): negative for DVT  2. Asthma  · CXR (9/20): normal findings. 3. NSTEMI, likely type 2.  4. Atrial flutter  5. Acute on chronic diastolic CHF  · Echo (4/16): LVEF 40-45%, mild (grade 1) LV diastolic dysfunction, moderate AV leaflet calcification, mild-to-moderate AV & MV regurgitation. RVSP 32 mm Hg. · Pro-BNP 4201  6. H/o MI and s/p pacemaker  7. H/o anemia  8. H/o HTN  9. H/o Hypothyroidism  10. Diabetes Mellitus  11. Hypoalbuminemia        RECOMMENDATIONS/PLAN:     1. Currently doing well on 1 L/min NC O2. Will wean off of oxygen as tolerated. 2. Atrial flutter patient was put on IV heparin which has been transition to Eliquis  3. Current Breathing Management  · DUO-NEB  · SYMBICORT  · SOLU-MEDROL  · Will start patient on prednisone discontinue Solu-Medrol  4.  Current Antimicrobials  · Levar Munoz MD

## 2021-09-24 NOTE — PROGRESS NOTES
1740- Pt was given lanoxin IV at 1705 and telemetry called to let me know that pt was converted back to SR in the 80's. Pt was eating dinner. No distress or c/o's.

## 2021-09-24 NOTE — PROGRESS NOTES
At 1429 telemetry alert nurse that pt heart rate was irregular and was not on afib. EKG was order per protocol and confirm that the pt was on Afib 110. HR up to 139. Pt was SOB briefly on RA and she was place to 3 lt O2 and that help her improve. BP at 1528 was 93/73. Cardiologist was [paged twice. Dr Juan Francsico Martinez called back and he was made aware of the situation. He order lanoxin 0.25 mg IV x now order and the order was enter and pharmacy was contacted. Pt home cg at the bed side and pt was reposition.

## 2021-09-24 NOTE — PROGRESS NOTES
Hospitalist Progress Note    Subjective:   Daily Progress Note: 9/24/2021 10:23 AM  Beatris Sicard Carpenter is a 80 y. o. white female with a history of COPD, HTN, hypothyroid who presents with SOB x 1 day. Patient notes that the onset was sudden. She endorses having dizziness. Patient lives alone, with daughter nearby. She explains that she called her daughter when she began having the SOB. EMS services were then contacted. On arrival it was noted that the patient was tripoding. She was given IV magnesium and placed on a non rebreathe. Patient denies fever, chills, chest pain, or abdominal pain. Today, Patient is seen resting in her bed with NC in nose but no flow rate. Patient is in no acute distress, and does not display any signs of dyspnea. She complains of not being able to breath well, O2 sats this am 95%. She denies any chest pain, dizziness , headache, nausea or vomiting. Patient has no further concerns at this time.     Current Facility-Administered Medications   Medication Dose Route Frequency    apixaban (ELIQUIS) tablet 2.5 mg  2.5 mg Oral BID    albuterol-ipratropium (DUO-NEB) 2.5 MG-0.5 MG/3 ML  3 mL Nebulization Q6H PRN    heparin (porcine) 1,000 unit/mL injection 4,000 Units  4,000 Units IntraVENous PRN    Or    heparin (porcine) 1,000 unit/mL injection 2,000 Units  2,000 Units IntraVENous PRN    melatonin tablet 5 mg  5 mg Oral QHS    metoprolol tartrate (LOPRESSOR) tablet 25 mg  25 mg Oral BID    budesonide-formoteroL (SYMBICORT) 160-4.5 mcg/actuation HFA inhaler 2 Puff  2 Puff Inhalation BID RT    aspirin chewable tablet 81 mg  81 mg Oral DAILY    methylPREDNISolone (PF) (SOLU-MEDROL) injection 60 mg  60 mg IntraVENous Q6H    ALPRAZolam (XANAX) tablet 0.25 mg  0.25 mg Oral BID    folic acid (FOLVITE) tablet 1 mg  1 mg Oral DAILY    levothyroxine (SYNTHROID) tablet 100 mcg  100 mcg Oral DAILY    acetaminophen (TYLENOL) tablet 650 mg  650 mg Oral Q6H PRN    Or    acetaminophen (TYLENOL) suppository 650 mg  650 mg Rectal Q6H PRN    polyethylene glycol (MIRALAX) packet 17 g  17 g Oral DAILY PRN    ondansetron (ZOFRAN ODT) tablet 4 mg  4 mg Oral Q8H PRN    Or    ondansetron (ZOFRAN) injection 4 mg  4 mg IntraVENous Q6H PRN    atorvastatin (LIPITOR) tablet 40 mg  40 mg Oral QHS        Review of Systems  Constitutional: Negative for chills, fatigue and fever. HENT: Negative for congestion, sinus pressure and trouble swallowing.    Eyes: Negative for photophobia and pain. Respiratory: +minimal SOB    Cardiovascular: Negative for chest pain and leg swelling. Gastrointestinal: Negative for abdominal pain, diarrhea, nausea and vomiting. Endocrine: Negative for polydipsia, polyphagia and polyuria. Genitourinary: Negative for decreased urine volume, difficulty urinating, dysuria, hematuria and urgency. Musculoskeletal: Negative for back pain, gait problem, myalgias and neck pain. Skin: Negative for pallor and rash. Allergic/Immunologic: Negative for environmental allergies and food allergies. Neurological: Negative for dizziness, facial asymmetry, speech difficulty, numbness and headaches. Hematological: Negative for adenopathy. Does not bruise/bleed easily. Psychiatric/Behavioral: Negative for agitation, self-injury and suicidal ideas. The patient is not nervous/anxious. Objective:     Constitutional:       General: She is not in acute distress. HENT:      Head: Normocephalic and atraumatic.      Ears: Patient is unable to hear well, must speak directly into her ears. Eyes:      Extraocular Movements: Extraocular movements intact.      Pupils: Pupils are equal, round, and reactive to light. Cardiovascular:      Rate and Rhythm: Normal rate and regular rhythm.      Pulses: Normal pulses.      Heart sounds:Murmur. Pulmonary:      Effort: Pulmonary effort is normal.      Breath sounds: mild wheezing present. Abdominal:      Palpations: Abdomen is soft.      Tenderness:  There is no abdominal tenderness. .   Neurological:      General: No focal deficit present.      Mental Status: She is alert. Psychiatric:         Mood and Affect: Mood normal.   Visit Vitals  BP (!) 160/55 (BP Patient Position: Semi fowlers)   Pulse 61   Temp 97.4 °F (36.3 °C)   Resp 18   Ht 5' 2\" (1.575 m)   Wt 64.4 kg (141 lb 15.6 oz)   SpO2 96%   BMI 25.97 kg/m²    O2 Flow Rate (L/min): 1 l/min O2 Device: Nasal cannula    Temp (24hrs), Av.8 °F (36.6 °C), Min:97.4 °F (36.3 °C), Max:98.1 °F (36.7 °C)      No intake/output data recorded.  1901 -  0700  In: 61 [P.O.:60]  Out: 400 [Urine:400]      Data Review    Recent Results (from the past 24 hour(s))   PTT    Collection Time: 21  9:35 PM   Result Value Ref Range    aPTT 75.4 (H) 21.2 - 34.1 sec    aPTT, therapeutic range   82 - 109 sec   CBC WITH AUTOMATED DIFF    Collection Time: 21  7:20 AM   Result Value Ref Range    WBC 14.1 (H) 3.6 - 11.0 K/uL    RBC 3.66 (L) 3.80 - 5.20 M/uL    HGB 11.6 11.5 - 16.0 g/dL    HCT 33.4 (L) 35.0 - 47.0 %    MCV 91.3 80.0 - 99.0 FL    MCH 31.7 26.0 - 34.0 PG    MCHC 34.7 30.0 - 36.5 g/dL    RDW 12.3 11.5 - 14.5 %    PLATELET 043 230 - 581 K/uL    MPV 11.2 8.9 - 12.9 FL    NRBC 0.0 0.0  WBC    ABSOLUTE NRBC 0.00 0.00 - 0.01 K/uL    NEUTROPHILS 90 (H) 32 - 75 %    LYMPHOCYTES 6 (L) 12 - 49 %    MONOCYTES 3 (L) 5 - 13 %    EOSINOPHILS 0 0 - 7 %    BASOPHILS 0 0 - 1 %    IMMATURE GRANULOCYTES 1 (H) 0 - 0.5 %    ABS. NEUTROPHILS 12.5 (H) 1.8 - 8.0 K/UL    ABS. LYMPHOCYTES 0.9 0.8 - 3.5 K/UL    ABS. MONOCYTES 0.5 0.0 - 1.0 K/UL    ABS. EOSINOPHILS 0.0 0.0 - 0.4 K/UL    ABS. BASOPHILS 0.0 0.0 - 0.1 K/UL    ABS. IMM.  GRANS. 0.2 (H) 0.00 - 0.04 K/UL    DF AUTOMATED     METABOLIC PANEL, COMPREHENSIVE    Collection Time: 21  7:20 AM   Result Value Ref Range    Sodium 138 136 - 145 mmol/L    Potassium 4.1 3.5 - 5.1 mmol/L    Chloride 104 97 - 108 mmol/L    CO2 24 21 - 32 mmol/L    Anion gap 10 5 - 15 mmol/L Glucose 165 (H) 65 - 100 mg/dL    BUN 34 (H) 6 - 20 mg/dL    Creatinine 1.23 (H) 0.55 - 1.02 mg/dL    BUN/Creatinine ratio 28 (H) 12 - 20      GFR est AA 50 (L) >60 ml/min/1.73m2    GFR est non-AA 41 (L) >60 ml/min/1.73m2    Calcium 8.5 8.5 - 10.1 mg/dL    Bilirubin, total 0.2 0.2 - 1.0 mg/dL    AST (SGOT) 22 15 - 37 U/L    ALT (SGPT) 19 12 - 78 U/L    Alk. phosphatase 77 45 - 117 U/L    Protein, total 6.0 (L) 6.4 - 8.2 g/dL    Albumin 2.8 (L) 3.5 - 5.0 g/dL    Globulin 3.2 2.0 - 4.0 g/dL    A-G Ratio 0.9 (L) 1.1 - 2.2     PTT    Collection Time: 09/24/21  9:35 AM   Result Value Ref Range    aPTT 145.9 (HH) 21.2 - 34.1 sec    aPTT, therapeutic range   82 - 109 sec         Assessment/Plan:     NSTEMI (elevated troponin)  Trending down  Troponin yesterday 1.27  BNP previous: 4,201  CXR: nL  EKG: sinus tachy, LBBB  ECHO: LV: Estimated LVEF is 40 - 45%. Normal cavity size, wall thickness and systolic function (ejection fraction normal). Moderate hypokinesis of the mid anteroseptal, apical septal and apical anterior wall(s). Mild (grade 1) left ventricular diastolic dysfunction. · AV: Aortic valve leaflet calcification present without reduced excursion. Mild to moderate aortic valve regurgitation is present. · MV: Mild to moderate mitral valve regurgitation is present. · TV: Right Ventricular Arterial Pressure (RVSP) is 32 mmHg.     Acute Hypoxemic respiratory distress  Blood gases: pH: 7.44, CO2:35 PO2: 62 Bicarb: 24 O2 sat: 94 RA  COPD exacerbation  HTN  Hypothyroidism  History of MI  Anemia  Asymptomatic Urinary tract infection  Levoquin 750 mg in D5W IVPB  Leukocytosis  Trending down   WBC: 14.1 today     Bilateral leg pains  D-dimer 0.93  Doppler studies show no evidence of DVT in L LE or R LE    Plan:        Per Cardiology: Continue heparin 48 hours, Continue BB, statin, lopressor for now. Transition to Toprol XL 75 mg P.O on discharge. [id number] = 5, needs anticoagulation.  Starting today transition to aspirin 81 mg PO and Eliquis 2.5 mg Po BID. Observe in hospital for one more day     PER RT:  9/23 O2 sat 96% on RA at rest. NC in nose but no flow at this time.  Patient is resting comfortably.       PT/OT: discharge home with [facility] 24/7 care    Monitor aPTT levels, monitor for signs of bleeding  REPEAT TROPONIN  MAG level  Monitor leukocytosis     Begin aspirin 81 mg PO every day  Begin Eliquis 2.5 mg PO BID      Continue:     Lipitor 40 mg PO QHS   Levaquin 750 mg in D5W IVPB  Q48 hours x 3 doses    duo-neb 2.5 mg- 0.5 mg/ 3ml neb Q6H   symbicort 160-4.5 mcg 2 puff BID   synthroid tablet 100 mcg PO every day   solumedrol 60 mg IV Q6H   folvite tablet 1 mg PO every day  Xanax tablet 0.25 mg PO BID  melatonin tablet PO 5 mg QHS  Lopressor 25 mg PO BID     Consider home O2, PT/OT ambulatory O2 monitoring  Discharge tomorrow  Discussed with the cardiology regarding discharge home tomorrow

## 2021-09-24 NOTE — PROGRESS NOTES
Cardiology Progress Note      9/24/2021 1:49 PM    Admit Date: 9/20/2021      Subjective:     Patient seen and examined. She remains chest pain-free. Visit Vitals  BP (!) 160/55 (BP Patient Position: Semi fowlers)   Pulse 61   Temp 97.4 °F (36.3 °C)   Resp 18   Ht 5' 2\" (1.575 m)   Wt 64.4 kg (141 lb 15.6 oz)   SpO2 96%   BMI 25.97 kg/m²     Current Facility-Administered Medications   Medication Dose Route Frequency    apixaban (ELIQUIS) tablet 2.5 mg  2.5 mg Oral BID    [START ON 9/25/2021] predniSONE (DELTASONE) tablet 10 mg  10 mg Oral DAILY WITH BREAKFAST    albuterol-ipratropium (DUO-NEB) 2.5 MG-0.5 MG/3 ML  3 mL Nebulization Q6H PRN    heparin (porcine) 1,000 unit/mL injection 4,000 Units  4,000 Units IntraVENous PRN    Or    heparin (porcine) 1,000 unit/mL injection 2,000 Units  2,000 Units IntraVENous PRN    melatonin tablet 5 mg  5 mg Oral QHS    metoprolol tartrate (LOPRESSOR) tablet 25 mg  25 mg Oral BID    budesonide-formoteroL (SYMBICORT) 160-4.5 mcg/actuation HFA inhaler 2 Puff  2 Puff Inhalation BID RT    aspirin chewable tablet 81 mg  81 mg Oral DAILY    ALPRAZolam (XANAX) tablet 0.25 mg  0.25 mg Oral BID    folic acid (FOLVITE) tablet 1 mg  1 mg Oral DAILY    levothyroxine (SYNTHROID) tablet 100 mcg  100 mcg Oral DAILY    acetaminophen (TYLENOL) tablet 650 mg  650 mg Oral Q6H PRN    Or    acetaminophen (TYLENOL) suppository 650 mg  650 mg Rectal Q6H PRN    polyethylene glycol (MIRALAX) packet 17 g  17 g Oral DAILY PRN    ondansetron (ZOFRAN ODT) tablet 4 mg  4 mg Oral Q8H PRN    Or    ondansetron (ZOFRAN) injection 4 mg  4 mg IntraVENous Q6H PRN    atorvastatin (LIPITOR) tablet 40 mg  40 mg Oral QHS         Objective:      Physical Exam:  Neck: no JVD  Heart: irregularly irregular rhythm  Lungs: dminished sounds b/l  Abdomen: soft, non-tender.  Bowel sounds normal. No masses,  no organomegaly  Extremities: extremities normal, atraumatic, no cyanosis or edema  Pulses: 2+ and symmetric  Neurologic: Grossly normal    Data Review:   Labs:    Recent Results (from the past 24 hour(s))   PTT    Collection Time: 09/23/21  9:35 PM   Result Value Ref Range    aPTT 75.4 (H) 21.2 - 34.1 sec    aPTT, therapeutic range   82 - 109 sec   CBC WITH AUTOMATED DIFF    Collection Time: 09/24/21  7:20 AM   Result Value Ref Range    WBC 14.1 (H) 3.6 - 11.0 K/uL    RBC 3.66 (L) 3.80 - 5.20 M/uL    HGB 11.6 11.5 - 16.0 g/dL    HCT 33.4 (L) 35.0 - 47.0 %    MCV 91.3 80.0 - 99.0 FL    MCH 31.7 26.0 - 34.0 PG    MCHC 34.7 30.0 - 36.5 g/dL    RDW 12.3 11.5 - 14.5 %    PLATELET 158 869 - 829 K/uL    MPV 11.2 8.9 - 12.9 FL    NRBC 0.0 0.0  WBC    ABSOLUTE NRBC 0.00 0.00 - 0.01 K/uL    NEUTROPHILS 90 (H) 32 - 75 %    LYMPHOCYTES 6 (L) 12 - 49 %    MONOCYTES 3 (L) 5 - 13 %    EOSINOPHILS 0 0 - 7 %    BASOPHILS 0 0 - 1 %    IMMATURE GRANULOCYTES 1 (H) 0 - 0.5 %    ABS. NEUTROPHILS 12.5 (H) 1.8 - 8.0 K/UL    ABS. LYMPHOCYTES 0.9 0.8 - 3.5 K/UL    ABS. MONOCYTES 0.5 0.0 - 1.0 K/UL    ABS. EOSINOPHILS 0.0 0.0 - 0.4 K/UL    ABS. BASOPHILS 0.0 0.0 - 0.1 K/UL    ABS. IMM. GRANS. 0.2 (H) 0.00 - 0.04 K/UL    DF AUTOMATED     METABOLIC PANEL, COMPREHENSIVE    Collection Time: 09/24/21  7:20 AM   Result Value Ref Range    Sodium 138 136 - 145 mmol/L    Potassium 4.1 3.5 - 5.1 mmol/L    Chloride 104 97 - 108 mmol/L    CO2 24 21 - 32 mmol/L    Anion gap 10 5 - 15 mmol/L    Glucose 165 (H) 65 - 100 mg/dL    BUN 34 (H) 6 - 20 mg/dL    Creatinine 1.23 (H) 0.55 - 1.02 mg/dL    BUN/Creatinine ratio 28 (H) 12 - 20      GFR est AA 50 (L) >60 ml/min/1.73m2    GFR est non-AA 41 (L) >60 ml/min/1.73m2    Calcium 8.5 8.5 - 10.1 mg/dL    Bilirubin, total 0.2 0.2 - 1.0 mg/dL    AST (SGOT) 22 15 - 37 U/L    ALT (SGPT) 19 12 - 78 U/L    Alk.  phosphatase 77 45 - 117 U/L    Protein, total 6.0 (L) 6.4 - 8.2 g/dL    Albumin 2.8 (L) 3.5 - 5.0 g/dL    Globulin 3.2 2.0 - 4.0 g/dL    A-G Ratio 0.9 (L) 1.1 - 2.2     PTT    Collection Time: 09/24/21 9:35 AM   Result Value Ref Range    aPTT 145.9 () 21.2 - 34.1 sec    aPTT, therapeutic range   82 - 109 sec         Assessment:   NSTEMI  Acute on COPD  Atrial Flutter  Hypertension  Moderate AI      Plan:   -Heart rate is better controlled continue Lopressor for now. Transition to Toprol-XL 75 mg p.o. daily upon discharge  -Gui Galaviz is about 5 and she will need to be on anticoagulation for stroke prevention. She would be high risk for bleeding on triple therapy. Patient was on heparin infusion for 48 hours she will now be on aspirin 81 mg by mouth daily and then increased to 25 mg by mouth twice a day. -She has remained chest pain-free, has had no malignant arrhythmias, and troponins have trended down. She also has a history of penetrating aortic ulcer. Given her advanced age, comorbidities and the above, will manage conservatively with no plans for invasive evaluation. Will observe in the hospital for 1 more day; will add low-dose Imdur for angina as hemodynamically tolerated.   -We will follow    Discussed with primary team.

## 2021-09-24 NOTE — PROGRESS NOTES
Problem: Mobility Impaired (Adult and Pediatric)  Goal: *Acute Goals and Plan of Care (Insert Text)  Description: Pt will be I with LE HEP in 7 days. Pt will perform bed mobility with mod I in 7 days. Pt will perform transfers with mod I in 7 days. Pt will amb -100 feet with LRAD safely with mod I in 7 days. Outcome: Progressing Towards Goal     Problem: Patient Education: Go to Patient Education Activity  Goal: Patient/Family Education  Outcome: Progressing Towards Goal       PHYSICAL THERAPY TREATMENT  Patient: Jordi Rao (47 y.o. female)  Date: 9/24/2021  Diagnosis: SOB (shortness of breath) [R06.02]  COPD exacerbation (HCC) [J44.1]  NSTEMI (non-ST elevated myocardial infarction) ([facility] [address] [I21.4]  COPD (chronic obstructive pulmonary disease) ([email or web address] [J44.9] <principal problem not specified>       Precautions:    Chart, physical therapy assessment, plan of care and goals were reviewed. ASSESSMENT  Patient continues with skilled PT services and is progressing towards goals. Pt found supine in bed with caregiver present. Pt was supine to sit at [facility]. She was able to sit EOB at [facility] with external perturbations place on her without LOB noted. Sit to stand at [facility]. Once she was standing, pt urinated a little so she was asked to sit back down for PT to clean the floor. Sit to stand from bed a second time CGA. Pt then amb 10 feet with FWW at [facility], transfer on/off commode at [facility]. Pt then amb 15 feet in the room at [facility] with FWW. At the end of treatment, sit to supine at [address] [place] [address]. Once in bed, pt coughed causing he rto urinate on herself. Pt jasvir in B directions at [facility] so that chucks pad could be changed. Pt demos weakness, decreased balance and decreased activity tolerance. She would benefit from continued acute PT to address her limitations. PT rec DC [facility] with 24/7 care. PLAN :  Patient continues to benefit from skilled intervention to address the above impairments.   Continue treatment per established plan of care. to address goals. Recommendation for discharge: (in order for the patient to meet his/her long term goals)  Home with [address] [place] [address] Tobias    This discharge recommendation:  Has not yet been discussed the attending provider and/or case management    IF patient discharges home will need the following DME: rolling walker       SUBJECTIVE:   Patient stated I want to go home.     OBJECTIVE DATA SUMMARY:   Critical Behavior:  Neurologic State: Alert  Orientation Level: Oriented X4  Cognition: Appropriate decision making     Functional Mobility Training:  Bed Mobility:     Supine to Sit: Stand-by assistance  Sit to Supine: Minimum assistance  Scooting: Stand-by assistance        Transfers:  Sit to Stand: Contact guard assistance  Stand to Sit: Contact guard assistance                             Balance:  Sitting: Intact; With support  Standing: Impaired  Standing - Static: Constant support;Good  Standing - Dynamic : Constant support; Fair  Ambulation/Gait Training:  Distance (ft): 25 Feet (ft)  Assistive Device: Gait belt;Walker  Ambulation - Level of Assistance: Contact guard assistance                 Base of Support: Narrowed     Speed/Vilma: Slow  Step Length: Left shortened;Right shortened       Pain Rating:  No c/o pain    Activity Tolerance:   Fair  Please refer to the flowsheet for vital signs taken during this treatment. After treatment patient left in no apparent distress:   Supine in bed, Call bell within reach, and Caregiver / family present    COMMUNICATION/COLLABORATION:   The patients plan of care was discussed with: Registered nurse.      Terrance Pond PT, DPT   Time Calculation: 40 mins

## 2021-09-24 NOTE — PROGRESS NOTES
Pt's daughter called and RN spoke to her. She was given an update, but she request for MD to called her. Md has been informed.

## 2021-09-25 PROCEDURE — 74011250637 HC RX REV CODE- 250/637: Performed by: FAMILY MEDICINE

## 2021-09-25 PROCEDURE — 74011636637 HC RX REV CODE- 636/637: Performed by: INTERNAL MEDICINE

## 2021-09-25 PROCEDURE — 94640 AIRWAY INHALATION TREATMENT: CPT

## 2021-09-25 PROCEDURE — 94760 N-INVAS EAR/PLS OXIMETRY 1: CPT

## 2021-09-25 PROCEDURE — 74011250637 HC RX REV CODE- 250/637: Performed by: INTERNAL MEDICINE

## 2021-09-25 PROCEDURE — 65270000029 HC RM PRIVATE

## 2021-09-25 RX ADMIN — METOPROLOL TARTRATE 25 MG: 25 TABLET, FILM COATED ORAL at 09:51

## 2021-09-25 RX ADMIN — FOLIC ACID 1 MG: 1 TABLET ORAL at 09:51

## 2021-09-25 RX ADMIN — BUDESONIDE AND FORMOTEROL FUMARATE DIHYDRATE 2 PUFF: 160; 4.5 AEROSOL RESPIRATORY (INHALATION) at 20:35

## 2021-09-25 RX ADMIN — LEVOTHYROXINE SODIUM 100 MCG: 0.1 TABLET ORAL at 09:51

## 2021-09-25 RX ADMIN — ASPIRIN 81 MG CHEWABLE TABLET 81 MG: 81 TABLET CHEWABLE at 09:51

## 2021-09-25 RX ADMIN — ALPRAZOLAM 0.25 MG: 0.5 TABLET ORAL at 22:09

## 2021-09-25 RX ADMIN — BUDESONIDE AND FORMOTEROL FUMARATE DIHYDRATE 2 PUFF: 160; 4.5 AEROSOL RESPIRATORY (INHALATION) at 08:21

## 2021-09-25 RX ADMIN — ALPRAZOLAM 0.25 MG: 0.5 TABLET ORAL at 09:51

## 2021-09-25 RX ADMIN — APIXABAN 2.5 MG: 2.5 TABLET, FILM COATED ORAL at 09:51

## 2021-09-25 RX ADMIN — APIXABAN 2.5 MG: 2.5 TABLET, FILM COATED ORAL at 22:09

## 2021-09-25 RX ADMIN — PREDNISONE 10 MG: 5 TABLET ORAL at 09:54

## 2021-09-25 RX ADMIN — METOPROLOL TARTRATE 25 MG: 25 TABLET, FILM COATED ORAL at 22:09

## 2021-09-25 RX ADMIN — ATORVASTATIN CALCIUM 40 MG: 40 TABLET, FILM COATED ORAL at 22:09

## 2021-09-25 RX ADMIN — ACETAMINOPHEN 650 MG: 325 TABLET ORAL at 17:51

## 2021-09-25 NOTE — PROGRESS NOTES
Hospitalist Progress Note    Subjective:   Daily Progress Note: 9/25/2021 10:23 AM  Tramaine Harper is a 80 y. o. white female with a history of COPD, HTN, hypothyroid who presents with SOB x 1 day. Patient notes that the onset was sudden. She endorses having dizziness. Patient lives alone, with daughter nearby. She explains that she called her daughter when she began having the SOB. EMS services were then contacted. On arrival it was noted that the patient was tripoding. She was given IV magnesium and placed on a non rebreathe. Patient denies fever, chills, chest pain, or abdominal pain. Patient is seen resting in her bed with NC in nose but no flow rate. Patient is in no acute distress, and does not display any signs of dyspnea. She complains of not being able to breath well, O2 sats this am 95%. She denies any chest pain, dizziness , headache, nausea or vomiting. Patient has no further concerns at this time.     Current Facility-Administered Medications   Medication Dose Route Frequency    apixaban (ELIQUIS) tablet 2.5 mg  2.5 mg Oral BID    predniSONE (DELTASONE) tablet 10 mg  10 mg Oral DAILY WITH BREAKFAST    albuterol-ipratropium (DUO-NEB) 2.5 MG-0.5 MG/3 ML  3 mL Nebulization Q6H PRN    heparin (porcine) 1,000 unit/mL injection 4,000 Units  4,000 Units IntraVENous PRN    Or    heparin (porcine) 1,000 unit/mL injection 2,000 Units  2,000 Units IntraVENous PRN    melatonin tablet 5 mg  5 mg Oral QHS    metoprolol tartrate (LOPRESSOR) tablet 25 mg  25 mg Oral BID    budesonide-formoteroL (SYMBICORT) 160-4.5 mcg/actuation HFA inhaler 2 Puff  2 Puff Inhalation BID RT    aspirin chewable tablet 81 mg  81 mg Oral DAILY    ALPRAZolam (XANAX) tablet 0.25 mg  0.25 mg Oral BID    folic acid (FOLVITE) tablet 1 mg  1 mg Oral DAILY    levothyroxine (SYNTHROID) tablet 100 mcg  100 mcg Oral DAILY    acetaminophen (TYLENOL) tablet 650 mg  650 mg Oral Q6H PRN    Or    acetaminophen (TYLENOL) suppository 650 mg  650 mg Rectal Q6H PRN    polyethylene glycol (MIRALAX) packet 17 g  17 g Oral DAILY PRN    ondansetron (ZOFRAN ODT) tablet 4 mg  4 mg Oral Q8H PRN    Or    ondansetron (ZOFRAN) injection 4 mg  4 mg IntraVENous Q6H PRN    atorvastatin (LIPITOR) tablet 40 mg  40 mg Oral QHS        Review of Systems  Constitutional: Negative for chills, fatigue and fever. HENT: Negative for congestion, sinus pressure and trouble swallowing.    Eyes: Negative for photophobia and pain. Respiratory: +minimal SOB    Cardiovascular: Negative for chest pain and leg swelling. Gastrointestinal: Negative for abdominal pain, diarrhea, nausea and vomiting. Endocrine: Negative for polydipsia, polyphagia and polyuria. Genitourinary: Negative for decreased urine volume, difficulty urinating, dysuria, hematuria and urgency. Musculoskeletal: Negative for back pain, gait problem, myalgias and neck pain. Skin: Negative for pallor and rash. Allergic/Immunologic: Negative for environmental allergies and food allergies. Neurological: Negative for dizziness, facial asymmetry, speech difficulty, numbness and headaches. Hematological: Negative for adenopathy. Does not bruise/bleed easily. Psychiatric/Behavioral: Negative for agitation, self-injury and suicidal ideas. The patient is not nervous/anxious. Objective:     Constitutional:       General: She is not in acute distress. HENT:      Head: Normocephalic and atraumatic.      Ears: Patient is unable to hear well, must speak directly into her ears. Eyes:      Extraocular Movements: Extraocular movements intact.      Pupils: Pupils are equal, round, and reactive to light. Cardiovascular:      Rate and Rhythm: Normal rate and regular rhythm.      Pulses: Normal pulses.      Heart sounds:Murmur. Pulmonary:      Effort: Pulmonary effort is normal.      Breath sounds: mild wheezing present. Abdominal:      Palpations: Abdomen is soft.      Tenderness:  There is no abdominal tenderness. .   Neurological:      General: No focal deficit present.      Mental Status: She is alert. Psychiatric:         Mood and Affect: Mood normal.   Visit Vitals  BP (!) 124/50 (BP 1 Location: Right upper arm, BP Patient Position: Supine)   Pulse 68   Temp 98.7 °F (37.1 °C)   Resp 20   Ht 5' 2\" (1.575 m)   Wt 64.4 kg (141 lb 15.6 oz)   SpO2 95%   BMI 25.97 kg/m²    O2 Flow Rate (L/min):  (was on RM but back on 2 lt for comfort) O2 Device: None (Room air)    Temp (24hrs), Av.9 °F (36.6 °C), Min:97.5 °F (36.4 °C), Max:98.7 °F (37.1 °C)       07 - 1900  In: 720 [P.O.:720]  Out: 1350 [HAILZ:7137]  1901 -  0700  In: 720 [P.O.:720]  Out: 300 [Urine:300]      Data Review    No results found for this or any previous visit (from the past 24 hour(s)). Assessment/Plan:     NSTEMI (elevated troponin)  Trending down  Troponin yesterday 1.27  BNP previous: 4,201  CXR: nL  EKG: sinus tachy, LBBB  ECHO: LV: Estimated LVEF is 40 - 45%. Normal cavity size, wall thickness and systolic function (ejection fraction normal). Moderate hypokinesis of the mid anteroseptal, apical septal and apical anterior wall(s). Mild (grade 1) left ventricular diastolic dysfunction. · AV: Aortic valve leaflet calcification present without reduced excursion. Mild to moderate aortic valve regurgitation is present. · MV: Mild to moderate mitral valve regurgitation is present. · TV: Right Ventricular Arterial Pressure (RVSP) is 32 mmHg.     Acute Hypoxemic respiratory distress  Blood gases: pH: 7.44, CO2:35 PO2: 62 Bicarb: 24 O2 sat: 94 RA  COPD exacerbation  HTN  Hypothyroidism  History of MI  Anemia  Asymptomatic Urinary tract infection  Levoquin 750 mg in D5W IVPB  Leukocytosis  Trending down   WBC: 14.1 today     Bilateral leg pains  D-dimer 0.93  Doppler studies show no evidence of DVT in L LE or R LE    Plan:        Per Cardiology: Continue heparin 48 hours, Continue BB, statin, lopressor for now. Transition to Toprol XL 75 mg P.O on discharge. Xogta2ypsm = 5, needs anticoagulation. Starting today transition to aspirin 81 mg PO and Eliquis 2.5 mg Po BID. Observe in hospital for one more day     PER RT:  9/23 O2 sat 96% on RA at rest. NC in nose but no flow at this time.  Patient is resting comfortably.       PT/OT: discharge home with [facility] 24/7 care    Monitor aPTT levels, monitor for signs of bleeding  REPEAT TROPONIN  MAG level  Monitor leukocytosis     Begin aspirin 81 mg PO every day  Begin Eliquis 2.5 mg PO BID      Continue:     Lipitor 40 mg PO QHS   Levaquin 750 mg in D5W IVPB  Q48 hours x 3 doses    duo-neb 2.5 mg- 0.5 mg/ 3ml neb Q6H   symbicort 160-4.5 mcg 2 puff BID   synthroid tablet 100 mcg PO every day   solumedrol 60 mg IV Q6H   folvite tablet 1 mg PO every day  Xanax tablet 0.25 mg PO BID  melatonin tablet PO 5 mg QHS  Lopressor 25 mg PO BID     Consider home O2, PT/OT ambulatory O2 monitoring  Discharge tomorrow  Discussed with the cardiology regarding discharge home tomorrow

## 2021-09-25 NOTE — PROGRESS NOTES
PULMONARY NOTE  [facility]    Name: Wiliam Calderon MRN: [id number]   : 1930 Hospital: [address] [place] [address]   Date: 2021  Admission date: 2021 Hospital Day: 6       HPI:     Hospital Problems  Date Reviewed: 2020        Codes Class Noted POA    SOB (shortness of breath) ICD-10-CM: R06.02  ICD-9-CM: 786.05  2021 Unknown        COPD exacerbation ([facility] ICD-10-CM: J44.1  ICD-9-CM: 491.21  2021 Unknown        COPD (chronic obstructive pulmonary disease) ([facility] ICD-10-CM: J44.9  ICD-9-CM: 252  2021 Unknown        NSTEMI (non-ST elevated myocardial infarction) [facility]) ICD-10-CM: I21.4  ICD-9-CM: 410.70  2021 Unknown                   [x] High complexity decision making was performed  [x] See my orders for details      Subjective/Initial History:     I was asked by Socorro Tamayo MD to see Wiliam Calderon  a 80 y.o.  female in consultation     Excerpts from admission 2021 or consult notes as follows:   80 y.o. female with PMH significant for COPD, HTN, diabetes, and hypothyroidism who presented to the ED via EMS with chief complaint of SOB. Patient developed onset of moderate SOB sometime earlier today without any relieving/palliative factors. Patient additionally mentioned some left-sided chest pain. Upon EMS arrival, patient was tripoding -- he received IV Magnesium and placed on NRB. At the time, patient denied any fever, chills, abdominal pain, nausea/vomiting, or diarrhea. Labs in the ED were significant for troponin 0.19 and Pro-. CXR revealed no acute findings. ABG showed pO2 62. Patient was admitted to [address] [place] [address] for further evaluation and Pulmonology was consulted regarding additional management.   Patient is very hard of hearing and somewhat of a poor historian.        ---------------------------------------------------------------------------------------------------------  PROGRESS NOTE (2021 8:41 AM)    Patient went into A-Flutter w/RVR yesterday afternoon (~13:20) and received IV Metoprolol 5 mg. I spoke with the patient today, who appears to be doing better and does not complain of any palpitations or chest pain at this time. She continues to do well on 1 L/min NC O2 and does not appear dyspneic. No new or worsening complaints at this time. No Known Allergies     MAR reviewed and pertinent medications noted or modified as needed     Current Facility-Administered Medications   Medication    apixaban (ELIQUIS) tablet 2.5 mg    predniSONE (DELTASONE) tablet 10 mg    albuterol-ipratropium (DUO-NEB) 2.5 MG-0.5 MG/3 ML    heparin (porcine) 1,000 unit/mL injection 4,000 Units    Or    heparin (porcine) 1,000 unit/mL injection 2,000 Units    melatonin tablet 5 mg    metoprolol tartrate (LOPRESSOR) tablet 25 mg    budesonide-formoteroL (SYMBICORT) 160-4.5 mcg/actuation HFA inhaler 2 Puff    aspirin chewable tablet 81 mg    ALPRAZolam (XANAX) tablet 1.07 mg    folic acid (FOLVITE) tablet 1 mg    levothyroxine (SYNTHROID) tablet 100 mcg    acetaminophen (TYLENOL) tablet 650 mg    Or    acetaminophen (TYLENOL) suppository 650 mg    polyethylene glycol (MIRALAX) packet 17 g    ondansetron (ZOFRAN ODT) tablet 4 mg    Or    ondansetron (ZOFRAN) injection 4 mg    atorvastatin (LIPITOR) tablet 40 mg      Patient PCP: None  PMH:  has a past medical history of Anemia, Chronic obstructive pulmonary disease ([address] Heart attack [facility] Hypertension, and Thyroid disease. PSH:   has a past surgical history that includes hx orthopaedic (2018) and hx pacemaker. FHX: family history includes Heart Disease in her father and mother; Stroke in her father and mother. SHX:  reports that she has never smoked. She has never used smokeless tobacco. She reports that she does not drink alcohol and does not use drugs. ROS:  Constitutional: Negative for chills and fever. Eyes: Negative for discharge. Respiratory: Positive for shortness of breath. Cardiovascular: Positive for chest pain. Gastrointestinal: Negative for abdominal pain, nausea and vomiting. Skin: Negative for rash. Neurological: Negative for headaches. Objective:     Vital Signs: Telemetry:    normal sinus rhythm Intake/Output:   Visit Vitals  BP (!) 148/60 (BP 1 Location: Left upper arm, BP Patient Position: At rest;Supine)   Pulse (!) 59   Temp 97.7 °F (36.5 °C)   Resp 20   Ht 5' 2\" (1.575 m)   Wt 64.4 kg (141 lb 15.6 oz)   SpO2 95%   BMI 25.97 kg/m²       Temp (24hrs), Av.5 °F (36.4 °C), Min:97.3 °F (36.3 °C), Max:97.7 °F (36.5 °C)        O2 Device: None (Room air) O2 Flow Rate (L/min): 1 l/min       Wt Readings from Last 4 Encounters:   21 64.4 kg (141 lb 15.6 oz)   21 68 kg (150 lb)   21 68 kg (150 lb)   20 68 kg (150 lb)          Intake/Output Summary (Last 24 hours) at 2021 1044  Last data filed at 2021 1800  Gross per 24 hour   Intake 720 ml   Output 300 ml   Net 420 ml       Last shift:      No intake/output data recorded. Last 3 shifts:  1901 -  0700  In: 5 [P.O.:720]  Out: 300 [Urine:300]       Physical Exam:   Constitutional:       General: She is not in acute distress. Comments: Elderly   HENT:      Head: Normocephalic and atraumatic. Ears:      Comments: Hard of hearing. Eyes:      Extraocular Movements: Extraocular movements intact. Pupils: Pupils are equal, round, and reactive to light. Cardiovascular:      Rate and Rhythm: Normal rate and regular rhythm. Pulses: Normal pulses. Heart sounds: Murmur (2/6 systolic murmur, LUSB) heard. Pulmonary:      Effort: Pulmonary effort is normal.      Breath sounds: coarse breath sounds anteriorly. Abdominal:      Palpations: Abdomen is soft. Tenderness: There is no abdominal tenderness. Musculoskeletal:         General: Normal range of motion. Cervical back: Normal range of motion. Skin:     Coloration: Skin is not jaundiced. Neurological:      General: No focal deficit present. Mental Status: She is alert. Psychiatric:         Mood and Affect: Mood normal.       Labs:    Recent Labs     09/24/21  0935 09/24/21  0720 09/23/21  2135 09/23/21  0810   WBC  --  14.1*  --  15.7*   HGB  --  11.6  --  11.5   PLT  --  312  --  300   APTT 145.9*  --  75.4* 84.1*     Recent Labs     09/24/21  0720 09/23/21  0810    137   K 4.1 4.0    104   CO2 24 24   * 155*   BUN 34* 31*   CREA 1.23* 1.24*   CA 8.5 8.4*   ALB 2.8* 2.8*   ALT 19 14     No results for input(s): PH, PCO2, PO2, HCO3, FIO2 in the last 72 hours. Recent Labs     09/22/21  1527   TROIQ 1.00*     No results found for: BNPP, BNP   No results found for: CULTNo results found for: TSH, TSHEXT, TSHEXT    Imaging:    CXR Results  (Last 48 hours)    None        Results from Hospital Encounter encounter on 09/20/21    XR CHEST PORT    Narrative  History: Evidence of breath    A single view of the chest was obtained. Heart size is stable. There is some  atherosclerotic change of the aorta. Lungs are clear. No effusions or  pneumothorax. Bony structures are within normal limits. Impression  Normal findings. Results from Hospital Encounter encounter on 06/02/21    XR CHEST PORT    Narrative  The study is a single view chest radiographic examination dated 6/2/2021. HISTORY: Shortness of breath. COMPARISON:  1/24/2021. FINDINGS: The heart size is normal. The pulmonary vessels are normal in caliber. The lung parenchyma is clear without an infiltrate or atelectasis. There is a  mild degree of chronic baseline peribronchial cuffing. The costophrenic angles  are maintained without pleural effusions or a pneumothorax. There is a midline  position to the trachea. There are atherosclerotic vascular changes of the  thoracic aorta.  The pulmonary bia are symmetrical.    The bony thorax is intact without fractures/acute osseous abnormalities. Impression  1. There are moderate atherosclerotic vascular changes of the thoracic aorta. The patient may also have coronary artery atherosclerotic vascular changes which  could be a source for chest pain. 2.  This examination is negative for acute pulmonary parenchymal pathology. There is a mild degree of chronic baseline peribronchial cuffing without change. Results from [place] encounter on 01/24/21    XR CHEST PORT    Narrative  Portable chest, AP upright, 1546 hours. Comparison with 11/19/2019. Stable heart size. Calcified thoracic aorta. The lungs appear clear. No evidence  of pulmonary edema, air space pneumonia, or pleural effusion. No evidence of  pneumothorax. Impression  No evidence of focal airspace pneumonia. Results from [place] encounter on 01/24/21    CT CHEST W CONT    Addendum 2/25/2021 12:51 PM  Addendum: CT dose reduction was achieved through use of a standardized protocol  tailored for this examination and automatic exposure control for dose  modulation. Narrative  Contrast study was cc [id number]    Very mild subpleural fibrosis with otherwise completely clear lungs and patent  central airways. Pulmonary arteries are well-opacified and show no filling defect or distortion. Aorta shows normal dimensions, without dissection. Moderate atherosclerosis with  penetrating ulcer off the left side of the arch. 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 The aortic maximum diameter at this level is 3.3 cm. Patient age noted. No  mediastinal or hilar adenopathy. Coronary calcification. No cardiac finding. No pleural pericardial effusion. No  significant upper abdominal finding    Impression  Clear lungs. No evidence of PE.  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 IMPRESSION:     1. Acute on chronic respiratory failure with hypoxia  · ABG (9/20): Room Air // pH 7.44 // pCO2 35 // pO2 62 // Bicarb 24 // SpO2 94%  · Venous Duplex (9/21): negative for DVT  2. Asthma  · CXR (9/20): normal findings. 3. NSTEMI, likely type 2.  4. Atrial flutter  5. Acute on chronic diastolic CHF  · Echo (9/63): LVEF 40-45%, mild (grade 1) LV diastolic dysfunction, moderate AV leaflet calcification, mild-to-moderate AV & MV regurgitation. RVSP 32 mm Hg. · Pro-BNP 4201  6. H/o MI and s/p pacemaker  7. H/o anemia  8. H/o HTN  9. H/o Hypothyroidism  10. Diabetes Mellitus  11. Hypoalbuminemia        RECOMMENDATIONS/PLAN:     1. Currently doing well on 1 L/min NC O2. Will wean off of oxygen as tolerated. 2. Atrial flutter patient was put on IV heparin which has been transition to Eliquis  3.  Current Breathing Management  · DUO-NEB  · SYMBICORT  · Will start patient on prednisone discontinue Solu-Medrol  Pulse oximetry on room air and ambulation no desaturation         Cherri Contreras MD

## 2021-09-25 NOTE — PROGRESS NOTES
Cardiology Progress Note      9/25/2021 1:49 PM    Admit Date: 9/20/2021      Subjective:     Patient seen and examined. She remains chest pain-free. Blood pressure slightly elevated. Visit Vitals  BP (!) 150/58 (BP 1 Location: Right upper arm, BP Patient Position: Semi fowlers)   Pulse 68   Temp 98.7 °F (37.1 °C)   Resp 18   Ht 5' 2\" (1.575 m)   Wt 64.4 kg (141 lb 15.6 oz)   SpO2 97%   BMI 25.97 kg/m²     Current Facility-Administered Medications   Medication Dose Route Frequency    apixaban (ELIQUIS) tablet 2.5 mg  2.5 mg Oral BID    predniSONE (DELTASONE) tablet 10 mg  10 mg Oral DAILY WITH BREAKFAST    albuterol-ipratropium (DUO-NEB) 2.5 MG-0.5 MG/3 ML  3 mL Nebulization Q6H PRN    heparin (porcine) 1,000 unit/mL injection 4,000 Units  4,000 Units IntraVENous PRN    Or    heparin (porcine) 1,000 unit/mL injection 2,000 Units  2,000 Units IntraVENous PRN    melatonin tablet 5 mg  5 mg Oral QHS    metoprolol tartrate (LOPRESSOR) tablet 25 mg  25 mg Oral BID    budesonide-formoteroL (SYMBICORT) 160-4.5 mcg/actuation HFA inhaler 2 Puff  2 Puff Inhalation BID RT    aspirin chewable tablet 81 mg  81 mg Oral DAILY    ALPRAZolam (XANAX) tablet 0.25 mg  0.25 mg Oral BID    folic acid (FOLVITE) tablet 1 mg  1 mg Oral DAILY    levothyroxine (SYNTHROID) tablet 100 mcg  100 mcg Oral DAILY    acetaminophen (TYLENOL) tablet 650 mg  650 mg Oral Q6H PRN    Or    acetaminophen (TYLENOL) suppository 650 mg  650 mg Rectal Q6H PRN    polyethylene glycol (MIRALAX) packet 17 g  17 g Oral DAILY PRN    ondansetron (ZOFRAN ODT) tablet 4 mg  4 mg Oral Q8H PRN    Or    ondansetron (ZOFRAN) injection 4 mg  4 mg IntraVENous Q6H PRN    atorvastatin (LIPITOR) tablet 40 mg  40 mg Oral QHS         Objective:      Physical Exam:  Neck: no JVD  Heart: S1, S2  Lungs: dminished sounds b/l  Abdomen: soft, non-tender.  Bowel sounds normal. No masses,  no organomegaly  Extremities: extremities normal, atraumatic, no cyanosis or edema  Pulses: 2+ and symmetric  Neurologic: Grossly normal    Data Review:   Labs:    No results found for this or any previous visit (from the past 24 hour(s)). Assessment:   NSTEMI  Acute on COPD  Atrial Flutter  Hypertension  Moderate AI      Plan:   -Heart rate is better controlled continue Lopressor for now. Transition to Toprol-XL 75 mg p.o. daily upon discharge. Titrate as hemodynamically tolerated to optimize heart rate control  -[id number] is about 5 and she will need to be on anticoagulation for stroke prevention. She would be high risk for bleeding on triple therapy. Patient was on heparin infusion for 48 hours she will now be on aspirin 81 mg by mouth daily and then increased to 25 mg by mouth twice a day. -She has remained chest pain-free, has had no malignant arrhythmias, and troponins have trended down. She also has a history of penetrating aortic ulcer. Given her advanced age, comorbidities and the above, will manage conservatively with no plans for invasive evaluation. Will observe in the hospital for 1 more day; will add low-dose Imdur for angina as hemodynamically tolerated. -We will follow. Discussed with caregiver at the bedside as well as daughter Svetlana Farmers over the phone.

## 2021-09-25 NOTE — PROGRESS NOTES
Spoke to cardiologist who came to see patient and remind him to call the Daughter Wade Cordero and he spoke to her while in the room with Patient. Pt private cg in the room with pt.

## 2021-09-26 LAB
ALBUMIN SERPL-MCNC: 2.6 G/DL (ref 3.5–5)
ALBUMIN/GLOB SERPL: 0.8 {RATIO} (ref 1.1–2.2)
ALP SERPL-CCNC: 78 U/L (ref 45–117)
ALT SERPL-CCNC: 36 U/L (ref 12–78)
ANION GAP SERPL CALC-SCNC: 7 MMOL/L (ref 5–15)
AST SERPL W P-5'-P-CCNC: 29 U/L (ref 15–37)
BASOPHILS # BLD: 0 K/UL (ref 0–0.1)
BASOPHILS NFR BLD: 0 % (ref 0–1)
BILIRUB SERPL-MCNC: 0.4 MG/DL (ref 0.2–1)
BUN SERPL-MCNC: 23 MG/DL (ref 6–20)
BUN/CREAT SERPL: 26 (ref 12–20)
CA-I BLD-MCNC: 7.9 MG/DL (ref 8.5–10.1)
CHLORIDE SERPL-SCNC: 106 MMOL/L (ref 97–108)
CO2 SERPL-SCNC: 25 MMOL/L (ref 21–32)
CREAT SERPL-MCNC: 0.9 MG/DL (ref 0.55–1.02)
DIFFERENTIAL METHOD BLD: ABNORMAL
EOSINOPHIL # BLD: 0.6 K/UL (ref 0–0.4)
EOSINOPHIL NFR BLD: 3 % (ref 0–7)
ERYTHROCYTE [DISTWIDTH] IN BLOOD BY AUTOMATED COUNT: 12.8 % (ref 11.5–14.5)
GLOBULIN SER CALC-MCNC: 3.1 G/DL (ref 2–4)
GLUCOSE SERPL-MCNC: 100 MG/DL (ref 65–100)
HCT VFR BLD AUTO: 38.2 % (ref 35–47)
HGB BLD-MCNC: 12.8 G/DL (ref 11.5–16)
IMM GRANULOCYTES # BLD AUTO: 0.3 K/UL (ref 0–0.04)
IMM GRANULOCYTES NFR BLD AUTO: 2 % (ref 0–0.5)
LYMPHOCYTES # BLD: 2.5 K/UL (ref 0.8–3.5)
LYMPHOCYTES NFR BLD: 13 % (ref 12–49)
MCH RBC QN AUTO: 31.4 PG (ref 26–34)
MCHC RBC AUTO-ENTMCNC: 33.5 G/DL (ref 30–36.5)
MCV RBC AUTO: 93.9 FL (ref 80–99)
MONOCYTES # BLD: 1.9 K/UL (ref 0–1)
MONOCYTES NFR BLD: 10 % (ref 5–13)
NEUTS SEG # BLD: 13.6 K/UL (ref 1.8–8)
NEUTS SEG NFR BLD: 72 % (ref 32–75)
NRBC # BLD: 0 K/UL (ref 0–0.01)
NRBC BLD-RTO: 0 PER 100 WBC
PLATELET # BLD AUTO: 284 K/UL (ref 150–400)
PMV BLD AUTO: 10.9 FL (ref 8.9–12.9)
POTASSIUM SERPL-SCNC: 4.3 MMOL/L (ref 3.5–5.1)
PROT SERPL-MCNC: 5.7 G/DL (ref 6.4–8.2)
RBC # BLD AUTO: 4.07 M/UL (ref 3.8–5.2)
SODIUM SERPL-SCNC: 138 MMOL/L (ref 136–145)
WBC # BLD AUTO: 18.9 K/UL (ref 3.6–11)

## 2021-09-26 PROCEDURE — 65270000029 HC RM PRIVATE

## 2021-09-26 PROCEDURE — 74011250637 HC RX REV CODE- 250/637: Performed by: INTERNAL MEDICINE

## 2021-09-26 PROCEDURE — 94760 N-INVAS EAR/PLS OXIMETRY 1: CPT

## 2021-09-26 PROCEDURE — 85025 COMPLETE CBC W/AUTO DIFF WBC: CPT

## 2021-09-26 PROCEDURE — 97530 THERAPEUTIC ACTIVITIES: CPT

## 2021-09-26 PROCEDURE — 74011636637 HC RX REV CODE- 636/637: Performed by: INTERNAL MEDICINE

## 2021-09-26 PROCEDURE — 74011250637 HC RX REV CODE- 250/637: Performed by: FAMILY MEDICINE

## 2021-09-26 PROCEDURE — 36415 COLL VENOUS BLD VENIPUNCTURE: CPT

## 2021-09-26 PROCEDURE — 80053 COMPREHEN METABOLIC PANEL: CPT

## 2021-09-26 PROCEDURE — 94640 AIRWAY INHALATION TREATMENT: CPT

## 2021-09-26 RX ADMIN — ALPRAZOLAM 0.25 MG: 0.5 TABLET ORAL at 22:44

## 2021-09-26 RX ADMIN — APIXABAN 2.5 MG: 2.5 TABLET, FILM COATED ORAL at 22:44

## 2021-09-26 RX ADMIN — ASPIRIN 81 MG CHEWABLE TABLET 81 MG: 81 TABLET CHEWABLE at 11:17

## 2021-09-26 RX ADMIN — METOPROLOL TARTRATE 25 MG: 25 TABLET, FILM COATED ORAL at 22:44

## 2021-09-26 RX ADMIN — METOPROLOL TARTRATE 25 MG: 25 TABLET, FILM COATED ORAL at 11:17

## 2021-09-26 RX ADMIN — ATORVASTATIN CALCIUM 40 MG: 40 TABLET, FILM COATED ORAL at 22:44

## 2021-09-26 RX ADMIN — POLYETHYLENE GLYCOL 3350 17 G: 17 POWDER, FOR SOLUTION ORAL at 11:17

## 2021-09-26 RX ADMIN — PREDNISONE 10 MG: 5 TABLET ORAL at 11:17

## 2021-09-26 RX ADMIN — BUDESONIDE AND FORMOTEROL FUMARATE DIHYDRATE 2 PUFF: 160; 4.5 AEROSOL RESPIRATORY (INHALATION) at 08:31

## 2021-09-26 RX ADMIN — APIXABAN 2.5 MG: 2.5 TABLET, FILM COATED ORAL at 11:17

## 2021-09-26 RX ADMIN — BUDESONIDE AND FORMOTEROL FUMARATE DIHYDRATE 2 PUFF: 160; 4.5 AEROSOL RESPIRATORY (INHALATION) at 20:07

## 2021-09-26 RX ADMIN — LEVOTHYROXINE SODIUM 100 MCG: 0.1 TABLET ORAL at 11:18

## 2021-09-26 RX ADMIN — FOLIC ACID 1 MG: 1 TABLET ORAL at 11:18

## 2021-09-26 RX ADMIN — MELATONIN TAB 5 MG 5 MG: 5 TAB at 22:44

## 2021-09-26 RX ADMIN — ALPRAZOLAM 0.25 MG: 0.5 TABLET ORAL at 11:18

## 2021-09-26 RX ADMIN — ACETAMINOPHEN 650 MG: 325 TABLET ORAL at 22:44

## 2021-09-26 NOTE — PROGRESS NOTES
Cardiology Progress Note      9/26/2021 1:49 PM    Admit Date: 9/20/2021      Subjective:     Patient seen and examined. She remains chest pain-free. Blood pressure slightly elevated. Visit Vitals  BP (!) 168/61   Pulse 80   Temp 98 °F (36.7 °C)   Resp 20   Ht 5' 2\" (1.575 m)   Wt 64.4 kg (141 lb 15.6 oz)   SpO2 100%   BMI 25.97 kg/m²     Current Facility-Administered Medications   Medication Dose Route Frequency    apixaban (ELIQUIS) tablet 2.5 mg  2.5 mg Oral BID    predniSONE (DELTASONE) tablet 10 mg  10 mg Oral DAILY WITH BREAKFAST    albuterol-ipratropium (DUO-NEB) 2.5 MG-0.5 MG/3 ML  3 mL Nebulization Q6H PRN    heparin (porcine) 1,000 unit/mL injection 4,000 Units  4,000 Units IntraVENous PRN    Or    heparin (porcine) 1,000 unit/mL injection 2,000 Units  2,000 Units IntraVENous PRN    melatonin tablet 5 mg  5 mg Oral QHS    metoprolol tartrate (LOPRESSOR) tablet 25 mg  25 mg Oral BID    budesonide-formoteroL (SYMBICORT) 160-4.5 mcg/actuation HFA inhaler 2 Puff  2 Puff Inhalation BID RT    aspirin chewable tablet 81 mg  81 mg Oral DAILY    ALPRAZolam (XANAX) tablet 0.25 mg  0.25 mg Oral BID    folic acid (FOLVITE) tablet 1 mg  1 mg Oral DAILY    levothyroxine (SYNTHROID) tablet 100 mcg  100 mcg Oral DAILY    acetaminophen (TYLENOL) tablet 650 mg  650 mg Oral Q6H PRN    Or    acetaminophen (TYLENOL) suppository 650 mg  650 mg Rectal Q6H PRN    polyethylene glycol (MIRALAX) packet 17 g  17 g Oral DAILY PRN    ondansetron (ZOFRAN ODT) tablet 4 mg  4 mg Oral Q8H PRN    Or    ondansetron (ZOFRAN) injection 4 mg  4 mg IntraVENous Q6H PRN    atorvastatin (LIPITOR) tablet 40 mg  40 mg Oral QHS         Objective:      Physical Exam:  Neck: no JVD  Heart: S1, S2  Lungs: dminished sounds b/l  Abdomen: soft, non-tender.  Bowel sounds normal. No masses,  no organomegaly  Extremities: extremities normal, atraumatic, no cyanosis or edema  Pulses: 2+ and symmetric  Neurologic: Grossly normal    Data Review:   Labs:    Recent Results (from the past 24 hour(s))   CBC WITH AUTOMATED DIFF    Collection Time: 09/26/21  7:20 AM   Result Value Ref Range    WBC 18.9 (H) 3.6 - 11.0 K/uL    RBC 4.07 3.80 - 5.20 M/uL    HGB 12.8 11.5 - 16.0 g/dL    HCT 38.2 35.0 - 47.0 %    MCV 93.9 80.0 - 99.0 FL    MCH 31.4 26.0 - 34.0 PG    MCHC 33.5 30.0 - 36.5 g/dL    RDW 12.8 11.5 - 14.5 %    PLATELET 530 269 - 493 K/uL    MPV 10.9 8.9 - 12.9 FL    NRBC 0.0 0.0  WBC    ABSOLUTE NRBC 0.00 0.00 - 0.01 K/uL    NEUTROPHILS 72 32 - 75 %    LYMPHOCYTES 13 12 - 49 %    MONOCYTES 10 5 - 13 %    EOSINOPHILS 3 0 - 7 %    BASOPHILS 0 0 - 1 %    IMMATURE GRANULOCYTES 2 (H) 0 - 0.5 %    ABS. NEUTROPHILS 13.6 (H) 1.8 - 8.0 K/UL    ABS. LYMPHOCYTES 2.5 0.8 - 3.5 K/UL    ABS. MONOCYTES 1.9 (H) 0.0 - 1.0 K/UL    ABS. EOSINOPHILS 0.6 (H) 0.0 - 0.4 K/UL    ABS. BASOPHILS 0.0 0.0 - 0.1 K/UL    ABS. IMM. GRANS. 0.3 (H) 0.00 - 0.04 K/UL    DF AUTOMATED     METABOLIC PANEL, COMPREHENSIVE    Collection Time: 09/26/21  7:20 AM   Result Value Ref Range    Sodium 138 136 - 145 mmol/L    Potassium 4.3 3.5 - 5.1 mmol/L    Chloride 106 97 - 108 mmol/L    CO2 25 21 - 32 mmol/L    Anion gap 7 5 - 15 mmol/L    Glucose 100 65 - 100 mg/dL    BUN 23 (H) 6 - 20 mg/dL    Creatinine 0.90 0.55 - 1.02 mg/dL    BUN/Creatinine ratio 26 (H) 12 - 20      GFR est AA >60 >60 ml/min/1.73m2    GFR est non-AA 59 (L) >60 ml/min/1.73m2    Calcium 7.9 (L) 8.5 - 10.1 mg/dL    Bilirubin, total 0.4 0.2 - 1.0 mg/dL    AST (SGOT) 29 15 - 37 U/L    ALT (SGPT) 36 12 - 78 U/L    Alk. phosphatase 78 45 - 117 U/L    Protein, total 5.7 (L) 6.4 - 8.2 g/dL    Albumin 2.6 (L) 3.5 - 5.0 g/dL    Globulin 3.1 2.0 - 4.0 g/dL    A-G Ratio 0.8 (L) 1.1 - 2.2           Assessment:   NSTEMI  Acute on COPD  Atrial Flutter  Hypertension  Moderate AI      Plan:   -Heart rate is better controlled continue Lopressor for now. Transition to Toprol-XL 75 mg p.o. daily upon discharge.   Titrate as hemodynamically tolerated to optimize heart rate control  -[id number] is about 5 and she will need to be on anticoagulation for stroke prevention. She would be high risk for bleeding on triple therapy. Patient was on heparin infusion for 48 hours she will now be on aspirin 81 mg by mouth daily and then increased to 25 mg by mouth twice a day. -She has remained chest pain-free, has had no malignant arrhythmias, and troponins have trended down. She also has a history of penetrating aortic ulcer. Given her advanced age, comorbidities and the above, will manage conservatively with no plans for invasive evaluation. Will observe in the hospital for 1 more day; will add low-dose Imdur for angina as hemodynamically tolerated. -She will likely need placement post discharge which I will defer to primary team  -We will follow.

## 2021-09-26 NOTE — PROGRESS NOTES
Hospitalist Progress Note    Subjective:     Jamil Harper is a 80 y. o. white female with a history of COPD, HTN, hypothyroid who presents with SOB x 1 day. Patient notes that the onset was sudden. She endorses having dizziness. Patient lives alone, with daughter nearby. She explains that she called her daughter when she began having the SOB. EMS services were then contacted. On arrival it was noted that the patient was tripoding. She was given IV magnesium and placed on a non rebreathe. Patient denies fever, chills, chest pain, or abdominal pain. Patient is seen resting in her bed with NC in nose but no flow rate. Patient is in no acute distress, and does not display any signs of dyspnea.      Caregiver at the bedside    Current Facility-Administered Medications   Medication Dose Route Frequency    apixaban (ELIQUIS) tablet 2.5 mg  2.5 mg Oral BID    predniSONE (DELTASONE) tablet 10 mg  10 mg Oral DAILY WITH BREAKFAST    albuterol-ipratropium (DUO-NEB) 2.5 MG-0.5 MG/3 ML  3 mL Nebulization Q6H PRN    heparin (porcine) 1,000 unit/mL injection 4,000 Units  4,000 Units IntraVENous PRN    Or    heparin (porcine) 1,000 unit/mL injection 2,000 Units  2,000 Units IntraVENous PRN    melatonin tablet 5 mg  5 mg Oral QHS    metoprolol tartrate (LOPRESSOR) tablet 25 mg  25 mg Oral BID    budesonide-formoteroL (SYMBICORT) 160-4.5 mcg/actuation HFA inhaler 2 Puff  2 Puff Inhalation BID RT    aspirin chewable tablet 81 mg  81 mg Oral DAILY    ALPRAZolam (XANAX) tablet 0.25 mg  0.25 mg Oral BID    folic acid (FOLVITE) tablet 1 mg  1 mg Oral DAILY    levothyroxine (SYNTHROID) tablet 100 mcg  100 mcg Oral DAILY    acetaminophen (TYLENOL) tablet 650 mg  650 mg Oral Q6H PRN    Or    acetaminophen (TYLENOL) suppository 650 mg  650 mg Rectal Q6H PRN    polyethylene glycol (MIRALAX) packet 17 g  17 g Oral DAILY PRN    ondansetron (ZOFRAN ODT) tablet 4 mg  4 mg Oral Q8H PRN    Or    ondansetron (ZOFRAN) injection 4 mg  4 mg IntraVENous Q6H PRN    atorvastatin (LIPITOR) tablet 40 mg  40 mg Oral QHS        Review of Systems  Constitutional: Negative for chills, fatigue and fever. HENT: Negative for congestion, sinus pressure and trouble swallowing.    Eyes: Negative for photophobia and pain. Respiratory: +minimal SOB    Cardiovascular: Negative for chest pain and leg swelling. Gastrointestinal: Negative for abdominal pain, diarrhea, nausea and vomiting. Endocrine: Negative for polydipsia, polyphagia and polyuria. Genitourinary: Negative for decreased urine volume, difficulty urinating, dysuria, hematuria and urgency. Musculoskeletal: Negative for back pain, gait problem, myalgias and neck pain. Skin: Negative for pallor and rash. Allergic/Immunologic: Negative for environmental allergies and food allergies. Neurological: Negative for dizziness, facial asymmetry, speech difficulty, numbness and headaches. Hematological: Negative for adenopathy. Does not bruise/bleed easily. Psychiatric/Behavioral: Negative for agitation, self-injury and suicidal ideas. The patient is not nervous/anxious. Objective:     Constitutional:       General: She is not in acute distress. HENT:      Head: Normocephalic and atraumatic.      Ears: Patient is unable to hear well, must speak directly into her ears. Eyes:      Extraocular Movements: Extraocular movements intact.      Pupils: Pupils are equal, round, and reactive to light. Cardiovascular:      Rate and Rhythm: Normal rate and regular rhythm.      Pulses: Normal pulses.      Heart sounds:Murmur. Pulmonary:      Effort: Pulmonary effort is normal.      Breath sounds: mild wheezing present. Abdominal:      Palpations: Abdomen is soft.      Tenderness: There is no abdominal tenderness. .   Neurological:      General: No focal deficit present.      Mental Status: She is alert.    Psychiatric:         Mood and Affect: Mood normal.   Visit Vitals  BP (!) 168/61   Pulse 80   Temp 98 °F (36.7 °C)   Resp 20   Ht 5' 2\" (1.575 m)   Wt 64.4 kg (141 lb 15.6 oz)   SpO2 100%   BMI 25.97 kg/m²    O2 Flow Rate (L/min): 1 l/min O2 Device: Nasal cannula    Temp (24hrs), Av.7 °F (36.5 °C), Min:97.4 °F (36.3 °C), Max:98 °F (36.7 °C)      No intake/output data recorded.  190 -  0700  In: 720 [P.O.:720]  Out: 1350 [Urine:1350]      Data Review    Recent Results (from the past 24 hour(s))   CBC WITH AUTOMATED DIFF    Collection Time: 21  7:20 AM   Result Value Ref Range    WBC 18.9 (H) 3.6 - 11.0 K/uL    RBC 4.07 3.80 - 5.20 M/uL    HGB 12.8 11.5 - 16.0 g/dL    HCT 38.2 35.0 - 47.0 %    MCV 93.9 80.0 - 99.0 FL    MCH 31.4 26.0 - 34.0 PG    MCHC 33.5 30.0 - 36.5 g/dL    RDW 12.8 11.5 - 14.5 %    PLATELET 802 843 - 054 K/uL    MPV 10.9 8.9 - 12.9 FL    NRBC 0.0 0.0  WBC    ABSOLUTE NRBC 0.00 0.00 - 0.01 K/uL    NEUTROPHILS 72 32 - 75 %    LYMPHOCYTES 13 12 - 49 %    MONOCYTES 10 5 - 13 %    EOSINOPHILS 3 0 - 7 %    BASOPHILS 0 0 - 1 %    IMMATURE GRANULOCYTES 2 (H) 0 - 0.5 %    ABS. NEUTROPHILS 13.6 (H) 1.8 - 8.0 K/UL    ABS. LYMPHOCYTES 2.5 0.8 - 3.5 K/UL    ABS. MONOCYTES 1.9 (H) 0.0 - 1.0 K/UL    ABS. EOSINOPHILS 0.6 (H) 0.0 - 0.4 K/UL    ABS. BASOPHILS 0.0 0.0 - 0.1 K/UL    ABS. IMM. GRANS. 0.3 (H) 0.00 - 0.04 K/UL    DF AUTOMATED     METABOLIC PANEL, COMPREHENSIVE    Collection Time: 21  7:20 AM   Result Value Ref Range    Sodium 138 136 - 145 mmol/L    Potassium 4.3 3.5 - 5.1 mmol/L    Chloride 106 97 - 108 mmol/L    CO2 25 21 - 32 mmol/L    Anion gap 7 5 - 15 mmol/L    Glucose 100 65 - 100 mg/dL    BUN 23 (H) 6 - 20 mg/dL    Creatinine 0.90 0.55 - 1.02 mg/dL    BUN/Creatinine ratio 26 (H) 12 - 20      GFR est AA >60 >60 ml/min/1.73m2    GFR est non-AA 59 (L) >60 ml/min/1.73m2    Calcium 7.9 (L) 8.5 - 10.1 mg/dL    Bilirubin, total 0.4 0.2 - 1.0 mg/dL    AST (SGOT) 29 15 - 37 U/L    ALT (SGPT) 36 12 - 78 U/L    Alk.  phosphatase 78 45 - 117 U/L Protein, total 5.7 (L) 6.4 - 8.2 g/dL    Albumin 2.6 (L) 3.5 - 5.0 g/dL    Globulin 3.1 2.0 - 4.0 g/dL    A-G Ratio 0.8 (L) 1.1 - 2.2           Assessment/Plan:     NSTEMI (elevated troponin)  Trending down  Troponin yesterday 1.27  BNP previous: 4,201  CXR: nL  EKG: sinus tachy, LBBB  ECHO: LV: Estimated LVEF is 40 - 45%. Normal cavity size, wall thickness and systolic function (ejection fraction normal). Moderate hypokinesis of the mid anteroseptal, apical septal and apical anterior wall(s). Mild (grade 1) left ventricular diastolic dysfunction. · AV: Aortic valve leaflet calcification present without reduced excursion. Mild to moderate aortic valve regurgitation is present. · MV: Mild to moderate mitral valve regurgitation is present. · TV: Right Ventricular Arterial Pressure (RVSP) is 32 mmHg.     Acute Hypoxemic respiratory distress  Blood gases: pH: 7.44, CO2:35 PO2: 62 Bicarb: 24 O2 sat: 94 RA  COPD exacerbation  HTN  Hypothyroidism  History of MI  Anemia  Asymptomatic Urinary tract infection  Levoquin 750 mg in D5W IVPB  Leukocytosis  Trending down   WBC: 14.1 today     Bilateral leg pains  D-dimer 0.93  Doppler studies show no evidence of DVT in L LE or R LE    Plan:        Per Cardiology: Continue heparin 48 hours, Continue BB, statin, lopressor for now. Transition to Toprol XL 75 mg P.O on discharge. [id number] = 5, needs anticoagulation. Starting today transition to aspirin 81 mg PO and Eliquis 2.5 mg Po BID. Observe in hospital for one more day     PER RT:  9/23 O2 sat 96% on RA at rest. NC in nose but no flow at this time.  Patient is resting comfortably.       PT/OT: discharge home with [facility] 24/7 care    Possible discharge home tomorrow    Monitor aPTT levels, monitor for signs of bleeding  REPEAT TROPONIN  MAG level  Monitor leukocytosis     Begin aspirin 81 mg PO every day  Begin Eliquis 2.5 mg PO BID      Continue:     Lipitor 40 mg PO QHS   Levaquin 750 mg in D5W IVPB  Q48 hours x 3 doses  duo-neb 2.5 mg- 0.5 mg/ 3ml neb Q6H   symbicort 160-4.5 mcg 2 puff BID   synthroid tablet 100 mcg PO every day   solumedrol 60 mg IV Q6H   folvite tablet 1 mg PO every day  Xanax tablet 0.25 mg PO BID  melatonin tablet PO 5 mg QHS  Lopressor 25 mg PO BID     Consider home O2, PT/OT ambulatory O2 monitoring  Discharge tomorrow  Discussed with the cardiology regarding discharge home tomorrow

## 2021-09-26 NOTE — PROGRESS NOTES
PHYSICAL THERAPY TREATMENT  Patient: Raleigh Valverde (10 y.o. female)  Date: 9/26/2021  Diagnosis: SOB (shortness of breath) [R06.02]  COPD exacerbation ([facility]) [J44.1]  NSTEMI (non-ST elevated myocardial infarction) ([facility]) [I21.4]  COPD (chronic obstructive pulmonary disease) ([facility] [address] [J44.9] <principal problem not specified>       Precautions:    Chart, physical therapy assessment, plan of care and goals were reviewed. ASSESSMENT  Patient continues with skilled PT services and is progressing towards goals. Pt. Semi supine in bed upon arrival and agreeable to therapy session. Pt. Able to perform bed mobility and transfers with mod I. Very [place]. Able to demonstrate Rolling R/R 5x and scoots to [facility] with independence. Performs seated LE TE with R low back pain. Able to ambulate 36' with RW on 2L of nasal canula with therapist negotiating line. Exhibits fair standing balance with complaints of BLE knee pain. Recommend DC to PT WITH 24/7 CARE. Current Level of Function Impacting Discharge (mobility/balance): ENDURANCE, BALANCE    Other factors to consider for discharge: PMH         PLAN :  Patient continues to benefit from skilled intervention to address the above impairments. Continue treatment per established plan of care. to address goals. Recommendation for discharge: (in order for the patient to meet his/her long term goals)  Home with Family Care    This discharge recommendation:  Has not yet been discussed the attending provider and/or case management    IF patient discharges home will need the following DME: rolling walker       SUBJECTIVE:   Patient stated IM OKAY.     OBJECTIVE DATA SUMMARY:   Critical Behavior:  Neurologic State: Alert  Orientation Level: Oriented to person, Oriented to place  Cognition: Appropriate for age attention/concentration, Appropriate safety awareness, Follows commands     Functional Mobility Training:  Bed Mobility:  Rolling: Modified independent  Supine to Sit: Modified independent  Sit to Supine: Modified independent  Scooting: Modified independent        Transfers:  Sit to Stand: Stand-by assistance  Stand to Sit: Stand-by assistance                             Balance:  Sitting: Intact  Standing: Impaired; With support  Standing - Static: Constant support;Good  Standing - Dynamic : Constant support;Good  Ambulation/Gait Training:  Distance (ft): 40 Feet (ft)  Assistive Device: Walker, rolling;Gait belt  Ambulation - Level of Assistance: Contact guard assistance                 Base of Support: Narrowed     Speed/Vilma: Slow  Step Length: Left shortened;Right shortened                    Stairs: Therapeutic Exercises:   Therapeutic Exercises:       EXERCISE   Sets   Reps   Active Active Assist   Passive Self ROM   Comments   Ankle Pumps  10 [x] [] [] []    Quad Sets/Glut Sets   [] [] [] []    Hamstring Sets   [] [] [] []    Short Arc Quads   [] [] [] []    Heel Slides   [] [] [] []    Straight Leg Raises   [] [] [] []    Hip abd/add  10 [x] [] [] []    Long Arc Quads  10 [x] [] [] []    Marching  10 [x] [] [] []       [] [] [] []        Pain Ratin    Activity Tolerance:   Good  Please refer to the flowsheet for vital signs taken during this treatment. After treatment patient left in no apparent distress:   Supine in bed    COMMUNICATION/COLLABORATION:   The patients plan of care was discussed with: Physical therapy assistant.      Cj Valdovinos PTA   Time Calculation: 27 mins

## 2021-09-27 VITALS
BODY MASS INDEX: 26.13 KG/M2 | WEIGHT: 141.98 LBS | DIASTOLIC BLOOD PRESSURE: 64 MMHG | SYSTOLIC BLOOD PRESSURE: 157 MMHG | OXYGEN SATURATION: 97 % | HEART RATE: 76 BPM | TEMPERATURE: 97.8 F | HEIGHT: 62 IN | RESPIRATION RATE: 20 BRPM

## 2021-09-27 PROCEDURE — 97535 SELF CARE MNGMENT TRAINING: CPT

## 2021-09-27 PROCEDURE — 94640 AIRWAY INHALATION TREATMENT: CPT

## 2021-09-27 PROCEDURE — 94760 N-INVAS EAR/PLS OXIMETRY 1: CPT

## 2021-09-27 PROCEDURE — 74011250637 HC RX REV CODE- 250/637: Performed by: INTERNAL MEDICINE

## 2021-09-27 PROCEDURE — 74011250637 HC RX REV CODE- 250/637: Performed by: FAMILY MEDICINE

## 2021-09-27 PROCEDURE — 74011636637 HC RX REV CODE- 636/637: Performed by: INTERNAL MEDICINE

## 2021-09-27 RX ORDER — METOPROLOL SUCCINATE 25 MG/1
50 TABLET, EXTENDED RELEASE ORAL DAILY
Status: DISCONTINUED | OUTPATIENT
Start: 2021-09-28 | End: 2021-09-27 | Stop reason: HOSPADM

## 2021-09-27 RX ORDER — IPRATROPIUM BROMIDE AND ALBUTEROL SULFATE 2.5; .5 MG/3ML; MG/3ML
3 SOLUTION RESPIRATORY (INHALATION)
Qty: 30 NEBULE | Refills: 0 | Status: SHIPPED | OUTPATIENT
Start: 2021-09-27

## 2021-09-27 RX ORDER — ATORVASTATIN CALCIUM 40 MG/1
40 TABLET, FILM COATED ORAL
Qty: 60 TABLET | Refills: 0 | Status: SHIPPED | OUTPATIENT
Start: 2021-09-27

## 2021-09-27 RX ORDER — SORBITOL SOLUTION 70 %
30 SOLUTION, ORAL MISCELLANEOUS DAILY PRN
Status: DISCONTINUED | OUTPATIENT
Start: 2021-09-27 | End: 2021-09-27 | Stop reason: HOSPADM

## 2021-09-27 RX ORDER — GUAIFENESIN 100 MG/5ML
81 LIQUID (ML) ORAL DAILY
Qty: 30 TABLET | Refills: 0 | Status: SHIPPED | OUTPATIENT
Start: 2021-09-28

## 2021-09-27 RX ORDER — PREDNISONE 10 MG/1
10 TABLET ORAL
Qty: 10 TABLET | Refills: 0 | Status: SHIPPED | OUTPATIENT
Start: 2021-09-28

## 2021-09-27 RX ORDER — METOPROLOL TARTRATE 25 MG/1
25 TABLET, FILM COATED ORAL 2 TIMES DAILY
Qty: 60 TABLET | Refills: 0 | Status: SHIPPED | OUTPATIENT
Start: 2021-09-27 | End: 2021-09-27

## 2021-09-27 RX ORDER — BUDESONIDE AND FORMOTEROL FUMARATE DIHYDRATE 160; 4.5 UG/1; UG/1
2 AEROSOL RESPIRATORY (INHALATION) 2 TIMES DAILY
Qty: 10.2 G | Refills: 1 | Status: SHIPPED | OUTPATIENT
Start: 2021-09-27

## 2021-09-27 RX ORDER — METOPROLOL SUCCINATE 50 MG/1
50 TABLET, EXTENDED RELEASE ORAL DAILY
Qty: 50 TABLET | Refills: 0 | Status: SHIPPED | OUTPATIENT
Start: 2021-09-28

## 2021-09-27 RX ADMIN — METOPROLOL TARTRATE 25 MG: 25 TABLET, FILM COATED ORAL at 08:38

## 2021-09-27 RX ADMIN — ALPRAZOLAM 0.25 MG: 0.5 TABLET ORAL at 08:37

## 2021-09-27 RX ADMIN — APIXABAN 2.5 MG: 2.5 TABLET, FILM COATED ORAL at 08:38

## 2021-09-27 RX ADMIN — BUDESONIDE AND FORMOTEROL FUMARATE DIHYDRATE 2 PUFF: 160; 4.5 AEROSOL RESPIRATORY (INHALATION) at 09:57

## 2021-09-27 RX ADMIN — FOLIC ACID 1 MG: 1 TABLET ORAL at 08:38

## 2021-09-27 RX ADMIN — LEVOTHYROXINE SODIUM 100 MCG: 0.1 TABLET ORAL at 08:38

## 2021-09-27 RX ADMIN — SORBITOL SOLUTION (BULK) 30 ML: 70 SOLUTION at 16:20

## 2021-09-27 RX ADMIN — PREDNISONE 10 MG: 5 TABLET ORAL at 08:38

## 2021-09-27 RX ADMIN — ASPIRIN 81 MG CHEWABLE TABLET 81 MG: 81 TABLET CHEWABLE at 08:38

## 2021-09-27 NOTE — PROGRESS NOTES
Hospitalist Progress Note    Subjective:   Daily Progress Note: 9/27/2021 10:26 AM  Otilia Harper is a 80 y. o. white female with a history of COPD, HTN, hypothyroid who presents with SOB x 1 day. Patient notes that the onset was sudden. She endorses having dizziness. Patient lives alone, with daughter nearby. She explains that she called her daughter when she began having the SOB. EMS services were then contacted. On arrival it was noted that the patient was tripoding. She was given IV magnesium and placed on a non rebreathe. Patient denies fever, chills, chest pain, or abdominal pain. Today, Patient is lying in bed with NC in on no flow, in no acute distress. Patient denies any chest pain, dizziness, palpitations. She denies any increased SOB. Patient was concerned about what was on her breakfast try. No further complaints at this time.      Current Facility-Administered Medications   Medication Dose Route Frequency    apixaban (ELIQUIS) tablet 2.5 mg  2.5 mg Oral BID    predniSONE (DELTASONE) tablet 10 mg  10 mg Oral DAILY WITH BREAKFAST    albuterol-ipratropium (DUO-NEB) 2.5 MG-0.5 MG/3 ML  3 mL Nebulization Q6H PRN    heparin (porcine) 1,000 unit/mL injection 4,000 Units  4,000 Units IntraVENous PRN    Or    heparin (porcine) 1,000 unit/mL injection 2,000 Units  2,000 Units IntraVENous PRN    melatonin tablet 5 mg  5 mg Oral QHS    metoprolol tartrate (LOPRESSOR) tablet 25 mg  25 mg Oral BID    budesonide-formoteroL (SYMBICORT) 160-4.5 mcg/actuation HFA inhaler 2 Puff  2 Puff Inhalation BID RT    aspirin chewable tablet 81 mg  81 mg Oral DAILY    ALPRAZolam (XANAX) tablet 0.25 mg  0.25 mg Oral BID    folic acid (FOLVITE) tablet 1 mg  1 mg Oral DAILY    levothyroxine (SYNTHROID) tablet 100 mcg  100 mcg Oral DAILY    acetaminophen (TYLENOL) tablet 650 mg  650 mg Oral Q6H PRN    Or    acetaminophen (TYLENOL) suppository 650 mg  650 mg Rectal Q6H PRN    polyethylene glycol (MIRALAX) packet 17 g  17 g Oral DAILY PRN    ondansetron (ZOFRAN ODT) tablet 4 mg  4 mg Oral Q8H PRN    Or    ondansetron (ZOFRAN) injection 4 mg  4 mg IntraVENous Q6H PRN    atorvastatin (LIPITOR) tablet 40 mg  40 mg Oral QHS        Review of Systems  Constitutional: Negative for chills, fatigue and fever. HENT: Negative for congestion, sinus pressure and trouble swallowing.    Eyes: Negative for photophobia and pain. Respiratory: Negative   Cardiovascular: Negative for chest pain and leg swelling. Gastrointestinal: Negative for abdominal pain, diarrhea, nausea and vomiting. Endocrine: Negative for polydipsia, polyphagia and polyuria. Genitourinary: Negative for decreased urine volume, difficulty urinating, dysuria, hematuria and urgency. Musculoskeletal: Negative for back pain, gait problem, myalgias and neck pain. Skin: Negative for pallor and rash. Allergic/Immunologic: Negative for environmental allergies and food allergies. Neurological: Negative for dizziness, facial asymmetry, speech difficulty, numbness and headaches. Hematological: Negative for adenopathy. Does not bruise/bleed easily. Psychiatric/Behavioral: Negative for agitation, self-injury and suicidal ideas. The patient is not nervous/anxious. Objective:     Constitutional:       General: She is not in acute distress. HENT:      Head: Normocephalic and atraumatic.      Ears: Patient is unable to hear well, must speak directly into her ears. Eyes:      Extraocular Movements: Extraocular movements intact.      Pupils: Pupils are equal, round, and reactive to light. Cardiovascular:      Rate and Rhythm: Normal rate and regular rhythm.      Pulses: Normal pulses.      Heart sounds:Murmur. Pulmonary:      Effort: Pulmonary effort is normal.      Breath sounds: mild wheezing present. Abdominal:      Palpations: Abdomen is soft.      Tenderness: There is no abdominal tenderness. .   Neurological:      General: No focal deficit present.    Mental Status: She is alert. Psychiatric:         Mood and Affect: Mood normal.     Visit Vitals  BP (!) 170/72 (BP Patient Position: At rest;Lying)   Pulse 65   Temp 97.9 °F (36.6 °C)   Resp 18   Ht 5' 2\" (1.575 m)   Wt 141 lb 15.6 oz (64.4 kg)   SpO2 97%   BMI 25.97 kg/m²    O2 Flow Rate (L/min): 2 l/min O2 Device: Nasal cannula    Temp (24hrs), Av.7 °F (36.5 °C), Min:97.4 °F (36.3 °C), Max:98 °F (36.7 °C)      No intake/output data recorded.  1901 -  0700  In: 600 [P.O.:600]  Out: -         Data Review    No results found for this or any previous visit (from the past 24 hour(s)). Assessment/Plan:   NSTEMI (elevated troponin)  Trending down  Troponin : 1.00  BNP previous: 4,201  CXR: nL  EKG: sinus tachy, LBBB  ECHO: LV: Estimated LVEF is 40 - 45%. Normal cavity size, wall thickness and systolic function (ejection fraction normal). Moderate hypokinesis of the mid anteroseptal, apical septal and apical anterior wall(s). Mild (grade 1) left ventricular diastolic dysfunction. · AV: Aortic valve leaflet calcification present without reduced excursion. Mild to moderate aortic valve regurgitation is present. · MV: Mild to moderate mitral valve regurgitation is present. · TV: Right Ventricular Arterial Pressure (RVSP) is 32 mmHg.     Acute Hypoxemic respiratory distress  Blood gases: pH: 7.44, CO2:35 PO2: 62 Bicarb: 24 O2 sat: 94 RA  COPD exacerbation  HTN  Hypothyroidism  History of MI  Anemia  Asymptomatic Urinary tract infection  Treated  Leukocytosis  WBC today: 18.9 vs 14.1 prior     Bilateral leg pains  D-dimer 0.93  Doppler studies show no evidence of DVT in L LE or R LE    Plan:    Continue to monitor Leukocytosis    Consider Lisinopril 10 mg PO QD    Per cardiology: Continue Lopressor. Transition to Toprol- XL 75 mg upon d/c. Continue aspirin 81mg Po and Eliquis 2.5 mg BID.   Given her advanced age, comorbidities and the above, will manage conservatively with no plans for invasive evaluation. Will observe in the hospital for 1 more day; will add low-dose Imdur for angina as hemodynamically tolerated. Per PT: discharge home with family care    Per pulm: Continue with current breathing management. Wean off oxygen as tolerated.     Continue      Lipitor 40 mg PO QHS  duo-neb 2.5 mg- 0.5 mg/ 3ml neb Q6H PRN  symbicort 160-4.5 mcg 2 puff BID  synthroid tablet 100 mcg PO every day  Prednisone 10 mg PO every day   folvite tablet 1 mg PO every day  Xanax tablet 0.25 mg PO BID  melatonin tablet PO 5 mg QHS  Lopressor 25 mg PO BID  Eliquis 2.5 mg PO BID

## 2021-09-27 NOTE — DISCHARGE SUMMARY
Hospitalist Progress Note    Subjective:   Daily Progress Note: 9/27/2021 10:26 AM  Otilia Harper is a 80 y. o. white female with a history of COPD, HTN, hypothyroid who presents with SOB x 1 day. Patient notes that the onset was sudden. She endorses having dizziness. Patient lives alone, with daughter nearby. She explains that she called her daughter when she began having the SOB. EMS services were then contacted. On arrival it was noted that the patient was tripoding. She was given IV magnesium and placed on a non rebreathe. Patient denies fever, chills, chest pain, or abdominal pain. Today, Patient is lying in bed with NC in on no flow, in no acute distress. Patient denies any chest pain, dizziness, palpitations. She denies any increased SOB. Patient was concerned about what was on her breakfast try. No further complaints at this time.      Current Facility-Administered Medications   Medication Dose Route Frequency    apixaban (ELIQUIS) tablet 2.5 mg  2.5 mg Oral BID    predniSONE (DELTASONE) tablet 10 mg  10 mg Oral DAILY WITH BREAKFAST    albuterol-ipratropium (DUO-NEB) 2.5 MG-0.5 MG/3 ML  3 mL Nebulization Q6H PRN    heparin (porcine) 1,000 unit/mL injection 4,000 Units  4,000 Units IntraVENous PRN    Or    heparin (porcine) 1,000 unit/mL injection 2,000 Units  2,000 Units IntraVENous PRN    melatonin tablet 5 mg  5 mg Oral QHS    metoprolol tartrate (LOPRESSOR) tablet 25 mg  25 mg Oral BID    budesonide-formoteroL (SYMBICORT) 160-4.5 mcg/actuation HFA inhaler 2 Puff  2 Puff Inhalation BID RT    aspirin chewable tablet 81 mg  81 mg Oral DAILY    ALPRAZolam (XANAX) tablet 0.25 mg  0.25 mg Oral BID    folic acid (FOLVITE) tablet 1 mg  1 mg Oral DAILY    levothyroxine (SYNTHROID) tablet 100 mcg  100 mcg Oral DAILY    acetaminophen (TYLENOL) tablet 650 mg  650 mg Oral Q6H PRN    Or    acetaminophen (TYLENOL) suppository 650 mg  650 mg Rectal Q6H PRN    polyethylene glycol (MIRALAX) packet 17 g  17 g Oral DAILY PRN    ondansetron (ZOFRAN ODT) tablet 4 mg  4 mg Oral Q8H PRN    Or    ondansetron (ZOFRAN) injection 4 mg  4 mg IntraVENous Q6H PRN    atorvastatin (LIPITOR) tablet 40 mg  40 mg Oral QHS        Review of Systems  Constitutional: Negative for chills, fatigue and fever. HENT: Negative for congestion, sinus pressure and trouble swallowing.    Eyes: Negative for photophobia and pain. Respiratory: Negative   Cardiovascular: Negative for chest pain and leg swelling. Gastrointestinal: Negative for abdominal pain, diarrhea, nausea and vomiting. Endocrine: Negative for polydipsia, polyphagia and polyuria. Genitourinary: Negative for decreased urine volume, difficulty urinating, dysuria, hematuria and urgency. Musculoskeletal: Negative for back pain, gait problem, myalgias and neck pain. Skin: Negative for pallor and rash. Allergic/Immunologic: Negative for environmental allergies and food allergies. Neurological: Negative for dizziness, facial asymmetry, speech difficulty, numbness and headaches. Hematological: Negative for adenopathy. Does not bruise/bleed easily. Psychiatric/Behavioral: Negative for agitation, self-injury and suicidal ideas. The patient is not nervous/anxious. Objective:     Constitutional:       General: She is not in acute distress. HENT:      Head: Normocephalic and atraumatic.      Ears: Patient is unable to hear well, must speak directly into her ears. Eyes:      Extraocular Movements: Extraocular movements intact.      Pupils: Pupils are equal, round, and reactive to light. Cardiovascular:      Rate and Rhythm: Normal rate and regular rhythm.      Pulses: Normal pulses.      Heart sounds:Murmur. Pulmonary:      Effort: Pulmonary effort is normal.      Breath sounds: mild wheezing present. Abdominal:      Palpations: Abdomen is soft.      Tenderness: There is no abdominal tenderness. .   Neurological:      General: No focal deficit present.    Mental Status: She is alert. Psychiatric:         Mood and Affect: Mood normal.     Visit Vitals  BP (!) 170/72 (BP Patient Position: At rest;Lying)   Pulse 65   Temp 97.9 °F (36.6 °C)   Resp 18   Ht 5' 2\" (1.575 m)   Wt 64.4 kg (141 lb 15.6 oz)   SpO2 97%   BMI 25.97 kg/m²    O2 Flow Rate (L/min): 2 l/min O2 Device: Nasal cannula    Temp (24hrs), Av.7 °F (36.5 °C), Min:97.4 °F (36.3 °C), Max:97.9 °F (36.6 °C)      No intake/output data recorded.  1901 -  0700  In: 600 [P.O.:600]  Out: -         Data Review    No results found for this or any previous visit (from the past 24 hour(s)). Assessment/Plan:   NSTEMI (elevated troponin)  Trending down  Troponin : 1.00  BNP previous: 4,201  CXR: nL  EKG: sinus tachy, LBBB  ECHO: LV: Estimated LVEF is 40 - 45%. Normal cavity size, wall thickness and systolic function (ejection fraction normal). Moderate hypokinesis of the mid anteroseptal, apical septal and apical anterior wall(s). Mild (grade 1) left ventricular diastolic dysfunction. · AV: Aortic valve leaflet calcification present without reduced excursion. Mild to moderate aortic valve regurgitation is present. · MV: Mild to moderate mitral valve regurgitation is present.   · TV: Right Ventricular Arterial Pressure (RVSP) is 32 mmHg.     Acute Hypoxemic respiratory distress  Blood gases: pH: 7.44, CO2:35 PO2: 62 Bicarb: 24 O2 sat: 94 RA  COPD exacerbation  HTN  Hypothyroidism  History of MI  Anemia  Asymptomatic Urinary tract infection  Treated  Leukocytosis  WBC today: 18.9 vs 14.1 prior     Bilateral leg pains  D-dimer 0.93  Doppler studies show no evidence of DVT in L LE or R LE    Plan:    Continue to monitor Leukocytosis  If cleared by the cardiology and pulmonary patient can be discharged home today    With home PT OT

## 2021-09-27 NOTE — PROGRESS NOTES
Problem: Self Care Deficits Care Plan (Adult)  Goal: *Acute Goals and Plan of Care (Insert Text)  Description: Pt will be IND sup <> sit in prep for EOB ADLs  Pt will be SBA grooming standing sink side LRAD  Pt will be min A LE dressing sitting EOB/long sit  Pt will be SBA sit <>  prep for toileting LRAD  Pt will be SBA toileting/toilet transfer/cloth mgmt LRAD  Pt will be IND following UE HEP in prep for self care tasks   Outcome: Progressing Towards Goal   OCCUPATIONAL THERAPY TREATMENT  Patient: Ginny Ramirez (79 y.o. female)  Date: 9/27/2021  Diagnosis: SOB (shortness of breath) [R06.02]  COPD exacerbation ([facility]) [J44.1]  NSTEMI (non-ST elevated myocardial infarction) ([facility]) [I21.4]  COPD (chronic obstructive pulmonary disease) ([facility] [address] [J44.9] <principal problem not specified>       Precautions: Fall  Chart, occupational therapy assessment, plan of care, and goals were reviewed. ASSESSMENT  Patient continues with skilled OT services and is progressing towards goals. Patient received semi supine in bed upon COUCH'S arrival on room air and agreeable to work with therapist.  Patient requires SBA for supine to sit EOB and scooting. STS and bed ->toilet tf->bathroom sink->bed using Rw/gait belt with SBA and vc's for navigating RW for safety. SBA for toileting hygiene and cloth mgmt using grab bar. Standing at sink to wash hands with SBA with no LOB. Patient required SBA sit to supine with increased time 2/2 decreased activity tolerance. Patient resting comfortably with caregiver. Patient would benefit from continued skilled Occupational services while at [facility] in order to increase safety and independence with self care and functional tranfers/mobility. Recommend at discharge to [address] [place] [address] with 24/7 family care when medically appropriate.      Current Level of Function Impacting Discharge (ADLs): SBA for grooming and toileting    Other factors to consider for discharge: Time of onset, medical prognosis/diagnosis, severity of deficits, PLOF, home environment, and family support          PLAN :  Patient continues to benefit from skilled intervention to address the above impairments. Continue treatment per established plan of care. to address goals. Recommendation for discharge: (in order for the patient to meet his/her long term goals)  [facility] with 24/7 family care    This discharge recommendation:  Has been made in collaboration with the attending provider and/or case management    IF patient discharges home will need the following DME: pt has DME       SUBJECTIVE:   Patient stated I could use the bathroom    OBJECTIVE DATA SUMMARY:   Cognitive/Behavioral Status:  Neurologic State: Alert  Orientation Level: Oriented to person;Oriented to place;Oriented to time  Cognition: Follows commands             Functional Mobility and Transfers for ADLs:  Bed Mobility:  Supine to Sit: Stand-by assistance  Sit to Supine: Stand-by assistance  Scooting: Stand-by assistance    Transfers:  Sit to Stand: Stand-by assistance  Functional Transfers  Bathroom Mobility: Stand-by assistance  Toilet Transfer : Stand-by assistance       Balance:  Sitting: Intact; With support  Standing: Impaired; With support  Standing - Static: Good;Constant support  Standing - Dynamic : Fair;Constant support    ADL Intervention:       Grooming  Grooming Assistance: Stand-by assistance  Position Performed: Standing  Washing Hands: Stand-by assistance       Toileting  Toileting Assistance: Stand-by assistance  Bladder Hygiene: Stand-by assistance  Clothing Management: Stand-by assistance         Pain:  0/10    Activity Tolerance:   Fair and SpO2 stable on RA  Please refer to the flowsheet for vital signs taken during this treatment.     After treatment patient left in no apparent distress:   Supine in bed, Call bell within reach, Bed / chair alarm activated, Caregiver / family present, and Side rails x 3    COMMUNICATION/COLLABORATION:   The patients plan of care was discussed with: Registered nurse.      KHOA Kearney  Time Calculation: 26 mins

## 2021-09-27 NOTE — PROGRESS NOTES
Cardiology Progress Note      9/27/2021 1:49 PM    Admit Date: 9/20/2021      Subjective:     Patient seen and examined. She remains chest pain-free. Blood pressure slightly elevated. Visit Vitals  BP (!) 170/72 (BP Patient Position: At rest;Lying)   Pulse 65   Temp 97.9 °F (36.6 °C)   Resp 18   Ht 5' 2\" (1.575 m)   Wt 64.4 kg (141 lb 15.6 oz)   SpO2 97%   BMI 25.97 kg/m²     Current Facility-Administered Medications   Medication Dose Route Frequency    [START ON 9/28/2021] metoprolol succinate (TOPROL-XL) XL tablet 50 mg  50 mg Oral DAILY    apixaban (ELIQUIS) tablet 2.5 mg  2.5 mg Oral BID    predniSONE (DELTASONE) tablet 10 mg  10 mg Oral DAILY WITH BREAKFAST    albuterol-ipratropium (DUO-NEB) 2.5 MG-0.5 MG/3 ML  3 mL Nebulization Q6H PRN    heparin (porcine) 1,000 unit/mL injection 4,000 Units  4,000 Units IntraVENous PRN    Or    heparin (porcine) 1,000 unit/mL injection 2,000 Units  2,000 Units IntraVENous PRN    melatonin tablet 5 mg  5 mg Oral QHS    budesonide-formoteroL (SYMBICORT) 160-4.5 mcg/actuation HFA inhaler 2 Puff  2 Puff Inhalation BID RT    aspirin chewable tablet 81 mg  81 mg Oral DAILY    ALPRAZolam (XANAX) tablet 0.25 mg  0.25 mg Oral BID    folic acid (FOLVITE) tablet 1 mg  1 mg Oral DAILY    levothyroxine (SYNTHROID) tablet 100 mcg  100 mcg Oral DAILY    acetaminophen (TYLENOL) tablet 650 mg  650 mg Oral Q6H PRN    Or    acetaminophen (TYLENOL) suppository 650 mg  650 mg Rectal Q6H PRN    polyethylene glycol (MIRALAX) packet 17 g  17 g Oral DAILY PRN    ondansetron (ZOFRAN ODT) tablet 4 mg  4 mg Oral Q8H PRN    Or    ondansetron (ZOFRAN) injection 4 mg  4 mg IntraVENous Q6H PRN    atorvastatin (LIPITOR) tablet 40 mg  40 mg Oral QHS         Objective:      Physical Exam:  Neck: no JVD  Heart: S1, S2  Lungs: dminished sounds b/l  Abdomen: soft, non-tender.  Bowel sounds normal. No masses,  no organomegaly  Extremities: extremities normal, atraumatic, no cyanosis or edema  Pulses: 2+ and symmetric  Neurologic: Grossly normal    Data Review:   Labs:    No results found for this or any previous visit (from the past 24 hour(s)). Assessment:   NSTEMI  Acute on COPD  Atrial Flutter  Hypertension  Moderate AI      Plan:   -Heart rate is better controlled continue Lopressor for now. Transition to Toprol-XL 75 mg p.o. daily upon discharge. Titrate as hemodynamically tolerated to optimize heart rate control  -[id number] is about 5 and she will need to be on anticoagulation for stroke prevention. She would be high risk for bleeding on triple therapy. Patient was on heparin infusion for 48 hours she will now be on aspirin 81 mg by mouth daily and then increased to 25 mg by mouth twice a day. -She has remained chest pain-free, has had no malignant arrhythmias, and troponins have trended down. She also has a history of penetrating aortic ulcer. Given her advanced age, comorbidities and the above, will manage conservatively with no plans for invasive evaluation. Will observe in the hospital for 1 more day; will add low-dose Imdur for angina as hemodynamically tolerated. -She is stable for discharge from the cardiac standpoint; will need close outpatient followup. Will f/u in the office in 1-2 weeks.

## 2021-09-27 NOTE — PROGRESS NOTES
Hospitalist Progress Note    Subjective:   Daily Progress Note: 9/27/2021 10:26 AM  Otilia Harper is a 80 y. o. white female with a history of COPD, HTN, hypothyroid who presents with SOB x 1 day. Patient notes that the onset was sudden. She endorses having dizziness. Patient lives alone, with daughter nearby. She explains that she called her daughter when she began having the SOB. EMS services were then contacted. On arrival it was noted that the patient was tripoding. She was given IV magnesium and placed on a non rebreathe. Patient denies fever, chills, chest pain, or abdominal pain. Today, Patient is lying in bed with NC in on no flow, in no acute distress. Patient denies any chest pain, dizziness, palpitations. She denies any increased SOB. Patient was concerned about what was on her breakfast try. No further complaints at this time.      Current Facility-Administered Medications   Medication Dose Route Frequency    apixaban (ELIQUIS) tablet 2.5 mg  2.5 mg Oral BID    predniSONE (DELTASONE) tablet 10 mg  10 mg Oral DAILY WITH BREAKFAST    albuterol-ipratropium (DUO-NEB) 2.5 MG-0.5 MG/3 ML  3 mL Nebulization Q6H PRN    heparin (porcine) 1,000 unit/mL injection 4,000 Units  4,000 Units IntraVENous PRN    Or    heparin (porcine) 1,000 unit/mL injection 2,000 Units  2,000 Units IntraVENous PRN    melatonin tablet 5 mg  5 mg Oral QHS    metoprolol tartrate (LOPRESSOR) tablet 25 mg  25 mg Oral BID    budesonide-formoteroL (SYMBICORT) 160-4.5 mcg/actuation HFA inhaler 2 Puff  2 Puff Inhalation BID RT    aspirin chewable tablet 81 mg  81 mg Oral DAILY    ALPRAZolam (XANAX) tablet 0.25 mg  0.25 mg Oral BID    folic acid (FOLVITE) tablet 1 mg  1 mg Oral DAILY    levothyroxine (SYNTHROID) tablet 100 mcg  100 mcg Oral DAILY    acetaminophen (TYLENOL) tablet 650 mg  650 mg Oral Q6H PRN    Or    acetaminophen (TYLENOL) suppository 650 mg  650 mg Rectal Q6H PRN    polyethylene glycol (MIRALAX) packet 17 g  17 g Oral DAILY PRN    ondansetron (ZOFRAN ODT) tablet 4 mg  4 mg Oral Q8H PRN    Or    ondansetron (ZOFRAN) injection 4 mg  4 mg IntraVENous Q6H PRN    atorvastatin (LIPITOR) tablet 40 mg  40 mg Oral QHS        Review of Systems  Constitutional: Negative for chills, fatigue and fever. HENT: Negative for congestion, sinus pressure and trouble swallowing.    Eyes: Negative for photophobia and pain. Respiratory: Negative   Cardiovascular: Negative for chest pain and leg swelling. Gastrointestinal: Negative for abdominal pain, diarrhea, nausea and vomiting. Endocrine: Negative for polydipsia, polyphagia and polyuria. Genitourinary: Negative for decreased urine volume, difficulty urinating, dysuria, hematuria and urgency. Musculoskeletal: Negative for back pain, gait problem, myalgias and neck pain. Skin: Negative for pallor and rash. Allergic/Immunologic: Negative for environmental allergies and food allergies. Neurological: Negative for dizziness, facial asymmetry, speech difficulty, numbness and headaches. Hematological: Negative for adenopathy. Does not bruise/bleed easily. Psychiatric/Behavioral: Negative for agitation, self-injury and suicidal ideas. The patient is not nervous/anxious. Objective:     Constitutional:       General: She is not in acute distress. HENT:      Head: Normocephalic and atraumatic.      Ears: Patient is unable to hear well, must speak directly into her ears. Eyes:      Extraocular Movements: Extraocular movements intact.      Pupils: Pupils are equal, round, and reactive to light. Cardiovascular:      Rate and Rhythm: Normal rate and regular rhythm.      Pulses: Normal pulses.      Heart sounds:Murmur. Pulmonary:      Effort: Pulmonary effort is normal.      Breath sounds: mild wheezing present. Abdominal:      Palpations: Abdomen is soft.      Tenderness: There is no abdominal tenderness. .   Neurological:      General: No focal deficit present.    Mental Status: She is alert. Psychiatric:         Mood and Affect: Mood normal.     Visit Vitals  BP (!) 170/72 (BP Patient Position: At rest;Lying)   Pulse 65   Temp 97.9 °F (36.6 °C)   Resp 18   Ht 5' 2\" (1.575 m)   Wt 141 lb 15.6 oz (64.4 kg)   SpO2 97%   BMI 25.97 kg/m²    O2 Flow Rate (L/min): 2 l/min O2 Device: Nasal cannula    Temp (24hrs), Av.7 °F (36.5 °C), Min:97.4 °F (36.3 °C), Max:98 °F (36.7 °C)      No intake/output data recorded.  1901 -  0700  In: 600 [P.O.:600]  Out: -         Data Review    No results found for this or any previous visit (from the past 24 hour(s)). Assessment/Plan:   NSTEMI (elevated troponin)  Trending down  Troponin : 1.00  BNP previous: 4,201  CXR: nL  EKG: sinus tachy, LBBB  ECHO: LV: Estimated LVEF is 40 - 45%. Normal cavity size, wall thickness and systolic function (ejection fraction normal). Moderate hypokinesis of the mid anteroseptal, apical septal and apical anterior wall(s). Mild (grade 1) left ventricular diastolic dysfunction. · AV: Aortic valve leaflet calcification present without reduced excursion. Mild to moderate aortic valve regurgitation is present. · MV: Mild to moderate mitral valve regurgitation is present. · TV: Right Ventricular Arterial Pressure (RVSP) is 32 mmHg.     Acute Hypoxemic respiratory distress  Blood gases: pH: 7.44, CO2:35 PO2: 62 Bicarb: 24 O2 sat: 94 RA  COPD exacerbation  HTN  Hypothyroidism  History of MI  Anemia  Asymptomatic Urinary tract infection  Treated  Leukocytosis  WBC today: 18.9 vs 14.1 prior     Bilateral leg pains  D-dimer 0.93  Doppler studies show no evidence of DVT in L LE or R LE    Plan:    Continue to monitor Leukocytosis    Per cardiology: Continue Lopressor. Transition to Toprol- XL 75 mg upon d/c. Continue aspirin 81mg Po and Eliquis 2.5 mg BID. Given her advanced age, comorbidities and the above, will manage conservatively with no plans for invasive evaluation.   Will observe in the hospital for 1 more day; will add low-dose Imdur for angina as hemodynamically tolerated. Per PT: discharge home with family care    Per pulm: Continue with current breathing management. Wean off oxygen as tolerated.     Continue    Lipitor 40 mg PO QHS  duo-neb 2.5 mg- 0.5 mg/ 3ml neb Q6H PRN  symbicort 160-4.5 mcg 2 puff BID  synthroid tablet 100 mcg PO every day  Prednisone 10 mg PO every day   folvite tablet 1 mg PO every day  Xanax tablet 0.25 mg PO BID  melatonin tablet PO 5 mg QHS  Lopressor 25 mg PO BID  Eliquis 2.5 mg PO BID

## 2021-09-27 NOTE — DISCHARGE INSTRUCTIONS
Discharge Instructions       PATIENT ID: Derek Gee  MRN: [id number]   YOB: 1930    DATE OF ADMISSION: 9/20/2021  5:36 PM    DATE OF DISCHARGE: 9/27/2021    PRIMARY CARE PROVIDER: None     ATTENDING PHYSICIAN: Nic Benjamin MD  DISCHARGING PROVIDER: Maurice Silva MD    To contact this individual call [phone] and ask the  to page. If unavailable ask to be transferred the Adult Hospitalist Department. DISCHARGE DIAGNOSES non-STEMI COPD    CONSULTATIONS: IP CONSULT TO CARDIOLOGY  IP CONSULT TO PULMONOLOGY    PROCEDURES/SURGERIES: * No surgery found *    PENDING TEST RESULTS:   At the time of discharge the following test results are still pending: Cardiac and pulmonary clearance    FOLLOW UP APPOINTMENTS:   Follow-up Information     Follow up With Specialties Details Why Devon Rodríguez MD Family Medicine In 1 week  [address] [facility] [address] [phone]             ADDITIONAL CARE RECOMMENDATIONS: Home PT OT    DIET: Cardiac Diet      ACTIVITY: Activity as tolerated    Wound care: Wound Care Order: submitted to [facility] Please view [email or web address]    EQUIPMENT needed: None      DISCHARGE MEDICATIONS:   See Medication Reconciliation Form    · It is important that you take the medication exactly as they are prescribed. · Keep your medication in the bottles provided by the pharmacist and keep a list of the medication names, dosages, and times to be taken in your wallet. · Do not take other medications without consulting your doctor. NOTIFY YOUR PHYSICIAN FOR ANY OF THE FOLLOWING:   Fever over 101 degrees for 24 hours. Chest pain, shortness of breath, fever, chills, nausea, vomiting, diarrhea, change in mentation, falling, weakness, bleeding. Severe pain or pain not relieved by medications. Or, any other signs or symptoms that you may have questions about.       DISPOSITION:    Home With:   OT  PT  [facility] RN SNF/Inpatient Rehab/LTAC    Independent/assisted living    Hospice    Other:         PROBLEM LIST Updated:  Yes  yes       Signed:   Lin Bamberger, MD  9/27/2021  11:09 AM

## 2021-09-27 NOTE — PROGRESS NOTES
Cannula has been left in patient's nose without oxygen going to it in order to keep patient from becoming anxious about not having o2, family aware.

## 2021-09-27 NOTE — PROGRESS NOTES
1445 pm  Patient accepted by [facility]ama by [facility]. Anticipated start of Care 9/29/21. Patient ok for discharge per case management. 1235 pm  CM received choice for home health from daughter Sharif Roy. Referral sent via [facility]. 1135 am  CM spoke to daughter Sharif Roy regarding discharge order for her mom. Daughter states her mom is able to discharge home via personal vehicle and she would send patient's caregiver to pick her up. Daughter asked that nurse call her at [phone] to go over discharge instructions with the daughter. Daughter also concerned that she has not heard from physician in several days for an update. I asked if she wanted to speak to the primary or pulmonary and daughter requested Dr. Mora Araiza call her for follow up on her fluid in her chest. Message sent to Dr. Mora Araiza to call daughter. Dr. Mora Araiza also gave approval for patient to discharge home.

## 2021-09-27 NOTE — PROGRESS NOTES
Daughter Nuvia Mendez called to request patient not receive comfort o2 or have Nasal cannula in pt nose without flow. Daughter says pt is going to become more anxious at home and have \"anxiety attacks\" because she is not going to have the cannula at home.

## 2021-09-27 NOTE — PROGRESS NOTES
Comprehensive Nutrition Assessment    Type and Reason for Visit: RD nutrition re-screen/LOS    Nutrition Recommendations/Plan:   Downgrade to soft and bite sized, 2gm Na    Rec'd SLP jacqueal    Recommend regular bowel regimen    Document intakes and BM in I/O's    Nutrition Assessment:  Admitted for SOB. Was on 2L NC, now on RA. Pt stable for d/c, awaiting authorization to Healthcare facility. RD spoke with pt caregiver bedside, who reports pt ate fish and mashed potatoes that she brought in from a restaurant. RD also visualized pt consumed ~65% of B tray. Caregiver concerned about c/s issues. RD to adjust diet texture. No immediate need for ONS, as suboptimal intakes appear to be from chewing issues. Labs: BUN 23, Ca 7.9. Meds: xanax, statin, folic acid, miralax. Malnutrition Assessment:  Malnutrition Status:  Mild malnutrition    Context:  Acute illness     Findings of the 6 clinical characteristics of malnutrition:   Energy Intake:  1 - 75% or less of est energy req for 7 or more days  Weight Loss:  No significant weight loss     Body Fat Loss:  1 - Mild body fat loss, Orbital   Muscle Mass Loss:  1 - Mild muscle mass loss, Temples (temporalis)  Fluid Accumulation:  No significant fluid accumulation,      Nutrition Related Findings:  NFPE with mild wasting. Denies N/V/D. No BM documented in 5 days. Pt prescribed miralax PRN. No top teeth, caregiver endorses c/s issues. No edema. Wounds:    None       Current Nutrition Therapies:  ADULT DIET Dysphagia - Soft & Bite Sized; Low Fat/Low Chol/High Fiber/2 gm Na    Anthropometric Measures:  · Height:  5' 2\" (157.5 cm)  · Current Body Wt:  64.4 kg (141 lb 15.6 oz)   · Ideal Body Wt:  110 lbs:  129.1 %   · BMI Category: Overweight (BMI 25.0-29. 9)       Nutrition Diagnosis:   · Inadequate oral intake related to biting/chewing (masticatory) difficulty as evidenced by poor dentition    Nutrition Interventions:   Food and/or Nutrient Delivery: Modify current diet  Nutrition Education and Counseling: No recommendations at this time  Coordination of Nutrition Care: Continue to monitor while inpatient, Speech therapy    Goals:  Pt to meet >75% of EEN within 5 days.        Nutrition Monitoring and Evaluation:   Behavioral-Environmental Outcomes: None identified  Food/Nutrient Intake Outcomes: Food and nutrient intake  Physical Signs/Symptoms Outcomes: Chewing or swallowing, Constipation    Discharge Planning:    No discharge needs at this time     Electronically signed by Phyllis Ridley RD on 9/27/2021 at 3:14 PM    Contact: [phone]

## 2021-09-27 NOTE — PROGRESS NOTES
VSS. Patient given discharge instructions. Follow up appointments discussed. Rx sent to pharmacy. Patient, family, provider, and primary RN aware of discharge. Patient transferred outside in wheelchair.

## 2021-09-27 NOTE — PROGRESS NOTES
PULMONARY NOTE  [facility]    Name: Chandrakant Young MRN: [id number]   : 1930 Hospital: [address] [place] [address]   Date: 2021  Admission date: 2021 Hospital Day: 8       HPI:     Hospital Problems  Date Reviewed: 2020        Codes Class Noted POA    SOB (shortness of breath) ICD-10-CM: R06.02  ICD-9-CM: 786.05  2021 Unknown        COPD exacerbation ([facility] ICD-10-CM: J44.1  ICD-9-CM: 491.21  2021 Unknown        COPD (chronic obstructive pulmonary disease) ([facility] ICD-10-CM: J44.9  ICD-9-CM: 606  2021 Unknown        NSTEMI (non-ST elevated myocardial infarction) [facility]) ICD-10-CM: I21.4  ICD-9-CM: 410.70  2021 Unknown                   [x] High complexity decision making was performed  [x] See my orders for details      Subjective/Initial History:     I was asked by Lexi Pina MD to see Chandrakant Young  a 80 y.o.  female in consultation     Excerpts from admission 2021 or consult notes as follows:   80 y.o. female with PMH significant for COPD, HTN, diabetes, and hypothyroidism who presented to the ED via EMS with chief complaint of SOB. Patient developed onset of moderate SOB sometime earlier today without any relieving/palliative factors. Patient additionally mentioned some left-sided chest pain. Upon EMS arrival, patient was tripoding -- he received IV Magnesium and placed on NRB. At the time, patient denied any fever, chills, abdominal pain, nausea/vomiting, or diarrhea. Labs in the ED were significant for troponin 0.19 and Pro-. CXR revealed no acute findings. ABG showed pO2 62. Patient was admitted to [address] [place] [address] for further evaluation and Pulmonology was consulted regarding additional management.   Patient is very hard of hearing and somewhat of a poor historian.        ---------------------------------------------------------------------------------------------------------  PROGRESS NOTE (2021 8:41 AM)    Patient went into A-Flutter w/RVR yesterday afternoon (~13:20) and received IV Metoprolol 5 mg. I spoke with the patient today, who appears to be doing better and does not complain of any palpitations or chest pain at this time. She continues to do well on 1 L/min NC O2 and does not appear dyspneic. No new or worsening complaints at this time. No Known Allergies     MAR reviewed and pertinent medications noted or modified as needed     Current Facility-Administered Medications   Medication    apixaban (ELIQUIS) tablet 2.5 mg    predniSONE (DELTASONE) tablet 10 mg    albuterol-ipratropium (DUO-NEB) 2.5 MG-0.5 MG/3 ML    heparin (porcine) 1,000 unit/mL injection 4,000 Units    Or    heparin (porcine) 1,000 unit/mL injection 2,000 Units    melatonin tablet 5 mg    metoprolol tartrate (LOPRESSOR) tablet 25 mg    budesonide-formoteroL (SYMBICORT) 160-4.5 mcg/actuation HFA inhaler 2 Puff    aspirin chewable tablet 81 mg    ALPRAZolam (XANAX) tablet 5.42 mg    folic acid (FOLVITE) tablet 1 mg    levothyroxine (SYNTHROID) tablet 100 mcg    acetaminophen (TYLENOL) tablet 650 mg    Or    acetaminophen (TYLENOL) suppository 650 mg    polyethylene glycol (MIRALAX) packet 17 g    ondansetron (ZOFRAN ODT) tablet 4 mg    Or    ondansetron (ZOFRAN) injection 4 mg    atorvastatin (LIPITOR) tablet 40 mg      Patient PCP: None  PMH:  has a past medical history of Anemia, Chronic obstructive pulmonary disease ([address] Heart attack ([facility] Hypertension, and Thyroid disease. PSH:   has a past surgical history that includes hx orthopaedic (2018) and hx pacemaker. FHX: family history includes Heart Disease in her father and mother; Stroke in her father and mother. SHX:  reports that she has never smoked. She has never used smokeless tobacco. She reports that she does not drink alcohol and does not use drugs. ROS:  Constitutional: Negative for chills and fever. Eyes: Negative for discharge. Respiratory: Positive for shortness of breath. Cardiovascular: Positive for chest pain. Gastrointestinal: Negative for abdominal pain, nausea and vomiting. Skin: Negative for rash. Neurological: Negative for headaches. Objective:     Vital Signs: Telemetry:    normal sinus rhythm Intake/Output:   Visit Vitals  BP (!) 170/72 (BP Patient Position: At rest;Lying)   Pulse 65   Temp 97.9 °F (36.6 °C)   Resp 18   Ht 5' 2\" (1.575 m)   Wt 64.4 kg (141 lb 15.6 oz)   SpO2 97%   BMI 25.97 kg/m²       Temp (24hrs), Av.7 °F (36.5 °C), Min:97.4 °F (36.3 °C), Max:98 °F (36.7 °C)        O2 Device: Nasal cannula O2 Flow Rate (L/min): 2 l/min       Wt Readings from Last 4 Encounters:   21 64.4 kg (141 lb 15.6 oz)   21 68 kg (150 lb)   21 68 kg (150 lb)   20 68 kg (150 lb)          Intake/Output Summary (Last 24 hours) at 2021 0944  Last data filed at 2021 1357  Gross per 24 hour   Intake 360 ml   Output --   Net 360 ml       Last shift:      No intake/output data recorded. Last 3 shifts:  190 -  0700  In: 600 [P.O.:600]  Out: -        Physical Exam:   Constitutional:       General: She is not in acute distress. Comments: Elderly   HENT:      Head: Normocephalic and atraumatic. Ears:      Comments: Hard of hearing. Eyes:      Extraocular Movements: Extraocular movements intact. Pupils: Pupils are equal, round, and reactive to light. Cardiovascular:      Rate and Rhythm: Normal rate and regular rhythm. Pulses: Normal pulses. Heart sounds: Murmur (2/6 systolic murmur, LUSB) heard. Pulmonary:      Effort: Pulmonary effort is normal.      Breath sounds: coarse breath sounds anteriorly. Abdominal:      Palpations: Abdomen is soft. Tenderness: There is no abdominal tenderness. Musculoskeletal:         General: Normal range of motion. Cervical back: Normal range of motion. Skin:     Coloration: Skin is not jaundiced.    Neurological: General: No focal deficit present. Mental Status: She is alert. Psychiatric:         Mood and Affect: Mood normal.       Labs:    Recent Labs     09/26/21  0720   WBC 18.9*   HGB 12.8        Recent Labs     09/26/21  0720      K 4.3      CO2 25      BUN 23*   CREA 0.90   CA 7.9*   ALB 2.6*   ALT 36     No results for input(s): PH, PCO2, PO2, HCO3, FIO2 in the last 72 hours. No results for input(s): CPK, CKNDX, TROIQ in the last 72 hours. No lab exists for component: CPKMB  No results found for: BNPP, BNP   No results found for: CULTNo results found for: TSH, TSHEXT, TSHEXT    Imaging:    CXR Results  (Last 48 hours)    None        Results from Hospital Encounter encounter on 09/20/21    XR CHEST PORT    Narrative  History: Evidence of breath    A single view of the chest was obtained. Heart size is stable. There is some  atherosclerotic change of the aorta. Lungs are clear. No effusions or  pneumothorax. Bony structures are within normal limits. Impression  Normal findings. Results from Hospital Encounter encounter on 06/02/21    XR CHEST PORT    Narrative  The study is a single view chest radiographic examination dated 6/2/2021. HISTORY: Shortness of breath. COMPARISON:  1/24/2021. FINDINGS: The heart size is normal. The pulmonary vessels are normal in caliber. The lung parenchyma is clear without an infiltrate or atelectasis. There is a  mild degree of chronic baseline peribronchial cuffing. The costophrenic angles  are maintained without pleural effusions or a pneumothorax. There is a midline  position to the trachea. There are atherosclerotic vascular changes of the  thoracic aorta. The pulmonary bia are symmetrical.    The bony thorax is intact without fractures/acute osseous abnormalities. Impression  1. There are moderate atherosclerotic vascular changes of the thoracic aorta.   The patient may also have coronary artery atherosclerotic vascular changes which  could be a source for chest pain. 2.  This examination is negative for acute pulmonary parenchymal pathology. There is a mild degree of chronic baseline peribronchial cuffing without change. Results from [place] encounter on 01/24/21    XR CHEST PORT    Narrative  Portable chest, AP upright, 1546 hours. Comparison with 11/19/2019. Stable heart size. Calcified thoracic aorta. The lungs appear clear. No evidence  of pulmonary edema, air space pneumonia, or pleural effusion. No evidence of  pneumothorax. Impression  No evidence of focal airspace pneumonia. Results from [place] encounter on 01/24/21    CT CHEST W CONT    Addendum 2/25/2021 12:51 PM  Addendum: CT dose reduction was achieved through use of a standardized protocol  tailored for this examination and automatic exposure control for dose  modulation. Narrative  Contrast study was cc [id number]    Very mild subpleural fibrosis with otherwise completely clear lungs and patent  central airways. Pulmonary arteries are well-opacified and show no filling defect or distortion. Aorta shows normal dimensions, without dissection. Moderate atherosclerosis with  penetrating ulcer off the left side of the arch. 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 The aortic maximum diameter at this level is 3.3 cm. Patient age noted. No  mediastinal or hilar adenopathy. Coronary calcification. No cardiac finding. No pleural pericardial effusion. No  significant upper abdominal finding    Impression  Clear lungs. No evidence of PE. 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 IMPRESSION:     1.  Acute on chronic respiratory failure with hypoxia  · ABG (9/20): Room Air // pH 7.44 // pCO2 35 // pO2 62 // Bicarb 24 // SpO2 94%  · Venous Duplex (9/21): negative for DVT  2. Asthma  · CXR (9/20): normal findings. 3. NSTEMI, likely type 2.  4. Atrial flutter  5. Acute on chronic diastolic CHF  · Echo (0/50): LVEF 40-45%, mild (grade 1) LV diastolic dysfunction, moderate AV leaflet calcification, mild-to-moderate AV & MV regurgitation. RVSP 32 mm Hg. · Pro-BNP 4201  6. H/o MI and s/p pacemaker  7. H/o anemia  8. H/o HTN  9. H/o Hypothyroidism  10. Diabetes Mellitus  11. Hypoalbuminemia      RECOMMENDATIONS/PLAN:     1. Currently doing well on 1 L/min NC O2. Will wean off of oxygen as tolerated. 2. Atrial flutter patient was put on IV heparin which has been transition to Eliquis  3.  Current Breathing Management  · DUO-NEB  · SYMBICORT  · On prednisone  Pulse oximetry on room air and ambulation no desaturation         Lissette Betts MD

## 2022-01-04 ENCOUNTER — HOSPITAL ENCOUNTER (INPATIENT)
Age: 87
LOS: 6 days | Discharge: HOME | DRG: 444 | End: 2022-01-10
Attending: EMERGENCY MEDICINE | Admitting: FAMILY MEDICINE
Payer: MEDICARE

## 2022-01-04 ENCOUNTER — APPOINTMENT (OUTPATIENT)
Dept: CT IMAGING | Age: 87
DRG: 444 | End: 2022-01-04
Attending: EMERGENCY MEDICINE
Payer: MEDICARE

## 2022-01-04 ENCOUNTER — APPOINTMENT (OUTPATIENT)
Dept: GENERAL RADIOLOGY | Age: 87
DRG: 444 | End: 2022-01-04
Attending: EMERGENCY MEDICINE
Payer: MEDICARE

## 2022-01-04 DIAGNOSIS — R53.83 LETHARGY: Primary | ICD-10-CM

## 2022-01-04 PROBLEM — R41.82 AMS (ALTERED MENTAL STATUS): Status: ACTIVE | Noted: 2022-01-04

## 2022-01-04 LAB
ALBUMIN SERPL-MCNC: 2.5 G/DL (ref 3.5–5)
ALBUMIN/GLOB SERPL: 0.9 {RATIO} (ref 1.1–2.2)
ALP SERPL-CCNC: 119 U/L (ref 45–117)
ALT SERPL-CCNC: 29 U/L (ref 12–78)
ANION GAP SERPL CALC-SCNC: 6 MMOL/L (ref 5–15)
APPEARANCE UR: CLEAR
AST SERPL W P-5'-P-CCNC: 10 U/L (ref 15–37)
BACTERIA URNS QL MICRO: NEGATIVE /HPF
BASOPHILS # BLD: 0 K/UL (ref 0–0.1)
BASOPHILS NFR BLD: 0 % (ref 0–1)
BILIRUB SERPL-MCNC: 0.3 MG/DL (ref 0.2–1)
BILIRUB UR QL: NEGATIVE
BUN SERPL-MCNC: 22 MG/DL (ref 6–20)
BUN/CREAT SERPL: 19 (ref 12–20)
CA-I BLD-MCNC: 7.6 MG/DL (ref 8.5–10.1)
CHLORIDE SERPL-SCNC: 115 MMOL/L (ref 97–108)
CK SERPL-CCNC: 50 NG/ML (ref 26–192)
CO2 SERPL-SCNC: 24 MMOL/L (ref 21–32)
COLOR UR: ABNORMAL
COVID-19 RAPID TEST, COVR: NOT DETECTED
CREAT SERPL-MCNC: 1.13 MG/DL (ref 0.55–1.02)
DIFFERENTIAL METHOD BLD: ABNORMAL
EOSINOPHIL # BLD: 0.4 K/UL (ref 0–0.4)
EOSINOPHIL NFR BLD: 4 % (ref 0–7)
ERYTHROCYTE [DISTWIDTH] IN BLOOD BY AUTOMATED COUNT: 12.3 % (ref 11.5–14.5)
FLUAV AG NPH QL IA: NEGATIVE
FLUBV AG NOSE QL IA: NEGATIVE
GLOBULIN SER CALC-MCNC: 2.9 G/DL (ref 2–4)
GLUCOSE BLD STRIP.AUTO-MCNC: 89 MG/DL (ref 65–117)
GLUCOSE SERPL-MCNC: 83 MG/DL (ref 65–100)
GLUCOSE UR STRIP.AUTO-MCNC: NEGATIVE MG/DL
HCT VFR BLD AUTO: 33.7 % (ref 35–47)
HGB BLD-MCNC: 10.9 G/DL (ref 11.5–16)
HGB UR QL STRIP: NEGATIVE
HYALINE CASTS URNS QL MICRO: ABNORMAL /LPF (ref 0–5)
IMM GRANULOCYTES # BLD AUTO: 0 K/UL (ref 0–0.04)
IMM GRANULOCYTES NFR BLD AUTO: 0 % (ref 0–0.5)
KETONES UR QL STRIP.AUTO: NEGATIVE MG/DL
LEUKOCYTE ESTERASE UR QL STRIP.AUTO: NEGATIVE
LYMPHOCYTES # BLD: 2.3 K/UL (ref 0.8–3.5)
LYMPHOCYTES NFR BLD: 24 % (ref 12–49)
MCH RBC QN AUTO: 30.8 PG (ref 26–34)
MCHC RBC AUTO-ENTMCNC: 32.3 G/DL (ref 30–36.5)
MCV RBC AUTO: 95.2 FL (ref 80–99)
MONOCYTES # BLD: 1.1 K/UL (ref 0–1)
MONOCYTES NFR BLD: 11 % (ref 5–13)
MUCOUS THREADS URNS QL MICRO: ABNORMAL /LPF
NEUTS SEG # BLD: 6 K/UL (ref 1.8–8)
NEUTS SEG NFR BLD: 61 % (ref 32–75)
NITRITE UR QL STRIP.AUTO: NEGATIVE
NRBC # BLD: 0 K/UL (ref 0–0.01)
NRBC BLD-RTO: 0 PER 100 WBC
PERFORMED BY, TECHID: NORMAL
PH UR STRIP: 5 [PH] (ref 5–8)
PLATELET # BLD AUTO: 305 K/UL (ref 150–400)
PMV BLD AUTO: 11 FL (ref 8.9–12.9)
POTASSIUM SERPL-SCNC: 3.5 MMOL/L (ref 3.5–5.1)
PROT SERPL-MCNC: 5.4 G/DL (ref 6.4–8.2)
PROT UR STRIP-MCNC: NEGATIVE MG/DL
RBC # BLD AUTO: 3.54 M/UL (ref 3.8–5.2)
RBC #/AREA URNS HPF: ABNORMAL /HPF (ref 0–5)
SODIUM SERPL-SCNC: 145 MMOL/L (ref 136–145)
SP GR UR REFRACTOMETRY: 1.01 (ref 1–1.03)
SPECIMEN SOURCE: NORMAL
TROPONIN-HIGH SENSITIVITY: 21 NG/L (ref 0–51)
TROPONIN-HIGH SENSITIVITY: 23 NG/L (ref 0–51)
UROBILINOGEN UR QL STRIP.AUTO: 0.1 EU/DL (ref 0.1–1)
WBC # BLD AUTO: 9.8 K/UL (ref 3.6–11)
WBC URNS QL MICRO: ABNORMAL /HPF (ref 0–4)

## 2022-01-04 PROCEDURE — 70450 CT HEAD/BRAIN W/O DYE: CPT

## 2022-01-04 PROCEDURE — 80053 COMPREHEN METABOLIC PANEL: CPT

## 2022-01-04 PROCEDURE — 74011250637 HC RX REV CODE- 250/637: Performed by: PHYSICIAN ASSISTANT

## 2022-01-04 PROCEDURE — 93005 ELECTROCARDIOGRAM TRACING: CPT

## 2022-01-04 PROCEDURE — 74011250636 HC RX REV CODE- 250/636: Performed by: FAMILY MEDICINE

## 2022-01-04 PROCEDURE — 84484 ASSAY OF TROPONIN QUANT: CPT

## 2022-01-04 PROCEDURE — 82550 ASSAY OF CK (CPK): CPT

## 2022-01-04 PROCEDURE — 87635 SARS-COV-2 COVID-19 AMP PRB: CPT

## 2022-01-04 PROCEDURE — 65270000029 HC RM PRIVATE

## 2022-01-04 PROCEDURE — 74177 CT ABD & PELVIS W/CONTRAST: CPT

## 2022-01-04 PROCEDURE — 81003 URINALYSIS AUTO W/O SCOPE: CPT

## 2022-01-04 PROCEDURE — 87804 INFLUENZA ASSAY W/OPTIC: CPT

## 2022-01-04 PROCEDURE — 99285 EMERGENCY DEPT VISIT HI MDM: CPT

## 2022-01-04 PROCEDURE — 74011250637 HC RX REV CODE- 250/637: Performed by: FAMILY MEDICINE

## 2022-01-04 PROCEDURE — 74011000636 HC RX REV CODE- 636: Performed by: FAMILY MEDICINE

## 2022-01-04 PROCEDURE — 85025 COMPLETE CBC W/AUTO DIFF WBC: CPT

## 2022-01-04 PROCEDURE — 82962 GLUCOSE BLOOD TEST: CPT

## 2022-01-04 PROCEDURE — 71045 X-RAY EXAM CHEST 1 VIEW: CPT

## 2022-01-04 RX ORDER — ONDANSETRON 2 MG/ML
4 INJECTION INTRAMUSCULAR; INTRAVENOUS
Status: DISCONTINUED | OUTPATIENT
Start: 2022-01-04 | End: 2022-01-10 | Stop reason: HOSPADM

## 2022-01-04 RX ORDER — ENOXAPARIN SODIUM 100 MG/ML
30 INJECTION SUBCUTANEOUS DAILY
Status: DISCONTINUED | OUTPATIENT
Start: 2022-01-05 | End: 2022-01-05

## 2022-01-04 RX ORDER — POLYETHYLENE GLYCOL 3350 17 G/17G
17 POWDER, FOR SOLUTION ORAL DAILY PRN
Status: DISCONTINUED | OUTPATIENT
Start: 2022-01-04 | End: 2022-01-10 | Stop reason: HOSPADM

## 2022-01-04 RX ORDER — ONDANSETRON 4 MG/1
4 TABLET, ORALLY DISINTEGRATING ORAL
Status: DISCONTINUED | OUTPATIENT
Start: 2022-01-04 | End: 2022-01-10 | Stop reason: HOSPADM

## 2022-01-04 RX ORDER — ACETAMINOPHEN 325 MG/1
650 TABLET ORAL
Status: DISCONTINUED | OUTPATIENT
Start: 2022-01-04 | End: 2022-01-10 | Stop reason: HOSPADM

## 2022-01-04 RX ORDER — ACETAMINOPHEN 325 MG/1
650 TABLET ORAL
Status: COMPLETED | OUTPATIENT
Start: 2022-01-04 | End: 2022-01-04

## 2022-01-04 RX ORDER — ACETAMINOPHEN 650 MG/1
650 SUPPOSITORY RECTAL
Status: DISCONTINUED | OUTPATIENT
Start: 2022-01-04 | End: 2022-01-10 | Stop reason: HOSPADM

## 2022-01-04 RX ORDER — SODIUM CHLORIDE 9 MG/ML
75 INJECTION, SOLUTION INTRAVENOUS CONTINUOUS
Status: DISCONTINUED | OUTPATIENT
Start: 2022-01-04 | End: 2022-01-10 | Stop reason: HOSPADM

## 2022-01-04 RX ADMIN — ACETAMINOPHEN 650 MG: 325 TABLET ORAL at 13:12

## 2022-01-04 RX ADMIN — IOPAMIDOL 100 ML: 755 INJECTION, SOLUTION INTRAVENOUS at 15:50

## 2022-01-04 RX ADMIN — ACETAMINOPHEN 650 MG: 325 TABLET ORAL at 18:05

## 2022-01-04 RX ADMIN — SODIUM CHLORIDE 75 ML/HR: 9 INJECTION, SOLUTION INTRAVENOUS at 18:05

## 2022-01-04 NOTE — PROGRESS NOTES
Reason for Admission:  Lethargy                      RUR Score:   11%                  Plan for utilizing home health:  None/uses walker. PCP: First and Last name: kaleigh Rhodes     Name of Practice:    Are you a current patient: Yes/No: Yes   Approximate date of last visit: seen 2 yrs ago. Can you participate in a virtual visit with your PCP:                   No  Current Advanced Directive/Advance Care Plan: Prior      Healthcare Decision Maker:              Primary Decision Maker: Mega Mercedes - Daughter - [phone]                  Transition of Care Plan:  D/C Plan is home with prvt sitters 24/7. Daughter to transport home upon discharge.

## 2022-01-04 NOTE — ED TRIAGE NOTES
Patient family called for complaint of back pain. EMS notice patient to have AMS but family states this is her baseline. Answers all questions appropriately.

## 2022-01-04 NOTE — Clinical Note
Status[de-identified] INPATIENT [101]   Type of Bed: Medical [8]   Inpatient Hospitalization Certified Necessary for the Following Reasons: 3.  Patient receiving treatment that can only be provided in an inpatient setting (further clarification in H&P documentation)   Admitting Diagnosis: Lethargy [[id number]]   Admitting Physician: Jazmine Morales [[id number]]   Attending Physician: Jazmine Morales [[id number]]   Estimated Length of Stay: 2 Midnights   Discharge Plan[de-identified] Home with Office Follow-up

## 2022-01-04 NOTE — PROGRESS NOTES
1/4/22. PCP is YOLI Jacobs - seen 2 yrs ago. D/C Plan is home with prvt sitters 24/7. Pt uses walker. No home health. Daughter Nicole Welsh @ [phone]) will transport pt home upon discharge.

## 2022-01-04 NOTE — ACP (ADVANCE CARE PLANNING)
Advance Care Planning   Healthcare Decision Maker:       Primary Decision Maker: Orquidea Arnold - Daughter - [phone]

## 2022-01-04 NOTE — ED PROVIDER NOTES
EMERGENCY DEPARTMENT HISTORY AND PHYSICAL EXAM      Date: 1/4/2022  Patient Name: Milo Michael      History of Presenting Illness     No chief complaint on file. History Provided By: Patient    HPI: Milo Michael, 80 y.o. female with a past medical history significant hypertension and COPD presents to the ED with cc of her mental status. Per EMS caregiver called for patient acting abnormally. Unfortunately patient is unable to contribute to the history. Spoke with patient's daughter. They state that she has been awake for 2 days. She has been crying and complaining of various pains. They are concerned she could have a UTI. She has been acting confused and now has been much more sleepy than normal since this morning. They deny any history of falls or trauma. States that patient has frequent ED visits for    There are no other complaints, changes, or physical findings at this time. PCP: None    Current Outpatient Medications   Medication Sig Dispense Refill    albuterol-ipratropium (DUO-NEB) 2.5 mg-0.5 mg/3 ml nebu 3 mL by Nebulization route every six (6) hours as needed for Wheezing. 30 Nebule 0    apixaban (ELIQUIS) 2.5 mg tablet Take 1 Tablet by mouth two (2) times a day. Indications: treatment to prevent blood clots in chronic atrial fibrillation 60 Tablet 0    aspirin 81 mg chewable tablet Take 1 Tablet by mouth daily. 30 Tablet 0    atorvastatin (LIPITOR) 40 mg tablet Take 1 Tablet by mouth nightly. 60 Tablet 0    budesonide-formoteroL (SYMBICORT) 160-4.5 mcg/actuation HFAA Take 2 Puffs by inhalation two (2) times a day. 10.2 g 1    predniSONE (DELTASONE) 10 mg tablet Take 10 mg by mouth daily (with breakfast). 10 Tablet 0    metoprolol succinate (TOPROL-XL) 50 mg XL tablet Take 1 Tablet by mouth daily. 50 Tablet 0    albuterol (PROVENTIL VENTOLIN) 2.5 mg /3 mL (0.083 %) nebu Take 3 mL by inhalation every six (6) hours as needed.       folic acid (FOLVITE) 1 mg tablet TAKE 1 TABLET BY MOUTH EVERY DAY      levothyroxine (SYNTHROID) 100 mcg tablet TAKE 1 TABLET BY MOUTH EVERY DAY      ALPRAZolam (XANAX) 0.25 mg tablet TAKE 1 TABLET BY MOUTH EVERY 12 HOURS AS NEEDED         Past History     Past Medical History:  Past Medical History:   Diagnosis Date    Anemia     [facility] [address]     Heart attack ([facility] [address]     Hypertension     Thyroid disease     hypothyroidism       Past Surgical History:  Past Surgical History:   Procedure Laterality Date    HX ORTHOPAEDIC  2018    total hip replacement    HX PACEMAKER         Family History:  Family History   Problem Relation Age of Onset    Stroke Mother     Heart Disease Mother     Stroke Father     Heart Disease Father        Social History:  Social History     Tobacco Use    Smoking status: Never Smoker    Smokeless tobacco: Never Used   Substance Use Topics    Alcohol use: Never    Drug use: Never       Allergies:  No Known Allergies      Review of Systems     Review of Systems   Unable to perform ROS: Mental status change       Physical Exam     Physical Exam  Vitals and nursing note reviewed. Constitutional:       General: She is not in acute distress. Appearance: Normal appearance. She is normal weight. She is not toxic-appearing. HENT:      Head: Normocephalic and atraumatic. Nose: Nose normal.      Mouth/Throat:      Mouth: Mucous membranes are moist.   Eyes:      Conjunctiva/sclera: Conjunctivae normal.   Cardiovascular:      Rate and Rhythm: Normal rate. Pulses: Normal pulses. Pulmonary:      Effort: Pulmonary effort is normal. No respiratory distress. Abdominal:      Tenderness: There is no abdominal tenderness (Mild epigastric). There is no guarding or rebound. Musculoskeletal:         General: No swelling or deformity. Normal range of motion. Cervical back: No tenderness. Skin:     General: Skin is warm and dry. Findings: No rash.    Neurological:      General: No focal deficit present. Sensory: No sensory deficit. Motor: No weakness. Comments: Lethargic but arousable to voice   Psychiatric:         Mood and Affect: Mood normal.         Behavior: Behavior normal.         Lab and Diagnostic Study Results     Labs -     Recent Results (from the past 12 hour(s))   INFLUENZA A & B AG (RAPID TEST)    Collection Time: 01/04/22  8:30 AM   Result Value Ref Range    Influenza A Antigen Negative Negative      Influenza B Antigen Negative Negative     COVID-19 RAPID TEST    Collection Time: 01/04/22  8:45 AM   Result Value Ref Range    Specimen source Please find results under separate order      COVID-19 rapid test Not Detected Not Detected     GLUCOSE, POC    Collection Time: 01/04/22  8:46 AM   Result Value Ref Range    Glucose (POC) 89 65 - 117 mg/dL    Performed by Magi Cross    CBC WITH AUTOMATED DIFF    Collection Time: 01/04/22  8:55 AM   Result Value Ref Range    WBC 9.8 3.6 - 11.0 K/uL    RBC 3.54 (L) 3.80 - 5.20 M/uL    HGB 10.9 (L) 11.5 - 16.0 g/dL    HCT 33.7 (L) 35.0 - 47.0 %    MCV 95.2 80.0 - 99.0 FL    MCH 30.8 26.0 - 34.0 PG    MCHC 32.3 30.0 - 36.5 g/dL    RDW 12.3 11.5 - 14.5 %    PLATELET 356 264 - 835 K/uL    MPV 11.0 8.9 - 12.9 FL    NRBC 0.0 0.0  WBC    ABSOLUTE NRBC 0.00 0.00 - 0.01 K/uL    NEUTROPHILS 61 32 - 75 %    LYMPHOCYTES 24 12 - 49 %    MONOCYTES 11 5 - 13 %    EOSINOPHILS 4 0 - 7 %    BASOPHILS 0 0 - 1 %    IMMATURE GRANULOCYTES 0 0 - 0.5 %    ABS. NEUTROPHILS 6.0 1.8 - 8.0 K/UL    ABS. LYMPHOCYTES 2.3 0.8 - 3.5 K/UL    ABS. MONOCYTES 1.1 (H) 0.0 - 1.0 K/UL    ABS. EOSINOPHILS 0.4 0.0 - 0.4 K/UL    ABS. BASOPHILS 0.0 0.0 - 0.1 K/UL    ABS. IMM.  GRANS. 0.0 0.00 - 0.04 K/UL    DF AUTOMATED     METABOLIC PANEL, COMPREHENSIVE    Collection Time: 01/04/22  8:55 AM   Result Value Ref Range    Sodium 145 136 - 145 mmol/L    Potassium 3.5 3.5 - 5.1 mmol/L    Chloride 115 (H) 97 - 108 mmol/L    CO2 24 21 - 32 mmol/L    Anion gap 6 5 - 15 mmol/L    Glucose 83 65 - 100 mg/dL    BUN 22 (H) 6 - 20 mg/dL    Creatinine 1.13 (H) 0.55 - 1.02 mg/dL    BUN/Creatinine ratio 19 12 - 20      GFR est AA 55 (L) >60 ml/min/1.73m2    GFR est non-AA 45 (L) >60 ml/min/1.73m2    Calcium 7.6 (L) 8.5 - 10.1 mg/dL    Bilirubin, total 0.3 0.2 - 1.0 mg/dL    AST (SGOT) 10 (L) 15 - 37 U/L    ALT (SGPT) 29 12 - 78 U/L    Alk. phosphatase 119 (H) 45 - 117 U/L    Protein, total 5.4 (L) 6.4 - 8.2 g/dL    Albumin 2.5 (L) 3.5 - 5.0 g/dL    Globulin 2.9 2.0 - 4.0 g/dL    A-G Ratio 0.9 (L) 1.1 - 2.2     CK W/ REFLX CKMB    Collection Time: 01/04/22  8:55 AM   Result Value Ref Range    CK 50.0 26 - 192 ng/mL   TROPONIN-HIGH SENSITIVITY    Collection Time: 01/04/22  8:55 AM   Result Value Ref Range    Troponin-High Sensitivity 21 0 - 51 ng/L   URINALYSIS W/ RFLX MICROSCOPIC    Collection Time: 01/04/22 11:00 AM   Result Value Ref Range    Color Yellow/Straw      Appearance Clear Clear      Specific gravity 1.013 1.003 - 1.030      pH (UA) 5.0 5.0 - 8.0      Protein Negative Negative mg/dL    Glucose Negative Negative mg/dL    Ketone Negative Negative mg/dL    Bilirubin Negative Negative      Blood Negative Negative      Urobilinogen 0.1 0.1 - 1.0 EU/dL    Nitrites Negative Negative      Leukocyte Esterase Negative Negative      WBC 0-4 0 - 4 /hpf    RBC 0-5 0 - 5 /hpf    Bacteria Negative Negative /hpf    Mucus Trace (A) Negative /lpf    Hyaline cast 5-10 0 - 5 /lpf       Radiologic Studies -   [unfilled]  CT Results  (Last 48 hours)               01/04/22 0914  CT HEAD WO CONT Final result    Impression:  Age-appropriate atrophy. No acute findings. Narrative:  CT dose reduction was achieved through use of a standardized protocol tailored   for this examination and automatic exposure control for dose modulation. CT Head       History:        The sulci are prominent but symmetric. Periventricular and deep white matter   hypodensity is not unexpected for age.  No acute abnormality seen gray or white   matter. Ventricles are symmetric and appropriate for atrophy. There is no   midline shift or mass effect, or evidence of hemorrhage. Bone windows show no   fracture. CXR Results  (Last 48 hours)               01/04/22 0903  XR CHEST PORT Final result    Impression:  The cardiomediastinal silhouette is appropriate for age, technique,   and lung expansion. Pulmonary vasculature is not congested. The lungs are   essentially clear. No effusion or pneumothorax is seen. Narrative:  1 new comparison September 20. Medical Decision Making and ED Course   - I am the first and primary provider for this patient AND AM THE PRIMARY PROVIDER OF RECORD. - I reviewed the vital signs, available nursing notes, past medical history, past surgical history, family history and social history. - Initial assessment performed. The patients presenting problems have been discussed, and the staff are in agreement with the care plan formulated and outlined with them. I have encouraged them to ask questions as they arise throughout their visit. Vital Signs-Reviewed the patient's vital signs. Patient Vitals for the past 12 hrs:   Temp Pulse Resp BP SpO2   01/04/22 1012 97.3 °F (36.3 °C) 64 19 (!) 183/59 100 %   01/04/22 0839 -- -- -- -- 99 %   01/04/22 0831 -- (!) 53 16 (!) 158/61 99 %   Records Reviewed: Nursing Notes and Old Medical Records    ED Course:       ED Course as of 01/04/22 6010 [facility] Jan 04, 2022   1757 Dr. Oskar Shields patient's primary care physician. [LW]   7776 68-year-old female presents for evaluation of altered mental status. Patient here is arousable to voice but unable to contribute to the history. Unknown baseline. Spoke to patient's daughter who states that she has dementia at baseline and frequently gets delirium in the hospital.  She has been complaining of flank pain since Gamal miracle. She has not had any fevers or vomiting.   Does have dementia at baseline. States she has been awake for last 2 days without sleeping so she is likely just very sleepy. Differential diagnosis includes ICH versus viral syndrome versus UTI versus other infectious etiology. We will get labs including a CBC, CMP, troponin, UA, Covid, influenza, chest x-ray and head CT. Disposition as per clinical course [LW]   [phone] Patient now complains of abdominal pain. CT A/P ordered. Patient remains lethargic. Admitted to Dr. Guerrero Guzman  [LW]   [phone] EKG performed at [phone], read at 845. Sinus bradycardia with a rate of 55. LVH. Normal AK, normal QRS, normal QTC. [LW]      ED Course User Index  [LW] Kyler Alfred MD         Consultations:       Consultations: -  Hospitalist Consultant: Dr. Palak Green: We have asked for emergent assistance with regard to this patient. We have discussed the patients HPI, ROS, PE and results this far. They will come and evaluate the patient for admission. Disposition     Disposition: Admitted to Floor Medical Floor the case was discussed with the admitting physician Carmelina    Admitted      Diagnosis     Clinical Impression:   1. Lethargy        Attestations:    Maninder Ruiz MD    Please note that this dictation was completed with [employer], the computer voice recognition software. Quite often unanticipated grammatical, syntax, homophones, and other interpretive errors are inadvertently transcribed by the computer software. Please disregard these errors. Please excuse any errors that have escaped final proofreading. Thank you.

## 2022-01-05 ENCOUNTER — APPOINTMENT (OUTPATIENT)
Dept: ULTRASOUND IMAGING | Age: 87
DRG: 444 | End: 2022-01-05
Attending: FAMILY MEDICINE
Payer: MEDICARE

## 2022-01-05 LAB
ALBUMIN SERPL-MCNC: 2.9 G/DL (ref 3.5–5)
ALBUMIN/GLOB SERPL: 0.8 {RATIO} (ref 1.1–2.2)
ALP SERPL-CCNC: 133 U/L (ref 45–117)
ALT SERPL-CCNC: 28 U/L (ref 12–78)
ANION GAP SERPL CALC-SCNC: 7 MMOL/L (ref 5–15)
AST SERPL W P-5'-P-CCNC: 17 U/L (ref 15–37)
ATRIAL RATE: 55 BPM
BASOPHILS # BLD: 0 K/UL (ref 0–0.1)
BASOPHILS NFR BLD: 0 % (ref 0–1)
BILIRUB SERPL-MCNC: 0.4 MG/DL (ref 0.2–1)
BUN SERPL-MCNC: 20 MG/DL (ref 6–20)
BUN/CREAT SERPL: 17 (ref 12–20)
CA-I BLD-MCNC: 9 MG/DL (ref 8.5–10.1)
CALCULATED P AXIS, ECG09: 79 DEGREES
CALCULATED R AXIS, ECG10: -3 DEGREES
CALCULATED T AXIS, ECG11: 41 DEGREES
CHLORIDE SERPL-SCNC: 108 MMOL/L (ref 97–108)
CO2 SERPL-SCNC: 26 MMOL/L (ref 21–32)
CREAT SERPL-MCNC: 1.15 MG/DL (ref 0.55–1.02)
DIAGNOSIS, 93000: NORMAL
DIFFERENTIAL METHOD BLD: NORMAL
EOSINOPHIL # BLD: 0.3 K/UL (ref 0–0.4)
EOSINOPHIL NFR BLD: 4 % (ref 0–7)
ERYTHROCYTE [DISTWIDTH] IN BLOOD BY AUTOMATED COUNT: 11.9 % (ref 11.5–14.5)
GLOBULIN SER CALC-MCNC: 3.6 G/DL (ref 2–4)
GLUCOSE SERPL-MCNC: 89 MG/DL (ref 65–100)
HCT VFR BLD AUTO: 35.3 % (ref 35–47)
HGB BLD-MCNC: 11.7 G/DL (ref 11.5–16)
IMM GRANULOCYTES # BLD AUTO: 0 K/UL (ref 0–0.04)
IMM GRANULOCYTES NFR BLD AUTO: 0 % (ref 0–0.5)
LYMPHOCYTES # BLD: 1.6 K/UL (ref 0.8–3.5)
LYMPHOCYTES NFR BLD: 19 % (ref 12–49)
MCH RBC QN AUTO: 30.8 PG (ref 26–34)
MCHC RBC AUTO-ENTMCNC: 33.1 G/DL (ref 30–36.5)
MCV RBC AUTO: 92.9 FL (ref 80–99)
MONOCYTES # BLD: 0.7 K/UL (ref 0–1)
MONOCYTES NFR BLD: 8 % (ref 5–13)
NEUTS SEG # BLD: 5.8 K/UL (ref 1.8–8)
NEUTS SEG NFR BLD: 69 % (ref 32–75)
NRBC # BLD: 0 K/UL (ref 0–0.01)
NRBC BLD-RTO: 0 PER 100 WBC
P-R INTERVAL, ECG05: 212 MS
PLATELET # BLD AUTO: 342 K/UL (ref 150–400)
PMV BLD AUTO: 10.2 FL (ref 8.9–12.9)
POTASSIUM SERPL-SCNC: 4.6 MMOL/L (ref 3.5–5.1)
PROT SERPL-MCNC: 6.5 G/DL (ref 6.4–8.2)
Q-T INTERVAL, ECG07: 468 MS
QRS DURATION, ECG06: 90 MS
QTC CALCULATION (BEZET), ECG08: 447 MS
RBC # BLD AUTO: 3.8 M/UL (ref 3.8–5.2)
SODIUM SERPL-SCNC: 141 MMOL/L (ref 136–145)
VENTRICULAR RATE, ECG03: 55 BPM
WBC # BLD AUTO: 8.3 K/UL (ref 3.6–11)

## 2022-01-05 PROCEDURE — 74011250637 HC RX REV CODE- 250/637: Performed by: FAMILY MEDICINE

## 2022-01-05 PROCEDURE — 65270000029 HC RM PRIVATE

## 2022-01-05 PROCEDURE — 99221 1ST HOSP IP/OBS SF/LOW 40: CPT | Performed by: COLON & RECTAL SURGERY

## 2022-01-05 PROCEDURE — 36415 COLL VENOUS BLD VENIPUNCTURE: CPT

## 2022-01-05 PROCEDURE — 76705 ECHO EXAM OF ABDOMEN: CPT

## 2022-01-05 PROCEDURE — 94640 AIRWAY INHALATION TREATMENT: CPT

## 2022-01-05 PROCEDURE — 80053 COMPREHEN METABOLIC PANEL: CPT

## 2022-01-05 PROCEDURE — 85025 COMPLETE CBC W/AUTO DIFF WBC: CPT

## 2022-01-05 PROCEDURE — 74011250636 HC RX REV CODE- 250/636: Performed by: FAMILY MEDICINE

## 2022-01-05 RX ORDER — FOLIC ACID 1 MG/1
1 TABLET ORAL DAILY
Status: DISCONTINUED | OUTPATIENT
Start: 2022-01-05 | End: 2022-01-10 | Stop reason: HOSPADM

## 2022-01-05 RX ORDER — BUDESONIDE AND FORMOTEROL FUMARATE DIHYDRATE 160; 4.5 UG/1; UG/1
2 AEROSOL RESPIRATORY (INHALATION) 2 TIMES DAILY
Status: DISCONTINUED | OUTPATIENT
Start: 2022-01-05 | End: 2022-01-10 | Stop reason: HOSPADM

## 2022-01-05 RX ORDER — ATORVASTATIN CALCIUM 40 MG/1
40 TABLET, FILM COATED ORAL
Status: DISCONTINUED | OUTPATIENT
Start: 2022-01-05 | End: 2022-01-10 | Stop reason: HOSPADM

## 2022-01-05 RX ORDER — GUAIFENESIN 100 MG/5ML
81 LIQUID (ML) ORAL DAILY
Status: DISCONTINUED | OUTPATIENT
Start: 2022-01-05 | End: 2022-01-10 | Stop reason: HOSPADM

## 2022-01-05 RX ORDER — METOPROLOL SUCCINATE 50 MG/1
50 TABLET, EXTENDED RELEASE ORAL DAILY
Status: DISCONTINUED | OUTPATIENT
Start: 2022-01-05 | End: 2022-01-10 | Stop reason: HOSPADM

## 2022-01-05 RX ORDER — LEVOTHYROXINE SODIUM 100 UG/1
100 TABLET ORAL DAILY
Status: DISCONTINUED | OUTPATIENT
Start: 2022-01-05 | End: 2022-01-10 | Stop reason: HOSPADM

## 2022-01-05 RX ORDER — PREDNISONE 5 MG/1
10 TABLET ORAL
Status: DISCONTINUED | OUTPATIENT
Start: 2022-01-06 | End: 2022-01-10 | Stop reason: HOSPADM

## 2022-01-05 RX ORDER — IPRATROPIUM BROMIDE AND ALBUTEROL SULFATE 2.5; .5 MG/3ML; MG/3ML
3 SOLUTION RESPIRATORY (INHALATION)
Status: DISCONTINUED | OUTPATIENT
Start: 2022-01-05 | End: 2022-01-10 | Stop reason: HOSPADM

## 2022-01-05 RX ADMIN — ASPIRIN 81 MG CHEWABLE TABLET 81 MG: 81 TABLET CHEWABLE at 11:43

## 2022-01-05 RX ADMIN — BUDESONIDE AND FORMOTEROL FUMARATE DIHYDRATE 2 PUFF: 160; 4.5 AEROSOL RESPIRATORY (INHALATION) at 10:35

## 2022-01-05 RX ADMIN — METOPROLOL SUCCINATE 50 MG: 50 TABLET, EXTENDED RELEASE ORAL at 11:43

## 2022-01-05 RX ADMIN — FOLIC ACID 1 MG: 1 TABLET ORAL at 11:43

## 2022-01-05 RX ADMIN — APIXABAN 2.5 MG: 2.5 TABLET, FILM COATED ORAL at 11:43

## 2022-01-05 RX ADMIN — LEVOTHYROXINE SODIUM 100 MCG: 0.1 TABLET ORAL at 11:43

## 2022-01-05 RX ADMIN — ENOXAPARIN SODIUM 30 MG: 100 INJECTION SUBCUTANEOUS at 08:30

## 2022-01-05 NOTE — ED NOTES
Writer spoke to Dr. German Hirsch about pt's blood pressure and discharge. Dr. German Hirsch stated to continue to monitor blood pressure but no new orders. Pt will not be discharged today.

## 2022-01-05 NOTE — CONSULTS
PULMONARY CONSULT  [facility]    Name: Larry Bales MRN: [id number]   : 1930 Hospital: [address] [place] [address]   Date: 2022  Admission date: 2022 Hospital Day: 2       HPI:     Hospital Problems  Date Reviewed: 2020          Codes Class Noted POA    Lethargy ICD-10-CM: R53.83  ICD-9-CM: 780.79  2022 Unknown        AMS (altered mental status) ICD-10-CM: R41.82  ICD-9-CM: 780.97  2022 Unknown                   [x] High complexity decision making was performed  [x] See my orders for details      Subjective/Initial History:     I was asked by Mata Potts MD to see Larry Bales  a 80 y.o.  female in consultation     Excerpts from admission 2022 or consult notes as follows:   35-year-old lady came in because of generalized weakness shortness of breath and confusion she has significant past medical history of asthma COPD hypertension coronary artery disease history of stent placement in the past she was having confusion spell also having upper respiratory symptoms generalized weakness her Covid test came back negative CAT scan of the abdomen was done which shows gallstone.       No Known Allergies     MAR reviewed and pertinent medications noted or modified as needed     Current Facility-Administered Medications   Medication    albuterol-ipratropium (DUO-NEB) 2.5 MG-0.5 MG/3 ML    apixaban (ELIQUIS) tablet 2.5 mg    aspirin chewable tablet 81 mg    atorvastatin (LIPITOR) tablet 40 mg    budesonide-formoteroL (SYMBICORT) 160-4.5 mcg/actuation HFA inhaler 2 Puff    metoprolol succinate (TOPROL-XL) XL tablet 50 mg    [START ON 2022] predniSONE (DELTASONE) tablet 10 mg    folic acid (FOLVITE) tablet 1 mg    levothyroxine (SYNTHROID) tablet 100 mcg    0.9% sodium chloride infusion    acetaminophen (TYLENOL) tablet 650 mg    Or    acetaminophen (TYLENOL) suppository 650 mg    polyethylene glycol (MIRALAX) packet 17 g    ondansetron (ZOFRAN ODT) tablet 4 mg    Or    ondansetron (ZOFRAN) injection 4 mg     Current Outpatient Medications   Medication Sig    albuterol-ipratropium (DUO-NEB) 2.5 mg-0.5 mg/3 ml nebu 3 mL by Nebulization route every six (6) hours as needed for Wheezing.  apixaban (ELIQUIS) 2.5 mg tablet Take 1 Tablet by mouth two (2) times a day. Indications: treatment to prevent blood clots in chronic atrial fibrillation    aspirin 81 mg chewable tablet Take 1 Tablet by mouth daily.  atorvastatin (LIPITOR) 40 mg tablet Take 1 Tablet by mouth nightly.  budesonide-formoteroL (SYMBICORT) 160-4.5 mcg/actuation HFAA Take 2 Puffs by inhalation two (2) times a day.  predniSONE (DELTASONE) 10 mg tablet Take 10 mg by mouth daily (with breakfast).  metoprolol succinate (TOPROL-XL) 50 mg XL tablet Take 1 Tablet by mouth daily.  albuterol (PROVENTIL VENTOLIN) 2.5 mg /3 mL (0.083 %) nebu Take 3 mL by inhalation every six (6) hours as needed.  folic acid (FOLVITE) 1 mg tablet TAKE 1 TABLET BY MOUTH EVERY DAY    levothyroxine (SYNTHROID) 100 mcg tablet TAKE 1 TABLET BY MOUTH EVERY DAY    ALPRAZolam (XANAX) 0.25 mg tablet TAKE 1 TABLET BY MOUTH EVERY 12 HOURS AS NEEDED      Patient PCP: None  PMH:  has a past medical history of Anemia, Chronic obstructive pulmonary disease ([facility] [address] Heart attack ([facility] [address] Hypertension, and Thyroid disease. PSH:   has a past surgical history that includes hx orthopaedic (2018) and hx pacemaker. FHX: family history includes Heart Disease in her father and mother; Stroke in her father and mother. SHX:  reports that she has never smoked. She has never used smokeless tobacco. She reports that she does not drink alcohol and does not use drugs. ROS:    Review of Systems   Constitutional: Positive for malaise/fatigue. HENT: Positive for hearing loss. Eyes: Negative. Respiratory: Positive for shortness of breath. Cardiovascular: Negative. Gastrointestinal: Negative. Genitourinary: Negative. Musculoskeletal: Negative. Skin: Negative. Neurological: Negative. Psychiatric/Behavioral: Negative. Objective:     Vital Signs: Telemetry:    normal sinus rhythm Intake/Output:   Visit Vitals  BP (!) 154/75   Pulse 71   Temp 97.3 °F (36.3 °C)   Resp 20   Ht 5' 5\" (1.651 m)   Wt 64 kg (141 lb)   SpO2 97%   BMI 23.46 kg/m²       No data recorded. O2 Device: None (Room air)         Wt Readings from Last 4 Encounters:   01/04/22 64 kg (141 lb)   09/23/21 64.4 kg (141 lb 15.6 oz)   06/02/21 68 kg (150 lb)   01/24/21 68 kg (150 lb)          Intake/Output Summary (Last 24 hours) at 1/5/2022 1110  Last data filed at 1/5/2022 0656  Gross per 24 hour   Intake --   Output 650 ml   Net -650 ml       Last shift:      No intake/output data recorded. Last 3 shifts: 01/03 1901 - 01/05 0700  In: -   Out: 650 [Urine:650]       Physical Exam:     Physical Exam  Constitutional:       Appearance: Normal appearance. HENT:      Head: Normocephalic and atraumatic. Nose: Nose normal.      Mouth/Throat:      Mouth: Mucous membranes are moist.   Eyes:      Pupils: Pupils are equal, round, and reactive to light. Cardiovascular:      Rate and Rhythm: Normal rate. Pulses: Normal pulses. Pulmonary:      Effort: Pulmonary effort is normal.   Abdominal:      General: Abdomen is flat. Bowel sounds are normal.   Musculoskeletal:         General: Normal range of motion. Cervical back: Normal range of motion and neck supple. Neurological:      Mental Status: She is alert. Comments: Hard of hearing          Labs:    Recent Labs     01/05/22  0543 01/04/22  0855   WBC 8.3 9.8   HGB 11.7 10.9*    305     Recent Labs     01/05/22  0543 01/04/22  0855    145   K 4.6 3.5    115*   CO2 26 24   GLU 89 83   BUN 20 22*   CREA 1.15* 1.13*   CA 9.0 7.6*   ALB 2.9* 2.5*   ALT 28 29     No results for input(s): PH, PCO2, PO2, HCO3, FIO2 in the last 72 hours.   No results for input(s): CPK, CKNDX, TROIQ in the last 72 hours. No lab exists for component: CPKMB  No results found for: BNPP, BNP   No results found for: CULTNo results found for: TSH, TSHEXT    Imaging:    CXR Results  (Last 48 hours)               01/04/22 0903  XR CHEST PORT Final result    Impression:  The cardiomediastinal silhouette is appropriate for age, technique,   and lung expansion. Pulmonary vasculature is not congested. The lungs are   essentially clear. No effusion or pneumothorax is seen. Narrative:  1 new comparison September 20. Results from [place] encounter on 01/04/22    XR CHEST PORT    Narrative  1 new comparison September 20. Impression  The cardiomediastinal silhouette is appropriate for age, technique,  and lung expansion. Pulmonary vasculature is not congested. The lungs are  essentially clear. No effusion or pneumothorax is seen. Results from Hospital Encounter encounter on 09/20/21    XR CHEST PORT    Narrative  History: Evidence of breath    A single view of the chest was obtained. Heart size is stable. There is some  atherosclerotic change of the aorta. Lungs are clear. No effusions or  pneumothorax. Bony structures are within normal limits. Impression  Normal findings. Results from Hospital Encounter encounter on 06/02/21    XR CHEST PORT    Narrative  The study is a single view chest radiographic examination dated 6/2/2021. HISTORY: Shortness of breath. COMPARISON:  1/24/2021. FINDINGS: The heart size is normal. The pulmonary vessels are normal in caliber. The lung parenchyma is clear without an infiltrate or atelectasis. There is a  mild degree of chronic baseline peribronchial cuffing. The costophrenic angles  are maintained without pleural effusions or a pneumothorax. There is a midline  position to the trachea. There are atherosclerotic vascular changes of the  thoracic aorta.  The pulmonary bia are symmetrical.    The bony thorax is intact without fractures/acute osseous abnormalities. Impression  1. There are moderate atherosclerotic vascular changes of the thoracic aorta. The patient may also have coronary artery atherosclerotic vascular changes which  could be a source for chest pain. 2.  This examination is negative for acute pulmonary parenchymal pathology. There is a mild degree of chronic baseline peribronchial cuffing without change. Results from [place] encounter on 01/04/22    CT ABD PELV W CONT    Narrative  CT abdomen and pelvis without IV contrast    Comparison CT abdomen and pelvis November 19, 2019. Axial images are reviewed along with reformatted sagittal/coronal images. 100 mL  Isovue 370 administered. Motion limited study. Dose reduction: All CT scans at this facility are performed using dose reduction  optimization techniques as appropriate to a performed exam including the  following-  automated exposure control, adjustments of mA and/or Kv according to patient  size, or use of iterative reconstructive technique. Lung bases are clear. .    Normal enhancement of the liver. Gallbladder distention. Small gallstones noted  dependently within the gallbladder lumen. Pancreas, spleen, and bilateral  adrenal glands appear unremarkable. Cortical thinning bilateral kidneys. No hydronephrosis. Stomach and small bowel loops are decompressed. Appendix unchanged. There is  stool and air through colon. Distal descending and sigmoid colon diverticula. No  CT evidence for appendicitis or diverticulitis. No ascites. Unchanged uterine calcification likely related to small degenerated uterine  fibroid. Dense intimal calcification normal caliber abdominal aorta, advanced abdominal  aorta atherosclerosis. Atherosclerosis extends to proximal abdominal aorta  branch vessels and pelvic arteries. Left hip hardware. Impression  No bowel obstruction. Colonic diverticulosis. No CT evidence for  appendicitis or diverticulitis. Unchanged appearance appendix. Gallbladder distention. Few small dependent gallstones within gallbladder lumen. Cortical atrophy each kidney. No hydronephrosis. Advanced atherosclerotic change abdominal aorta and pelvic arteries. No  abdominal aortic aneurysm. Left hip hardware. IMPRESSION:   1. Asthma   2. COPD  3. Gallstone  4. Hypertension hypothyroidism  5. History of coronary artery disease  6. Pt is requiring Drug therapy requiring intensive monitoring for toxicity  7. Pt is unstable, unpredictable needing inpatient monitoring; is acutely ill and at high risk of sudden decline and decompensation with severe consequenses and continued end organ dysfunction and failure  8. Prognosis guarded       RECOMMENDATIONS/PLAN:     3 75-year-old lady with history of asthma and also upper respiratory symptoms CAT scan of the abdomen was done which shows gallstone  2. Agree with Symbicort inhaler  3. Resume home meds  4. Supplemental O2 to keep sats > 93%  5. Aspiration precautions  6. Labs to follow electrolytes, renal function and and blood counts  7. Glucose monitoring and SSI  8. Bronchial hygiene with respiratory therapy techniques, bronchodilators  9.  DVT, SUP prophylaxis         ·           Alex Cee MD

## 2022-01-05 NOTE — ED NOTES
Past Medical History:   Diagnosis Date    Anemia     Chronic obstructive pulmonary disease ([facility] [address]     Heart attack ([facility] [address]     Hypertension     Thyroid disease     hypothyroidism     Past Surgical History:   Procedure Laterality Date    HX ORTHOPAEDIC  2018    total hip replacement    HX PACEMAKER       Care assumed and bedside SBAR report endorsed on 79yo female who presents to ED for lethargy, back pain, altered mental status and weakness. Resting comfortably, pain currently within manageable limits, IV patent, plan of care reinforced, bed in lowest position, side rails up x2, call bell within reach, will continue to monitor.

## 2022-01-05 NOTE — H&P
History and Physical    NAME: Katie Davey   :  1930   MRN:  [id number]     Date/Time:  2022 10:21 AM    Patient PCP: None  ______________________________________________________________________             Subjective:     CHIEF COMPLAINT:     Altered mental status    HISTORY OF PRESENT ILLNESS:       Patient is a 80y.o. year old female signal past medical history of COPD hypertension hypothyroidism came to emergency room with confusion as the patient not a good historian history and physical from the ER record ER physician talked to the patient daughter and patient crying and complaining of various pain some abdominal pain patient have a CT scan of the abdomen done shows gallstone patient was admitted for further evaluation treatment  Past Medical History:   Diagnosis Date    Anemia     Chronic obstructive pulmonary disease ([address]     Heart attack ([address]     Hypertension     Thyroid disease     hypothyroidism        Past Surgical History:   Procedure Laterality Date    HX ORTHOPAEDIC  2018    total hip replacement    HX PACEMAKER         Social History     Tobacco Use    Smoking status: Never Smoker    Smokeless tobacco: Never Used   Substance Use Topics    Alcohol use: Never        Family History   Problem Relation Age of Onset    Stroke Mother     Heart Disease Mother     Stroke Father     Heart Disease Father        No Known Allergies     Prior to Admission medications    Medication Sig Start Date End Date Taking? Authorizing Provider   albuterol-ipratropium (DUO-NEB) 2.5 mg-0.5 mg/3 ml nebu 3 mL by Nebulization route every six (6) hours as needed for Wheezing. 21   Radhika Villanueva MD   apixaban (ELIQUIS) 2.5 mg tablet Take 1 Tablet by mouth two (2) times a day. Indications: treatment to prevent blood clots in chronic atrial fibrillation 21   Radhika Villanueva MD   aspirin 81 mg chewable tablet Take 1 Tablet by mouth daily.  21   Radhika Villanueva MD   atorvastatin (LIPITOR) 40 mg tablet Take 1 Tablet by mouth nightly. 9/27/21   Ayden Villanueva MD   budesonide-formoteroL [facility]) 160-4.5 mcg/actuation HFAA Take 2 Puffs by inhalation two (2) times a day. 9/27/21   Ayden Villanueva MD   predniSONE (DELTASONE) 10 mg tablet Take 10 mg by mouth daily (with breakfast). 9/28/21   Ayden Villanueva MD   metoprolol succinate (TOPROL-XL) 50 mg XL tablet Take 1 Tablet by mouth daily. 9/28/21   Ayden Villanueva MD   albuterol (PROVENTIL VENTOLIN) 2.5 mg /3 mL (0.083 %) nebu Take 3 mL by inhalation every six (6) hours as needed.  9/17/21   Provider, Historical   folic acid (FOLVITE) 1 mg tablet TAKE 1 TABLET BY MOUTH EVERY DAY 11/9/20   Provider, Historical   levothyroxine (SYNTHROID) 100 mcg tablet TAKE 1 TABLET BY MOUTH EVERY DAY 11/16/20   Provider, Historical   ALPRAZolam (XANAX) 0.25 mg tablet TAKE 1 TABLET BY MOUTH EVERY 12 HOURS AS NEEDED 11/9/20   Provider, Historical         Current Facility-Administered Medications:     albuterol-ipratropium (DUO-NEB) 2.5 MG-0.5 MG/3 ML, 3 mL, Nebulization, Q6H PRN, Raphael Villanueva MD    apixaban (ELIQUIS) tablet 2.5 mg, 2.5 mg, Oral, BID, Ayden Villanueva MD    aspirin chewable tablet 81 mg, 81 mg, Oral, DAILY, Raphael Villanueva MD    atorvastatin (LIPITOR) tablet 40 mg, 40 mg, Oral, QHS, Raphael Villanueva MD    budesonide-formoteroL (SYMBICORT) 160-4.5 mcg/actuation HFA inhaler 2 Puff, 2 Puff, Inhalation, BID, Raphael Villanueva MD    metoprolol succinate (TOPROL-XL) XL tablet 50 mg, 50 mg, Oral, DAILY, Raphael Villanueva MD  [address] [place] [address]  [START ON 1/6/2022] predniSONE (DELTASONE) tablet 10 mg, 10 mg, Oral, DAILY WITH BREAKFAST, Raphael Villanueva MD    folic acid (FOLVITE) tablet 1 mg, 1 mg, Oral, DAILY, Raphael Villanueva MD    levothyroxine (SYNTHROID) tablet 100 mcg, 100 mcg, Oral, DAILY, Raphael Villanueva MD    0.9% sodium chloride infusion, 75 mL/hr, IntraVENous, CONTINUOUS, Raphael Villanueva MD, Last Rate: 75 mL/hr at 01/04/22 1805, 75 mL/hr at 01/04/22 1805    acetaminophen (TYLENOL) tablet 650 mg, 650 mg, Oral, Q6H PRN, 650 mg at 01/04/22 1805 **OR** acetaminophen (TYLENOL) suppository 650 mg, 650 mg, Rectal, Q6H PRN, Paty Villanueva MD    polyethylene glycol (MIRALAX) packet 17 g, 17 g, Oral, DAILY PRN, Paty Villanueva MD    ondansetron (ZOFRAN ODT) tablet 4 mg, 4 mg, Oral, Q8H PRN **OR** ondansetron (ZOFRAN) injection 4 mg, 4 mg, IntraVENous, Q6H PRN, Raphael Villanueva MD    Current Outpatient Medications:     albuterol-ipratropium (DUO-NEB) 2.5 mg-0.5 mg/3 ml nebu, 3 mL by Nebulization route every six (6) hours as needed for Wheezing., Disp: 30 Nebule, Rfl: 0    apixaban (ELIQUIS) 2.5 mg tablet, Take 1 Tablet by mouth two (2) times a day. Indications: treatment to prevent blood clots in chronic atrial fibrillation, Disp: 60 Tablet, Rfl: 0    aspirin 81 mg chewable tablet, Take 1 Tablet by mouth daily. , Disp: 30 Tablet, Rfl: 0    atorvastatin (LIPITOR) 40 mg tablet, Take 1 Tablet by mouth nightly., Disp: 60 Tablet, Rfl: 0    budesonide-formoteroL (SYMBICORT) 160-4.5 mcg/actuation HFAA, Take 2 Puffs by inhalation two (2) times a day., Disp: 10.2 g, Rfl: 1    predniSONE (DELTASONE) 10 mg tablet, Take 10 mg by mouth daily (with breakfast). , Disp: 10 Tablet, Rfl: 0    metoprolol succinate (TOPROL-XL) 50 mg XL tablet, Take 1 Tablet by mouth daily. , Disp: 50 Tablet, Rfl: 0    albuterol (PROVENTIL VENTOLIN) 2.5 mg /3 mL (0.083 %) nebu, Take 3 mL by inhalation every six (6) hours as needed. , Disp: , Rfl:     folic acid (FOLVITE) 1 mg tablet, TAKE 1 TABLET BY MOUTH EVERY DAY, Disp: , Rfl:     levothyroxine (SYNTHROID) 100 mcg tablet, TAKE 1 TABLET BY MOUTH EVERY DAY, Disp: , Rfl:     ALPRAZolam (XANAX) 0.25 mg tablet, TAKE 1 TABLET BY MOUTH EVERY 12 HOURS AS NEEDED, Disp: , Rfl:     LAB DATA REVIEWED:    Recent Results (from the past 24 hour(s))   URINALYSIS W/ RFLX MICROSCOPIC    Collection Time: 01/04/22 11:00 AM   Result Value Ref Range    Color Yellow/Straw      Appearance Clear Clear      Specific gravity 1.013 1.003 - 1.030      pH (UA) 5.0 5.0 - 8.0      Protein Negative Negative mg/dL    Glucose Negative Negative mg/dL    Ketone Negative Negative mg/dL    Bilirubin Negative Negative      Blood Negative Negative      Urobilinogen 0.1 0.1 - 1.0 EU/dL    Nitrites Negative Negative      Leukocyte Esterase Negative Negative      WBC 0-4 0 - 4 /hpf    RBC 0-5 0 - 5 /hpf    Bacteria Negative Negative /hpf    Mucus Trace (A) Negative /lpf    Hyaline cast 5-10 0 - 5 /lpf   TROPONIN-HIGH SENSITIVITY    Collection Time: 01/04/22  5:15 PM   Result Value Ref Range    Troponin-High Sensitivity 23 0 - 51 ng/L   METABOLIC PANEL, COMPREHENSIVE    Collection Time: 01/05/22  5:43 AM   Result Value Ref Range    Sodium 141 136 - 145 mmol/L    Potassium 4.6 3.5 - 5.1 mmol/L    Chloride 108 97 - 108 mmol/L    CO2 26 21 - 32 mmol/L    Anion gap 7 5 - 15 mmol/L    Glucose 89 65 - 100 mg/dL    BUN 20 6 - 20 mg/dL    Creatinine 1.15 (H) 0.55 - 1.02 mg/dL    BUN/Creatinine ratio 17 12 - 20      GFR est AA 54 (L) >60 ml/min/1.73m2    GFR est non-AA 44 (L) >60 ml/min/1.73m2    Calcium 9.0 8.5 - 10.1 mg/dL    Bilirubin, total 0.4 0.2 - 1.0 mg/dL    AST (SGOT) 17 15 - 37 U/L    ALT (SGPT) 28 12 - 78 U/L    Alk.  phosphatase 133 (H) 45 - 117 U/L    Protein, total 6.5 6.4 - 8.2 g/dL    Albumin 2.9 (L) 3.5 - 5.0 g/dL    Globulin 3.6 2.0 - 4.0 g/dL    A-G Ratio 0.8 (L) 1.1 - 2.2     CBC WITH AUTOMATED DIFF    Collection Time: 01/05/22  5:43 AM   Result Value Ref Range    WBC 8.3 3.6 - 11.0 K/uL    RBC 3.80 3.80 - 5.20 M/uL    HGB 11.7 11.5 - 16.0 g/dL    HCT 35.3 35.0 - 47.0 %    MCV 92.9 80.0 - 99.0 FL    MCH 30.8 26.0 - 34.0 PG    MCHC 33.1 30.0 - 36.5 g/dL    RDW 11.9 11.5 - 14.5 %    PLATELET 348 103 - 900 K/uL    MPV 10.2 8.9 - 12.9 FL    NRBC 0.0 0.0  WBC    ABSOLUTE NRBC 0.00 0.00 - 0.01 K/uL    NEUTROPHILS 69 32 - 75 %    LYMPHOCYTES 19 12 - 49 % MONOCYTES 8 5 - 13 %    EOSINOPHILS 4 0 - 7 %    BASOPHILS 0 0 - 1 %    IMMATURE GRANULOCYTES 0 0 - 0.5 %    ABS. NEUTROPHILS 5.8 1.8 - 8.0 K/UL    ABS. LYMPHOCYTES 1.6 0.8 - 3.5 K/UL    ABS. MONOCYTES 0.7 0.0 - 1.0 K/UL    ABS. EOSINOPHILS 0.3 0.0 - 0.4 K/UL    ABS. BASOPHILS 0.0 0.0 - 0.1 K/UL    ABS. IMM. GRANS. 0.0 0.00 - 0.04 K/UL    DF AUTOMATED         XR Results (most recent):  Results from Hospital Encounter encounter on 01/04/22    XR CHEST PORT    Narrative  1 new comparison September 20. Impression  The cardiomediastinal silhouette is appropriate for age, technique,  and lung expansion. Pulmonary vasculature is not congested. The lungs are  essentially clear. No effusion or pneumothorax is seen. CT ABD PELV W CONT   Final Result   No bowel obstruction. Colonic diverticulosis. No CT evidence for   appendicitis or diverticulitis. Unchanged appearance appendix. Gallbladder distention. Few small dependent gallstones within gallbladder lumen. Cortical atrophy each kidney. No hydronephrosis. Advanced atherosclerotic change abdominal aorta and pelvic arteries. No   abdominal aortic aneurysm. Left hip hardware. CT HEAD WO CONT   Final Result   Age-appropriate atrophy. No acute findings. XR CHEST PORT   Final Result   The cardiomediastinal silhouette is appropriate for age, technique,   and lung expansion. Pulmonary vasculature is not congested. The lungs are   essentially clear. No effusion or pneumothorax is seen. Review of Systems:  Constitutional: Negative for chills and fever. HENT: Negative. Eyes: Negative. Respiratory: Negative. Cardiovascular: Negative. Gastrointestinal: Negative for abdominal pain and nausea. Skin: Negative. Neurological: Negative.       Objective:   VITALS:    Visit Vitals  BP (!) 154/75   Pulse 71   Temp 97.3 °F (36.3 °C)   Resp 20   Ht 5' 5\" (1.651 m)   Wt 64 kg (141 lb)   SpO2 97%   BMI 23.46 kg/m² Physical Exam:   Constitutional: Alert awake confused  HENT:   Head: Normocephalic and atraumatic. Eyes: Pupils are equal, round, and reactive to light. EOM are normal.   Cardiovascular: Normal rate, regular rhythm and normal heart sounds. Pulmonary/Chest: Breath sounds normal. No wheezes. No rales. Exhibits no tenderness. Abdominal: Soft. Bowel sounds are normal. There is no abdominal tenderness. There is no rebound and no guarding. Musculoskeletal: Normal range of motion. Neurological: pt is alert and oriented to person, place, and time. Alert. Normal strength. No cranial nerve deficit or sensory deficit. Displays a negative Romberg sign.         ASSESSMENT & PLAN:    Gallstone  COPD  Hypertension  Hypothyroidism  History of MI          Current Facility-Administered Medications:     albuterol-ipratropium (DUO-NEB) 2.5 MG-0.5 MG/3 ML, 3 mL, Nebulization, Q6H PRN, Mark Villanueva MD    apixaban (ELIQUIS) tablet 2.5 mg, 2.5 mg, Oral, BID, Mark Villanueva MD    aspirin chewable tablet 81 mg, 81 mg, Oral, DAILY, Raphael Villanueva MD    atorvastatin (LIPITOR) tablet 40 mg, 40 mg, Oral, QHS, Raphael Villanueva MD    budesonide-formoteroL (SYMBICORT) 160-4.5 mcg/actuation HFA inhaler 2 Puff, 2 Puff, Inhalation, BID, Mark Villanueva MD    metoprolol succinate (TOPROL-XL) XL tablet 50 mg, 50 mg, Oral, DAILY, Raphael Villanueva MD  Ambreen Deon  [START ON 1/6/2022] predniSONE (DELTASONE) tablet 10 mg, 10 mg, Oral, DAILY WITH BREAKFAST, Raphael Villanueva MD    folic acid (FOLVITE) tablet 1 mg, 1 mg, Oral, DAILY, Raphael Villanueva MD    levothyroxine (SYNTHROID) tablet 100 mcg, 100 mcg, Oral, DAILY, Raphael Villanueva MD    0.9% sodium chloride infusion, 75 mL/hr, IntraVENous, CONTINUOUS, Raphael Villanueva MD, Last Rate: 75 mL/hr at 01/04/22 1805, 75 mL/hr at 01/04/22 1805    acetaminophen (TYLENOL) tablet 650 mg, 650 mg, Oral, Q6H PRN, 650 mg at 01/04/22 1805 **OR** acetaminophen (TYLENOL) suppository 650 mg, 650 mg, Rectal, Q6H PRN, Addi Villanueva MD    polyethylene glycol (MIRALAX) packet 17 g, 17 g, Oral, DAILY PRN, Addi Villanueva MD    ondansetron (ZOFRAN ODT) tablet 4 mg, 4 mg, Oral, Q8H PRN **OR** ondansetron (ZOFRAN) injection 4 mg, 4 mg, IntraVENous, Q6H PRN, Raphael Villanueva MD    Current Outpatient Medications:     albuterol-ipratropium (DUO-NEB) 2.5 mg-0.5 mg/3 ml nebu, 3 mL by Nebulization route every six (6) hours as needed for Wheezing., Disp: 30 Nebule, Rfl: 0    apixaban (ELIQUIS) 2.5 mg tablet, Take 1 Tablet by mouth two (2) times a day. Indications: treatment to prevent blood clots in chronic atrial fibrillation, Disp: 60 Tablet, Rfl: 0    aspirin 81 mg chewable tablet, Take 1 Tablet by mouth daily. , Disp: 30 Tablet, Rfl: 0    atorvastatin (LIPITOR) 40 mg tablet, Take 1 Tablet by mouth nightly., Disp: 60 Tablet, Rfl: 0    budesonide-formoteroL (SYMBICORT) 160-4.5 mcg/actuation HFAA, Take 2 Puffs by inhalation two (2) times a day., Disp: 10.2 g, Rfl: 1    predniSONE (DELTASONE) 10 mg tablet, Take 10 mg by mouth daily (with breakfast). , Disp: 10 Tablet, Rfl: 0    metoprolol succinate (TOPROL-XL) 50 mg XL tablet, Take 1 Tablet by mouth daily. , Disp: 50 Tablet, Rfl: 0    albuterol (PROVENTIL VENTOLIN) 2.5 mg /3 mL (0.083 %) nebu, Take 3 mL by inhalation every six (6) hours as needed. , Disp: , Rfl:     folic acid (FOLVITE) 1 mg tablet, TAKE 1 TABLET BY MOUTH EVERY DAY, Disp: , Rfl:     levothyroxine (SYNTHROID) 100 mcg tablet, TAKE 1 TABLET BY MOUTH EVERY DAY, Disp: , Rfl:     ALPRAZolam (XANAX) 0.25 mg tablet, TAKE 1 TABLET BY MOUTH EVERY 12 HOURS AS NEEDED, Disp: , Rfl:     _consult GI and surgery for gallstone ______________________________________________________________________    Signed: River Hickman MD

## 2022-01-05 NOTE — ED NOTES
Pt's daughter contacted and notified pt will not be discharged today. Pt notified as well. Writer called pt's daughter from room phone so she can speak with the patient as well. Pt placed on hospital bed.     Austen Yeboah [phone]

## 2022-01-05 NOTE — CONSULTS
Consult Date: 1/5/2022    Consults    Subjective   Patient is a 19-year-old female with a past medical history significant for coronary artery disease status post MI, hypothyroidism, hypertension, COPD who was brought to the emergency department secondary to abdominal pain. At the time of my examination the patient is by herself and it appears has a history of dementia as she is not a good historian. She was pleasantly confused and insisting that she was going home. She at the time of my examination denied any abdominal pain. She had recently eaten lunch as the remainder of her lunch were still in the room. Presentation she had blood work done. Her CBC was unremarkable. Her white blood cell count was 9800 with a normal differential.  Her hemoglobin was 10.9. She does have a history of chronic anemia. Terms of her CMP her creatinine was 1. 13. Albumin low at 2.5. Bilirubin normal.  No lipase sent. CT scan of the abdomen pelvis which demonstrated gallbladder distention with small gallstones in the gallbladder lumen. She also had an ultrasound which demonstrated mild distention of the gallbladder with numerous small gallstones. There is no gallbladder wall edematous change or pericholecystic fluid. The body of the pancreas was noted to be unremarkable. Surgery was consulted for possible cholecystitis.   Past Medical History:   Diagnosis Date    Anemia     Chronic obstructive pulmonary disease ([address]     Heart attack ([address]     Hypertension     Thyroid disease     hypothyroidism      Past Surgical History:   Procedure Laterality Date    HX ORTHOPAEDIC  2018    total hip replacement    HX PACEMAKER       Family History   Problem Relation Age of Onset    Stroke Mother     Heart Disease Mother     Stroke Father     Heart Disease Father       Social History     Tobacco Use    Smoking status: Never Smoker    Smokeless tobacco: Never Used   Substance Use Topics    Alcohol use: Never Current Facility-Administered Medications   Medication Dose Route Frequency Provider Last Rate Last Admin    albuterol-ipratropium (DUO-NEB) 2.5 MG-0.5 MG/3 ML  3 mL Nebulization Q6H PRN Lucia Villanueva MD        apixaban [place]) tablet 2.5 mg  2.5 mg Oral BID Lucia Villanueva MD   2.5 mg at 01/05/22 1143    aspirin chewable tablet 81 mg  81 mg Oral DAILY Raphael Villanueva MD   81 mg at 01/05/22 1143    atorvastatin (LIPITOR) tablet 40 mg  40 mg Oral QHS Raphael Villanueva MD        budesonide-formoteroL (SYMBICORT) 160-4.5 mcg/actuation HFA inhaler 2 Puff  2 Puff Inhalation BID Raphael Villanueva MD   2 Puff at 01/05/22 1035    metoprolol succinate (TOPROL-XL) XL tablet 50 mg  50 mg Oral DAILY Raphael Villanueva MD   50 mg at 01/05/22 1143    [START ON 1/6/2022] predniSONE (DELTASONE) tablet 10 mg  10 mg Oral DAILY WITH BREAKFAST Lucia Villanueva MD        folic acid (FOLVITE) tablet 1 mg  1 mg Oral DAILY Raphael Villanueva MD   1 mg at 01/05/22 1143    levothyroxine (SYNTHROID) tablet 100 mcg  100 mcg Oral DAILY Raphael Villanueva MD   100 mcg at 01/05/22 1143    0.9% sodium chloride infusion  75 mL/hr IntraVENous CONTINUOUS Raphael Villanueva MD 75 mL/hr at 01/04/22 1805 75 mL/hr at 01/04/22 1805    acetaminophen (TYLENOL) tablet 650 mg  650 mg Oral Q6H PRN Lucia Villanueva MD   650 mg at 01/04/22 1805    Or    acetaminophen (TYLENOL) suppository 650 mg  650 mg Rectal Q6H PRN Lucia Villanueva MD        polyethylene glycol (MIRALAX) packet 17 g  17 g Oral DAILY PRN Lucia Villanueva MD        ondansetron (ZOFRAN ODT) tablet 4 mg  4 mg Oral Q8H PRN Lucia Villanueva MD        Or    ondansetron [facility]) injection 4 mg  4 mg IntraVENous Q6H PRN Raphael Villanueva MD         Current Outpatient Medications   Medication Sig Dispense Refill    albuterol-ipratropium (DUO-NEB) 2.5 mg-0.5 mg/3 ml nebu 3 mL by Nebulization route every six (6) hours as needed for Wheezing.  30 Nebule 0    apixaban (ELIQUIS) 2.5 mg tablet Take 1 Tablet by mouth two (2) times a day. Indications: treatment to prevent blood clots in chronic atrial fibrillation 60 Tablet 0    aspirin 81 mg chewable tablet Take 1 Tablet by mouth daily. 30 Tablet 0    atorvastatin (LIPITOR) 40 mg tablet Take 1 Tablet by mouth nightly. 60 Tablet 0    budesonide-formoteroL (SYMBICORT) 160-4.5 mcg/actuation HFAA Take 2 Puffs by inhalation two (2) times a day. 10.2 g 1    predniSONE (DELTASONE) 10 mg tablet Take 10 mg by mouth daily (with breakfast). 10 Tablet 0    metoprolol succinate (TOPROL-XL) 50 mg XL tablet Take 1 Tablet by mouth daily. 50 Tablet 0    albuterol (PROVENTIL VENTOLIN) 2.5 mg /3 mL (0.083 %) nebu Take 3 mL by inhalation every six (6) hours as needed.  folic acid (FOLVITE) 1 mg tablet TAKE 1 TABLET BY MOUTH EVERY DAY      levothyroxine (SYNTHROID) 100 mcg tablet TAKE 1 TABLET BY MOUTH EVERY DAY      ALPRAZolam (XANAX) 0.25 mg tablet TAKE 1 TABLET BY MOUTH EVERY 12 HOURS AS NEEDED          Review of Systems   Unable to perform ROS: Dementia       Objective     Vital signs for last 24 hours:  Visit Vitals  BP (!) 114/45   Pulse 90   Temp 97.3 °F (36.3 °C)   Resp 18   Ht 5' 5\" (1.651 m)   Wt 141 lb (64 kg)   SpO2 98%   BMI 23.46 kg/m²       Intake/Output this shift:  Current Shift: No intake/output data recorded.   Last 3 Shifts: 01/03 1901 - 01/05 0700  In: -   Out: 650 [Urine:650]    Data Review:   Recent Results (from the past 24 hour(s))   TROPONIN-HIGH SENSITIVITY    Collection Time: 01/04/22  5:15 PM   Result Value Ref Range    Troponin-High Sensitivity 23 0 - 51 ng/L   METABOLIC PANEL, COMPREHENSIVE    Collection Time: 01/05/22  5:43 AM   Result Value Ref Range    Sodium 141 136 - 145 mmol/L    Potassium 4.6 3.5 - 5.1 mmol/L    Chloride 108 97 - 108 mmol/L    CO2 26 21 - 32 mmol/L    Anion gap 7 5 - 15 mmol/L    Glucose 89 65 - 100 mg/dL    BUN 20 6 - 20 mg/dL    Creatinine 1.15 (H) 0.55 - 1.02 mg/dL    BUN/Creatinine ratio 17 12 - 20      GFR est AA 54 (L) >60 ml/min/1.73m2    GFR est non-AA 44 (L) >60 ml/min/1.73m2    Calcium 9.0 8.5 - 10.1 mg/dL    Bilirubin, total 0.4 0.2 - 1.0 mg/dL    AST (SGOT) 17 15 - 37 U/L    ALT (SGPT) 28 12 - 78 U/L    Alk. phosphatase 133 (H) 45 - 117 U/L    Protein, total 6.5 6.4 - 8.2 g/dL    Albumin 2.9 (L) 3.5 - 5.0 g/dL    Globulin 3.6 2.0 - 4.0 g/dL    A-G Ratio 0.8 (L) 1.1 - 2.2     CBC WITH AUTOMATED DIFF    Collection Time: 01/05/22  5:43 AM   Result Value Ref Range    WBC 8.3 3.6 - 11.0 K/uL    RBC 3.80 3.80 - 5.20 M/uL    HGB 11.7 11.5 - 16.0 g/dL    HCT 35.3 35.0 - 47.0 %    MCV 92.9 80.0 - 99.0 FL    MCH 30.8 26.0 - 34.0 PG    MCHC 33.1 30.0 - 36.5 g/dL    RDW 11.9 11.5 - 14.5 %    PLATELET 519 391 - 124 K/uL    MPV 10.2 8.9 - 12.9 FL    NRBC 0.0 0.0  WBC    ABSOLUTE NRBC 0.00 0.00 - 0.01 K/uL    NEUTROPHILS 69 32 - 75 %    LYMPHOCYTES 19 12 - 49 %    MONOCYTES 8 5 - 13 %    EOSINOPHILS 4 0 - 7 %    BASOPHILS 0 0 - 1 %    IMMATURE GRANULOCYTES 0 0 - 0.5 %    ABS. NEUTROPHILS 5.8 1.8 - 8.0 K/UL    ABS. LYMPHOCYTES 1.6 0.8 - 3.5 K/UL    ABS. MONOCYTES 0.7 0.0 - 1.0 K/UL    ABS. EOSINOPHILS 0.3 0.0 - 0.4 K/UL    ABS. BASOPHILS 0.0 0.0 - 0.1 K/UL    ABS. IMM. GRANS. 0.0 0.00 - 0.04 K/UL    DF AUTOMATED         Physical Exam  Constitutional:       Comments: Elderly female appears her stated age in no acute distress pleasantly confused   HENT:      Head: Normocephalic and atraumatic. Cardiovascular:      Rate and Rhythm: Normal rate and regular rhythm. Pulmonary:      Effort: Pulmonary effort is normal.   Abdominal:      General: There is no distension. Palpations: Abdomen is soft. Tenderness: There is abdominal tenderness (Very mild tenderness to palpation in the epigastrium). Skin:     General: Skin is warm and dry. Neurological:      Mental Status: Mental status is at baseline.    Psychiatric:      Comments: Confused           Assessment and plan:  79-year-old female with complaints of abdominal pain on CT and ultrasound found to have a distended gallbladder with no evidence of cholecystitis.   We will order a HIDA scan just to verify there is no cholecystitis  Further recommendations pending HIDA

## 2022-01-06 ENCOUNTER — APPOINTMENT (OUTPATIENT)
Dept: NUCLEAR MEDICINE | Age: 87
DRG: 444 | End: 2022-01-06
Attending: COLON & RECTAL SURGERY
Payer: MEDICARE

## 2022-01-06 LAB
ALBUMIN SERPL-MCNC: 2.8 G/DL (ref 3.5–5)
ALBUMIN/GLOB SERPL: 0.9 {RATIO} (ref 1.1–2.2)
ALP SERPL-CCNC: 119 U/L (ref 45–117)
ALT SERPL-CCNC: 23 U/L (ref 12–78)
ANION GAP SERPL CALC-SCNC: 5 MMOL/L (ref 5–15)
AST SERPL W P-5'-P-CCNC: 15 U/L (ref 15–37)
BILIRUB SERPL-MCNC: 0.3 MG/DL (ref 0.2–1)
BUN SERPL-MCNC: 18 MG/DL (ref 6–20)
BUN/CREAT SERPL: 15 (ref 12–20)
CA-I BLD-MCNC: 9 MG/DL (ref 8.5–10.1)
CHLORIDE SERPL-SCNC: 111 MMOL/L (ref 97–108)
CO2 SERPL-SCNC: 25 MMOL/L (ref 21–32)
CREAT SERPL-MCNC: 1.17 MG/DL (ref 0.55–1.02)
ERYTHROCYTE [DISTWIDTH] IN BLOOD BY AUTOMATED COUNT: 12.1 % (ref 11.5–14.5)
GLOBULIN SER CALC-MCNC: 3.2 G/DL (ref 2–4)
GLUCOSE SERPL-MCNC: 100 MG/DL (ref 65–100)
HCT VFR BLD AUTO: 32.6 % (ref 35–47)
HGB BLD-MCNC: 10.9 G/DL (ref 11.5–16)
MCH RBC QN AUTO: 31 PG (ref 26–34)
MCHC RBC AUTO-ENTMCNC: 33.4 G/DL (ref 30–36.5)
MCV RBC AUTO: 92.6 FL (ref 80–99)
NRBC # BLD: 0 K/UL (ref 0–0.01)
NRBC BLD-RTO: 0 PER 100 WBC
PLATELET # BLD AUTO: 336 K/UL (ref 150–400)
PMV BLD AUTO: 10.2 FL (ref 8.9–12.9)
POTASSIUM SERPL-SCNC: 4.3 MMOL/L (ref 3.5–5.1)
PROT SERPL-MCNC: 6 G/DL (ref 6.4–8.2)
RBC # BLD AUTO: 3.52 M/UL (ref 3.8–5.2)
SODIUM SERPL-SCNC: 141 MMOL/L (ref 136–145)
WBC # BLD AUTO: 8.5 K/UL (ref 3.6–11)

## 2022-01-06 PROCEDURE — 74011250636 HC RX REV CODE- 250/636: Performed by: COLON & RECTAL SURGERY

## 2022-01-06 PROCEDURE — 74011250636 HC RX REV CODE- 250/636: Performed by: FAMILY MEDICINE

## 2022-01-06 PROCEDURE — 99232 SBSQ HOSP IP/OBS MODERATE 35: CPT | Performed by: COLON & RECTAL SURGERY

## 2022-01-06 PROCEDURE — 65270000029 HC RM PRIVATE

## 2022-01-06 PROCEDURE — 80053 COMPREHEN METABOLIC PANEL: CPT

## 2022-01-06 PROCEDURE — 74011000258 HC RX REV CODE- 258: Performed by: COLON & RECTAL SURGERY

## 2022-01-06 PROCEDURE — 94640 AIRWAY INHALATION TREATMENT: CPT

## 2022-01-06 PROCEDURE — A9537 TC99M MEBROFENIN: HCPCS | Performed by: COLON & RECTAL SURGERY

## 2022-01-06 PROCEDURE — 74011636637 HC RX REV CODE- 636/637: Performed by: FAMILY MEDICINE

## 2022-01-06 PROCEDURE — 36415 COLL VENOUS BLD VENIPUNCTURE: CPT

## 2022-01-06 PROCEDURE — A9537 TC99M MEBROFENIN: HCPCS

## 2022-01-06 PROCEDURE — 85027 COMPLETE CBC AUTOMATED: CPT

## 2022-01-06 PROCEDURE — 74011000250 HC RX REV CODE- 250: Performed by: COLON & RECTAL SURGERY

## 2022-01-06 PROCEDURE — 74011250637 HC RX REV CODE- 250/637: Performed by: FAMILY MEDICINE

## 2022-01-06 RX ORDER — KIT FOR THE PREPARATION OF TECHNETIUM TC 99M MEBROFENIN 45 MG/10ML
7.2 INJECTION, POWDER, LYOPHILIZED, FOR SOLUTION INTRAVENOUS
Status: COMPLETED | OUTPATIENT
Start: 2022-01-06 | End: 2022-01-06

## 2022-01-06 RX ADMIN — PIPERACILLIN SODIUM AND TAZOBACTAM SODIUM 3.38 G: 3; .375 INJECTION, POWDER, LYOPHILIZED, FOR SOLUTION INTRAVENOUS at 21:31

## 2022-01-06 RX ADMIN — BUDESONIDE AND FORMOTEROL FUMARATE DIHYDRATE 2 PUFF: 160; 4.5 AEROSOL RESPIRATORY (INHALATION) at 20:57

## 2022-01-06 RX ADMIN — METOPROLOL SUCCINATE 50 MG: 50 TABLET, EXTENDED RELEASE ORAL at 11:45

## 2022-01-06 RX ADMIN — ASPIRIN 81 MG CHEWABLE TABLET 81 MG: 81 TABLET CHEWABLE at 11:45

## 2022-01-06 RX ADMIN — SODIUM CHLORIDE 75 ML/HR: 9 INJECTION, SOLUTION INTRAVENOUS at 22:32

## 2022-01-06 RX ADMIN — ATORVASTATIN CALCIUM 40 MG: 40 TABLET, FILM COATED ORAL at 22:34

## 2022-01-06 RX ADMIN — LEVOTHYROXINE SODIUM 100 MCG: 0.1 TABLET ORAL at 11:45

## 2022-01-06 RX ADMIN — MEBROFENIN 7.2 MILLICURIE: 45 INJECTION, POWDER, LYOPHILIZED, FOR SOLUTION INTRAVENOUS at 08:10

## 2022-01-06 RX ADMIN — SODIUM CHLORIDE, PRESERVATIVE FREE 20 MG: 5 INJECTION INTRAVENOUS at 11:44

## 2022-01-06 RX ADMIN — SODIUM CHLORIDE, PRESERVATIVE FREE 20 MG: 5 INJECTION INTRAVENOUS at 21:00

## 2022-01-06 RX ADMIN — PREDNISONE 10 MG: 5 TABLET ORAL at 11:45

## 2022-01-06 RX ADMIN — FOLIC ACID 1 MG: 1 TABLET ORAL at 11:45

## 2022-01-06 RX ADMIN — ACETAMINOPHEN 650 MG: 325 TABLET ORAL at 11:45

## 2022-01-06 NOTE — CONSULTS
Consult    Patient: Joyce Norris MRN: [id number]  SSN: [id number]    YOB: 1930  Age: 80 y.o. Sex: female      Subjective:      Joyce Norris is a 80 y.o. female who is being seen for gallbladder issue    She was brought to ER with confused and , ? abdominal pain. ER labs showed  Her hemoglobin was 10.9. liver functions showed slightly elevated alk phosphatases    CT scan of the abdomen pelvis which demonstrated gallbladder distention with small gallstones in the gallbladder lumen. There is no gallbladder wall edematous change.  Patient has no pain and want to go home,     Past Medical History:   Diagnosis Date    Anemia     Chronic obstructive pulmonary disease ([address]     Heart attack ([address]     Hypertension     Thyroid disease     hypothyroidism     Past Surgical History:   Procedure Laterality Date    HX ORTHOPAEDIC  2018    total hip replacement    HX PACEMAKER        Family History   Problem Relation Age of Onset    Stroke Mother     Heart Disease Mother     Stroke Father     Heart Disease Father      Social History     Tobacco Use    Smoking status: Never Smoker    Smokeless tobacco: Never Used   Substance Use Topics    Alcohol use: Never      Current Facility-Administered Medications   Medication Dose Route Frequency Provider Last Rate Last Admin    albuterol-ipratropium (DUO-NEB) 2.5 MG-0.5 MG/3 ML  3 mL Nebulization Q6H PRN Raphael Villanueva MD        apixaban (ELIQUIS) tablet 2.5 mg  2.5 mg Oral BID Raphael Villanueva MD   2.5 mg at 01/05/22 1143    aspirin chewable tablet 81 mg  81 mg Oral DAILY Raphael Villanueva MD   81 mg at 01/05/22 1143    atorvastatin (LIPITOR) tablet 40 mg  40 mg Oral QHS Raphael Villanueva MD        budesonide-formoteroL (SYMBICORT) 160-4.5 mcg/actuation HFA inhaler 2 Puff  2 Puff Inhalation BID Raphael Villanueva MD   2 Puff at 01/05/22 1035    metoprolol succinate (TOPROL-XL) XL tablet 50 mg  50 mg Oral DAILY Mohiuddin, Vergie Kehr, MD   50 mg at 01/05/22 1143    [START ON 1/6/2022] predniSONE (DELTASONE) tablet 10 mg  10 mg Oral DAILY WITH BREAKFAST Jean Villanueva MD        folic acid (FOLVITE) tablet 1 mg  1 mg Oral DAILY Raphael Villanueva MD   1 mg at 01/05/22 1143    levothyroxine (SYNTHROID) tablet 100 mcg  100 mcg Oral DAILY Raphael Villanueva MD   100 mcg at 01/05/22 1143    famotidine (PF) (PEPCID) 20 mg in 0.9% sodium chloride 10 mL injection  20 mg IntraVENous Q12H River Burgos MD        0.9% sodium chloride infusion  75 mL/hr IntraVENous CONTINUOUS Raphael Villanueva MD 75 mL/hr at 01/04/22 1805 75 mL/hr at 01/04/22 1805    acetaminophen (TYLENOL) tablet 650 mg  650 mg Oral Q6H PRN Jean Villanueva MD   650 mg at 01/04/22 1805    Or    acetaminophen (TYLENOL) suppository 650 mg  650 mg Rectal Q6H PRN Jean Villanueva MD        polyethylene glycol (MIRALAX) packet 17 g  17 g Oral DAILY PRN Jean Villanueva MD        ondansetron (ZOFRAN ODT) tablet 4 mg  4 mg Oral Q8H PRN Raphael Villanueva MD        Or    ondansetron [facility]) injection 4 mg  4 mg IntraVENous Q6H PRN Raphael Villanueva MD            No Known Allergies    Review of Systems:  Review of Systems   Unable to perform ROS: Dementia        Objective:     Vitals:    01/05/22 1340 01/05/22 1802 01/05/22 1808 01/05/22 2055   BP: (!) 114/45  (!) 172/53    Pulse: 90  78    Resp: 18      Temp:  98 °F (36.7 °C)     SpO2: 98%  98%    Weight:    63.2 kg (139 lb 5.3 oz)   Height:            Physical Exam:  Physical Exam  Constitutional:       Appearance: She is ill-appearing. HENT:      Head: Atraumatic. Mouth/Throat:      Mouth: Mucous membranes are dry. Eyes:      General: No scleral icterus. Cardiovascular:      Rate and Rhythm: Normal rate. Pulses: Normal pulses. Pulmonary:      Effort: Pulmonary effort is normal.   Abdominal:      General: Abdomen is flat. Bowel sounds are normal.   Musculoskeletal:         General: Normal range of motion.       Cervical back: Neck supple. Skin:     General: Skin is warm. Findings: No lesion. Neurological:      General: No focal deficit present. Mental Status: She is disoriented. Recent Results (from the past 24 hour(s))   METABOLIC PANEL, COMPREHENSIVE    Collection Time: 01/05/22  5:43 AM   Result Value Ref Range    Sodium 141 136 - 145 mmol/L    Potassium 4.6 3.5 - 5.1 mmol/L    Chloride 108 97 - 108 mmol/L    CO2 26 21 - 32 mmol/L    Anion gap 7 5 - 15 mmol/L    Glucose 89 65 - 100 mg/dL    BUN 20 6 - 20 mg/dL    Creatinine 1.15 (H) 0.55 - 1.02 mg/dL    BUN/Creatinine ratio 17 12 - 20      GFR est AA 54 (L) >60 ml/min/1.73m2    GFR est non-AA 44 (L) >60 ml/min/1.73m2    Calcium 9.0 8.5 - 10.1 mg/dL    Bilirubin, total 0.4 0.2 - 1.0 mg/dL    AST (SGOT) 17 15 - 37 U/L    ALT (SGPT) 28 12 - 78 U/L    Alk. phosphatase 133 (H) 45 - 117 U/L    Protein, total 6.5 6.4 - 8.2 g/dL    Albumin 2.9 (L) 3.5 - 5.0 g/dL    Globulin 3.6 2.0 - 4.0 g/dL    A-G Ratio 0.8 (L) 1.1 - 2.2     CBC WITH AUTOMATED DIFF    Collection Time: 01/05/22  5:43 AM   Result Value Ref Range    WBC 8.3 3.6 - 11.0 K/uL    RBC 3.80 3.80 - 5.20 M/uL    HGB 11.7 11.5 - 16.0 g/dL    HCT 35.3 35.0 - 47.0 %    MCV 92.9 80.0 - 99.0 FL    MCH 30.8 26.0 - 34.0 PG    MCHC 33.1 30.0 - 36.5 g/dL    RDW 11.9 11.5 - 14.5 %    PLATELET 378 393 - 378 K/uL    MPV 10.2 8.9 - 12.9 FL    NRBC 0.0 0.0  WBC    ABSOLUTE NRBC 0.00 0.00 - 0.01 K/uL    NEUTROPHILS 69 32 - 75 %    LYMPHOCYTES 19 12 - 49 %    MONOCYTES 8 5 - 13 %    EOSINOPHILS 4 0 - 7 %    BASOPHILS 0 0 - 1 %    IMMATURE GRANULOCYTES 0 0 - 0.5 %    ABS. NEUTROPHILS 5.8 1.8 - 8.0 K/UL    ABS. LYMPHOCYTES 1.6 0.8 - 3.5 K/UL    ABS. MONOCYTES 0.7 0.0 - 1.0 K/UL    ABS. EOSINOPHILS 0.3 0.0 - 0.4 K/UL    ABS. BASOPHILS 0.0 0.0 - 0.1 K/UL    ABS. IMM. GRANS. 0.0 0.00 - 0.04 K/UL    DF AUTOMATED          US ABD LTD   Final Result   Cholelithiasis.       CT ABD PELV W CONT   Final Result   No bowel obstruction. Colonic diverticulosis. No CT evidence for   appendicitis or diverticulitis. Unchanged appearance appendix. Gallbladder distention. Few small dependent gallstones within gallbladder lumen. Cortical atrophy each kidney. No hydronephrosis. Advanced atherosclerotic change abdominal aorta and pelvic arteries. No   abdominal aortic aneurysm. Left hip hardware. CT HEAD WO CONT   Final Result   Age-appropriate atrophy. No acute findings. XR CHEST PORT   Final Result   The cardiomediastinal silhouette is appropriate for age, technique,   and lung expansion. Pulmonary vasculature is not congested. The lungs are   essentially clear. No effusion or pneumothorax is seen. NM HEPATOBILIARY DUCT SCAN    (Results Pending)      Assessment:     Hospital Problems  Date Reviewed: 12/16/2020          Codes Class Noted POA    Lethargy ICD-10-CM: R53.83  ICD-9-CM: 780.79  1/4/2022 Unknown        AMS (altered mental status) ICD-10-CM: R41.82  ICD-9-CM: 780.97  1/4/2022 Unknown            distended gallbladder with no evidence of cholecystitis.   Slightly increasing of alk phophatese      Plan:   Follow the surgeon recommendation  Follow the liver panel  In am     Signed By: Josué Rodriguez MD     January 5, 2022         Thank you for allowing me to participate in this patients care  Cc Referring Physician   None

## 2022-01-06 NOTE — PROGRESS NOTES
PULMONARY NOTE  [facility]    Name: Gardenia Toledo MRN: [id number]   : 1930 Hospital: [facility]   Date: 2022  Admission date: 2022 Hospital Day: 3       HPI:     Hospital Problems  Date Reviewed: 2020          Codes Class Noted POA    Lethargy ICD-10-CM: R53.83  ICD-9-CM: 780.79  2022 Unknown        AMS (altered mental status) ICD-10-CM: R41.82  ICD-9-CM: 780.97  2022 Unknown                   [x] High complexity decision making was performed  [x] See my orders for details      Subjective/Initial History:     I was asked by Sy Shelton MD to see Gardenia Toledo  a 80 y.o.  female in consultation     Excerpts from admission 2022 or consult notes as follows:   22-year-old lady came in because of generalized weakness shortness of breath and confusion she has significant past medical history of asthma COPD hypertension coronary artery disease history of stent placement in the past she was having confusion spell also having upper respiratory symptoms generalized weakness her Covid test came back negative CAT scan of the abdomen was done which shows gallstone.       No Known Allergies     MAR reviewed and pertinent medications noted or modified as needed     Current Facility-Administered Medications   Medication    albuterol-ipratropium (DUO-NEB) 2.5 MG-0.5 MG/3 ML    apixaban (ELIQUIS) tablet 2.5 mg    aspirin chewable tablet 81 mg    atorvastatin (LIPITOR) tablet 40 mg    budesonide-formoteroL (SYMBICORT) 160-4.5 mcg/actuation HFA inhaler 2 Puff    metoprolol succinate (TOPROL-XL) XL tablet 50 mg    predniSONE (DELTASONE) tablet 10 mg    folic acid (FOLVITE) tablet 1 mg    levothyroxine (SYNTHROID) tablet 100 mcg    famotidine (PF) (PEPCID) 20 mg in 0.9% sodium chloride 10 mL injection    0.9% sodium chloride infusion    acetaminophen (TYLENOL) tablet 650 mg    Or    acetaminophen (TYLENOL) suppository 650 mg    polyethylene glycol (MIRALAX) packet 17 g    ondansetron (ZOFRAN ODT) tablet 4 mg    Or    ondansetron (ZOFRAN) injection 4 mg      Patient PCP: None  PMH:  has a past medical history of Anemia, Chronic obstructive pulmonary disease [address] [address] Hypertension, and Thyroid disease. PSH:   has a past surgical history that includes hx orthopaedic (2018) and hx pacemaker. FHX: family history includes Heart Disease in her father and mother; Stroke in her father and mother. SHX:  reports that she has never smoked. She has never used smokeless tobacco. She reports that she does not drink alcohol and does not use drugs. ROS:    Review of Systems   Constitutional: Positive for malaise/fatigue. HENT: Positive for hearing loss. Eyes: Negative. Respiratory: Positive for shortness of breath. Cardiovascular: Negative. Gastrointestinal: Negative. Genitourinary: Negative. Musculoskeletal: Negative. Skin: Negative. Neurological: Negative. Psychiatric/Behavioral: Negative.          Objective:     Vital Signs: Telemetry:    normal sinus rhythm Intake/Output:   Visit Vitals  BP (!) 154/56 (BP 1 Location: Right upper arm, BP Patient Position: Sitting)   Pulse 68   Temp 97.9 °F (36.6 °C)   Resp 18   Ht 5' 5\" (1.651 m)   Wt 63.2 kg (139 lb 5.3 oz)   SpO2 97%   Breastfeeding No   BMI 23.19 kg/m²       Temp (24hrs), Av.9 °F (36.6 °C), Min:97.7 °F (36.5 °C), Max:98 °F (36.7 °C)        O2 Device: None (Room air)         Wt Readings from Last 4 Encounters:   22 63.2 kg (139 lb 5.3 oz)   21 64.4 kg (141 lb 15.6 oz)   21 68 kg (150 lb)   21 68 kg (150 lb)          Intake/Output Summary (Last 24 hours) at 2022 1000  Last data filed at 2022 0735  Gross per 24 hour   Intake --   Output 451 ml   Net -451 ml       Last shift:       0701 -  1900  In: -   Out: 451 [Urine:450]  Last 3 shifts: 1901 -  0700  In: -   Out: 650 [Urine:650]       Physical Exam: Physical Exam  Constitutional:       Appearance: Normal appearance. HENT:      Head: Normocephalic and atraumatic. Nose: Nose normal.      Mouth/Throat:      Mouth: Mucous membranes are moist.   Eyes:      Pupils: Pupils are equal, round, and reactive to light. Cardiovascular:      Rate and Rhythm: Normal rate. Pulses: Normal pulses. Pulmonary:      Effort: Pulmonary effort is normal.   Abdominal:      General: Abdomen is flat. Bowel sounds are normal.   Musculoskeletal:         General: Normal range of motion. Cervical back: Normal range of motion and neck supple. Neurological:      Mental Status: She is alert. Comments: Hard of hearing          Labs:    Recent Labs     01/06/22  0713 01/05/22  0543 01/04/22  0855   WBC 8.5 8.3 9.8   HGB 10.9* 11.7 10.9*    342 305     Recent Labs     01/06/22 0713 01/05/22  0543 01/04/22  0855    141 145   K 4.3 4.6 3.5   * 108 115*   CO2 25 26 24    89 83   BUN 18 20 22*   CREA 1.17* 1.15* 1.13*   CA 9.0 9.0 7.6*   ALB 2.8* 2.9* 2.5*   ALT 23 28 29     No results for input(s): PH, PCO2, PO2, HCO3, FIO2 in the last 72 hours. No results for input(s): CPK, CKNDX, TROIQ in the last 72 hours. No lab exists for component: CPKMB  No results found for: BNPP, BNP   No results found for: CULTNo results found for: TSH, TSHEXT, TSHEXT    Imaging:    CXR Results  (Last 48 hours)    None        Results from Hospital Encounter encounter on 01/04/22    XR CHEST PORT    Narrative  1 new comparison September 20. Impression  The cardiomediastinal silhouette is appropriate for age, technique,  and lung expansion. Pulmonary vasculature is not congested. The lungs are  essentially clear. No effusion or pneumothorax is seen. Results from Hospital Encounter encounter on 09/20/21    XR CHEST PORT    Narrative  History: Evidence of breath    A single view of the chest was obtained. Heart size is stable.  There is some  atherosclerotic change of the aorta. Lungs are clear. No effusions or  pneumothorax. Bony structures are within normal limits. Impression  Normal findings. Results from Hospital Encounter encounter on 06/02/21    XR CHEST PORT    Narrative  The study is a single view chest radiographic examination dated 6/2/2021. HISTORY: Shortness of breath. COMPARISON:  1/24/2021. FINDINGS: The heart size is normal. The pulmonary vessels are normal in caliber. The lung parenchyma is clear without an infiltrate or atelectasis. There is a  mild degree of chronic baseline peribronchial cuffing. The costophrenic angles  are maintained without pleural effusions or a pneumothorax. There is a midline  position to the trachea. There are atherosclerotic vascular changes of the  thoracic aorta. The pulmonary bia are symmetrical.    The bony thorax is intact without fractures/acute osseous abnormalities. Impression  1. There are moderate atherosclerotic vascular changes of the thoracic aorta. The patient may also have coronary artery atherosclerotic vascular changes which  could be a source for chest pain. 2.  This examination is negative for acute pulmonary parenchymal pathology. There is a mild degree of chronic baseline peribronchial cuffing without change. Results from [place] encounter on 01/04/22    CT ABD PELV W CONT    Narrative  CT abdomen and pelvis without IV contrast    Comparison CT abdomen and pelvis November 19, 2019. Axial images are reviewed along with reformatted sagittal/coronal images. 100 mL  Isovue 370 administered. Motion limited study. Dose reduction: All CT scans at this facility are performed using dose reduction  optimization techniques as appropriate to a performed exam including the  following-  automated exposure control, adjustments of mA and/or Kv according to patient  size, or use of iterative reconstructive technique. Lung bases are clear. .    Normal enhancement of the liver. Gallbladder distention. Small gallstones noted  dependently within the gallbladder lumen. Pancreas, spleen, and bilateral  adrenal glands appear unremarkable. Cortical thinning bilateral kidneys. No hydronephrosis. Stomach and small bowel loops are decompressed. Appendix unchanged. There is  stool and air through colon. Distal descending and sigmoid colon diverticula. No  CT evidence for appendicitis or diverticulitis. No ascites. Unchanged uterine calcification likely related to small degenerated uterine  fibroid. Dense intimal calcification normal caliber abdominal aorta, advanced abdominal  aorta atherosclerosis. Atherosclerosis extends to proximal abdominal aorta  branch vessels and pelvic arteries. Left hip hardware. Impression  No bowel obstruction. Colonic diverticulosis. No CT evidence for  appendicitis or diverticulitis. Unchanged appearance appendix. Gallbladder distention. Few small dependent gallstones within gallbladder lumen. Cortical atrophy each kidney. No hydronephrosis. Advanced atherosclerotic change abdominal aorta and pelvic arteries. No  abdominal aortic aneurysm. Left hip hardware. IMPRESSION:   1. Asthma   2. COPD  3. Gallstone  4. Hypertension hypothyroidism  5. History of coronary artery disease      RECOMMENDATIONS/PLAN:     3 70-year-old lady with history of asthma and also upper respiratory symptoms CAT scan of the abdomen was done which shows gallstone  2. Agree with Symbicort inhaler  3.  Resume home meds  4. GI and surgeon seen the patient patient is for HIDA scan         ·           Jerel Haas MD

## 2022-01-06 NOTE — PROGRESS NOTES
Comprehensive Nutrition Assessment    Type and Reason for Visit: Consult (viji score, general nutrition management)    Nutrition Recommendations/Plan:  Diet as ordered, adv as able  Add ensure clear BID    Monitor weights, intake, labs and skin changes    Nutrition Assessment:  Admitted with generalized weakness, SOB. CAT scan of abdomen shows gallstone. No bowel obstruction. DX Acute cholecystitis. Pt taking clear liquids currently without difficulty. C/o of RUQ pain. Deciding surgery vs conservative management w/tube. Meds: Zosyn, statin, prednisone, folic acid, pepcid, levothyroxine, Labs: Na 141, K 4.3, BUN/CRE 18/1.17, Gluc 100, Alk phos 119, Alb 2.8         Malnutrition Assessment:  Malnutrition Status: Moderate malnutrition    Context:  Acute illness     Findings of the 6 clinical characteristics of malnutrition:   Energy Intake:  7 - 50% or less of est energy requirements for 5 or more days  Weight Loss:  No significant weight loss     Body Fat Loss:  1 - Mild body fat loss, Orbital,Buccal region   Muscle Mass Loss:  7 - Moderate muscle mass loss, Temples (temporalis),Hand (interosseous)  Fluid Accumulation:  No significant fluid accumulation,          Estimated Daily Nutrient Needs:  Energy (kcal): 1600kcal (25kcal/kg); Weight Used for Energy Requirements: Current  Protein (g): 77g pro(1.2g/kgpr); Weight Used for Protein Requirements: Current  Fluid (ml/day): 1600 ml; Method Used for Fluid Requirements: 1 ml/kcal      Nutrition Related Findings:  NFPE findings show moderate malnutrition No N/V, no C/D. Last BM 1/6. No edema.         Wounds:    Pressure injury (L buttock)       Current Nutrition Therapies:  ADULT DIET Clear Liquid    Anthropometric Measures:  · Height:  5' 5\" (165.1 cm)  · Current Body Wt:  63.2 kg (139 lb 5.3 oz)   · Admission Body Wt:  139 lb 5.3 oz       · Ideal Body Wt:  125 lbs:  111.5 %   · Adjusted Body Weight:   ; Weight Adjustment for: No adjustment   · BMI Category:  Normal weight (BMI 22.0-24.9) age over 72       Nutrition Diagnosis:   · Moderate malnutrition related to acute injury/trauma as evidenced by NPO or clear liquid status due to medical condition      Nutrition Interventions:   Food and/or Nutrient Delivery: Continue current diet,Start oral nutrition supplement  Nutrition Education and Counseling: No recommendations at this time  Coordination of Nutrition Care: Continue to monitor while inpatient    Goals:  Pt to meet >75% of EEN within 5-7 days,  skin improvement, weight maintenance       Nutrition Monitoring and Evaluation:   Behavioral-Environmental Outcomes: None identified  Food/Nutrient Intake Outcomes: Food and nutrient intake,Supplement intake  Physical Signs/Symptoms Outcomes: Weight,Skin,Chewing or swallowing    Discharge Planning:    No discharge needs at this time     Electronically signed by Toni Wilson RD on 1/6/2022 at 3:55 PM    Contact: [phone]

## 2022-01-06 NOTE — PROGRESS NOTES
Progress Note    Patient: Shalom Walls MRN: [id number]  SSN: [id number]    YOB: 1930  Age: 80 y.o. Sex: female      Admit Date: 1/4/2022    LOS: 2 days     Subjective:   Hospital day 2  Patient seen in bed  Complains of right upper abdominal pain    Objective:     Vitals:    01/05/22 1808 01/05/22 2020 01/05/22 2055 01/06/22 1145   BP: (!) 172/53 (!) 154/56  (!) 149/49   Pulse: 78 68  65   Resp:  18  18   Temp:  97.9 °F (36.6 °C)  97.8 °F (36.6 °C)   SpO2: 98% 97%  99%   Weight:   139 lb 5.3 oz (63.2 kg)    Height:            Intake and Output:  Current Shift: 01/06 0701 - 01/06 1900  In: -   Out: 451 [Urine:450]  Last three shifts: 01/04 1901 - 01/06 0700  In: -   Out: 650 [Urine:650]    Review of Systems:  ROS     Physical Exam:   Abdomen is soft, nondistended, tender palpation in the right upper quadrant    Lab/Data Review:  Recent Results (from the past 24 hour(s))   CBC W/O DIFF    Collection Time: 01/06/22  7:13 AM   Result Value Ref Range    WBC 8.5 3.6 - 11.0 K/uL    RBC 3.52 (L) 3.80 - 5.20 M/uL    HGB 10.9 (L) 11.5 - 16.0 g/dL    HCT 32.6 (L) 35.0 - 47.0 %    MCV 92.6 80.0 - 99.0 FL    MCH 31.0 26.0 - 34.0 PG    MCHC 33.4 30.0 - 36.5 g/dL    RDW 12.1 11.5 - 14.5 %    PLATELET 346 089 - 902 K/uL    MPV 10.2 8.9 - 12.9 FL    NRBC 0.0 0.0  WBC    ABSOLUTE NRBC 0.00 0.00 - 4.86 K/uL   METABOLIC PANEL, COMPREHENSIVE    Collection Time: 01/06/22  7:13 AM   Result Value Ref Range    Sodium 141 136 - 145 mmol/L    Potassium 4.3 3.5 - 5.1 mmol/L    Chloride 111 (H) 97 - 108 mmol/L    CO2 25 21 - 32 mmol/L    Anion gap 5 5 - 15 mmol/L    Glucose 100 65 - 100 mg/dL    BUN 18 6 - 20 mg/dL    Creatinine 1.17 (H) 0.55 - 1.02 mg/dL    BUN/Creatinine ratio 15 12 - 20      GFR est AA 53 (L) >60 ml/min/1.73m2    GFR est non-AA 43 (L) >60 ml/min/1.73m2    Calcium 9.0 8.5 - 10.1 mg/dL    Bilirubin, total 0.3 0.2 - 1.0 mg/dL    AST (SGOT) 15 15 - 37 U/L    ALT (SGPT) 23 12 - 78 U/L    Alk. phosphatase 119 (H) 45 - 117 U/L    Protein, total 6.0 (L) 6.4 - 8.2 g/dL    Albumin 2.8 (L) 3.5 - 5.0 g/dL    Globulin 3.2 2.0 - 4.0 g/dL    A-G Ratio 0.9 (L) 1.1 - 2.2        HIDA scan:  Narrative & Impression   Liver is normal. Tracer is first seen in the biliary tree at 10 minutes and in  the small bowel by 20 minutes. A normal gallbladder is not seen at any point up  until 2 hours postinjection. There is no rim sign     IMPRESSION  Abnormal study           Assessment:     Active Problems:    Lethargy (1/4/2022)      AMS (altered mental status) (1/4/2022)      Acute cholecystitis  Plan:   Had an extensive discussion with Ms. Harper's daughter regarding the findings of her HIDA scan. Her exam today she certainly is more tender in the right upper quadrant. I told her daughter that at her age and with her past history including a cardiac history surgery is fairly high risk. I am having cardiology come and see her to stratify her. I told her that another option is to put in a cholecystostomy tube. Her daughter said that knowing her mom she will pull it out but I also discussed with Ms. Brandt Beard surgery versus the tube and she seemed to voiced understanding that she gets decubitus to stay there for 6 weeks. Will make further recommendations following cardiology's assessment.   Signed By: Malia Butler MD     January 6, 2022

## 2022-01-06 NOTE — PROGRESS NOTES
General Daily Progress Note          Patient Name:   Nataliya Wilson       YOB: 1930       Age:  80 y.o.       Admit Date: 1/4/2022      Subjective:       Patient is a 80y.o. year old female signal past medical history of COPD hypertension hypothyroidism came to emergency room with confusion as the patient not a good historian history and physical from the ER record ER physician talked to the patient daughter and patient crying and complaining of various pain some abdominal pain patient have a CT scan of the abdomen done shows gallstone patient was admitted for further evaluation treatment                   Objective:     Visit Vitals  BP (!) 154/56 (BP 1 Location: Right upper arm, BP Patient Position: Sitting)   Pulse 68   Temp 97.9 °F (36.6 °C)   Resp 18   Ht 5' 5\" (1.651 m)   Wt 63.2 kg (139 lb 5.3 oz)   SpO2 97%   Breastfeeding No   BMI 23.19 kg/m²        Recent Results (from the past 24 hour(s))   CBC W/O DIFF    Collection Time: 01/06/22  7:13 AM   Result Value Ref Range    WBC 8.5 3.6 - 11.0 K/uL    RBC 3.52 (L) 3.80 - 5.20 M/uL    HGB 10.9 (L) 11.5 - 16.0 g/dL    HCT 32.6 (L) 35.0 - 47.0 %    MCV 92.6 80.0 - 99.0 FL    MCH 31.0 26.0 - 34.0 PG    MCHC 33.4 30.0 - 36.5 g/dL    RDW 12.1 11.5 - 14.5 %    PLATELET 778 203 - 284 K/uL    MPV 10.2 8.9 - 12.9 FL    NRBC 0.0 0.0  WBC    ABSOLUTE NRBC 0.00 0.00 - 8.74 K/uL   METABOLIC PANEL, COMPREHENSIVE    Collection Time: 01/06/22  7:13 AM   Result Value Ref Range    Sodium 141 136 - 145 mmol/L    Potassium 4.3 3.5 - 5.1 mmol/L    Chloride 111 (H) 97 - 108 mmol/L    CO2 25 21 - 32 mmol/L    Anion gap 5 5 - 15 mmol/L    Glucose 100 65 - 100 mg/dL    BUN 18 6 - 20 mg/dL    Creatinine 1.17 (H) 0.55 - 1.02 mg/dL    BUN/Creatinine ratio 15 12 - 20      GFR est AA 53 (L) >60 ml/min/1.73m2    GFR est non-AA 43 (L) >60 ml/min/1.73m2    Calcium 9.0 8.5 - 10.1 mg/dL    Bilirubin, total 0.3 0.2 - 1.0 mg/dL    AST (SGOT) 15 15 - 37 U/L    ALT (SGPT) 23 12 - 78 U/L    Alk. phosphatase 119 (H) 45 - 117 U/L    Protein, total 6.0 (L) 6.4 - 8.2 g/dL    Albumin 2.8 (L) 3.5 - 5.0 g/dL    Globulin 3.2 2.0 - 4.0 g/dL    A-G Ratio 0.9 (L) 1.1 - 2.2       [unfilled]      Review of Systems    Constitutional: Negative for chills and fever. HENT: Negative. Eyes: Negative. Respiratory: Negative. Cardiovascular: Negative. Gastrointestinal: Negative for abdominal pain and nausea. Skin: Negative. Neurological: Negative. Physical Exam:      Constitutional: pt is oriented to person, place, and time. HENT:   Head: Normocephalic and atraumatic. Eyes: Pupils are equal, round, and reactive to light. EOM are normal.   Cardiovascular: Normal rate, regular rhythm and normal heart sounds. Pulmonary/Chest: Breath sounds normal. No wheezes. No rales. Exhibits no tenderness. Abdominal: Soft. Bowel sounds are normal. There is no abdominal tenderness. There is no rebound and no guarding. Musculoskeletal: Normal range of motion. Neurological: pt is alert and oriented to person, place, and time. NM HEPATOBILIARY DUCT SCAN   Final Result   Abnormal study      US ABD LTD   Final Result   Cholelithiasis. CT ABD PELV W CONT   Final Result   No bowel obstruction. Colonic diverticulosis. No CT evidence for   appendicitis or diverticulitis. Unchanged appearance appendix. Gallbladder distention. Few small dependent gallstones within gallbladder lumen. Cortical atrophy each kidney. No hydronephrosis. Advanced atherosclerotic change abdominal aorta and pelvic arteries. No   abdominal aortic aneurysm. Left hip hardware. CT HEAD WO CONT   Final Result   Age-appropriate atrophy. No acute findings. XR CHEST PORT   Final Result   The cardiomediastinal silhouette is appropriate for age, technique,   and lung expansion. Pulmonary vasculature is not congested. The lungs are   essentially clear. No effusion or pneumothorax is seen. Recent Results (from the past 24 hour(s))   CBC W/O DIFF    Collection Time: 01/06/22  7:13 AM   Result Value Ref Range    WBC 8.5 3.6 - 11.0 K/uL    RBC 3.52 (L) 3.80 - 5.20 M/uL    HGB 10.9 (L) 11.5 - 16.0 g/dL    HCT 32.6 (L) 35.0 - 47.0 %    MCV 92.6 80.0 - 99.0 FL    MCH 31.0 26.0 - 34.0 PG    MCHC 33.4 30.0 - 36.5 g/dL    RDW 12.1 11.5 - 14.5 %    PLATELET 591 263 - 885 K/uL    MPV 10.2 8.9 - 12.9 FL    NRBC 0.0 0.0  WBC    ABSOLUTE NRBC 0.00 0.00 - 2.25 K/uL   METABOLIC PANEL, COMPREHENSIVE    Collection Time: 01/06/22  7:13 AM   Result Value Ref Range    Sodium 141 136 - 145 mmol/L    Potassium 4.3 3.5 - 5.1 mmol/L    Chloride 111 (H) 97 - 108 mmol/L    CO2 25 21 - 32 mmol/L    Anion gap 5 5 - 15 mmol/L    Glucose 100 65 - 100 mg/dL    BUN 18 6 - 20 mg/dL    Creatinine 1.17 (H) 0.55 - 1.02 mg/dL    BUN/Creatinine ratio 15 12 - 20      GFR est AA 53 (L) >60 ml/min/1.73m2    GFR est non-AA 43 (L) >60 ml/min/1.73m2    Calcium 9.0 8.5 - 10.1 mg/dL    Bilirubin, total 0.3 0.2 - 1.0 mg/dL    AST (SGOT) 15 15 - 37 U/L    ALT (SGPT) 23 12 - 78 U/L    Alk. phosphatase 119 (H) 45 - 117 U/L    Protein, total 6.0 (L) 6.4 - 8.2 g/dL    Albumin 2.8 (L) 3.5 - 5.0 g/dL    Globulin 3.2 2.0 - 4.0 g/dL    A-G Ratio 0.9 (L) 1.1 - 2.2         Results     Procedure Component Value Units Date/Time    COVID-19 RAPID TEST [[id number]] Collected: 01/04/22 0845    Order Status: Completed Specimen: Nasopharyngeal Updated: 01/04/22 0933     Specimen source       Please find results under separate order           COVID-19 rapid test Not Detected        Comment: Rapid Abbott ID Now   Rapid NAAT:  The specimen is NEGATIVE for SARS-CoV-2, the novel coronavirus associated with COVID-19. Negative results should be treated as presumptive and, if inconsistent with clinical signs and symptoms or necessary for patient management, should be tested with an alternative molecular assay.  Negative results do not preclude SARS-CoV-2 infection and should not be used as the sole basis for patient management decisions. This test has been authorized by the FDA under   an Emergency Use Authorization (EUA) for use by authorized laboratories. Fact sheet for Healthcare Providers: ConventionUpdate.co.nz Fact sheet for Patients: ConventionUpdate.co.nz   Methodology: Isothermal Nucleic Acid Amplification         INFLUENZA A & B AG (RAPID TEST) [[id number]] Collected: 01/04/22 0830    Order Status: Completed Specimen: Nasopharyngeal from Nasal washing Updated: 01/04/22 0933     Influenza A Antigen Negative        Influenza B Antigen Negative              Labs:     Recent Labs     01/06/22 0713 01/05/22  0543   WBC 8.5 8.3   HGB 10.9* 11.7   HCT 32.6* 35.3    342     Recent Labs     01/06/22 0713 01/05/22  0543 01/04/22  0855    141 145   K 4.3 4.6 3.5   * 108 115*   CO2 25 26 24   BUN 18 20 22*   CREA 1.17* 1.15* 1.13*    89 83   CA 9.0 9.0 7.6*     Recent Labs     01/06/22 0713 01/05/22  0543 01/04/22  0855   ALT 23 28 29   * 133* 119*   TBILI 0.3 0.4 0.3   TP 6.0* 6.5 5.4*   ALB 2.8* 2.9* 2.5*   GLOB 3.2 3.6 2.9     No results for input(s): INR, PTP, APTT, INREXT in the last 72 hours. No results for input(s): FE, TIBC, PSAT, FERR in the last 72 hours. No results found for: FOL, RBCF   No results for input(s): PH, PCO2, PO2 in the last 72 hours. No results for input(s): CPK, CKNDX, TROIQ in the last 72 hours.     No lab exists for component: CPKMB  No results found for: CHOL, CHOLX, CHLST, CHOLV, HDL, HDLP, LDL, LDLC, DLDLP, TGLX, TRIGL, TRIGP, CHHD, CHHDX  Lab Results   Component Value Date/Time    Glucose (POC) 89 01/04/2022 08:46 AM     Lab Results   Component Value Date/Time    Color Yellow/Straw 01/04/2022 11:00 AM    Appearance Clear 01/04/2022 11:00 AM    Specific gravity 1.013 01/04/2022 11:00 AM    pH (UA) 5.0 01/04/2022 11:00 AM    Protein Negative 01/04/2022 11:00 AM Glucose Negative 01/04/2022 11:00 AM    Ketone Negative 01/04/2022 11:00 AM    Bilirubin Negative 01/04/2022 11:00 AM    Urobilinogen 0.1 01/04/2022 11:00 AM    Nitrites Negative 01/04/2022 11:00 AM    Leukocyte Esterase Negative 01/04/2022 11:00 AM    Bacteria Negative 01/04/2022 11:00 AM    WBC 0-4 01/04/2022 11:00 AM    RBC 0-5 01/04/2022 11:00 AM         Assessment:     Gallstone  COPD  Hypertension  Hypothyroidism  History of MI      Plan:     HIDA scan is positive follow with the surgeon        Current Facility-Administered Medications:     piperacillin-tazobactam (ZOSYN) 3.375 g in 0.9% sodium chloride (MBP/ADV) 100 mL MBP, 3.375 g, IntraVENous, Q8H, Aryan Montes MD    albuterol-ipratropium (DUO-NEB) 2.5 MG-0.5 MG/3 ML, 3 mL, Nebulization, Q6H PRN, Óscar Hinson MD    [Held by provider] apixaban [facility] tablet 2.5 mg, 2.5 mg, Oral, BID, Raphael Villanueva MD, 2.5 mg at 01/05/22 1143    aspirin chewable tablet 81 mg, 81 mg, Oral, DAILY, Raphael Villanueva MD, 81 mg at 01/06/22 1145    atorvastatin (LIPITOR) tablet 40 mg, 40 mg, Oral, QHS, Raphael Villanueva MD    budesonide-formoteroL (SYMBICORT) 160-4.5 mcg/actuation HFA inhaler 2 Puff, 2 Puff, Inhalation, BID, Raphael Villanueva MD, 2 Puff at 01/05/22 1035    metoprolol succinate (TOPROL-XL) XL tablet 50 mg, 50 mg, Oral, DAILY, Raphael Villanueva MD, 50 mg at 01/06/22 1145    predniSONE (DELTASONE) tablet 10 mg, 10 mg, Oral, DAILY WITH BREAKFAST, Raphael Villanueva MD, 10 mg at 01/49/20 7055    folic acid (FOLVITE) tablet 1 mg, 1 mg, Oral, DAILY, Raphael Villanueva MD, 1 mg at 01/06/22 1145    levothyroxine (SYNTHROID) tablet 100 mcg, 100 mcg, Oral, DAILY, Raphael Villanueva MD, 100 mcg at 01/06/22 1145    famotidine (PF) (PEPCID) 20 mg in 0.9% sodium chloride 10 mL injection, 20 mg, IntraVENous, Q12H, Aryan Montes MD, 20 mg at 01/06/22 1144    0.9% sodium chloride infusion, 75 mL/hr, IntraVENous, CONTINUOUS, Mark Villanueva MD, Last Rate: 75 mL/hr at 01/04/22 1805, 75 mL/hr at 01/04/22 1805    acetaminophen (TYLENOL) tablet 650 mg, 650 mg, Oral, Q6H PRN, 650 mg at 01/06/22 1145 **OR** acetaminophen (TYLENOL) suppository 650 mg, 650 mg, Rectal, Q6H PRN, Mohiuddin, Gibson Babinski, MD    polyethylene glycol (MIRALAX) packet 17 g, 17 g, Oral, DAILY PRN, Raphael Villanueva MD    ondansetron (ZOFRAN ODT) tablet 4 mg, 4 mg, Oral, Q8H PRN **OR** ondansetron (ZOFRAN) injection 4 mg, 4 mg, IntraVENous, Q6H PRN, Janeth Evans MD

## 2022-01-07 PROCEDURE — 94640 AIRWAY INHALATION TREATMENT: CPT

## 2022-01-07 PROCEDURE — 65270000029 HC RM PRIVATE

## 2022-01-07 PROCEDURE — 74011250637 HC RX REV CODE- 250/637: Performed by: FAMILY MEDICINE

## 2022-01-07 PROCEDURE — 74011000250 HC RX REV CODE- 250: Performed by: COLON & RECTAL SURGERY

## 2022-01-07 PROCEDURE — 74011000258 HC RX REV CODE- 258: Performed by: COLON & RECTAL SURGERY

## 2022-01-07 PROCEDURE — 74011636637 HC RX REV CODE- 636/637: Performed by: FAMILY MEDICINE

## 2022-01-07 PROCEDURE — 99232 SBSQ HOSP IP/OBS MODERATE 35: CPT | Performed by: COLON & RECTAL SURGERY

## 2022-01-07 PROCEDURE — 74011250636 HC RX REV CODE- 250/636: Performed by: COLON & RECTAL SURGERY

## 2022-01-07 RX ADMIN — PIPERACILLIN SODIUM AND TAZOBACTAM SODIUM 3.38 G: 3; .375 INJECTION, POWDER, LYOPHILIZED, FOR SOLUTION INTRAVENOUS at 13:57

## 2022-01-07 RX ADMIN — WHITE PETROLATUM,ZINC OXIDE: 57; 17 PASTE TOPICAL at 22:00

## 2022-01-07 RX ADMIN — BUDESONIDE AND FORMOTEROL FUMARATE DIHYDRATE 2 PUFF: 160; 4.5 AEROSOL RESPIRATORY (INHALATION) at 22:02

## 2022-01-07 RX ADMIN — PIPERACILLIN SODIUM AND TAZOBACTAM SODIUM 3.38 G: 3; .375 INJECTION, POWDER, LYOPHILIZED, FOR SOLUTION INTRAVENOUS at 20:59

## 2022-01-07 RX ADMIN — LEVOTHYROXINE SODIUM 100 MCG: 0.1 TABLET ORAL at 10:24

## 2022-01-07 RX ADMIN — WHITE PETROLATUM,ZINC OXIDE: 57; 17 PASTE TOPICAL at 19:30

## 2022-01-07 RX ADMIN — SODIUM CHLORIDE, PRESERVATIVE FREE 20 MG: 5 INJECTION INTRAVENOUS at 20:58

## 2022-01-07 RX ADMIN — ACETAMINOPHEN 650 MG: 325 TABLET ORAL at 20:57

## 2022-01-07 RX ADMIN — SODIUM CHLORIDE, PRESERVATIVE FREE 20 MG: 5 INJECTION INTRAVENOUS at 10:23

## 2022-01-07 RX ADMIN — ASPIRIN 81 MG CHEWABLE TABLET 81 MG: 81 TABLET CHEWABLE at 10:23

## 2022-01-07 RX ADMIN — METOPROLOL SUCCINATE 50 MG: 50 TABLET, EXTENDED RELEASE ORAL at 10:23

## 2022-01-07 RX ADMIN — FOLIC ACID 1 MG: 1 TABLET ORAL at 10:23

## 2022-01-07 RX ADMIN — ATORVASTATIN CALCIUM 40 MG: 40 TABLET, FILM COATED ORAL at 21:02

## 2022-01-07 RX ADMIN — BUDESONIDE AND FORMOTEROL FUMARATE DIHYDRATE 2 PUFF: 160; 4.5 AEROSOL RESPIRATORY (INHALATION) at 09:34

## 2022-01-07 RX ADMIN — PREDNISONE 10 MG: 5 TABLET ORAL at 10:24

## 2022-01-07 RX ADMIN — PIPERACILLIN SODIUM AND TAZOBACTAM SODIUM 3.38 G: 3; .375 INJECTION, POWDER, LYOPHILIZED, FOR SOLUTION INTRAVENOUS at 04:05

## 2022-01-07 NOTE — PROGRESS NOTES
Called to patient room. Patient had pulled INT out attempting to get Up out of bed. Patient assessed and area cleaned and assisted patient to bed. Explained to the patient that she would need another IV. Patient expressed her understanding and allowed this nurse to place a 20G in the [address] [place] [address] Avenue. No problems noted. Patient's  daughter on the phone and wanting to discuss patient plan of care. Daughter very vocal about staff and the doctors. Patient with the call bell within reach and fluids at bedside. Nurse will continue to assess.

## 2022-01-07 NOTE — PROGRESS NOTES
PULMONARY NOTE  [facility]    Name: Domitila Johns MRN: [id number]   : 1930 Hospital: [address] [place] [address]   Date: 2022  Admission date: 2022 Hospital Day: 4       HPI:     Hospital Problems  Date Reviewed: 2020          Codes Class Noted POA    Lethargy ICD-10-CM: R53.83  ICD-9-CM: 780.79  2022 Unknown        AMS (altered mental status) ICD-10-CM: R41.82  ICD-9-CM: 780.97  2022 Unknown                   [x] High complexity decision making was performed  [x] See my orders for details      Subjective/Initial History:     I was asked by Kusum Forbes MD to see Domitila Johns  a 80 y.o.  female in consultation     Excerpts from admission 2022 or consult notes as follows:   77-year-old lady came in because of generalized weakness shortness of breath and confusion she has significant past medical history of asthma COPD hypertension coronary artery disease history of stent placement in the past she was having confusion spell also having upper respiratory symptoms generalized weakness her Covid test came back negative CAT scan of the abdomen was done which shows gallstone.       No Known Allergies     MAR reviewed and pertinent medications noted or modified as needed     Current Facility-Administered Medications   Medication    piperacillin-tazobactam (ZOSYN) 3.375 g in 0.9% sodium chloride (MBP/ADV) 100 mL MBP    influenza vaccine  (6 mos+)(PF) (FLUARIX/FLULAVAL/FLUZONE QUAD) injection 0.5 mL    albuterol-ipratropium (DUO-NEB) 2.5 MG-0.5 MG/3 ML    [Held by provider] apixaban (ELIQUIS) tablet 2.5 mg    aspirin chewable tablet 81 mg    atorvastatin (LIPITOR) tablet 40 mg    budesonide-formoteroL (SYMBICORT) 160-4.5 mcg/actuation HFA inhaler 2 Puff    metoprolol succinate (TOPROL-XL) XL tablet 50 mg    predniSONE (DELTASONE) tablet 10 mg    folic acid (FOLVITE) tablet 1 mg    levothyroxine (SYNTHROID) tablet 100 mcg    famotidine (PF) (PEPCID) 20 mg in 0.9% sodium chloride 10 mL injection    0.9% sodium chloride infusion    acetaminophen (TYLENOL) tablet 650 mg    Or    acetaminophen (TYLENOL) suppository 650 mg    polyethylene glycol (MIRALAX) packet 17 g    ondansetron (ZOFRAN ODT) tablet 4 mg    Or    ondansetron (ZOFRAN) injection 4 mg      Patient PCP: None  PMH:  has a past medical history of Anemia, Chronic obstructive pulmonary disease ([facility] [address] Heart attack ([facility] [address] Hypertension, and Thyroid disease. PSH:   has a past surgical history that includes hx orthopaedic (2018) and hx pacemaker. FHX: family history includes Heart Disease in her father and mother; Stroke in her father and mother. SHX:  reports that she has never smoked. She has never used smokeless tobacco. She reports that she does not drink alcohol and does not use drugs. ROS:    Review of Systems   Constitutional: Positive for malaise/fatigue. HENT: Positive for hearing loss. Eyes: Negative. Respiratory: Positive for shortness of breath. Cardiovascular: Negative. Gastrointestinal: Negative. Genitourinary: Negative. Musculoskeletal: Negative. Skin: Negative. Neurological: Negative. Psychiatric/Behavioral: Negative.          Objective:     Vital Signs: Telemetry:    normal sinus rhythm Intake/Output:   Visit Vitals  BP (!) 157/62 (BP 1 Location: Right upper arm, BP Patient Position: Sitting)   Pulse 74   Temp 98.9 °F (37.2 °C)   Resp 18   Ht 5' 5\" (1.651 m)   Wt 63.2 kg (139 lb 5.3 oz)   SpO2 98%   Breastfeeding No   BMI 23.19 kg/m²       Temp (24hrs), Av.4 °F (36.9 °C), Min:97.8 °F (36.6 °C), Max:98.9 °F (37.2 °C)        O2 Device: None (Room air)         Wt Readings from Last 4 Encounters:   22 63.2 kg (139 lb 5.3 oz)   21 64.4 kg (141 lb 15.6 oz)   21 68 kg (150 lb)   21 68 kg (150 lb)          Intake/Output Summary (Last 24 hours) at 2022 0953  Last data filed at 2022 1823  Gross per 24 hour   Intake 720 ml   Output 400 ml   Net 320 ml       Last shift:      No intake/output data recorded. Last 3 shifts: 01/05 1901 - 01/07 0700  In: 720 [P.O.:720]  Out: 851 [Urine:850]       Physical Exam:     Physical Exam  Constitutional:       Appearance: Normal appearance. HENT:      Head: Normocephalic and atraumatic. Nose: Nose normal.      Mouth/Throat:      Mouth: Mucous membranes are moist.   Eyes:      Pupils: Pupils are equal, round, and reactive to light. Cardiovascular:      Rate and Rhythm: Normal rate. Pulses: Normal pulses. Pulmonary:      Effort: Pulmonary effort is normal.   Abdominal:      General: Abdomen is flat. Bowel sounds are normal.   Musculoskeletal:         General: Normal range of motion. Cervical back: Normal range of motion and neck supple. Neurological:      Mental Status: She is alert. Comments: Hard of hearing          Labs:    Recent Labs     01/06/22  0713 01/05/22  0543   WBC 8.5 8.3   HGB 10.9* 11.7    342     Recent Labs     01/06/22  0713 01/05/22  0543    141   K 4.3 4.6   * 108   CO2 25 26    89   BUN 18 20   CREA 1.17* 1.15*   CA 9.0 9.0   ALB 2.8* 2.9*   ALT 23 28     No results for input(s): PH, PCO2, PO2, HCO3, FIO2 in the last 72 hours. No results for input(s): CPK, CKNDX, TROIQ in the last 72 hours. No lab exists for component: CPKMB  No results found for: BNPP, BNP   No results found for: CULTNo results found for: TSH, TSHEXT, TSHEXT    Imaging:    CXR Results  (Last 48 hours)    None        Results from Hospital Encounter encounter on 01/04/22    XR CHEST PORT    Narrative  1 new comparison September 20. Impression  The cardiomediastinal silhouette is appropriate for age, technique,  and lung expansion. Pulmonary vasculature is not congested. The lungs are  essentially clear. No effusion or pneumothorax is seen.       Results from [place] encounter on 09/20/21    XR CHEST PORT    Narrative  History: Evidence of breath    A single view of the chest was obtained. Heart size is stable. There is some  atherosclerotic change of the aorta. Lungs are clear. No effusions or  pneumothorax. Bony structures are within normal limits. Impression  Normal findings. Results from Hospital Encounter encounter on 06/02/21    XR CHEST PORT    Narrative  The study is a single view chest radiographic examination dated 6/2/2021. HISTORY: Shortness of breath. COMPARISON:  1/24/2021. FINDINGS: The heart size is normal. The pulmonary vessels are normal in caliber. The lung parenchyma is clear without an infiltrate or atelectasis. There is a  mild degree of chronic baseline peribronchial cuffing. The costophrenic angles  are maintained without pleural effusions or a pneumothorax. There is a midline  position to the trachea. There are atherosclerotic vascular changes of the  thoracic aorta. The pulmonary bia are symmetrical.    The bony thorax is intact without fractures/acute osseous abnormalities. Impression  1. There are moderate atherosclerotic vascular changes of the thoracic aorta. The patient may also have coronary artery atherosclerotic vascular changes which  could be a source for chest pain. 2.  This examination is negative for acute pulmonary parenchymal pathology. There is a mild degree of chronic baseline peribronchial cuffing without change. Results from [place] encounter on 01/04/22    CT ABD PELV W CONT    Narrative  CT abdomen and pelvis without IV contrast    Comparison CT abdomen and pelvis November 19, 2019. Axial images are reviewed along with reformatted sagittal/coronal images. 100 mL  Isovue 370 administered. Motion limited study.   Dose reduction: All CT scans at this facility are performed using dose reduction  optimization techniques as appropriate to a performed exam including the  following-  automated exposure control, adjustments of mA and/or Kv according to patient  size, or use of iterative reconstructive technique. Lung bases are clear. .    Normal enhancement of the liver. Gallbladder distention. Small gallstones noted  dependently within the gallbladder lumen. Pancreas, spleen, and bilateral  adrenal glands appear unremarkable. Cortical thinning bilateral kidneys. No hydronephrosis. Stomach and small bowel loops are decompressed. Appendix unchanged. There is  stool and air through colon. Distal descending and sigmoid colon diverticula. No  CT evidence for appendicitis or diverticulitis. No ascites. Unchanged uterine calcification likely related to small degenerated uterine  fibroid. Dense intimal calcification normal caliber abdominal aorta, advanced abdominal  aorta atherosclerosis. Atherosclerosis extends to proximal abdominal aorta  branch vessels and pelvic arteries. Left hip hardware. Impression  No bowel obstruction. Colonic diverticulosis. No CT evidence for  appendicitis or diverticulitis. Unchanged appearance appendix. Gallbladder distention. Few small dependent gallstones within gallbladder lumen. Cortical atrophy each kidney. No hydronephrosis. Advanced atherosclerotic change abdominal aorta and pelvic arteries. No  abdominal aortic aneurysm. Left hip hardware. IMPRESSION:   1. Asthma   2. COPD  3. Gallstone  4. Hypertension hypothyroidism  5. History of coronary artery disease      RECOMMENDATIONS/PLAN:   3 60-year-old lady with history of asthma and also upper respiratory symptoms CAT scan of the abdomen was done which shows gallstone  2. Agree with Symbicort inhaler  3. Resume home meds  4.  Surgery is seen the patient for gallstone and discussed with her about HIDA scan she is hard of hearing either for cholecystectomy or cholecystectomy tube               Katie Mix MD

## 2022-01-07 NOTE — PROGRESS NOTES
General Daily Progress Note          Patient Name:   Gardenia Toledo       YOB: 1930       Age:  80 y.o. Admit Date: 1/4/2022      Subjective:       Patient is a 80y.o. year old female signal past medical history of COPD hypertension hypothyroidism came to emergency room with confusion as the patient not a good historian history and physical from the ER record ER physician talked to the patient daughter and patient crying and complaining of various pain some abdominal pain patient have a CT scan of the abdomen done shows gallstone patient was admitted for further evaluation treatment    1/7/22  Patient sitting up in bed. Currently not complaining of RUQ pain but still slightly tender to the area    HIDA Scan: Abnormal study. Normal gallbladder is not seen at any point up until 2 hours post-injection. Objective:     Visit Vitals  BP (!) 157/62 (BP 1 Location: Right upper arm, BP Patient Position: Sitting)   Pulse 74   Temp 98.9 °F (37.2 °C)   Resp 18   Ht 5' 5\" (1.651 m)   Wt 63.2 kg (139 lb 5.3 oz)   SpO2 98%   Breastfeeding No   BMI 23.19 kg/m²        No results found for this or any previous visit (from the past 24 hour(s)). [unfilled]      Review of Systems    Constitutional: Negative for chills and fever. HENT: Negative. Eyes: Negative. Respiratory: Negative. Cardiovascular: Negative. Gastrointestinal: Positive for abdominal pain. Negative for nausea   Skin: Negative. Neurological: Negative. Physical Exam:      Constitutional: pt is oriented to person, place, and time. HENT: Hearing loss  Head: Normocephalic and atraumatic. Eyes: Pupils are equal, round, and reactive to light. EOM are normal.   Cardiovascular: Normal rate, regular rhythm and normal heart sounds. Pulmonary/Chest: Breath sounds normal. No wheezes. No rales. Exhibits no tenderness. Abdominal:RUQ tenderness. Soft. Bowel sounds are normal.  There is no rebound and no guarding. Musculoskeletal: Normal range of motion. Neurological: pt is alert and oriented to person, place, and time. NM HEPATOBILIARY DUCT SCAN   Final Result   Abnormal study      US ABD LTD   Final Result   Cholelithiasis. CT ABD PELV W CONT   Final Result   No bowel obstruction. Colonic diverticulosis. No CT evidence for   appendicitis or diverticulitis. Unchanged appearance appendix. Gallbladder distention. Few small dependent gallstones within gallbladder lumen. Cortical atrophy each kidney. No hydronephrosis. Advanced atherosclerotic change abdominal aorta and pelvic arteries. No   abdominal aortic aneurysm. Left hip hardware. CT HEAD WO CONT   Final Result   Age-appropriate atrophy. No acute findings. XR CHEST PORT   Final Result   The cardiomediastinal silhouette is appropriate for age, technique,   and lung expansion. Pulmonary vasculature is not congested. The lungs are   essentially clear. No effusion or pneumothorax is seen. No results found for this or any previous visit (from the past 24 hour(s)). Results     Procedure Component Value Units Date/Time    COVID-19 RAPID TEST [[id number]] Collected: 01/04/22 0845    Order Status: Completed Specimen: Nasopharyngeal Updated: 01/04/22 0933     Specimen source       Please find results under separate order           COVID-19 rapid test Not Detected        Comment: Rapid Abbott ID Now   Rapid NAAT:  The specimen is NEGATIVE for SARS-CoV-2, the novel coronavirus associated with COVID-19. Negative results should be treated as presumptive and, if inconsistent with clinical signs and symptoms or necessary for patient management, should be tested with an alternative molecular assay. Negative results do not preclude SARS-CoV-2 infection and should not be used as the sole basis for patient management decisions.    This test has been authorized by the FDA under   an Emergency Use Authorization (EUA) for use by authorized laboratories. Fact sheet for Healthcare Providers: ConventionUpdate.co.nz Fact sheet for Patients: ConventionUpdate.co.nz   Methodology: Isothermal Nucleic Acid Amplification         INFLUENZA A & B AG (RAPID TEST) [[id number]] Collected: 01/04/22 0830    Order Status: Completed Specimen: Nasopharyngeal from Nasal washing Updated: 01/04/22 0933     Influenza A Antigen Negative        Influenza B Antigen Negative              Labs:     Recent Labs     01/06/22 0713 01/05/22  0543   WBC 8.5 8.3   HGB 10.9* 11.7   HCT 32.6* 35.3    342     Recent Labs     01/06/22 0713 01/05/22  0543    141   K 4.3 4.6   * 108   CO2 25 26   BUN 18 20   CREA 1.17* 1.15*    89   CA 9.0 9.0     Recent Labs     01/06/22 0713 01/05/22  0543   ALT 23 28   * 133*   TBILI 0.3 0.4   TP 6.0* 6.5   ALB 2.8* 2.9*   GLOB 3.2 3.6     No results for input(s): INR, PTP, APTT, INREXT, INREXT in the last 72 hours. No results for input(s): FE, TIBC, PSAT, FERR in the last 72 hours. No results found for: FOL, RBCF   No results for input(s): PH, PCO2, PO2 in the last 72 hours. No results for input(s): CPK, CKNDX, TROIQ in the last 72 hours.     No lab exists for component: CPKMB  No results found for: CHOL, CHOLX, CHLST, CHOLV, HDL, HDLP, LDL, LDLC, DLDLP, TGLX, TRIGL, TRIGP, CHHD, CHHDX  Lab Results   Component Value Date/Time    Glucose (POC) 89 01/04/2022 08:46 AM     Lab Results   Component Value Date/Time    Color Yellow/Straw 01/04/2022 11:00 AM    Appearance Clear 01/04/2022 11:00 AM    Specific gravity 1.013 01/04/2022 11:00 AM    pH (UA) 5.0 01/04/2022 11:00 AM    Protein Negative 01/04/2022 11:00 AM    Glucose Negative 01/04/2022 11:00 AM    Ketone Negative 01/04/2022 11:00 AM    Bilirubin Negative 01/04/2022 11:00 AM    Urobilinogen 0.1 01/04/2022 11:00 AM    Nitrites Negative 01/04/2022 11:00 AM    Leukocyte Esterase Negative 01/04/2022 11:00 AM    Bacteria Negative 01/04/2022 11:00 AM    WBC 0-4 01/04/2022 11:00 AM    RBC 0-5 01/04/2022 11:00 AM         Assessment:     Gallstone  COPD  Hypertension  Hypothyroidism  History of MI      Plan:     HIDA scan abnormal.    Per Colon and Rectal surgery:   Cholecystectomy vs Cholecystostomy tube.      Per Cardiology:  [id number]: 6  High risk for surgery due to high suspicion for proximal LAD disease  Recommends conservative management       Current Facility-Administered Medications:     piperacillin-tazobactam (ZOSYN) 3.375 g in 0.9% sodium chloride (MBP/ADV) 100 mL MBP, 3.375 g, IntraVENous, Q8H, Aryan Ramos MD, Last Rate: 25 mL/hr at 01/07/22 0405, 3.375 g at 01/07/22 0405    influenza vaccine 2021-22 (6 mos+)(PF) (FLUARIX/FLULAVAL/FLUZONE QUAD) injection 0.5 mL, 1 Each, IntraMUSCular, PRIOR TO DISCHARGE, Radhika Villanueva MD    albuterol-ipratropium (DUO-NEB) 2.5 MG-0.5 MG/3 ML, 3 mL, Nebulization, Q6H PRN, Tari Hackett MD    [Held by provider] apixaban Pieter Phalen) tablet 2.5 mg, 2.5 mg, Oral, BID, Raphael Villanueva MD, 2.5 mg at 01/05/22 1143    aspirin chewable tablet 81 mg, 81 mg, Oral, DAILY, Raphael Villanueva MD, 81 mg at 01/06/22 1145    atorvastatin (LIPITOR) tablet 40 mg, 40 mg, Oral, QHS, Raphael Villanueva MD, 40 mg at 01/06/22 2234    budesonide-formoteroL (SYMBICORT) 160-4.5 mcg/actuation HFA inhaler 2 Puff, 2 Puff, Inhalation, BID, Raphael Villanueva MD, 2 Puff at 01/07/22 0934    metoprolol succinate (TOPROL-XL) XL tablet 50 mg, 50 mg, Oral, DAILY, Raphael Villanueva MD, 50 mg at 01/06/22 1145    predniSONE (DELTASONE) tablet 10 mg, 10 mg, Oral, DAILY WITH BREAKFAST, Raphael Villanueva MD, 10 mg at 42/27/62 1028    folic acid (FOLVITE) tablet 1 mg, 1 mg, Oral, DAILY, Raphael Villanueva MD, 1 mg at 01/06/22 1145    levothyroxine (SYNTHROID) tablet 100 mcg, 100 mcg, Oral, DAILY, Raphael Villanueva MD, 100 mcg at 01/06/22 1145    famotidine (PF) (PEPCID) 20 mg in 0.9% sodium chloride 10 mL injection, 20 mg, IntraVENous, Q12H, Deshawn Mccray MD, 20 mg at 01/06/22 2100    0.9% sodium chloride infusion, 75 mL/hr, IntraVENous, CONTINUOUS, Raphael Villanueva MD, Last Rate: 75 mL/hr at 01/06/22 2232, 75 mL/hr at 01/06/22 2232    acetaminophen (TYLENOL) tablet 650 mg, 650 mg, Oral, Q6H PRN, 650 mg at 01/06/22 1145 **OR** acetaminophen (TYLENOL) suppository 650 mg, 650 mg, Rectal, Q6H PRN, Raphael Villanueva MD    polyethylene glycol (MIRALAX) packet 17 g, 17 g, Oral, DAILY PRN, Raphael Villanueva MD    ondansetron (ZOFRAN ODT) tablet 4 mg, 4 mg, Oral, Q8H PRN **OR** ondansetron (ZOFRAN) injection 4 mg, 4 mg, IntraVENous, Q6H PRN, Trae Robles MD

## 2022-01-07 NOTE — PROGRESS NOTES
Progress Note    Patient: Erick Jc MRN: [id number]  SSN: [id number]    YOB: 1930  Age: 80 y.o. Sex: female      Admit Date: 1/4/2022    LOS: 3 days     Subjective:   Patient seen in bed  Reports abdominal pain is improved  Remains pleasantly confused but appears to be in no distress    Objective:     Vitals:    01/06/22 1823 01/06/22 2000 01/07/22 0800 01/07/22 0935   BP: (!) 190/64 (!) 157/62 135/81    Pulse: 77 74 63    Resp: 18 18     Temp: 98.5 °F (36.9 °C) 98.9 °F (37.2 °C) 97.9 °F (36.6 °C)    SpO2: 98% 99% 97% 98%   Weight:       Height:            Intake and Output:  Current Shift: No intake/output data recorded. Last three shifts: 01/05 1901 - 01/07 0700  In: 720 [P.O.:720]  Out: 851 [Urine:850]    Review of Systems:  ROS     Physical Exam:   Abdomen is soft, nondistended, very mildly tender palpation right upper quadrant    Lab/Data Review:  No results found for this or any previous visit (from the past 24 hour(s)). Assessment:     Active Problems:    Lethargy (1/4/2022)      AMS (altered mental status) (1/4/2022)    Cholecystitis    Plan:   Patient's exam is improved, she continues on IV antibiotics. I had extensive discussion with interventional radiology and they feel given her dementia that is highly likely that she will pull out the drain given the fact that she needs to be on Eliquis that this would be risky to have a transhepatic drain. I did agree with him that she is clinically improved we can hold off. We will start a clear liquid diet if she does well we will advance his diet over this weekend and discharge her home.   Signed By: Kylee Hawkins MD     January 7, 2022

## 2022-01-07 NOTE — PROGRESS NOTES
Cardiology Progress Note      1/7/2022 11:42 AM    Admit Date: 1/4/2022    Admit Diagnosis: Lethargy [R53.83]  AMS (altered mental status) [R41.82]      Subjective:   Patient seen and examined. She has no new complaints.     Visit Vitals  /81 (BP 1 Location: Right upper arm, BP Patient Position: At rest)   Pulse 63   Temp 97.9 °F (36.6 °C)   Resp 18   Ht 5' 5\" (1.651 m)   Wt 63.2 kg (139 lb 5.3 oz)   SpO2 98%   Breastfeeding No   BMI 23.19 kg/m²     Current Facility-Administered Medications   Medication Dose Route Frequency    piperacillin-tazobactam (ZOSYN) 3.375 g in 0.9% sodium chloride (MBP/ADV) 100 mL MBP  3.375 g IntraVENous Q8H    influenza vaccine 2021-22 (6 mos+)(PF) (FLUARIX/FLULAVAL/FLUZONE QUAD) injection 0.5 mL  1 Each IntraMUSCular PRIOR TO DISCHARGE    albuterol-ipratropium (DUO-NEB) 2.5 MG-0.5 MG/3 ML  3 mL Nebulization Q6H PRN    [Held by provider] apixaban (ELIQUIS) tablet 2.5 mg  2.5 mg Oral BID    aspirin chewable tablet 81 mg  81 mg Oral DAILY    atorvastatin (LIPITOR) tablet 40 mg  40 mg Oral QHS    budesonide-formoteroL (SYMBICORT) 160-4.5 mcg/actuation HFA inhaler 2 Puff  2 Puff Inhalation BID    metoprolol succinate (TOPROL-XL) XL tablet 50 mg  50 mg Oral DAILY    predniSONE (DELTASONE) tablet 10 mg  10 mg Oral DAILY WITH BREAKFAST    folic acid (FOLVITE) tablet 1 mg  1 mg Oral DAILY    levothyroxine (SYNTHROID) tablet 100 mcg  100 mcg Oral DAILY    famotidine (PF) (PEPCID) 20 mg in 0.9% sodium chloride 10 mL injection  20 mg IntraVENous Q12H    0.9% sodium chloride infusion  75 mL/hr IntraVENous CONTINUOUS    acetaminophen (TYLENOL) tablet 650 mg  650 mg Oral Q6H PRN    Or    acetaminophen (TYLENOL) suppository 650 mg  650 mg Rectal Q6H PRN    polyethylene glycol (MIRALAX) packet 17 g  17 g Oral DAILY PRN    ondansetron (ZOFRAN ODT) tablet 4 mg  4 mg Oral Q8H PRN    Or    ondansetron (ZOFRAN) injection 4 mg  4 mg IntraVENous Q6H PRN         Objective:      Physical Exam:  Visit Vitals  /81 (BP 1 Location: Right upper arm, BP Patient Position: At rest)   Pulse 63   Temp 97.9 °F (36.6 °C)   Resp 18   Ht 5' 5\" (1.651 m)   Wt 63.2 kg (139 lb 5.3 oz)   SpO2 98%   Breastfeeding No   BMI 23.19 kg/m²     General Appearance:  Well developed, well nourished,alert and oriented x 3, and individual in no acute distress. Ears/Nose/Mouth/Throat:   Hearing grossly normal.         Neck: Supple. Chest:   Lungs clear to auscultation bilaterally. Cardiovascular:  Regular rate and rhythm, S1, S2 normal, no murmur. Abdomen:   Soft, non-tender, bowel sounds are active. Extremities: No edema bilaterally. Skin: Warm and dry. Data Review:   Labs:  No results found for this or any previous visit (from the past 24 hour(s)). Telemetry: normal sinus rhythm      Assessment:   Abdominal pain  Altered mental status  Chronic HFrEF  Paroxysmal atrial fibrillation  Coronary artery disease  Hypertension  Hyperlipidemia    Plan:   49-year-old female with a past medical history as above who is seen for preoperative risk assessment  -Patient with likely proximal LAD CAD which we had opted to manage medically as per the patient and her family's wishes. Fortunately, she has remained angina free since her last hospitalization however she was never revascularized. She has symptomatically done very well as an outpatient and remains clinically euvolemic at this time as well.  -She spontaneously converted to sinus rhythm as an outpatient and remains in sinus rhythm. Her [id number] is 6, indicating high risk for CVA. She is on eliquis for stroke prevention.  -Since she is not active at baseline, I am unable to assess her functional status at this time, however due to high suspicion for proximal LAD disease I think she will be high risk for any kind of surgical procedure.   She does have mild right upper quadrant tenderness and I will defer management of this to surgery, however with regards to risk/benefit in my opinion conservative management would be the best option for this patient.  -Fortunately, her abdominal symptoms are very well controlled at this time  -Please do not hesitate to reach out to me anytime with questions/concerns     Thank you for allowing us to participate in the care of your patient

## 2022-01-07 NOTE — PROGRESS NOTES
General Daily Progress Note          Patient Name:   Kirstie Mcadams       YOB: 1930       Age:  80 y.o. Admit Date: 1/4/2022      Subjective:       Patient is a 80y.o. year old female signal past medical history of COPD hypertension hypothyroidism came to emergency room with confusion as the patient not a good historian history and physical from the ER record ER physician talked to the patient daughter and patient crying and complaining of various pain some abdominal pain patient have a CT scan of the abdomen done shows gallstone patient was admitted for further evaluation treatment    1/7/22  Patient sitting up in bed. Currently not complaining of RUQ pain but still slightly tender to the area    HIDA Scan: Abnormal study. Normal gallbladder is not seen at any point up until 2 hours post-injection. Objective:     Visit Vitals  /81 (BP 1 Location: Right upper arm, BP Patient Position: At rest)   Pulse 63   Temp 97.9 °F (36.6 °C)   Resp 18   Ht 5' 5\" (1.651 m)   Wt 63.2 kg (139 lb 5.3 oz)   SpO2 98%   Breastfeeding No   BMI 23.19 kg/m²        No results found for this or any previous visit (from the past 24 hour(s)). [unfilled]      Review of Systems    Constitutional: Negative for chills and fever. HENT: Negative. Eyes: Negative. Respiratory: Negative. Cardiovascular: Negative. Gastrointestinal: Positive for abdominal pain. Negative for nausea   Skin: Negative. Neurological: Negative. Physical Exam:      Constitutional: pt is oriented to person, place, and time. HENT: Hearing loss  Head: Normocephalic and atraumatic. Eyes: Pupils are equal, round, and reactive to light. EOM are normal.   Cardiovascular: Normal rate, regular rhythm and normal heart sounds. Pulmonary/Chest: Breath sounds normal. No wheezes. No rales. Exhibits no tenderness. Abdominal:RUQ tenderness. Soft. Bowel sounds are normal.  There is no rebound and no guarding. Musculoskeletal: Normal range of motion. Neurological: pt is alert and oriented to person, place, and time. NM HEPATOBILIARY DUCT SCAN   Final Result   Abnormal study      US ABD LTD   Final Result   Cholelithiasis. CT ABD PELV W CONT   Final Result   No bowel obstruction. Colonic diverticulosis. No CT evidence for   appendicitis or diverticulitis. Unchanged appearance appendix. Gallbladder distention. Few small dependent gallstones within gallbladder lumen. Cortical atrophy each kidney. No hydronephrosis. Advanced atherosclerotic change abdominal aorta and pelvic arteries. No   abdominal aortic aneurysm. Left hip hardware. CT HEAD WO CONT   Final Result   Age-appropriate atrophy. No acute findings. XR CHEST PORT   Final Result   The cardiomediastinal silhouette is appropriate for age, technique,   and lung expansion. Pulmonary vasculature is not congested. The lungs are   essentially clear. No effusion or pneumothorax is seen. No results found for this or any previous visit (from the past 24 hour(s)). Results     Procedure Component Value Units Date/Time    COVID-19 RAPID TEST [[id number]] Collected: 01/04/22 0845    Order Status: Completed Specimen: Nasopharyngeal Updated: 01/04/22 0933     Specimen source       Please find results under separate order           COVID-19 rapid test Not Detected        Comment: Rapid Abbott ID Now   Rapid NAAT:  The specimen is NEGATIVE for SARS-CoV-2, the novel coronavirus associated with COVID-19. Negative results should be treated as presumptive and, if inconsistent with clinical signs and symptoms or necessary for patient management, should be tested with an alternative molecular assay. Negative results do not preclude SARS-CoV-2 infection and should not be used as the sole basis for patient management decisions.    This test has been authorized by the FDA under   an Emergency Use Authorization (EUA) for use by authorized laboratories. Fact sheet for Healthcare Providers: ConventionUpdate.co.nz Fact sheet for Patients: ConventionUpdate.co.nz   Methodology: Isothermal Nucleic Acid Amplification         INFLUENZA A & B AG (RAPID TEST) [[id number]] Collected: 01/04/22 0830    Order Status: Completed Specimen: Nasopharyngeal from Nasal washing Updated: 01/04/22 0933     Influenza A Antigen Negative        Influenza B Antigen Negative              Labs:     Recent Labs     01/06/22 0713 01/05/22  0543   WBC 8.5 8.3   HGB 10.9* 11.7   HCT 32.6* 35.3    342     Recent Labs     01/06/22 0713 01/05/22  0543    141   K 4.3 4.6   * 108   CO2 25 26   BUN 18 20   CREA 1.17* 1.15*    89   CA 9.0 9.0     Recent Labs     01/06/22 0713 01/05/22  0543   ALT 23 28   * 133*   TBILI 0.3 0.4   TP 6.0* 6.5   ALB 2.8* 2.9*   GLOB 3.2 3.6     No results for input(s): INR, PTP, APTT, INREXT, INREXT in the last 72 hours. No results for input(s): FE, TIBC, PSAT, FERR in the last 72 hours. No results found for: FOL, RBCF   No results for input(s): PH, PCO2, PO2 in the last 72 hours. No results for input(s): CPK, CKNDX, TROIQ in the last 72 hours.     No lab exists for component: CPKMB  No results found for: CHOL, CHOLX, CHLST, CHOLV, HDL, HDLP, LDL, LDLC, DLDLP, TGLX, TRIGL, TRIGP, CHHD, CHHDX  Lab Results   Component Value Date/Time    Glucose (POC) 89 01/04/2022 08:46 AM     Lab Results   Component Value Date/Time    Color Yellow/Straw 01/04/2022 11:00 AM    Appearance Clear 01/04/2022 11:00 AM    Specific gravity 1.013 01/04/2022 11:00 AM    pH (UA) 5.0 01/04/2022 11:00 AM    Protein Negative 01/04/2022 11:00 AM    Glucose Negative 01/04/2022 11:00 AM    Ketone Negative 01/04/2022 11:00 AM    Bilirubin Negative 01/04/2022 11:00 AM    Urobilinogen 0.1 01/04/2022 11:00 AM    Nitrites Negative 01/04/2022 11:00 AM    Leukocyte Esterase Negative 01/04/2022 11:00 AM    Bacteria Negative 01/04/2022 11:00 AM    WBC 0-4 01/04/2022 11:00 AM    RBC 0-5 01/04/2022 11:00 AM         Assessment:     Gallstone  COPD  Hypertension  Hypothyroidism  History of MI      Plan:     HIDA scan abnormal.    Per Colon and Rectal surgery:   Cholecystectomy vs Cholecystostomy tube.      Per Cardiology:  [id number]: 6  High risk for surgery due to high suspicion for proximal LAD disease  Recommends conservative management   D/w daughter she want cholecystectomy tube instead of surgery      Current Facility-Administered Medications:     piperacillin-tazobactam (ZOSYN) 3.375 g in 0.9% sodium chloride (MBP/ADV) 100 mL MBP, 3.375 g, IntraVENous, Q8H, Aryan Ramos MD, Last Rate: 25 mL/hr at 01/07/22 0405, 3.375 g at 01/07/22 0405    influenza vaccine 2021-22 (6 mos+)(PF) (FLUARIX/FLULAVAL/FLUZONE QUAD) injection 0.5 mL, 1 Each, IntraMUSCular, PRIOR TO DISCHARGE, Matty Villanueva MD    albuterol-ipratropium (DUO-NEB) 2.5 MG-0.5 MG/3 ML, 3 mL, Nebulization, Q6H PRN, Eva Landers MD    [Held by provider] apixaban Alistair Pine) tablet 2.5 mg, 2.5 mg, Oral, BID, Raphael Villanueva MD, 2.5 mg at 01/05/22 1143    aspirin chewable tablet 81 mg, 81 mg, Oral, DAILY, Raphael Villanueva MD, 81 mg at 01/07/22 1023    atorvastatin (LIPITOR) tablet 40 mg, 40 mg, Oral, QHS, Raphael Villanueva MD, 40 mg at 01/06/22 2234    budesonide-formoteroL (SYMBICORT) 160-4.5 mcg/actuation HFA inhaler 2 Puff, 2 Puff, Inhalation, BID, Raphael Villanueva MD, 2 Puff at 01/07/22 0934    metoprolol succinate (TOPROL-XL) XL tablet 50 mg, 50 mg, Oral, DAILY, Raphael Villanueva MD, 50 mg at 01/07/22 1023    predniSONE (DELTASONE) tablet 10 mg, 10 mg, Oral, DAILY WITH BREAKFAST, Raphael Villanueva MD, 10 mg at 71/19/88 6423    folic acid (FOLVITE) tablet 1 mg, 1 mg, Oral, DAILY, Raphael Villanueva MD, 1 mg at 01/07/22 1023    levothyroxine (SYNTHROID) tablet 100 mcg, 100 mcg, Oral, DAILY, Raphael Villanueva MD, 100 mcg at 01/07/22 1024   famotidine (PF) (PEPCID) 20 mg in 0.9% sodium chloride 10 mL injection, 20 mg, IntraVENous, Q12H, Yao Gonzalez MD, 20 mg at 01/07/22 1023    0.9% sodium chloride infusion, 75 mL/hr, IntraVENous, CONTINUOUS, Raphael Villanueva MD, Last Rate: 75 mL/hr at 01/06/22 2232, 75 mL/hr at 01/06/22 2232    acetaminophen (TYLENOL) tablet 650 mg, 650 mg, Oral, Q6H PRN, 650 mg at 01/06/22 1145 **OR** acetaminophen (TYLENOL) suppository 650 mg, 650 mg, Rectal, Q6H PRN, Raphael Villanueva MD    polyethylene glycol (MIRALAX) packet 17 g, 17 g, Oral, DAILY PRN, Raphael Villanueva MD    ondansetron (ZOFRAN ODT) tablet 4 mg, 4 mg, Oral, Q8H PRN **OR** ondansetron (ZOFRAN) injection 4 mg, 4 mg, IntraVENous, Q6H PRN, Óscar Hinson MD

## 2022-01-07 NOTE — CONSULTS
70-year-old patient with dementia presented to the ED for discomfort/pain. CT of the abdomen showed a distended gallbladder with cholelithiasis however no wall thickening or pericholecystic fluid. Ultrasound confirmed CT findings. HIDA scan was positive. Patient is afebrile. Hypertensive. No leukocytosis. On my exam pt is NAD laying in bed  Mild tenderness to deep palpation in ruq  abd is soft nondistended     Assessment/plan. 70-year-old female with abdominal pain, and positive HIDA scan. Clinically however no leukocytosis afebrile and stable hemodynamically. She is at a high risk for surgical intervention per cardiology evaluation. Discussed case  with . A cholecystectomy tube can be considered however, I am very concerned as patient will most likely pull the drain out as noted by daughter. Given that the patient is on anticoagulation, in the event of inadvertent removal in less than 4-6 weeks there is a high risk for bleeding and bile leak. PLAN:  Given Ms. Harper's current clinical presentation as above, agree with  for conservative management over the weekend and reevaluation on Monday.

## 2022-01-07 NOTE — CONSULTS
Consult    Patient: Edison Cooper MRN: [id number]  SSN: [id number]    YOB: 1930  Age: 80 y.o. Sex: female       Subjective:      Date of  Admission: 1/4/2022     Admission type: Emergency    Edison Cooper is a 80 y.o. female who was admitted for altered mental status. She complained of right upper quadrant abdominal discomfort. And is being followed by GI and surgery. No evidence for acute cholecystitis was found however HIDA scan was abnormal today and cholecystectomy is being considered and we are asked to see her for preoperative risk assessment. She has a history of chronic HFrEF, paroxysmal atrial flutter/fibrillation, coronary artery disease. She was admitted in September 2021 for an NSTEMI. Echocardiogram showed LV dysfunction with moderate aortic regurgitation, wall motion abnormalities in the LAD territory. Due to her advanced age, limited mobility at baseline. The patient's family and I decided to manage her medically. She was started on Eliquis for her atrial fibrillation. She later converted to normal sinus rhythm. She has done very well post hospitalization. As stated above, she is not very active at baseline. When seen by me today, she denies any recent worsening dyspnea on exertion, chest pain, orthopnea, paroxysmal nocturnal dyspnea or any other complaints. She does admit to mild right upper quadrant pain.     Primary Care Provider: None  Past Medical History:   Diagnosis Date    Anemia     Chronic obstructive pulmonary disease ([address]     Heart attack ([address]     Hypertension     Thyroid disease     hypothyroidism      Past Surgical History:   Procedure Laterality Date    HX ORTHOPAEDIC  2018    total hip replacement    HX PACEMAKER       Family History   Problem Relation Age of Onset    Stroke Mother     Heart Disease Mother     Stroke Father     Heart Disease Father       Social History     Tobacco Use    Smoking status: Never Smoker    Smokeless tobacco: Never Used   Substance Use Topics    Alcohol use: Never      Current Facility-Administered Medications   Medication Dose Route Frequency    piperacillin-tazobactam (ZOSYN) 3.375 g in 0.9% sodium chloride (MBP/ADV) 100 mL MBP  3.375 g IntraVENous Q8H    albuterol-ipratropium (DUO-NEB) 2.5 MG-0.5 MG/3 ML  3 mL Nebulization Q6H PRN    [Held by provider] apixaban (ELIQUIS) tablet 2.5 mg  2.5 mg Oral BID    aspirin chewable tablet 81 mg  81 mg Oral DAILY    atorvastatin (LIPITOR) tablet 40 mg  40 mg Oral QHS    budesonide-formoteroL (SYMBICORT) 160-4.5 mcg/actuation HFA inhaler 2 Puff  2 Puff Inhalation BID    metoprolol succinate (TOPROL-XL) XL tablet 50 mg  50 mg Oral DAILY    predniSONE (DELTASONE) tablet 10 mg  10 mg Oral DAILY WITH BREAKFAST    folic acid (FOLVITE) tablet 1 mg  1 mg Oral DAILY    levothyroxine (SYNTHROID) tablet 100 mcg  100 mcg Oral DAILY    famotidine (PF) (PEPCID) 20 mg in 0.9% sodium chloride 10 mL injection  20 mg IntraVENous Q12H    0.9% sodium chloride infusion  75 mL/hr IntraVENous CONTINUOUS    acetaminophen (TYLENOL) tablet 650 mg  650 mg Oral Q6H PRN    Or    acetaminophen (TYLENOL) suppository 650 mg  650 mg Rectal Q6H PRN    polyethylene glycol (MIRALAX) packet 17 g  17 g Oral DAILY PRN    ondansetron (ZOFRAN ODT) tablet 4 mg  4 mg Oral Q8H PRN    Or    ondansetron (ZOFRAN) injection 4 mg  4 mg IntraVENous Q6H PRN        No Known Allergies     Review of Systems:  A comprehensive review of systems was negative except for that written in the History of Present Illness.        Subjective:     Visit Vitals  BP (!) 149/49 (BP 1 Location: Right upper arm, BP Patient Position: At rest)   Pulse 65   Temp 97.8 °F (36.6 °C)   Resp 18   Ht 5' 5\" (1.651 m)   Wt 63.2 kg (139 lb 5.3 oz)   SpO2 99%   Breastfeeding No   BMI 23.19 kg/m²        Physical Exam:  Visit Vitals  BP (!) 149/49 (BP 1 Location: Right upper arm, BP Patient Position: At rest)   Pulse 65   Temp 97.8 °F (36.6 °C)   Resp 18   Ht 5' 5\" (1.651 m)   Wt 63.2 kg (139 lb 5.3 oz)   SpO2 99%   Breastfeeding No   BMI 23.19 kg/m²     General Appearance:  Well developed, well nourished,alert and oriented x 3, and individual in no acute distress. Ears/Nose/Mouth/Throat:   Hearing grossly normal.         Neck: Supple. Chest:   Lungs clear to auscultation bilaterally. Cardiovascular:  Regular rate and rhythm, S1, S2 normal, no murmur. Abdomen:   Soft, mild TTP RUQ no guarding   Extremities: No edema bilaterally. Skin: Warm and dry.                Cardiographics:  Telemetry: normal sinus rhythm      Data Reviewed:   BMP:   Lab Results   Component Value Date/Time     01/06/2022 07:13 AM    K 4.3 01/06/2022 07:13 AM     (H) 01/06/2022 07:13 AM    CO2 25 01/06/2022 07:13 AM    AGAP 5 01/06/2022 07:13 AM     01/06/2022 07:13 AM    BUN 18 01/06/2022 07:13 AM    CREA 1.17 (H) 01/06/2022 07:13 AM    GFRAA 53 (L) 01/06/2022 07:13 AM    GFRNA 43 (L) 01/06/2022 07:13 AM     CMP:   Lab Results   Component Value Date/Time     01/06/2022 07:13 AM    K 4.3 01/06/2022 07:13 AM     (H) 01/06/2022 07:13 AM    CO2 25 01/06/2022 07:13 AM    AGAP 5 01/06/2022 07:13 AM     01/06/2022 07:13 AM    BUN 18 01/06/2022 07:13 AM    CREA 1.17 (H) 01/06/2022 07:13 AM    GFRAA 53 (L) 01/06/2022 07:13 AM    GFRNA 43 (L) 01/06/2022 07:13 AM    CA 9.0 01/06/2022 07:13 AM    ALB 2.8 (L) 01/06/2022 07:13 AM    TP 6.0 (L) 01/06/2022 07:13 AM    GLOB 3.2 01/06/2022 07:13 AM    AGRAT 0.9 (L) 01/06/2022 07:13 AM    ALT 23 01/06/2022 07:13 AM     CBC:   Lab Results   Component Value Date/Time    WBC 8.5 01/06/2022 07:13 AM    HGB 10.9 (L) 01/06/2022 07:13 AM    HCT 32.6 (L) 01/06/2022 07:13 AM     01/06/2022 07:13 AM     All Cardiac Markers in the last 24 hours: No results found for: CPK, CK, CKMMB, CKMB, RCK3, CKMBT, CKNDX, CKND1, KAYDEN, TROPT, TROIQ, DAIANA, TROPT, TNIPOC, BNP, BNPP  Recent Glucose Results:   Lab Results   Component Value Date/Time     01/06/2022 07:13 AM     ABG: No results found for: PH, PHI, PCO2, PCO2I, PO2, PO2I, HCO3, HCO3I, FIO2, FIO2I  COAGS: No results found for: APTT, PTP, INR, INREXT, INREXT  Liver Panel:   Lab Results   Component Value Date/Time    ALB 2.8 (L) 01/06/2022 07:13 AM    TP 6.0 (L) 01/06/2022 07:13 AM    GLOB 3.2 01/06/2022 07:13 AM    AGRAT 0.9 (L) 01/06/2022 07:13 AM    ALT 23 01/06/2022 07:13 AM     (H) 01/06/2022 07:13 AM        Assessment:   Abdominal pain  Altered mental status  Chronic HFrEF  Paroxysmal atrial fibrillation  Coronary artery disease  Hypertension  Hyperlipidemia     Plan:   75-year-old female with a past medical history as above who is seen for preoperative risk assessment  -Patient with likely proximal LAD CAD which we had opted to manage medically as per the patient and her family's wishes. Fortunately, she has remained angina free since her last hospitalization however she was never revascularized. She has symptomatically done very well as an outpatient and remains clinically euvolemic at this time as well.  -She spontaneously converted to sinus rhythm as an outpatient and remains in sinus rhythm. Her [id number] is 6, indicating high risk for CVA. She is on eliquis for stroke prevention.  -Since she is not active at baseline, I am unable to assess her functional status at this time, however due to high suspicion for proximal LAD disease I think she will be high risk for any kind of surgical procedure. She does have mild right upper quadrant tenderness and I will defer management of this to surgery, however with regards to risk/benefit in my opinion conservative management would be the best option for this patient. I discussed this with Dr. Princeton Olszewski over the phone as well. -We will give further recommendations pending her clinical course.   Please do not hesitate to reach out to me anytime with questions/concerns    Thank you for allowing us to participate in the care of your patient

## 2022-01-07 NOTE — PROGRESS NOTES
Chart reviewed. CM called patients daughter, Connor Figures @ [phone] to discuss DC plans. Patient has a sitter that lives with her and provides 24/7 support. DC plan: home and her daughter will provide transportation upon DC.

## 2022-01-07 NOTE — PROGRESS NOTES
Attempting to give the patient her medications and IV antibiotics and the patient began yelling at this nurse. Patient stated that she wanted to be left alone and next time she will get a private room. Patient extremely confused and refuses to listen to reasoning.

## 2022-01-07 NOTE — PROGRESS NOTES
Bedside and Verbal shift change report given to Ivania Carr RN (oncoming nurse) by Janusz Boles LPN (offgoing nurse). Report included the following information SBAR, Kardex, Intake/Output, MAR, Accordion, Recent Results and Med Rec Status.

## 2022-01-07 NOTE — WOUND CARE
IP WOUND CONSULT    [address] RECORD NUMBER:  [id number]  AGE: 80 y.o. GENDER: female  : 1930  TODAY'S DATE:  2022    GENERAL     [] Follow-up   [x] New Consult    Hector Smoker is a 80 y.o. female referred by:   [] Physician  [x] Nursing  [] Other:         PAST MEDICAL HISTORY    Past Medical History:   Diagnosis Date    Anemia     Chronic obstructive pulmonary disease ([address]     Heart attack ([address]     Hypertension     Thyroid disease     hypothyroidism        PAST SURGICAL HISTORY    Past Surgical History:   Procedure Laterality Date    HX ORTHOPAEDIC  2018    total hip replacement    HX PACEMAKER         FAMILY HISTORY    Family History   Problem Relation Age of Onset    Stroke Mother     Heart Disease Mother     Stroke Father     Heart Disease Father          ALLERGIES    No Known Allergies    MEDICATIONS    No current facility-administered medications on file prior to encounter. Current Outpatient Medications on File Prior to Encounter   Medication Sig Dispense Refill    albuterol-ipratropium (DUO-NEB) 2.5 mg-0.5 mg/3 ml nebu 3 mL by Nebulization route every six (6) hours as needed for Wheezing. 30 Nebule 0    apixaban (ELIQUIS) 2.5 mg tablet Take 1 Tablet by mouth two (2) times a day. Indications: treatment to prevent blood clots in chronic atrial fibrillation 60 Tablet 0    aspirin 81 mg chewable tablet Take 1 Tablet by mouth daily. 30 Tablet 0    atorvastatin (LIPITOR) 40 mg tablet Take 1 Tablet by mouth nightly. 60 Tablet 0    budesonide-formoteroL (SYMBICORT) 160-4.5 mcg/actuation HFAA Take 2 Puffs by inhalation two (2) times a day. 10.2 g 1    predniSONE (DELTASONE) 10 mg tablet Take 10 mg by mouth daily (with breakfast). 10 Tablet 0    metoprolol succinate (TOPROL-XL) 50 mg XL tablet Take 1 Tablet by mouth daily. 50 Tablet 0    albuterol (PROVENTIL VENTOLIN) 2.5 mg /3 mL (0.083 %) nebu Take 3 mL by inhalation every six (6) hours as needed.       folic acid (FOLVITE) 1 mg tablet TAKE 1 TABLET BY MOUTH EVERY DAY      levothyroxine (SYNTHROID) 100 mcg tablet TAKE 1 TABLET BY MOUTH EVERY DAY      ALPRAZolam (XANAX) 0.25 mg tablet TAKE 1 TABLET BY MOUTH EVERY 12 HOURS AS NEEDED           [unfilled]  Visit Vitals  /81 (BP 1 Location: Right upper arm, BP Patient Position: At rest)   Pulse 63   Temp 97.9 °F (36.6 °C)   Resp 18   Ht 5' 5\" (1.651 m)   Wt 63.2 kg (139 lb 5.3 oz)   SpO2 98%   Breastfeeding No   BMI 23.19 kg/m²       ASSESSMENT     Wound Identification & Type: MASD with blanchable erythema  Dressing change: zinc paste   Verbal consent for picture: yes    Contributing Factors: anticoagulation therapy, decreased mobility, shear force, incontinence of urine and malnutrition    Wound Buttocks Left (Active)   Wound Image   01/07/22 1223   Wound Etiology Other (Comment) 01/07/22 1223   Dressing Status Other (Comment) 01/06/22 1145   Cleansed Soap and water 01/06/22 1145   Dressing/Treatment Zinc paste 01/07/22 1223   Wound Length (cm) 0.3 cm 01/07/22 1223   Wound Width (cm) 0.3 cm 01/07/22 1223   Wound Depth (cm) 0.01 cm 01/07/22 1223   Wound Surface Area (cm^2) 0.09 cm^2 01/07/22 1223   Wound Volume (cm^3) 0.0009 cm^3 01/07/22 1223   Wound Assessment Other (Comment);Pink/red 01/07/22 1223   Drainage Amount None 01/07/22 1223   Wound Odor None 01/07/22 1223   Rocio-Wound/Incision Assessment Blanchable erythema 01/07/22 1223   Number of days: 1          PLAN     Skin Care & Pressure Relief Recommendations  Speciality bed Waffle Overlay  Minimize layers of linen  Turn/reposition approximately every 2 hours  Pillow wedges  Manage incontinence   Promote continence; Skin Protective lotion/cream to buttocks and sacrum daily and as needed with incontinence care  Offload heels pillows    Gerald 17  Blood Glucose: 100 on 1/6/22                             Albumin: 2.8 on 1/6/22  WBCs: 8.5 on 1/6/22    Support Surface: apply a waffle overlay over mattress for increase pressure reduction. Physician/Provider notified:   Recommendations: Patient turns well independently as witnessed. Minor MASD with blanchable erythema noted to buttocks. Small open area noted to left buttock, superficial.  Maintain the PureWick to manage  incontinence. Apply zinc paste TID and prn for soiling to buttocks and perineum to protect skin from incontinence related breakdown. Apply Optifoam Border Sacral dressing daily to sacrum to help prevent pressure related skin injury. DO NOT apply zinc paste beneath foam dressing. Use foam wedge to turn q2h at 30 degree angle or more to offload sacrum. Float heels with 2-3 pillows while in bed for offloading of the heels. Maintain HOB at 30 degrees or less, if not contraindicated, to reduce pressure to buttocks and sacrum. Raise foot of bed to help prevent friction and shear injury from sliding down in the bed. Will continue to follow. Discharge Wound Care Needs: Manage incontinence.     Teaching completed with:   [] Patient           [] Family member       [] Caregiver          [] Nursing  [] Other    Patient/Caregiver Teaching:  Level of patient/caregiver understanding able to:   [] Indicates understanding       [] Needs reinforcement  [] Unsuccessful      [] Verbal Understanding  [] Demonstrated understanding       [] No evidence of learning  [] Refused teaching         [] N/A       Electronically signed by Susan Urrutia RN on 1/7/2022 at 12:29 PM

## 2022-01-08 PROCEDURE — 74011250636 HC RX REV CODE- 250/636: Performed by: COLON & RECTAL SURGERY

## 2022-01-08 PROCEDURE — 74011250636 HC RX REV CODE- 250/636: Performed by: FAMILY MEDICINE

## 2022-01-08 PROCEDURE — 74011000250 HC RX REV CODE- 250: Performed by: COLON & RECTAL SURGERY

## 2022-01-08 PROCEDURE — 94640 AIRWAY INHALATION TREATMENT: CPT

## 2022-01-08 PROCEDURE — 94760 N-INVAS EAR/PLS OXIMETRY 1: CPT

## 2022-01-08 PROCEDURE — 74011636637 HC RX REV CODE- 636/637: Performed by: FAMILY MEDICINE

## 2022-01-08 PROCEDURE — 74011250637 HC RX REV CODE- 250/637: Performed by: FAMILY MEDICINE

## 2022-01-08 PROCEDURE — 74011000258 HC RX REV CODE- 258: Performed by: COLON & RECTAL SURGERY

## 2022-01-08 PROCEDURE — 65270000029 HC RM PRIVATE

## 2022-01-08 RX ORDER — HYDROXYZINE 50 MG/ML
25 INJECTION, SOLUTION INTRAMUSCULAR ONCE
Status: COMPLETED | OUTPATIENT
Start: 2022-01-08 | End: 2022-01-08

## 2022-01-08 RX ORDER — HYDROXYZINE HYDROCHLORIDE 25 MG/ML
25 INJECTION, SOLUTION INTRAMUSCULAR
Status: COMPLETED | OUTPATIENT
Start: 2022-01-08 | End: 2022-01-08

## 2022-01-08 RX ORDER — HYDROXYZINE 50 MG/ML
25 INJECTION, SOLUTION INTRAMUSCULAR
Status: DISCONTINUED | OUTPATIENT
Start: 2022-01-08 | End: 2022-01-08

## 2022-01-08 RX ADMIN — SODIUM CHLORIDE, PRESERVATIVE FREE 20 MG: 5 INJECTION INTRAVENOUS at 09:01

## 2022-01-08 RX ADMIN — FOLIC ACID 1 MG: 1 TABLET ORAL at 08:45

## 2022-01-08 RX ADMIN — BUDESONIDE AND FORMOTEROL FUMARATE DIHYDRATE 2 PUFF: 160; 4.5 AEROSOL RESPIRATORY (INHALATION) at 19:46

## 2022-01-08 RX ADMIN — BUDESONIDE AND FORMOTEROL FUMARATE DIHYDRATE 2 PUFF: 160; 4.5 AEROSOL RESPIRATORY (INHALATION) at 09:21

## 2022-01-08 RX ADMIN — WHITE PETROLATUM,ZINC OXIDE: 57; 17 PASTE TOPICAL at 23:46

## 2022-01-08 RX ADMIN — ASPIRIN 81 MG CHEWABLE TABLET 81 MG: 81 TABLET CHEWABLE at 08:45

## 2022-01-08 RX ADMIN — LEVOTHYROXINE SODIUM 100 MCG: 0.1 TABLET ORAL at 08:45

## 2022-01-08 RX ADMIN — SODIUM CHLORIDE, PRESERVATIVE FREE 20 MG: 5 INJECTION INTRAVENOUS at 20:14

## 2022-01-08 RX ADMIN — PIPERACILLIN SODIUM AND TAZOBACTAM SODIUM 3.38 G: 3; .375 INJECTION, POWDER, LYOPHILIZED, FOR SOLUTION INTRAVENOUS at 04:54

## 2022-01-08 RX ADMIN — METOPROLOL SUCCINATE 50 MG: 50 TABLET, EXTENDED RELEASE ORAL at 08:48

## 2022-01-08 RX ADMIN — WHITE PETROLATUM,ZINC OXIDE: 57; 17 PASTE TOPICAL at 09:00

## 2022-01-08 RX ADMIN — PIPERACILLIN SODIUM AND TAZOBACTAM SODIUM 3.38 G: 3; .375 INJECTION, POWDER, LYOPHILIZED, FOR SOLUTION INTRAVENOUS at 20:13

## 2022-01-08 RX ADMIN — HYDROXYZINE HYDROCHLORIDE 25 MG: 50 INJECTION, SOLUTION INTRAMUSCULAR at 06:48

## 2022-01-08 RX ADMIN — PREDNISONE 10 MG: 5 TABLET ORAL at 08:45

## 2022-01-08 RX ADMIN — WHITE PETROLATUM,ZINC OXIDE: 57; 17 PASTE TOPICAL at 16:00

## 2022-01-08 RX ADMIN — ATORVASTATIN CALCIUM 40 MG: 40 TABLET, FILM COATED ORAL at 23:46

## 2022-01-08 RX ADMIN — HYDROXYZINE HYDROCHLORIDE 25 MG: 25 INJECTION, SOLUTION INTRAMUSCULAR at 08:53

## 2022-01-08 RX ADMIN — PIPERACILLIN SODIUM AND TAZOBACTAM SODIUM 3.38 G: 3; .375 INJECTION, POWDER, LYOPHILIZED, FOR SOLUTION INTRAVENOUS at 14:07

## 2022-01-08 NOTE — PROGRESS NOTES
Bedside and Verbal shift change report given to Shruti Wagner RN (oncoming nurse) by Deion Graff LPN (offgoing nurse). Report included the following information SBAR, Kardex, Intake/Output, MAR, Accordion, Recent Results and Med Rec Status.

## 2022-01-08 NOTE — PROGRESS NOTES
Patient continues to pull out INTs. And refuses to keep telemetry on. Patient has pulled the leads off several times. Patient is pleasantly confused.

## 2022-01-08 NOTE — PROGRESS NOTES
PULMONARY NOTE  [facility]    Name: Ivanna Velazquez MRN: [id number]   : 1930 Hospital: [address] [place] [address]   Date: 2022  Admission date: 2022 Hospital Day: 5       HPI:     Hospital Problems  Date Reviewed: 2020          Codes Class Noted POA    Lethargy ICD-10-CM: R53.83  ICD-9-CM: 780.79  2022 Unknown        AMS (altered mental status) ICD-10-CM: R41.82  ICD-9-CM: 780.97  2022 Unknown                   [x] High complexity decision making was performed  [x] See my orders for details      Subjective/Initial History:     I was asked by Carrol Sim MD to see Ivanna Velazquez  a 80 y.o.  female in consultation     Excerpts from admission 2022 or consult notes as follows:   70-year-old lady came in because of generalized weakness shortness of breath and confusion she has significant past medical history of asthma COPD hypertension coronary artery disease history of stent placement in the past she was having confusion spell also having upper respiratory symptoms generalized weakness her Covid test came back negative CAT scan of the abdomen was done which shows gallstone.       No Known Allergies     MAR reviewed and pertinent medications noted or modified as needed     Current Facility-Administered Medications   Medication    zinc oxide-white petrolatum 17-57 % topical paste    piperacillin-tazobactam (ZOSYN) 3.375 g in 0.9% sodium chloride (MBP/ADV) 100 mL MBP    influenza vaccine  (6 mos+)(PF) (FLUARIX/FLULAVAL/FLUZONE QUAD) injection 0.5 mL    albuterol-ipratropium (DUO-NEB) 2.5 MG-0.5 MG/3 ML    [Held by provider] apixaban (ELIQUIS) tablet 2.5 mg    aspirin chewable tablet 81 mg    atorvastatin (LIPITOR) tablet 40 mg    budesonide-formoteroL (SYMBICORT) 160-4.5 mcg/actuation HFA inhaler 2 Puff    metoprolol succinate (TOPROL-XL) XL tablet 50 mg    predniSONE (DELTASONE) tablet 10 mg    folic acid (FOLVITE) tablet 1 mg    levothyroxine (SYNTHROID) tablet 100 mcg    famotidine (PF) (PEPCID) 20 mg in 0.9% sodium chloride 10 mL injection    0.9% sodium chloride infusion    acetaminophen (TYLENOL) tablet 650 mg    Or    acetaminophen (TYLENOL) suppository 650 mg    polyethylene glycol (MIRALAX) packet 17 g    ondansetron (ZOFRAN ODT) tablet 4 mg    Or    ondansetron (ZOFRAN) injection 4 mg      Patient PCP: None  PMH:  has a past medical history of Anemia, Chronic obstructive pulmonary disease ([facility] [address] Heart attack ([facility] [address] [facility] Hypertension, and Thyroid disease. PSH:   has a past surgical history that includes hx orthopaedic (2018) and hx pacemaker. FHX: family history includes Heart Disease in her father and mother; Stroke in her father and mother. SHX:  reports that she has never smoked. She has never used smokeless tobacco. She reports that she does not drink alcohol and does not use drugs. ROS:    Review of Systems   Constitutional: Positive for malaise/fatigue. HENT: Positive for hearing loss. Eyes: Negative. Respiratory: Positive for shortness of breath. Cardiovascular: Negative. Gastrointestinal: Negative. Genitourinary: Negative. Musculoskeletal: Negative. Skin: Negative. Neurological: Negative. Psychiatric/Behavioral: Negative.          Objective:     Vital Signs: Telemetry:    normal sinus rhythm Intake/Output:   Visit Vitals  BP (!) 165/55 (BP 1 Location: Right upper arm, BP Patient Position: At rest)   Pulse 72   Temp 97.9 °F (36.6 °C)   Resp 18   Ht 5' 5\" (1.651 m)   Wt 63.2 kg (139 lb 5.3 oz)   SpO2 99%   Breastfeeding No   BMI 23.19 kg/m²       Temp (24hrs), Av.2 °F (36.8 °C), Min:97.9 °F (36.6 °C), Max:98.5 °F (36.9 °C)        O2 Device: None (Room air)         Wt Readings from Last 4 Encounters:   22 63.2 kg (139 lb 5.3 oz)   21 64.4 kg (141 lb 15.6 oz)   21 68 kg (150 lb)   21 68 kg (150 lb)        No intake or output data in the 24 hours ending 22 1126    Last shift:      No intake/output data recorded. Last 3 shifts: 01/06 1901 - 01/08 0700  In: -   Out: 350 [Urine:350]       Physical Exam:     Physical Exam  Constitutional:       Appearance: Normal appearance. HENT:      Head: Normocephalic and atraumatic. Nose: Nose normal.      Mouth/Throat:      Mouth: Mucous membranes are moist.   Eyes:      Pupils: Pupils are equal, round, and reactive to light. Cardiovascular:      Rate and Rhythm: Normal rate. Pulses: Normal pulses. Pulmonary:      Effort: Pulmonary effort is normal.   Abdominal:      General: Abdomen is flat. Bowel sounds are normal.   Musculoskeletal:         General: Normal range of motion. Cervical back: Normal range of motion and neck supple. Neurological:      Mental Status: She is alert. Comments: Hard of hearing          Labs:    Recent Labs     01/06/22 0713   WBC 8.5   HGB 10.9*        Recent Labs     01/06/22 0713      K 4.3   *   CO2 25      BUN 18   CREA 1.17*   CA 9.0   ALB 2.8*   ALT 23     No results for input(s): PH, PCO2, PO2, HCO3, FIO2 in the last 72 hours. No results for input(s): CPK, CKNDX, TROIQ in the last 72 hours. No lab exists for component: CPKMB  No results found for: BNPP, BNP   No results found for: CULTNo results found for: TSH, TSHEXT, TSHEXT    Imaging:    CXR Results  (Last 48 hours)    None        Results from Hospital Encounter encounter on 01/04/22    XR CHEST PORT    Narrative  1 new comparison September 20. Impression  The cardiomediastinal silhouette is appropriate for age, technique,  and lung expansion. Pulmonary vasculature is not congested. The lungs are  essentially clear. No effusion or pneumothorax is seen. Results from Hospital Encounter encounter on 09/20/21    XR CHEST PORT    Narrative  History: Evidence of breath    A single view of the chest was obtained. Heart size is stable. There is some  atherosclerotic change of the aorta. Lungs are clear. No effusions or  pneumothorax. Bony structures are within normal limits. Impression  Normal findings. Results from Hospital Encounter encounter on 06/02/21    XR CHEST PORT    Narrative  The study is a single view chest radiographic examination dated 6/2/2021. HISTORY: Shortness of breath. COMPARISON:  1/24/2021. FINDINGS: The heart size is normal. The pulmonary vessels are normal in caliber. The lung parenchyma is clear without an infiltrate or atelectasis. There is a  mild degree of chronic baseline peribronchial cuffing. The costophrenic angles  are maintained without pleural effusions or a pneumothorax. There is a midline  position to the trachea. There are atherosclerotic vascular changes of the  thoracic aorta. The pulmonary bia are symmetrical.    The bony thorax is intact without fractures/acute osseous abnormalities. Impression  1. There are moderate atherosclerotic vascular changes of the thoracic aorta. The patient may also have coronary artery atherosclerotic vascular changes which  could be a source for chest pain. 2.  This examination is negative for acute pulmonary parenchymal pathology. There is a mild degree of chronic baseline peribronchial cuffing without change. Results from [place] encounter on 01/04/22    CT ABD PELV W CONT    Narrative  CT abdomen and pelvis without IV contrast    Comparison CT abdomen and pelvis November 19, 2019. Axial images are reviewed along with reformatted sagittal/coronal images. 100 mL  Isovue 370 administered. Motion limited study. Dose reduction: All CT scans at this facility are performed using dose reduction  optimization techniques as appropriate to a performed exam including the  following-  automated exposure control, adjustments of mA and/or Kv according to patient  size, or use of iterative reconstructive technique. Lung bases are clear. .    Normal enhancement of the liver.  Gallbladder distention. Small gallstones noted  dependently within the gallbladder lumen. Pancreas, spleen, and bilateral  adrenal glands appear unremarkable. Cortical thinning bilateral kidneys. No hydronephrosis. Stomach and small bowel loops are decompressed. Appendix unchanged. There is  stool and air through colon. Distal descending and sigmoid colon diverticula. No  CT evidence for appendicitis or diverticulitis. No ascites. Unchanged uterine calcification likely related to small degenerated uterine  fibroid. Dense intimal calcification normal caliber abdominal aorta, advanced abdominal  aorta atherosclerosis. Atherosclerosis extends to proximal abdominal aorta  branch vessels and pelvic arteries. Left hip hardware. Impression  No bowel obstruction. Colonic diverticulosis. No CT evidence for  appendicitis or diverticulitis. Unchanged appearance appendix. Gallbladder distention. Few small dependent gallstones within gallbladder lumen. Cortical atrophy each kidney. No hydronephrosis. Advanced atherosclerotic change abdominal aorta and pelvic arteries. No  abdominal aortic aneurysm. Left hip hardware. IMPRESSION:   1. Asthma   2. COPD  3. Gallstone  4. Hypertension hypothyroidism  5. History of coronary artery disease      RECOMMENDATIONS/PLAN:   3 24-year-old lady with history of asthma and also upper respiratory symptoms CAT scan of the abdomen was done which shows gallstone  2. Agree with Symbicort inhaler  3. Resume home meds  4.  Surgery is seen the patient for gallstone and discussed with her about HIDA scan she is hard of hearing IR was consulted conservative management              Don Turner MD

## 2022-01-08 NOTE — PROGRESS NOTES
Patient combative and verbally abusive to staff. Patient refuses to stay in the bed. Patient continues to get up out of bed and attempting to walk out the tejada to go home. MD called and received and order for one time dose of vistaril 25 mg IM now.

## 2022-01-08 NOTE — PROGRESS NOTES
Writer spoke with patient's daughter Iliana Clayton providing update on patients situation and plan of care, assisting alleviation of anxiety via Vistaril 25mg IM per MD [email or web address]. agrees with care. Patient also spoke with her daughter.  Currently in bed resting with bed alarm on, door open, while room is close to nurses station for continued monitoring

## 2022-01-08 NOTE — PROGRESS NOTES
Patients behavior has improved since Vistaril IM injection administered this am. Patient able to rest, sleep, is pleasant and cooperative with staff at this time. Patients home caregiver at bedside until later this evening.

## 2022-01-08 NOTE — PROGRESS NOTES
General Daily Progress Note          Patient Name:   Ivanna Velazquez       YOB: 1930       Age:  80 y.o. Admit Date: 1/4/2022      Subjective:       Patient is a 80y.o. year old female signal past medical history of COPD hypertension hypothyroidism came to emergency room with confusion as the patient not a good historian history and physical from the ER record ER physician talked to the patient daughter and patient crying and complaining of various pain some abdominal pain patient have a CT scan of the abdomen done shows gallstone patient was admitted for further evaluation treatment    1/7/22  Patient sitting up in bed. Currently not complaining of RUQ pain but still slightly tender to the area    HIDA Scan: Abnormal study. Normal gallbladder is not seen at any point up until 2 hours post-injection. Objective:     Visit Vitals  BP (!) 165/55 (BP 1 Location: Right upper arm, BP Patient Position: At rest)   Pulse 72   Temp 97.9 °F (36.6 °C)   Resp 18   Ht 5' 5\" (1.651 m)   Wt 63.2 kg (139 lb 5.3 oz)   SpO2 99%   Breastfeeding No   BMI 23.19 kg/m²        No results found for this or any previous visit (from the past 24 hour(s)). [unfilled]      Review of Systems    Constitutional: Negative for chills and fever. HENT: Negative. Eyes: Negative. Respiratory: Negative. Cardiovascular: Negative. Gastrointestinal: Positive for abdominal pain. Negative for nausea   Skin: Negative. Neurological: Negative. Physical Exam:      Constitutional: pt is oriented to person, place, and time. HENT: Hearing loss  Head: Normocephalic and atraumatic. Eyes: Pupils are equal, round, and reactive to light. EOM are normal.   Cardiovascular: Normal rate, regular rhythm and normal heart sounds. Pulmonary/Chest: Breath sounds normal. No wheezes. No rales. Exhibits no tenderness. Abdominal:RUQ tenderness. Soft. Bowel sounds are normal.  There is no rebound and no guarding. Musculoskeletal: Normal range of motion. Neurological: pt is alert and oriented to person, place, and time. NM HEPATOBILIARY DUCT SCAN   Final Result   Abnormal study      US ABD LTD   Final Result   Cholelithiasis. CT ABD PELV W CONT   Final Result   No bowel obstruction. Colonic diverticulosis. No CT evidence for   appendicitis or diverticulitis. Unchanged appearance appendix. Gallbladder distention. Few small dependent gallstones within gallbladder lumen. Cortical atrophy each kidney. No hydronephrosis. Advanced atherosclerotic change abdominal aorta and pelvic arteries. No   abdominal aortic aneurysm. Left hip hardware. CT HEAD WO CONT   Final Result   Age-appropriate atrophy. No acute findings. XR CHEST PORT   Final Result   The cardiomediastinal silhouette is appropriate for age, technique,   and lung expansion. Pulmonary vasculature is not congested. The lungs are   essentially clear. No effusion or pneumothorax is seen. No results found for this or any previous visit (from the past 24 hour(s)). Results     Procedure Component Value Units Date/Time    COVID-19 RAPID TEST [[id number]] Collected: 01/04/22 0845    Order Status: Completed Specimen: Nasopharyngeal Updated: 01/04/22 0933     Specimen source       Please find results under separate order           COVID-19 rapid test Not Detected        Comment: Rapid Abbott ID Now   Rapid NAAT:  The specimen is NEGATIVE for SARS-CoV-2, the novel coronavirus associated with COVID-19. Negative results should be treated as presumptive and, if inconsistent with clinical signs and symptoms or necessary for patient management, should be tested with an alternative molecular assay. Negative results do not preclude SARS-CoV-2 infection and should not be used as the sole basis for patient management decisions.    This test has been authorized by the FDA under   an Emergency Use Authorization (EUA) for use by authorized laboratories. Fact sheet for Healthcare Providers: ConventionUpdate.co.nz Fact sheet for Patients: ConventionUpdate.co.nz   Methodology: Isothermal Nucleic Acid Amplification         INFLUENZA A & B AG (RAPID TEST) [[id number]] Collected: 01/04/22 0830    Order Status: Completed Specimen: Nasopharyngeal from Nasal washing Updated: 01/04/22 0933     Influenza A Antigen Negative        Influenza B Antigen Negative              Labs:     Recent Labs     01/06/22 0713   WBC 8.5   HGB 10.9*   HCT 32.6*        Recent Labs     01/06/22 0713      K 4.3   *   CO2 25   BUN 18   CREA 1.17*      CA 9.0     Recent Labs     01/06/22 0713   ALT 23   *   TBILI 0.3   TP 6.0*   ALB 2.8*   GLOB 3.2     No results for input(s): INR, PTP, APTT, INREXT, INREXT in the last 72 hours. No results for input(s): FE, TIBC, PSAT, FERR in the last 72 hours. No results found for: FOL, RBCF   No results for input(s): PH, PCO2, PO2 in the last 72 hours. No results for input(s): CPK, CKNDX, TROIQ in the last 72 hours.     No lab exists for component: CPKMB  No results found for: CHOL, CHOLX, CHLST, CHOLV, HDL, HDLP, LDL, LDLC, DLDLP, TGLX, TRIGL, TRIGP, CHHD, CHHDX  Lab Results   Component Value Date/Time    Glucose (POC) 89 01/04/2022 08:46 AM     Lab Results   Component Value Date/Time    Color Yellow/Straw 01/04/2022 11:00 AM    Appearance Clear 01/04/2022 11:00 AM    Specific gravity 1.013 01/04/2022 11:00 AM    pH (UA) 5.0 01/04/2022 11:00 AM    Protein Negative 01/04/2022 11:00 AM    Glucose Negative 01/04/2022 11:00 AM    Ketone Negative 01/04/2022 11:00 AM    Bilirubin Negative 01/04/2022 11:00 AM    Urobilinogen 0.1 01/04/2022 11:00 AM    Nitrites Negative 01/04/2022 11:00 AM    Leukocyte Esterase Negative 01/04/2022 11:00 AM    Bacteria Negative 01/04/2022 11:00 AM    WBC 0-4 01/04/2022 11:00 AM    RBC 0-5 01/04/2022 11:00 AM         Assessment: Gallstone  COPD  Hypertension  Hypothyroidism  History of MI      Plan:     HIDA scan abnormal.      Because of the patient mental status and chance of patient pull out the drainage tube recommend conservative treatment start on clear liquid diet and advance diet this weekend and if tolerated possible discharge      Current Facility-Administered Medications:     zinc oxide-white petrolatum 17-57 % topical paste, , Topical, TID, Raphael Villanueva MD, Given at 01/08/22 0900    piperacillin-tazobactam (ZOSYN) 3.375 g in 0.9% sodium chloride (MBP/ADV) 100 mL MBP, 3.375 g, IntraVENous, Q8H, Selma Baker MD, Last Rate: 25 mL/hr at 01/08/22 0454, 3.375 g at 01/08/22 0454    influenza vaccine 2021-22 (6 mos+)(PF) (FLUARIX/FLULAVAL/FLUZONE QUAD) injection 0.5 mL, 1 Each, IntraMUSCular, PRIOR TO DISCHARGE, Radhika Villanueva MD    albuterol-ipratropium (DUO-NEB) 2.5 MG-0.5 MG/3 ML, 3 mL, Nebulization, Q6H PRN, Tari Hackett MD    [Held by provider] apixaban Pieter Phalen) tablet 2.5 mg, 2.5 mg, Oral, BID, Raphael Villanueva MD, 2.5 mg at 01/05/22 1143    aspirin chewable tablet 81 mg, 81 mg, Oral, DAILY, Raphael Villanueva MD, 81 mg at 01/08/22 0845    atorvastatin (LIPITOR) tablet 40 mg, 40 mg, Oral, QHS, Raphael Villanueva MD, 40 mg at 01/07/22 2102    budesonide-formoteroL (SYMBICORT) 160-4.5 mcg/actuation HFA inhaler 2 Puff, 2 Puff, Inhalation, BID, Raphael Villanueva MD, 2 Puff at 01/08/22 0837    metoprolol succinate (TOPROL-XL) XL tablet 50 mg, 50 mg, Oral, DAILY, Raphael Villanueva MD, 50 mg at 01/08/22 0848    predniSONE (DELTASONE) tablet 10 mg, 10 mg, Oral, DAILY WITH BREAKFAST, Tari Hackett MD, 10 mg at 45/77/38 3895    folic acid (FOLVITE) tablet 1 mg, 1 mg, Oral, DAILY, Raphael Villanueva MD, 1 mg at 01/08/22 0845    levothyroxine (SYNTHROID) tablet 100 mcg, 100 mcg, Oral, DAILY, Raphael Villanueva MD, 100 mcg at 01/08/22 0845    famotidine (PF) (PEPCID) 20 mg in 0.9% sodium chloride 10 mL injection, 20 mg, IntraVENous, Q12H, Chidi Cooper MD, 20 mg at 01/08/22 0901    0.9% sodium chloride infusion, 75 mL/hr, IntraVENous, CONTINUOUS, Raphael Villanueva MD, Last Rate: 75 mL/hr at 01/06/22 2232, 75 mL/hr at 01/06/22 2232    acetaminophen (TYLENOL) tablet 650 mg, 650 mg, Oral, Q6H PRN, 650 mg at 01/07/22 2057 **OR** acetaminophen (TYLENOL) suppository 650 mg, 650 mg, Rectal, Q6H PRN, Raphael Villanueva MD    polyethylene glycol (MIRALAX) packet 17 g, 17 g, Oral, DAILY PRN, Raphael Villanueva MD    ondansetron (ZOFRAN ODT) tablet 4 mg, 4 mg, Oral, Q8H PRN **OR** ondansetron (ZOFRAN) injection 4 mg, 4 mg, IntraVENous, Q6H PRN, Kusum Archer MD

## 2022-01-08 NOTE — PROGRESS NOTES
Patient extremely agitated, confused and not following safety protocols, demanding to go home. Unable to reorient to situation. OOB without calling for assistance, despite bed alarm sounding off when patient attempts to get OOB. Non-skid socks are in place to assist further safety. Dr. Valeria Mix notified at 1360 of situation, new V.O.R.B provided for additional dose of  Vistaril 25mg IM injection one time.

## 2022-01-08 NOTE — PROGRESS NOTES
Patient given vistaril 25 mg IM in the left hip. Patient continues to attempt to leave states her car is in the parking lot. Will report to oncoming shift.

## 2022-01-08 NOTE — PROGRESS NOTES
Patient continues to pull IVs out of her arms. Patient becomes verbally abusive to staff. Patient very confused and threatening to remove IVs again. Patient assisted to the bathroom and patient removed dressing to buttocks. Patient assisted back to bed. Staff increasing caring rounds due to patient refuses to stay in the bed. Call bell within reach and bed alarm on and operating.

## 2022-01-09 PROCEDURE — 99232 SBSQ HOSP IP/OBS MODERATE 35: CPT | Performed by: COLON & RECTAL SURGERY

## 2022-01-09 PROCEDURE — 94640 AIRWAY INHALATION TREATMENT: CPT

## 2022-01-09 PROCEDURE — 74011250637 HC RX REV CODE- 250/637: Performed by: FAMILY MEDICINE

## 2022-01-09 PROCEDURE — 74011636637 HC RX REV CODE- 636/637: Performed by: FAMILY MEDICINE

## 2022-01-09 PROCEDURE — 65270000029 HC RM PRIVATE

## 2022-01-09 PROCEDURE — 94760 N-INVAS EAR/PLS OXIMETRY 1: CPT

## 2022-01-09 PROCEDURE — 74011000258 HC RX REV CODE- 258: Performed by: COLON & RECTAL SURGERY

## 2022-01-09 PROCEDURE — 74011000250 HC RX REV CODE- 250: Performed by: COLON & RECTAL SURGERY

## 2022-01-09 PROCEDURE — 74011250636 HC RX REV CODE- 250/636: Performed by: COLON & RECTAL SURGERY

## 2022-01-09 RX ORDER — AMOXICILLIN AND CLAVULANATE POTASSIUM 875; 125 MG/1; MG/1
1 TABLET, FILM COATED ORAL 2 TIMES DAILY
Qty: 20 TABLET | Refills: 0 | Status: SHIPPED | OUTPATIENT
Start: 2022-01-09

## 2022-01-09 RX ADMIN — WHITE PETROLATUM,ZINC OXIDE: 57; 17 PASTE TOPICAL at 09:00

## 2022-01-09 RX ADMIN — PREDNISONE 10 MG: 5 TABLET ORAL at 09:12

## 2022-01-09 RX ADMIN — PIPERACILLIN SODIUM AND TAZOBACTAM SODIUM 3.38 G: 3; .375 INJECTION, POWDER, LYOPHILIZED, FOR SOLUTION INTRAVENOUS at 22:11

## 2022-01-09 RX ADMIN — LEVOTHYROXINE SODIUM 100 MCG: 0.1 TABLET ORAL at 09:12

## 2022-01-09 RX ADMIN — ACETAMINOPHEN 650 MG: 325 TABLET ORAL at 14:09

## 2022-01-09 RX ADMIN — PIPERACILLIN SODIUM AND TAZOBACTAM SODIUM 3.38 G: 3; .375 INJECTION, POWDER, LYOPHILIZED, FOR SOLUTION INTRAVENOUS at 05:04

## 2022-01-09 RX ADMIN — METOPROLOL SUCCINATE 50 MG: 50 TABLET, EXTENDED RELEASE ORAL at 09:12

## 2022-01-09 RX ADMIN — WHITE PETROLATUM,ZINC OXIDE: 57; 17 PASTE TOPICAL at 16:00

## 2022-01-09 RX ADMIN — ATORVASTATIN CALCIUM 40 MG: 40 TABLET, FILM COATED ORAL at 22:11

## 2022-01-09 RX ADMIN — SODIUM CHLORIDE, PRESERVATIVE FREE 20 MG: 5 INJECTION INTRAVENOUS at 22:11

## 2022-01-09 RX ADMIN — BUDESONIDE AND FORMOTEROL FUMARATE DIHYDRATE 2 PUFF: 160; 4.5 AEROSOL RESPIRATORY (INHALATION) at 19:37

## 2022-01-09 RX ADMIN — FOLIC ACID 1 MG: 1 TABLET ORAL at 09:12

## 2022-01-09 RX ADMIN — PIPERACILLIN SODIUM AND TAZOBACTAM SODIUM 3.38 G: 3; .375 INJECTION, POWDER, LYOPHILIZED, FOR SOLUTION INTRAVENOUS at 14:08

## 2022-01-09 RX ADMIN — ASPIRIN 81 MG CHEWABLE TABLET 81 MG: 81 TABLET CHEWABLE at 09:12

## 2022-01-09 RX ADMIN — BUDESONIDE AND FORMOTEROL FUMARATE DIHYDRATE 2 PUFF: 160; 4.5 AEROSOL RESPIRATORY (INHALATION) at 09:26

## 2022-01-09 RX ADMIN — SODIUM CHLORIDE, PRESERVATIVE FREE 20 MG: 5 INJECTION INTRAVENOUS at 09:12

## 2022-01-09 NOTE — PROGRESS NOTES
DC order noted. Chart reviewed. Pt is actively being treated at this time and may be ischarged on [id number] if she is medically stable. Spoke with attending regarding DC readiness. Attending will discontinue current DC order.

## 2022-01-09 NOTE — PROGRESS NOTES
Patients diet changed from clear liquid diet to regular diet by Dr. Pratibha Gaviria this morning. After lunch  around 1430 patient c/o RUQ abd pain followed by nausea about 30mins later. Patient was given tylenol 650mg po zofran 4mg sublingual. Dr. Juan Martell notified and new orders to change patients diet to low fat easy to chew, in efforts to prevent abd discomfort. - Follow up at 1800 patient no longer complains of abd pain or nausea, able to continue with meals (low fat) without any issues a this time, she ate 85% of her dinner.

## 2022-01-09 NOTE — PROGRESS NOTES
Progress Note    Patient: Shalom Walls MRN: [id number]  SSN: [id number]    YOB: 1930  Age: 80 y.o. Sex: female      Admit Date: 1/4/2022    LOS: 5 days     Subjective:   Patient seen in bed  Chief complaint nasal congestion  Reports abdominal pain is improved    Objective:     Vitals:    01/08/22 1947 01/08/22 2015 01/09/22 0906 01/09/22 0926   BP:  (!) 157/57 (!) 167/52    Pulse:  71 67    Resp:  18 17    Temp:  98.3 °F (36.8 °C) 97.3 °F (36.3 °C)    SpO2: 98% 98% 100% 97%   Weight:       Height:            Intake and Output:  Current Shift: 01/09 0701 - 01/09 1900  In: 840 [P.O.:840]  Out: -   Last three shifts: No intake/output data recorded. Review of Systems:  ROS     Physical Exam:   Abdomen is soft, mildly tender to palpation in the right upper quadrant, nondistended    Lab/Data Review:  No results found for this or any previous visit (from the past 24 hour(s)). Assessment:     Active Problems:    Lethargy (1/4/2022)      AMS (altered mental status) (1/4/2022)        Plan:   Exam is improved however   Will advance diet to a low-fat diet and see how she does  Repeat labs in a.m.   If continues to do well can discharge tomorrow if however her exam worsens or her labs show any evidence of leukocytosis or elevated LFTs will need to get a percutaneous drain    Signed By: Gerson Braga MD     January 9, 2022

## 2022-01-09 NOTE — PROGRESS NOTES
Patients personal sitter very loud during her time with jeff. Asked multiple times if she could keep her voice down she continues to be loud talking about her personal business. Explained to jeff that the door needs to stay close if she continues to be loud because she disturbing other patients. She continues to open door and be loud. Security called to have them come speak with her.

## 2022-01-09 NOTE — PROGRESS NOTES
PULMONARY NOTE  [facility]    Name: Anjelica Pierson MRN: [id number]   : 1930 Hospital: [facility]   Date: 2022  Admission date: 2022 Hospital Day: 6       HPI:     Hospital Problems  Date Reviewed: 2020          Codes Class Noted POA    Lethargy ICD-10-CM: R53.83  ICD-9-CM: 780.79  2022 Unknown        AMS (altered mental status) ICD-10-CM: R41.82  ICD-9-CM: 780.97  2022 Unknown                   [x] High complexity decision making was performed  [x] See my orders for details      Subjective/Initial History:     I was asked by Clemencia Tripp MD to see Anjelica Pierson  a 80 y.o.  female in consultation     Excerpts from admission 2022 or consult notes as follows:   78-year-old lady came in because of generalized weakness shortness of breath and confusion she has significant past medical history of asthma COPD hypertension coronary artery disease history of stent placement in the past she was having confusion spell also having upper respiratory symptoms generalized weakness her Covid test came back negative CAT scan of the abdomen was done which shows gallstone.       No Known Allergies     MAR reviewed and pertinent medications noted or modified as needed     Current Facility-Administered Medications   Medication    zinc oxide-white petrolatum 17-57 % topical paste    piperacillin-tazobactam (ZOSYN) 3.375 g in 0.9% sodium chloride (MBP/ADV) 100 mL MBP    influenza vaccine  (6 mos+)(PF) (FLUARIX/FLULAVAL/FLUZONE QUAD) injection 0.5 mL    albuterol-ipratropium (DUO-NEB) 2.5 MG-0.5 MG/3 ML    [Held by provider] apixaban (ELIQUIS) tablet 2.5 mg    aspirin chewable tablet 81 mg    atorvastatin (LIPITOR) tablet 40 mg    budesonide-formoteroL (SYMBICORT) 160-4.5 mcg/actuation HFA inhaler 2 Puff    metoprolol succinate (TOPROL-XL) XL tablet 50 mg    predniSONE (DELTASONE) tablet 10 mg    folic acid (FOLVITE) tablet 1 mg    levothyroxine (SYNTHROID) tablet 100 mcg    famotidine (PF) (PEPCID) 20 mg in 0.9% sodium chloride 10 mL injection    0.9% sodium chloride infusion    acetaminophen (TYLENOL) tablet 650 mg    Or    acetaminophen (TYLENOL) suppository 650 mg    polyethylene glycol (MIRALAX) packet 17 g    ondansetron (ZOFRAN ODT) tablet 4 mg    Or    ondansetron (ZOFRAN) injection 4 mg      Patient PCP: None  PMH:  has a past medical history of Anemia, Chronic obstructive pulmonary disease ([facility] [address] Heart attack ([facility] [address] Hypertension, and Thyroid disease. PSH:   has a past surgical history that includes hx orthopaedic (2018) and hx pacemaker. FHX: family history includes Heart Disease in her father and mother; Stroke in her father and mother. SHX:  reports that she has never smoked. She has never used smokeless tobacco. She reports that she does not drink alcohol and does not use drugs. ROS:    Review of Systems   Constitutional: Positive for malaise/fatigue. HENT: Positive for hearing loss. Eyes: Negative. Respiratory: Positive for shortness of breath. Cardiovascular: Negative. Gastrointestinal: Negative. Genitourinary: Negative. Musculoskeletal: Negative. Skin: Negative. Neurological: Negative. Psychiatric/Behavioral: Negative.          Objective:     Vital Signs: Telemetry:    normal sinus rhythm Intake/Output:   Visit Vitals  BP (!) 167/52 (BP 1 Location: Right upper arm)   Pulse 67   Temp 97.3 °F (36.3 °C)   Resp 17   Ht 5' 5\" (1.651 m)   Wt 63.2 kg (139 lb 5.3 oz)   SpO2 97%   Breastfeeding No   BMI 23.19 kg/m²       Temp (24hrs), Av.8 °F (36.6 °C), Min:97.3 °F (36.3 °C), Max:98.3 °F (36.8 °C)        O2 Device: None (Room air)         Wt Readings from Last 4 Encounters:   22 63.2 kg (139 lb 5.3 oz)   21 64.4 kg (141 lb 15.6 oz)   21 68 kg (150 lb)   21 68 kg (150 lb)          Intake/Output Summary (Last 24 hours) at 2022 1240  Last data filed at 2022 3686  Gross per 24 hour   Intake 840 ml   Output --   Net 840 ml       Last shift:      01/09 0701 - 01/09 1900  In: 840 [P.O.:840]  Out: -   Last 3 shifts: No intake/output data recorded. Physical Exam:     Physical Exam  Constitutional:       Appearance: Normal appearance. HENT:      Head: Normocephalic and atraumatic. Nose: Nose normal.      Mouth/Throat:      Mouth: Mucous membranes are moist.   Eyes:      Pupils: Pupils are equal, round, and reactive to light. Cardiovascular:      Rate and Rhythm: Normal rate. Pulses: Normal pulses. Pulmonary:      Effort: Pulmonary effort is normal.   Abdominal:      General: Abdomen is flat. Bowel sounds are normal.   Musculoskeletal:         General: Normal range of motion. Cervical back: Normal range of motion and neck supple. Neurological:      Mental Status: She is alert. Comments: Hard of hearing          Labs:    No results for input(s): WBC, HGB, PLT, INR, APTT, HGBEXT, PLTEXT, INREXT, HGBEXT, PLTEXT, INREXT in the last 72 hours. No lab exists for component: FIB, DDMER  No results for input(s): NA, K, CL, CO2, GLU, BUN, CREA, CA, MG, PHOS, LAC, ALB, TBIL, ALT, AML, LPSE in the last 72 hours. No lab exists for component: SGOT  No results for input(s): PH, PCO2, PO2, HCO3, FIO2 in the last 72 hours. No results for input(s): CPK, CKNDX, TROIQ in the last 72 hours. No lab exists for component: CPKMB  No results found for: BNPP, BNP   No results found for: CULTNo results found for: TSH, TSHEXT, TSHEXT    Imaging:    CXR Results  (Last 48 hours)    None        Results from Hospital Encounter encounter on 01/04/22    XR CHEST PORT    Narrative  1 new comparison September 20. Impression  The cardiomediastinal silhouette is appropriate for age, technique,  and lung expansion. Pulmonary vasculature is not congested. The lungs are  essentially clear. No effusion or pneumothorax is seen.       Results from [facility] encounter on 09/20/21    XR CHEST PORT    Narrative  History: Evidence of breath    A single view of the chest was obtained. Heart size is stable. There is some  atherosclerotic change of the aorta. Lungs are clear. No effusions or  pneumothorax. Bony structures are within normal limits. Impression  Normal findings. Results from Hospital Encounter encounter on 06/02/21    XR CHEST PORT    Narrative  The study is a single view chest radiographic examination dated 6/2/2021. HISTORY: Shortness of breath. COMPARISON:  1/24/2021. FINDINGS: The heart size is normal. The pulmonary vessels are normal in caliber. The lung parenchyma is clear without an infiltrate or atelectasis. There is a  mild degree of chronic baseline peribronchial cuffing. The costophrenic angles  are maintained without pleural effusions or a pneumothorax. There is a midline  position to the trachea. There are atherosclerotic vascular changes of the  thoracic aorta. The pulmonary bia are symmetrical.    The bony thorax is intact without fractures/acute osseous abnormalities. Impression  1. There are moderate atherosclerotic vascular changes of the thoracic aorta. The patient may also have coronary artery atherosclerotic vascular changes which  could be a source for chest pain. 2.  This examination is negative for acute pulmonary parenchymal pathology. There is a mild degree of chronic baseline peribronchial cuffing without change. Results from [place] encounter on 01/04/22    CT ABD PELV W CONT    Narrative  CT abdomen and pelvis without IV contrast    Comparison CT abdomen and pelvis November 19, 2019. Axial images are reviewed along with reformatted sagittal/coronal images. 100 mL  Isovue 370 administered. Motion limited study.   Dose reduction: All CT scans at this facility are performed using dose reduction  optimization techniques as appropriate to a performed exam including the  following-  automated exposure control, adjustments of mA and/or Kv according to patient  size, or use of iterative reconstructive technique. Lung bases are clear. .    Normal enhancement of the liver. Gallbladder distention. Small gallstones noted  dependently within the gallbladder lumen. Pancreas, spleen, and bilateral  adrenal glands appear unremarkable. Cortical thinning bilateral kidneys. No hydronephrosis. Stomach and small bowel loops are decompressed. Appendix unchanged. There is  stool and air through colon. Distal descending and sigmoid colon diverticula. No  CT evidence for appendicitis or diverticulitis. No ascites. Unchanged uterine calcification likely related to small degenerated uterine  fibroid. Dense intimal calcification normal caliber abdominal aorta, advanced abdominal  aorta atherosclerosis. Atherosclerosis extends to proximal abdominal aorta  branch vessels and pelvic arteries. Left hip hardware. Impression  No bowel obstruction. Colonic diverticulosis. No CT evidence for  appendicitis or diverticulitis. Unchanged appearance appendix. Gallbladder distention. Few small dependent gallstones within gallbladder lumen. Cortical atrophy each kidney. No hydronephrosis. Advanced atherosclerotic change abdominal aorta and pelvic arteries. No  abdominal aortic aneurysm. Left hip hardware. IMPRESSION:   1. Asthma   2. COPD  3. Gallstone  4. Hypertension hypothyroidism  5. History of coronary artery disease      RECOMMENDATIONS/PLAN:   3 77-year-old lady with history of asthma and also upper respiratory symptoms CAT scan of the abdomen was done which shows gallstone  2. Agree with Symbicort inhaler  3. Resume home meds  4. Surgery is seen the patient for gallstone and discussed with her about HIDA scan she is hard of hearing IR was consulted conservative management   5. Repeat labs in a.m.              Heaven Castaneda MD

## 2022-01-09 NOTE — DISCHARGE SUMMARY
Discharge Summary       PATIENT ID: Eva Sharma  MRN: [id number]   YOB: 1930    DATE OF ADMISSION: 1/4/2022  8:25 AM    DATE OF DISCHARGE:   PRIMARY CARE PROVIDER: None     ATTENDING PHYSICIAN: Dimitrios Villanueva  DISCHARGING PROVIDER: Dimitrios Villanueva      CONSULTATIONS: IP CONSULT TO PULMONOLOGY  IP CONSULT TO GASTROENTEROLOGY  IP CONSULT TO INTERVENTIONAL RADIOLOGY  IP CONSULT TO GENERAL SURGERY  IP CONSULT TO CARDIOLOGY    PROCEDURES/SURGERIES: * No surgery found *    ADMITTING DIAGNOSES:    Patient Active Problem List    Diagnosis Date Noted    Lethargy 01/04/2022    AMS (altered mental status) 01/04/2022    SOB (shortness of breath) 09/20/2021    [facility] 09/20/2021    COPD (chronic obstructive pulmonary disease) ([facility] 09/20/2021    NSTEMI (non-ST elevated myocardial infarction) ([facility] 09/20/2021    Onychomycosis 12/08/2020       DISCHARGE DIAGNOSES / PLAN:         Active Hospital Problems    Diagnosis Date Noted    Lethargy 01/04/2022    AMS (altered mental status) 01/04/2022 1. ADDITIONAL CARE RECOMMENDATIONS:         DISCHARGE MEDICATIONS:  Current Discharge Medication List      START taking these medications    Details   amoxicillin-clavulanate (Augmentin) 875-125 mg per tablet Take 1 Tablet by mouth two (2) times a day. Qty: 20 Tablet, Refills: 0  Start date: 1/9/2022         CONTINUE these medications which have NOT CHANGED    Details   albuterol-ipratropium (DUO-NEB) 2.5 mg-0.5 mg/3 ml nebu 3 mL by Nebulization route every six (6) hours as needed for Wheezing. Qty: 30 Nebule, Refills: 0      apixaban (ELIQUIS) 2.5 mg tablet Take 1 Tablet by mouth two (2) times a day. Indications: treatment to prevent blood clots in chronic atrial fibrillation  Qty: 60 Tablet, Refills: 0      aspirin 81 mg chewable tablet Take 1 Tablet by mouth daily. Qty: 30 Tablet, Refills: 0      atorvastatin (LIPITOR) 40 mg tablet Take 1 Tablet by mouth nightly.   Qty: 60 Tablet, Refills: 0      budesonide-formoteroL (SYMBICORT) 160-4.5 mcg/actuation HFAA Take 2 Puffs by inhalation two (2) times a day. Qty: 10.2 g, Refills: 1      predniSONE (DELTASONE) 10 mg tablet Take 10 mg by mouth daily (with breakfast). Qty: 10 Tablet, Refills: 0      metoprolol succinate (TOPROL-XL) 50 mg XL tablet Take 1 Tablet by mouth daily. Qty: 50 Tablet, Refills: 0      albuterol (PROVENTIL VENTOLIN) 2.5 mg /3 mL (0.083 %) nebu Take 3 mL by inhalation every six (6) hours as needed. folic acid (FOLVITE) 1 mg tablet TAKE 1 TABLET BY MOUTH EVERY DAY      levothyroxine (SYNTHROID) 100 mcg tablet TAKE 1 TABLET BY MOUTH EVERY DAY         STOP taking these medications       ALPRAZolam (XANAX) 0.25 mg tablet Comments:   Reason for Stopping:                 NOTIFY YOUR PHYSICIAN FOR ANY OF THE FOLLOWING:   Fever over 101 degrees for 24 hours. Chest pain, shortness of breath, fever, chills, nausea, vomiting, diarrhea, change in mentation, falling, weakness, bleeding. Severe pain or pain not relieved by medications. Or, any other signs or symptoms that you may have questions about. DISPOSITION:  x  Home With:   OT  PT  HH  RN       Long term SNF/Inpatient Rehab    Independent/assisted living    Hospice    Other:       PATIENT CONDITION AT DISCHARGE: Stable      PHYSICAL EXAMINATION AT DISCHARGE:  General:          Alert, cooperative, no distress, appears stated age. HEENT:           Atraumatic, anicteric sclerae, pink conjunctivae                          No oral ulcers, mucosa moist, throat clear, dentition fair  Neck:               Supple, symmetrical  Lungs:             Clear to auscultation bilaterally. No Wheezing or Rhonchi. No rales. Chest wall:      No tenderness  No Accessory muscle use. Heart:              Regular  rhythm,  No  murmur   No edema  Abdomen:        Soft, non-tender. Not distended. Bowel sounds normal  Extremities:     No cyanosis.   No clubbing,                            Skin turgor normal, Capillary refill normal  Skin:                Not pale. Not Jaundiced  No rashes   Psych:             Not anxious or agitated. Neurologic:      Alert, moves all extremities, answers questions appropriately and responds to commands     NM HEPATOBILIARY DUCT SCAN   Final Result   Abnormal study      US ABD LTD   Final Result   Cholelithiasis. CT ABD PELV W CONT   Final Result   No bowel obstruction. Colonic diverticulosis. No CT evidence for   appendicitis or diverticulitis. Unchanged appearance appendix. Gallbladder distention. Few small dependent gallstones within gallbladder lumen. Cortical atrophy each kidney. No hydronephrosis. Advanced atherosclerotic change abdominal aorta and pelvic arteries. No   abdominal aortic aneurysm. Left hip hardware. CT HEAD WO CONT   Final Result   Age-appropriate atrophy. No acute findings. XR CHEST PORT   Final Result   The cardiomediastinal silhouette is appropriate for age, technique,   and lung expansion. Pulmonary vasculature is not congested. The lungs are   essentially clear. No effusion or pneumothorax is seen. No results found for this or any previous visit (from the past 24 hour(s)). HOSPITAL COURSE:    Patient is a 80 y.o. year old female signal past medical history of COPD hypertension hypothyroidism came to emergency room with confusion as the patient not a good historian history and physical from the ER record ER physician talked to the patient daughter and patient crying and complaining of various pain some abdominal pain patient have a CT scan of the abdomen done shows gallstone patient was admitted for further evaluation treatment     1/7/22  Patient sitting up in bed. Currently not complaining of RUQ pain but still slightly tender to the area     HIDA Scan: Abnormal study. Normal gallbladder is not seen at any point up until 2 hours post-injection.     Patient was seen by the surgery and also seen by cardiology patient is high risk for any surgery    Discussed with the patient surgeon this morning patient possible can be discharged home tomorrow if patient condition improved      Signed:   Ama Barajas MD  1/9/2022  12:58 PM

## 2022-01-10 VITALS
RESPIRATION RATE: 16 BRPM | SYSTOLIC BLOOD PRESSURE: 187 MMHG | BODY MASS INDEX: 24.76 KG/M2 | OXYGEN SATURATION: 98 % | HEART RATE: 67 BPM | WEIGHT: 148.59 LBS | HEIGHT: 65 IN | TEMPERATURE: 98.2 F | DIASTOLIC BLOOD PRESSURE: 62 MMHG

## 2022-01-10 LAB
ALBUMIN SERPL-MCNC: 2.5 G/DL (ref 3.5–5)
ALBUMIN/GLOB SERPL: 0.8 {RATIO} (ref 1.1–2.2)
ALP SERPL-CCNC: 91 U/L (ref 45–117)
ALT SERPL-CCNC: 21 U/L (ref 12–78)
ANION GAP SERPL CALC-SCNC: 5 MMOL/L (ref 5–15)
AST SERPL W P-5'-P-CCNC: 17 U/L (ref 15–37)
BILIRUB SERPL-MCNC: 0.3 MG/DL (ref 0.2–1)
BUN SERPL-MCNC: 12 MG/DL (ref 6–20)
BUN/CREAT SERPL: 11 (ref 12–20)
CA-I BLD-MCNC: 8.4 MG/DL (ref 8.5–10.1)
CHLORIDE SERPL-SCNC: 116 MMOL/L (ref 97–108)
CO2 SERPL-SCNC: 22 MMOL/L (ref 21–32)
CREAT SERPL-MCNC: 1.1 MG/DL (ref 0.55–1.02)
GLOBULIN SER CALC-MCNC: 3 G/DL (ref 2–4)
GLUCOSE SERPL-MCNC: 92 MG/DL (ref 65–100)
POTASSIUM SERPL-SCNC: 3.5 MMOL/L (ref 3.5–5.1)
PROT SERPL-MCNC: 5.5 G/DL (ref 6.4–8.2)
SODIUM SERPL-SCNC: 143 MMOL/L (ref 136–145)

## 2022-01-10 PROCEDURE — 94760 N-INVAS EAR/PLS OXIMETRY 1: CPT

## 2022-01-10 PROCEDURE — 74011000258 HC RX REV CODE- 258: Performed by: COLON & RECTAL SURGERY

## 2022-01-10 PROCEDURE — 94640 AIRWAY INHALATION TREATMENT: CPT

## 2022-01-10 PROCEDURE — 99232 SBSQ HOSP IP/OBS MODERATE 35: CPT | Performed by: COLON & RECTAL SURGERY

## 2022-01-10 PROCEDURE — 36415 COLL VENOUS BLD VENIPUNCTURE: CPT

## 2022-01-10 PROCEDURE — 99221 1ST HOSP IP/OBS SF/LOW 40: CPT | Performed by: PODIATRIST

## 2022-01-10 PROCEDURE — 74011250637 HC RX REV CODE- 250/637: Performed by: FAMILY MEDICINE

## 2022-01-10 PROCEDURE — 74011636637 HC RX REV CODE- 636/637: Performed by: FAMILY MEDICINE

## 2022-01-10 PROCEDURE — 80053 COMPREHEN METABOLIC PANEL: CPT

## 2022-01-10 PROCEDURE — 74011250636 HC RX REV CODE- 250/636: Performed by: COLON & RECTAL SURGERY

## 2022-01-10 PROCEDURE — 74011000250 HC RX REV CODE- 250: Performed by: COLON & RECTAL SURGERY

## 2022-01-10 RX ADMIN — LEVOTHYROXINE SODIUM 100 MCG: 0.1 TABLET ORAL at 11:34

## 2022-01-10 RX ADMIN — ASPIRIN 81 MG CHEWABLE TABLET 81 MG: 81 TABLET CHEWABLE at 11:34

## 2022-01-10 RX ADMIN — SODIUM CHLORIDE, PRESERVATIVE FREE 20 MG: 5 INJECTION INTRAVENOUS at 11:34

## 2022-01-10 RX ADMIN — WHITE PETROLATUM,ZINC OXIDE: 57; 17 PASTE TOPICAL at 11:34

## 2022-01-10 RX ADMIN — BUDESONIDE AND FORMOTEROL FUMARATE DIHYDRATE 2 PUFF: 160; 4.5 AEROSOL RESPIRATORY (INHALATION) at 09:15

## 2022-01-10 RX ADMIN — PIPERACILLIN SODIUM AND TAZOBACTAM SODIUM 3.38 G: 3; .375 INJECTION, POWDER, LYOPHILIZED, FOR SOLUTION INTRAVENOUS at 11:35

## 2022-01-10 RX ADMIN — FOLIC ACID 1 MG: 1 TABLET ORAL at 11:34

## 2022-01-10 RX ADMIN — METOPROLOL SUCCINATE 50 MG: 50 TABLET, EXTENDED RELEASE ORAL at 11:34

## 2022-01-10 RX ADMIN — ACETAMINOPHEN 650 MG: 325 TABLET ORAL at 11:34

## 2022-01-10 RX ADMIN — PREDNISONE 10 MG: 5 TABLET ORAL at 11:34

## 2022-01-10 RX ADMIN — PIPERACILLIN SODIUM AND TAZOBACTAM SODIUM 3.38 G: 3; .375 INJECTION, POWDER, LYOPHILIZED, FOR SOLUTION INTRAVENOUS at 05:22

## 2022-01-10 RX ADMIN — WHITE PETROLATUM,ZINC OXIDE: 57; 17 PASTE TOPICAL at 05:25

## 2022-01-10 NOTE — CONSULTS
[facility] CONSULT NOTE    Subjective:         Date of Consultation: January 10, 2022     Referring Physician: River Hickman MD    Patient is a 80 y.o. female who is being seen for thick/discolored toenails. Patient has a hx of anemia, MI, HTN, COPD and hypothyroidism. Pt is a poor historian, H&P gleaned via the chart. Pt was brought to the emergency department on 01/04/2022 secondary to abdominal pain. She was pleasantly confused and insisting that she was going home.   Denies any LE pain    Patient Active Problem List    Diagnosis Date Noted    Lethargy 01/04/2022    AMS (altered mental status) 01/04/2022    SOB (shortness of breath) 09/20/2021    [facility] 09/20/2021    COPD (chronic obstructive pulmonary disease) [facility] 09/20/2021    NSTEMI (non-ST elevated myocardial infarction) ([facility] 09/20/2021    Onychomycosis 12/08/2020     Past Medical History:   Diagnosis Date    Anemia     Chronic obstructive pulmonary disease ([address]     Heart attack ([address]     Hypertension     Thyroid disease     hypothyroidism      Past Surgical History:   Procedure Laterality Date    HX ORTHOPAEDIC  2018    total hip replacement    HX PACEMAKER        Family History   Problem Relation Age of Onset    Stroke Mother     Heart Disease Mother     Stroke Father     Heart Disease Father       Social History     Tobacco Use    Smoking status: Never Smoker    Smokeless tobacco: Never Used   Substance Use Topics    Alcohol use: Never     Current Facility-Administered Medications   Medication Dose Route Frequency Provider Last Rate Last Admin    zinc oxide-white petrolatum 17-57 % topical paste   Topical TID Julieta Powers MD   Given at 01/10/22 1134    piperacillin-tazobactam (ZOSYN) 3.375 g in 0.9% sodium chloride (MBP/ADV) 100 mL MBP  3.375 g IntraVENous Q8H eVlma Mistry MD 25 mL/hr at 01/10/22 1135 3.375 g at 01/10/22 1135    influenza vaccine 2021-22 (6 mos+)(PF) (FLUARIX/FLULAVAL/FLUZONE QUAD) injection 0.5 mL  1 Each IntraMUSCular PRIOR TO DISCHARGE Smiley Villanueva MD        albuterol-ipratropium (DUO-NEB) 2.5 MG-0.5 MG/3 ML  3 mL Nebulization Q6H PRN Smiley Villanueva MD       [place] [Held by provider] apixaban Verneita Dax) tablet 2.5 mg  2.5 mg Oral BID Smiley Villanueva MD   2.5 mg at 01/05/22 1143    aspirin chewable tablet 81 mg  81 mg Oral DAILY Raphael Villanueva MD   81 mg at 01/10/22 1134    atorvastatin (LIPITOR) tablet 40 mg  40 mg Oral QHS Raphael Villanueva MD   40 mg at 01/09/22 2211    budesonide-formoteroL (SYMBICORT) 160-4.5 mcg/actuation HFA inhaler 2 Puff  2 Puff Inhalation BID Smiley Villanueva MD   2 Puff at 01/10/22 0915    metoprolol succinate (TOPROL-XL) XL tablet 50 mg  50 mg Oral DAILY Raphael Villanueva MD   50 mg at 01/10/22 1134    predniSONE (DELTASONE) tablet 10 mg  10 mg Oral DAILY WITH BREAKFAST Allison Cirstobal MD   10 mg at 01/33/87 8997    folic acid (FOLVITE) tablet 1 mg  1 mg Oral DAILY Raphael Villanueva MD   1 mg at 01/10/22 1134    levothyroxine (SYNTHROID) tablet 100 mcg  100 mcg Oral DAILY Raphael Villanueva MD   100 mcg at 01/10/22 1134    famotidine (PF) (PEPCID) 20 mg in 0.9% sodium chloride 10 mL injection  20 mg IntraVENous Q12H Mirella Cardenas MD   20 mg at 01/10/22 1134    0.9% sodium chloride infusion  75 mL/hr IntraVENous CONTINUOUS Raphael Villanueva MD 75 mL/hr at 01/06/22 2232 75 mL/hr at 01/06/22 2232    acetaminophen (TYLENOL) tablet 650 mg  650 mg Oral Q6H PRN Smiley Villanueva MD   650 mg at 01/10/22 1134    Or    acetaminophen (TYLENOL) suppository 650 mg  650 mg Rectal Q6H PRN Smiley Villanueva MD        polyethylene glycol (MIRALAX) packet 17 g  17 g Oral DAILY PRN Smiley Villanueva MD        ondansetron (ZOFRAN ODT) tablet 4 mg  4 mg Oral Q8H PRN Raphael Villanueva MD        Or    ondansetron [facility]) injection 4 mg  4 mg IntraVENous Q6H PRN Allison Cristobal MD          No Known Allergies     Review of Systems:    Unable to accurately obtain due to dementia    Objective:     Patient Vitals for the past 8 hrs:   BP Temp Pulse Resp SpO2   01/10/22 0900 -- -- -- -- 98 %   01/10/22 0755 (!) 187/62 98.2 °F (36.8 °C) 67 16 98 %     Temp (24hrs), Av.3 °F (36.8 °C), Min:98.2 °F (36.8 °C), Max:98.3 °F (36.8 °C)      Physical Exam:    GEN: Pt is in NAD. No dressing noted to B/L LE. No family noted at [facility]    DERM: No wounds noted to B/L LE. No dry skin noted to B/L LE. No proximal lymphatic streaking noted to B/L LE. Toenails 1-5 B/L noted to be elongated, dystrophic and with subungual debris. NCSOI noted to B/L LE    VASC: Pedal pulses (DP/PT) palpable to B/L LE. CFT<3sec to all digits of B/L LE. No pedal hair growth noted to the level of the digits for B/L LE. Skin temp is warm to warm from proximal to distal for B/L LE. Neg homas/randi signs to B/L LE. No varicosities or telangectasias noted to B/L LE.    NEURO: Protective and epicritic sensations grossly intact to B/L LE.    MSK: -POP, no gross deformities noted. Good muscle tone and bulk noted to B/L LE.    PSYCH: Cooperative with normal mood and affect    Lab Review:   Recent Results (from the past 24 hour(s))   METABOLIC PANEL, COMPREHENSIVE    Collection Time: 01/10/22  6:42 AM   Result Value Ref Range    Sodium 143 136 - 145 mmol/L    Potassium 3.5 3.5 - 5.1 mmol/L    Chloride 116 (H) 97 - 108 mmol/L    CO2 22 21 - 32 mmol/L    Anion gap 5 5 - 15 mmol/L    Glucose 92 65 - 100 mg/dL    BUN 12 6 - 20 mg/dL    Creatinine 1.10 (H) 0.55 - 1.02 mg/dL    BUN/Creatinine ratio 11 (L) 12 - 20      GFR est AA 56 (L) >60 ml/min/1.73m2    GFR est non-AA 47 (L) >60 ml/min/1.73m2    Calcium 8.4 (L) 8.5 - 10.1 mg/dL    Bilirubin, total 0.3 0.2 - 1.0 mg/dL    AST (SGOT) 17 15 - 37 U/L    ALT (SGPT) 21 12 - 78 U/L    Alk.  phosphatase 91 45 - 117 U/L    Protein, total 5.5 (L) 6.4 - 8.2 g/dL    Albumin 2.5 (L) 3.5 - 5.0 g/dL    Globulin 3.0 2.0 - 4.0 g/dL    A-G Ratio 0.8 (L) 1.1 - 2.2         Impression:     Onychomycosis    Recommendation:       Pt seen and evaluated at [facility]  - Personally reviewed labs, diagnostics and all other provider documentation  - A sharp, aseptic onychoreduction performed to all 10 pedal digits without incident with a nail nipper. Pt tolerated well  - Cont current pain/medical management  - Pt stable for DC from podiatry standpoint with outpatient f/u in my office  - I will sign off at this time    Thank you for the consult! Gautam Jimenez [facility], [address] [facility], [address] [place] [address] and [facility]  [address] [place] [address]  O: [phone]  F: [phone]  C: [phone]

## 2022-01-10 NOTE — PROGRESS NOTES
Progress Note    Patient: Mally Pichardo MRN: [id number]  SSN: [id number]    YOB: 1930  Age: 80 y.o. Sex: female      Admit Date: 1/4/2022    LOS: 6 days     Subjective:   Patient seen in bed  Chief complaint is being wet with urine as per awake is not functioning well  Denies abdominal pain    Objective:     Vitals:    01/09/22 1939 01/09/22 2026 01/10/22 0755 01/10/22 0900   BP:  (!) 177/65 (!) 187/62    Pulse:  67 67    Resp:  18 16    Temp:  98.3 °F (36.8 °C) 98.2 °F (36.8 °C)    SpO2: 99% 98% 98% 98%   Weight:  148 lb 9.4 oz (67.4 kg)     Height:            Intake and Output:  Current Shift: No intake/output data recorded. Last three shifts: 01/08 1901 - 01/10 0700  In: 1260 [P.O.:1260]  Out: 700 [Urine:700]    Review of Systems:  ROS     Physical Exam:   Abdomen soft, family tender palpation of right upper quadrant, nondistended    Lab/Data Review:  Recent Results (from the past 24 hour(s))   METABOLIC PANEL, COMPREHENSIVE    Collection Time: 01/10/22  6:42 AM   Result Value Ref Range    Sodium 143 136 - 145 mmol/L    Potassium 3.5 3.5 - 5.1 mmol/L    Chloride 116 (H) 97 - 108 mmol/L    CO2 22 21 - 32 mmol/L    Anion gap 5 5 - 15 mmol/L    Glucose 92 65 - 100 mg/dL    BUN 12 6 - 20 mg/dL    Creatinine 1.10 (H) 0.55 - 1.02 mg/dL    BUN/Creatinine ratio 11 (L) 12 - 20      GFR est AA 56 (L) >60 ml/min/1.73m2    GFR est non-AA 47 (L) >60 ml/min/1.73m2    Calcium 8.4 (L) 8.5 - 10.1 mg/dL    Bilirubin, total 0.3 0.2 - 1.0 mg/dL    AST (SGOT) 17 15 - 37 U/L    ALT (SGPT) 21 12 - 78 U/L    Alk.  phosphatase 91 45 - 117 U/L    Protein, total 5.5 (L) 6.4 - 8.2 g/dL    Albumin 2.5 (L) 3.5 - 5.0 g/dL    Globulin 3.0 2.0 - 4.0 g/dL    A-G Ratio 0.8 (L) 1.1 - 2.2              Assessment:     Active Problems:    Lethargy (1/4/2022)      AMS (altered mental status) (1/4/2022)        Plan:   Cholelithiasis with cholecystitis on HIDA scan, patient clinically is improved, her pain is improved and exam is improved. Patient has been tolerating diet.   Patient can be discharged home from surgical perspective to follow-up in my office in 1 week    Signed By: Rina Ramirez MD     January 10, 2022

## 2022-01-10 NOTE — PROGRESS NOTES
PULMONARY NOTE  [facility]    Name: Richard Stevens MRN: [id number]   : 1930 Hospital: [address] [place] [address]   Date: 1/10/2022  Admission date: 2022 Hospital Day: 7       HPI:     Hospital Problems  Date Reviewed: 2020          Codes Class Noted POA    Lethargy ICD-10-CM: R53.83  ICD-9-CM: 780.79  2022 Unknown        AMS (altered mental status) ICD-10-CM: R41.82  ICD-9-CM: 780.97  2022 Unknown                   [x] High complexity decision making was performed  [x] See my orders for details      Subjective/Initial History:     I was asked by Sydnee Shrestha MD to see Richard Stevens  a 80 y.o.  female in consultation     Excerpts from admission 2022 or consult notes as follows:   70-year-old lady came in because of generalized weakness shortness of breath and confusion she has significant past medical history of asthma COPD hypertension coronary artery disease history of stent placement in the past she was having confusion spell also having upper respiratory symptoms generalized weakness her Covid test came back negative CAT scan of the abdomen was done which shows gallstone.       No Known Allergies     MAR reviewed and pertinent medications noted or modified as needed     Current Facility-Administered Medications   Medication    zinc oxide-white petrolatum 17-57 % topical paste    piperacillin-tazobactam (ZOSYN) 3.375 g in 0.9% sodium chloride (MBP/ADV) 100 mL MBP    influenza vaccine  (6 mos+)(PF) (FLUARIX/FLULAVAL/FLUZONE QUAD) injection 0.5 mL    albuterol-ipratropium (DUO-NEB) 2.5 MG-0.5 MG/3 ML    [Held by provider] apixaban (ELIQUIS) tablet 2.5 mg    aspirin chewable tablet 81 mg    atorvastatin (LIPITOR) tablet 40 mg    budesonide-formoteroL (SYMBICORT) 160-4.5 mcg/actuation HFA inhaler 2 Puff    metoprolol succinate (TOPROL-XL) XL tablet 50 mg    predniSONE (DELTASONE) tablet 10 mg    folic acid (FOLVITE) tablet 1 mg    levothyroxine (SYNTHROID) tablet 100 mcg    famotidine (PF) (PEPCID) 20 mg in 0.9% sodium chloride 10 mL injection    0.9% sodium chloride infusion    acetaminophen (TYLENOL) tablet 650 mg    Or    acetaminophen (TYLENOL) suppository 650 mg    polyethylene glycol (MIRALAX) packet 17 g    ondansetron (ZOFRAN ODT) tablet 4 mg    Or    ondansetron (ZOFRAN) injection 4 mg      Patient PCP: None  PMH:  has a past medical history of Anemia, Chronic obstructive pulmonary disease ([facility] Heart attack ([facility] Hypertension, and Thyroid disease. PSH:   has a past surgical history that includes hx orthopaedic (2018) and hx pacemaker. FHX: family history includes Heart Disease in her father and mother; Stroke in her father and mother. SHX:  reports that she has never smoked. She has never used smokeless tobacco. She reports that she does not drink alcohol and does not use drugs. ROS:    Review of Systems   Constitutional: Positive for malaise/fatigue. HENT: Positive for hearing loss. Eyes: Negative. Respiratory: Positive for shortness of breath. Cardiovascular: Negative. Gastrointestinal: Negative. Genitourinary: Negative. Musculoskeletal: Negative. Skin: Negative. Neurological: Negative. Psychiatric/Behavioral: Negative.          Objective:     Vital Signs: Telemetry:    normal sinus rhythm Intake/Output:   Visit Vitals  BP (!) 187/62   Pulse 67   Temp 98.2 °F (36.8 °C)   Resp 16   Ht 5' 5\" (1.651 m)   Wt 67.4 kg (148 lb 9.4 oz)   SpO2 98%   Breastfeeding No   BMI 24.73 kg/m²       Temp (24hrs), Av.3 °F (36.8 °C), Min:98.2 °F (36.8 °C), Max:98.3 °F (36.8 °C)        O2 Device: None (Room air)         Wt Readings from Last 4 Encounters:   22 67.4 kg (148 lb 9.4 oz)   21 64.4 kg (141 lb 15.6 oz)   21 68 kg (150 lb)   21 68 kg (150 lb)          Intake/Output Summary (Last 24 hours) at 1/10/2022 1109  Last data filed at 2022 1505  Gross per 24 hour   Intake 420 ml   Output 700 ml   Net -280 ml       Last shift:      No intake/output data recorded. Last 3 shifts: 01/08 1901 - 01/10 0700  In: 1260 [P.O.:1260]  Out: 700 [Urine:700]       Physical Exam:     Physical Exam  Constitutional:       Appearance: Normal appearance. HENT:      Head: Normocephalic and atraumatic. Nose: Nose normal.      Mouth/Throat:      Mouth: Mucous membranes are moist.   Eyes:      Pupils: Pupils are equal, round, and reactive to light. Cardiovascular:      Rate and Rhythm: Normal rate. Pulses: Normal pulses. Pulmonary:      Effort: Pulmonary effort is normal.   Abdominal:      General: Abdomen is flat. Bowel sounds are normal.   Musculoskeletal:         General: Normal range of motion. Cervical back: Normal range of motion and neck supple. Neurological:      Mental Status: She is alert. Comments: Hard of hearing          Labs:    No results for input(s): WBC, HGB, PLT, INR, APTT, HGBEXT, PLTEXT, INREXT, HGBEXT, PLTEXT, INREXT in the last 72 hours. No lab exists for component: FIB, DDMER  Recent Labs     01/10/22  0642      K 3.5   *   CO2 22   GLU 92   BUN 12   CREA 1.10*   CA 8.4*   ALB 2.5*   ALT 21     No results for input(s): PH, PCO2, PO2, HCO3, FIO2 in the last 72 hours. No results for input(s): CPK, CKNDX, TROIQ in the last 72 hours. No lab exists for component: CPKMB  No results found for: BNPP, BNP   No results found for: CULTNo results found for: TSH, TSHEXT, TSHEXT    Imaging:    CXR Results  (Last 48 hours)    None        Results from Hospital Encounter encounter on 01/04/22    XR CHEST PORT    Narrative  1 new comparison September 20. Impression  The cardiomediastinal silhouette is appropriate for age, technique,  and lung expansion. Pulmonary vasculature is not congested. The lungs are  essentially clear. No effusion or pneumothorax is seen.       Results from [place] encounter on 09/20/21    XR CHEST PORT    Narrative  History: Evidence of breath    A single view of the chest was obtained. Heart size is stable. There is some  atherosclerotic change of the aorta. Lungs are clear. No effusions or  pneumothorax. Bony structures are within normal limits. Impression  Normal findings. Results from Hospital Encounter encounter on 06/02/21    XR CHEST PORT    Narrative  The study is a single view chest radiographic examination dated 6/2/2021. HISTORY: Shortness of breath. COMPARISON:  1/24/2021. FINDINGS: The heart size is normal. The pulmonary vessels are normal in caliber. The lung parenchyma is clear without an infiltrate or atelectasis. There is a  mild degree of chronic baseline peribronchial cuffing. The costophrenic angles  are maintained without pleural effusions or a pneumothorax. There is a midline  position to the trachea. There are atherosclerotic vascular changes of the  thoracic aorta. The pulmonary bia are symmetrical.    The bony thorax is intact without fractures/acute osseous abnormalities. Impression  1. There are moderate atherosclerotic vascular changes of the thoracic aorta. The patient may also have coronary artery atherosclerotic vascular changes which  could be a source for chest pain. 2.  This examination is negative for acute pulmonary parenchymal pathology. There is a mild degree of chronic baseline peribronchial cuffing without change. Results from [place] encounter on 01/04/22    CT ABD PELV W CONT    Narrative  CT abdomen and pelvis without IV contrast    Comparison CT abdomen and pelvis November 19, 2019. Axial images are reviewed along with reformatted sagittal/coronal images. 100 mL  Isovue 370 administered. Motion limited study.   Dose reduction: All CT scans at this facility are performed using dose reduction  optimization techniques as appropriate to a performed exam including the  following-  automated exposure control, adjustments of mA and/or Kv according to patient  size, or use of iterative reconstructive technique. Lung bases are clear. .    Normal enhancement of the liver. Gallbladder distention. Small gallstones noted  dependently within the gallbladder lumen. Pancreas, spleen, and bilateral  adrenal glands appear unremarkable. Cortical thinning bilateral kidneys. No hydronephrosis. Stomach and small bowel loops are decompressed. Appendix unchanged. There is  stool and air through colon. Distal descending and sigmoid colon diverticula. No  CT evidence for appendicitis or diverticulitis. No ascites. Unchanged uterine calcification likely related to small degenerated uterine  fibroid. Dense intimal calcification normal caliber abdominal aorta, advanced abdominal  aorta atherosclerosis. Atherosclerosis extends to proximal abdominal aorta  branch vessels and pelvic arteries. Left hip hardware. Impression  No bowel obstruction. Colonic diverticulosis. No CT evidence for  appendicitis or diverticulitis. Unchanged appearance appendix. Gallbladder distention. Few small dependent gallstones within gallbladder lumen. Cortical atrophy each kidney. No hydronephrosis. Advanced atherosclerotic change abdominal aorta and pelvic arteries. No  abdominal aortic aneurysm. Left hip hardware. IMPRESSION:   1. Asthma   2. COPD  3. Gallstone  4. Hypertension hypothyroidism  5. History of coronary artery disease      RECOMMENDATIONS/PLAN:   3 31-year-old lady with history of asthma and also upper respiratory symptoms CAT scan of the abdomen was done which shows gallstone  2. Agree with Symbicort inhaler  3. Resume home meds  4.  Surgery is seen the patient for gallstone and discussed with her about HIDA scan she is hard of hearing IR was consulted but  conservative management              Santy Mims MD

## 2022-01-10 NOTE — PROGRESS NOTES
Cardiology Progress Note      1/10/2022 11:42 AM    Admit Date: 1/4/2022    Admit Diagnosis: Lethargy [R53.83]  AMS (altered mental status) [R41.82]      Subjective:   Patient seen and examined. She has no new complaints.     Visit Vitals  BP (!) 187/62   Pulse 67   Temp 98.2 °F (36.8 °C)   Resp 16   Ht 5' 5\" (1.651 m)   Wt 67.4 kg (148 lb 9.4 oz)   SpO2 98%   Breastfeeding No   BMI 24.73 kg/m²     Current Facility-Administered Medications   Medication Dose Route Frequency    zinc oxide-white petrolatum 17-57 % topical paste   Topical TID    piperacillin-tazobactam (ZOSYN) 3.375 g in 0.9% sodium chloride (MBP/ADV) 100 mL MBP  3.375 g IntraVENous Q8H    influenza vaccine 2021-22 (6 mos+)(PF) (FLUARIX/FLULAVAL/FLUZONE QUAD) injection 0.5 mL  1 Each IntraMUSCular PRIOR TO DISCHARGE    albuterol-ipratropium (DUO-NEB) 2.5 MG-0.5 MG/3 ML  3 mL Nebulization Q6H PRN    [Held by provider] apixaban (ELIQUIS) tablet 2.5 mg  2.5 mg Oral BID    aspirin chewable tablet 81 mg  81 mg Oral DAILY    atorvastatin (LIPITOR) tablet 40 mg  40 mg Oral QHS    budesonide-formoteroL (SYMBICORT) 160-4.5 mcg/actuation HFA inhaler 2 Puff  2 Puff Inhalation BID    metoprolol succinate (TOPROL-XL) XL tablet 50 mg  50 mg Oral DAILY    predniSONE (DELTASONE) tablet 10 mg  10 mg Oral DAILY WITH BREAKFAST    folic acid (FOLVITE) tablet 1 mg  1 mg Oral DAILY    levothyroxine (SYNTHROID) tablet 100 mcg  100 mcg Oral DAILY    famotidine (PF) (PEPCID) 20 mg in 0.9% sodium chloride 10 mL injection  20 mg IntraVENous Q12H    0.9% sodium chloride infusion  75 mL/hr IntraVENous CONTINUOUS    acetaminophen (TYLENOL) tablet 650 mg  650 mg Oral Q6H PRN    Or    acetaminophen (TYLENOL) suppository 650 mg  650 mg Rectal Q6H PRN    polyethylene glycol (MIRALAX) packet 17 g  17 g Oral DAILY PRN    ondansetron (ZOFRAN ODT) tablet 4 mg  4 mg Oral Q8H PRN    Or    ondansetron (ZOFRAN) injection 4 mg  4 mg IntraVENous Q6H PRN         Objective: Physical Exam:  Visit Vitals  BP (!) 187/62   Pulse 67   Temp 98.2 °F (36.8 °C)   Resp 16   Ht 5' 5\" (1.651 m)   Wt 67.4 kg (148 lb 9.4 oz)   SpO2 98%   Breastfeeding No   BMI 24.73 kg/m²     General Appearance:  Well developed, well nourished,alert and oriented x 3, and individual in no acute distress. Ears/Nose/Mouth/Throat:   Hearing grossly normal.         Neck: Supple. Chest:   Lungs clear to auscultation bilaterally. Cardiovascular:  Regular rate and rhythm, S1, S2 normal, no murmur. Abdomen:   Soft, non-tender, bowel sounds are active. Extremities: No edema bilaterally. Skin: Warm and dry. Data Review:   Labs:    Recent Results (from the past 24 hour(s))   METABOLIC PANEL, COMPREHENSIVE    Collection Time: 01/10/22  6:42 AM   Result Value Ref Range    Sodium 143 136 - 145 mmol/L    Potassium 3.5 3.5 - 5.1 mmol/L    Chloride 116 (H) 97 - 108 mmol/L    CO2 22 21 - 32 mmol/L    Anion gap 5 5 - 15 mmol/L    Glucose 92 65 - 100 mg/dL    BUN 12 6 - 20 mg/dL    Creatinine 1.10 (H) 0.55 - 1.02 mg/dL    BUN/Creatinine ratio 11 (L) 12 - 20      GFR est AA 56 (L) >60 ml/min/1.73m2    GFR est non-AA 47 (L) >60 ml/min/1.73m2    Calcium 8.4 (L) 8.5 - 10.1 mg/dL    Bilirubin, total 0.3 0.2 - 1.0 mg/dL    AST (SGOT) 17 15 - 37 U/L    ALT (SGPT) 21 12 - 78 U/L    Alk. phosphatase 91 45 - 117 U/L    Protein, total 5.5 (L) 6.4 - 8.2 g/dL    Albumin 2.5 (L) 3.5 - 5.0 g/dL    Globulin 3.0 2.0 - 4.0 g/dL    A-G Ratio 0.8 (L) 1.1 - 2.2         Telemetry: normal sinus rhythm      Assessment:   Abdominal pain  Altered mental status  Chronic HFrEF  Paroxysmal atrial fibrillation  Coronary artery disease  Hypertension  Hyperlipidemia    Plan:   27-year-old female with a past medical history as above who is seen for preoperative risk assessment  -Patient with likely proximal LAD CAD which we had opted to manage medically as per the patient and her family's wishes.   Fortunately, she has remained angina free since her last hospitalization however she was never revascularized. She has symptomatically done very well as an outpatient and remains clinically euvolemic at this time as well.  -She spontaneously converted to sinus rhythm as an outpatient and remains in sinus rhythm. Her [id number] is 6, indicating high risk for CVA. She is on eliquis for stroke prevention.  -Since she is not active at baseline, I am unable to assess her functional status at this time, however due to high suspicion for proximal LAD disease I think she will be high risk for any kind of surgical procedure.   She does have mild right upper quadrant tenderness and I will defer management of this to surgery, however with regards to risk/benefit in my opinion conservative management would be the best option for this patient.  -Fortunately, her abdominal symptoms are very well controlled at this time and she is being managed conservatively with plans for outpatient surgery follow-up  -Please do not hesitate to reach out to me anytime with questions/concerns     Thank you for allowing us to participate in the care of your patient

## 2022-01-10 NOTE — PROGRESS NOTES
CM called patients daughterDarryl @ [phone] to notify of patients DC for today. CM discussed HH and Mary Barnes declined stating that the patient has a sitter that lives with her and provides 24/7 support. Abhishekshandra Cameron stated that she wanted patient to have her nails cut by Dr. Janell Tesfaye before patient discharges. CM called Dr. Devora Crawford and received a verbal order to consult podiatry. CM entered order and notified Dr. Janell Tesfaye. He stated that he will see patient around lunchtime. CM called Abhishekshandra Cameron to notify. Mary Barnes stated that she will be here around 1:30-2pm to pick patient up and ask that the nurse calls her to go over the DC paperwork.     DC plan: home and her daughter will provide transportation upon DC. Discharge plan of care/case management plan validated with provider discharge order.

## 2022-01-10 NOTE — DISCHARGE SUMMARY
Discharge Summary       PATIENT ID: Kirstie Mcadams  MRN: [id number]   YOB: 1930    DATE OF ADMISSION: 1/4/2022  8:25 AM    DATE OF DISCHARGE:   PRIMARY CARE PROVIDER: None     ATTENDING PHYSICIAN: Renee Villanueva  DISCHARGING PROVIDER: Renee Villanueva      CONSULTATIONS: IP CONSULT TO PULMONOLOGY  IP CONSULT TO GASTROENTEROLOGY  IP CONSULT TO INTERVENTIONAL RADIOLOGY  IP CONSULT TO GENERAL SURGERY  IP CONSULT TO CARDIOLOGY    PROCEDURES/SURGERIES: * No surgery found *    ADMITTING DIAGNOSES:    Patient Active Problem List    Diagnosis Date Noted    Lethargy 01/04/2022    AMS (altered mental status) 01/04/2022    SOB (shortness of breath) 09/20/2021    COPD exacerbation ([facility] 09/20/2021    COPD (chronic obstructive pulmonary disease) ([facility] 09/20/2021    NSTEMI (non-ST elevated myocardial infarction) ([facility] 09/20/2021    Onychomycosis 12/08/2020       DISCHARGE DIAGNOSES / PLAN:      Gallstone  COPD  Hypertension  Hypothyroidism  History of MI        DISCHARGE MEDICATIONS:  Current Discharge Medication List      START taking these medications    Details   amoxicillin-clavulanate (Augmentin) 875-125 mg per tablet Take 1 Tablet by mouth two (2) times a day. Qty: 20 Tablet, Refills: 0  Start date: 1/9/2022         CONTINUE these medications which have NOT CHANGED    Details   albuterol-ipratropium (DUO-NEB) 2.5 mg-0.5 mg/3 ml nebu 3 mL by Nebulization route every six (6) hours as needed for Wheezing. Qty: 30 Nebule, Refills: 0      apixaban (ELIQUIS) 2.5 mg tablet Take 1 Tablet by mouth two (2) times a day. Indications: treatment to prevent blood clots in chronic atrial fibrillation  Qty: 60 Tablet, Refills: 0      aspirin 81 mg chewable tablet Take 1 Tablet by mouth daily. Qty: 30 Tablet, Refills: 0      atorvastatin (LIPITOR) 40 mg tablet Take 1 Tablet by mouth nightly.   Qty: 60 Tablet, Refills: 0      budesonide-formoteroL (SYMBICORT) 160-4.5 mcg/actuation HFAA Take 2 Puffs by inhalation two (2) times a day. Qty: 10.2 g, Refills: 1      predniSONE (DELTASONE) 10 mg tablet Take 10 mg by mouth daily (with breakfast). Qty: 10 Tablet, Refills: 0      metoprolol succinate (TOPROL-XL) 50 mg XL tablet Take 1 Tablet by mouth daily. Qty: 50 Tablet, Refills: 0      albuterol (PROVENTIL VENTOLIN) 2.5 mg /3 mL (0.083 %) nebu Take 3 mL by inhalation every six (6) hours as needed. folic acid (FOLVITE) 1 mg tablet TAKE 1 TABLET BY MOUTH EVERY DAY      levothyroxine (SYNTHROID) 100 mcg tablet TAKE 1 TABLET BY MOUTH EVERY DAY         STOP taking these medications       ALPRAZolam (XANAX) 0.25 mg tablet Comments:   Reason for Stopping:                 NOTIFY YOUR PHYSICIAN FOR ANY OF THE FOLLOWING:   Fever over 101 degrees for 24 hours. Chest pain, shortness of breath, fever, chills, nausea, vomiting, diarrhea, change in mentation, falling, weakness, bleeding. Severe pain or pain not relieved by medications. Or, any other signs or symptoms that you may have questions about. DISPOSITION:  x  Home With:   OT  PT  HH  RN       Long term SNF/Inpatient Rehab    Independent/assisted living    Hospice    Other:       PATIENT CONDITION AT DISCHARGE: Stable      PHYSICAL EXAMINATION AT DISCHARGE:  General:          Alert, cooperative, no distress, appears stated age. HEENT:           Atraumatic, anicteric sclerae, pink conjunctivae                          No oral ulcers, mucosa moist, throat clear, dentition fair  Neck:               Supple, symmetrical  Lungs:             Clear to auscultation bilaterally. No Wheezing or Rhonchi. No rales. Chest wall:      No tenderness  No Accessory muscle use. Heart:              Regular  rhythm,  No  murmur   No edema  Abdomen:        Soft, non-tender. Not distended. Bowel sounds normal  Extremities:     No cyanosis. No clubbing,                            Skin turgor normal, Capillary refill normal  Skin:                Not pale.   Not Jaundiced  No rashes   Psych: Not anxious or agitated. Neurologic:      Alert, moves all extremities, answers questions appropriately and responds to commands     NM HEPATOBILIARY DUCT SCAN   Final Result   Abnormal study      US ABD LTD   Final Result   Cholelithiasis. CT ABD PELV W CONT   Final Result   No bowel obstruction. Colonic diverticulosis. No CT evidence for   appendicitis or diverticulitis. Unchanged appearance appendix. Gallbladder distention. Few small dependent gallstones within gallbladder lumen. Cortical atrophy each kidney. No hydronephrosis. Advanced atherosclerotic change abdominal aorta and pelvic arteries. No   abdominal aortic aneurysm. Left hip hardware. CT HEAD WO CONT   Final Result   Age-appropriate atrophy. No acute findings. XR CHEST PORT   Final Result   The cardiomediastinal silhouette is appropriate for age, technique,   and lung expansion. Pulmonary vasculature is not congested. The lungs are   essentially clear. No effusion or pneumothorax is seen. Recent Results (from the past 24 hour(s))   METABOLIC PANEL, COMPREHENSIVE    Collection Time: 01/10/22  6:42 AM   Result Value Ref Range    Sodium 143 136 - 145 mmol/L    Potassium 3.5 3.5 - 5.1 mmol/L    Chloride 116 (H) 97 - 108 mmol/L    CO2 22 21 - 32 mmol/L    Anion gap 5 5 - 15 mmol/L    Glucose 92 65 - 100 mg/dL    BUN 12 6 - 20 mg/dL    Creatinine 1.10 (H) 0.55 - 1.02 mg/dL    BUN/Creatinine ratio 11 (L) 12 - 20      GFR est AA 56 (L) >60 ml/min/1.73m2    GFR est non-AA 47 (L) >60 ml/min/1.73m2    Calcium 8.4 (L) 8.5 - 10.1 mg/dL    Bilirubin, total 0.3 0.2 - 1.0 mg/dL    AST (SGOT) 17 15 - 37 U/L    ALT (SGPT) 21 12 - 78 U/L    Alk.  phosphatase 91 45 - 117 U/L    Protein, total 5.5 (L) 6.4 - 8.2 g/dL    Albumin 2.5 (L) 3.5 - 5.0 g/dL    Globulin 3.0 2.0 - 4.0 g/dL    A-G Ratio 0.8 (L) 1.1 - 2.2            HOSPITAL COURSE:    Patient is [age] y.o. year old female signal past medical history of COPD hypertension hypothyroidism came to emergency room with confusion as the patient not a good historian history and physical from the ER record ER physician talked to the patient daughter and patient crying and complaining of various pain some abdominal pain patient have a CT scan of the abdomen done shows gallstone patient was admitted for further evaluation treatment     1/7/22  Patient sitting up in bed. Currently not complaining of RUQ pain but still slightly tender to the area     HIDA Scan: Abnormal study. Normal gallbladder is not seen at any point up until 2 hours post-injection.     Patient was seen by the surgery and also seen by cardiology patient is high risk for any surgery      Patient cleared by the cardiology for discharge home follow-up with him in 1    Signed:   Anne Marie Wilson MD  1/10/2022  12:58 PM

## 2022-01-10 NOTE — DISCHARGE INSTRUCTIONS
Discharge Instructions       PATIENT ID: April Locke  MRN: [id number]   YOB: 1930    DATE OF ADMISSION: 1/4/2022  8:25 AM    DATE OF DISCHARGE: 1/9/2022    PRIMARY CARE PROVIDER: None     ATTENDING PHYSICIAN: Julieta Powers MD  DISCHARGING PROVIDER: River Hickman MD    To contact this individual call [phone] and ask the  to page. If unavailable ask to be transferred the Adult Hospitalist Department. DISCHARGE DIAGNOSES gallstone    CONSULTATIONS: IP CONSULT TO PULMONOLOGY  IP CONSULT TO GASTROENTEROLOGY  IP CONSULT TO INTERVENTIONAL RADIOLOGY  IP CONSULT TO GENERAL SURGERY  IP CONSULT TO CARDIOLOGY    PROCEDURES/SURGERIES: * No surgery found *    PENDING TEST RESULTS:   At the time of discharge the following test results are still pending: None    FOLLOW UP APPOINTMENTS:   Follow-up Information     Follow up With Specialties Details Why Claudio Sheth MD Family Medicine In 1 week  [address] [facility] [address] [phone]             ADDITIONAL CARE RECOMMENDATIONS: Follow-up with the surgery    DIET: Low fat, Low cholesterol      ACTIVITY: Activity as tolerated    Wound care: Wound Care Order: submitted to [facility] Please view [email or web address]    EQUIPMENT needed: None      DISCHARGE MEDICATIONS:   See Medication Reconciliation Form    · It is important that you take the medication exactly as they are prescribed. · Keep your medication in the bottles provided by the pharmacist and keep a list of the medication names, dosages, and times to be taken in your wallet. · Do not take other medications without consulting your doctor. NOTIFY YOUR PHYSICIAN FOR ANY OF THE FOLLOWING:   Fever over 101 degrees for 24 hours. Chest pain, shortness of breath, fever, chills, nausea, vomiting, diarrhea, change in mentation, falling, weakness, bleeding. Severe pain or pain not relieved by medications.   Or, any other signs or symptoms that you may have questions about.       DISPOSITION:    Home With:   OT  PT  HH  RN       SNF/Inpatient Rehab/LTAC    Independent/assisted living    Hospice    Other:         PROBLEM LIST Updated:  Yes ***       Signed:   Steve Gupta MD  1/9/2022  12:57 PM

## 2022-01-11 NOTE — PROGRESS NOTES
I have reviewed discharge instructions, follow up appointments, and medications with daughter (Antwan Stover). Daughter verbalized understanding. Patient escorted via wheelchair with all belongings to front entrance with family/caregiver.

## 2022-03-18 PROBLEM — R06.02 SOB (SHORTNESS OF BREATH): Status: ACTIVE | Noted: 2021-09-20

## 2022-03-18 PROBLEM — I21.4 NSTEMI (NON-ST ELEVATED MYOCARDIAL INFARCTION) (HCC): Status: ACTIVE | Noted: 2021-09-20

## 2022-03-19 PROBLEM — R53.83 LETHARGY: Status: ACTIVE | Noted: 2022-01-04

## 2022-03-19 PROBLEM — J44.1 COPD EXACERBATION (HCC): Status: ACTIVE | Noted: 2021-09-20

## 2022-03-19 PROBLEM — J44.9 COPD (CHRONIC OBSTRUCTIVE PULMONARY DISEASE) (HCC): Status: ACTIVE | Noted: 2021-09-20

## 2022-03-19 PROBLEM — B35.1 ONYCHOMYCOSIS: Status: ACTIVE | Noted: 2020-12-08

## 2022-03-19 PROBLEM — R41.82 AMS (ALTERED MENTAL STATUS): Status: ACTIVE | Noted: 2022-01-04

## 2022-09-26 NOTE — PROGRESS NOTES
Physician Progress Note      PATIENT:               Mariella Murphy  CSN #:                  [id number]  :                       1930  ADMIT DATE:       2022 8:25 AM  DISCH DATE:        1/10/2022 2:10 PM  RESPONDING  PROVIDER #:        Loretta Benitez MD          QUERY TEXT:    Pt admitted with abdominal pain. Noted documentation of Moderate malnutrition in  Dietician consult note. If possible, please document in progress notes and discharge summary:      The medical record reflects the following:  Risk Factors: 80year old female, generalized weakness, acute cholecystitis, clear liquid diet, BMI 23.19, 2.5-2.8-2.9  Clinical Indicators:  Dietician consult - Moderate malnutrition, Acute illness  Energy Intake:  7 - 50% or less of est energy requirements for 5 or more days  Body Fat Loss:  1 - Mild body fat loss, Orbital,Buccal region  Muscle Mass Loss:  7 - Moderate muscle mass loss, Temples (temporalis),Hand (interosseous)  Treatment: Diet as ordered, adv as able  Add ensure clear BID      Please call [phone] with any questions  Options provided:  -- moderate malnutrition confirmed present on admission  -- moderate malnutrition ruled out  -- Other - I will add my own diagnosis  -- Disagree - Not applicable / Not valid  -- Disagree - Clinically unable to determine / Unknown  -- Refer to Clinical Documentation Reviewer    PROVIDER RESPONSE TEXT:    The diagnosis of moderate malnutrition was confirmed as present on admission. Query created by: Martine Lyons on 2022 8:58 AM      QUERY TEXT:    Pt admitted with abdominal pain. Pt noted to have AMS. If possible, please document in the progress notes and discharge summary if you are evaluating and / or treating any of the following:       The medical record reflects the following:  Risk Factors: 80year old female, gallstones,  acting confused  Clinical Indicators:  ED provider note -  presents for evaluation of altered mental [FreeTextEntry1] : 57 yo woman with history of gastritis, pituitary microadenoma, BCC of the scalp, breast Ca s/p radiation ( on tamoxifen),  refereed by derm for alopecia.  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  he has suggested trying cellcept given that MTX and HCQ has not cleared her skin and she has new lesion over the neck area.  \par \par she has  photosensitivity and malar.  but denies dry eye, dry mouth, red painful eye, Raynaud's, serositis, low plts, anemia, low wbc, Cp, cough, sob, Dvts/PEs.  2 pregnancies,1 full term without complication.  had miscarriage at 8 weeks.    no joint swelling except for ankle swelling at the end of day.  she is on her feet all day working in a [employer]\par  \par \par in late 2020 and 2021 saw cardiology with normal ECHO and holter \par in 2021 saw pulm with normal PFTs \par \par \par today: patient has been on  cellcept 1000 BID.   patient denies side affects.  tolerating cellcept well.  did blood work which is overall normal. her facial and neck rash has improved with less erythema.  her scalp as well less red and looks like more hair despite her complains of continued hair loss.    patient denies any oral lesions, joint pain or morning stiffness, cp,sob, cough, Gi issues.  \par \par on exam had right 1st cmc and 1st IP pain on palpation.  pain in the area only with use.  No morning stiffness or swelling.  No other joint complaint s\par \par patient no longer needs gabapentin for cervical pain.  now able to turn neck.  she had cervical xray showing narrowed C5-C6 disc space with tiny disc margin osteophyte and straightening of the  cervical lordosis.  currently active doing exercise and her own PT \par  status. Does have dementia at baseline  1/5 H&P -  came to emergency room with confusion  Neurological: pt is alert and oriented to person, place, and time. Alert. 1/6 Surgical PN - Acute cholecystitis  Creatinine: 1.13-1.15-1.17  UA: Negative  Treatment: IVF NS, IV Zosyn    Please call [phone] with any questions  Options provided:  -- Anoxic encephalopathy  -- Hypertensive encephalopathy  -- Metabolic encephalopathy  -- Dementia  -- Other - I will add my own diagnosis  -- Disagree - Not applicable / Not valid  -- Disagree - Clinically unable to determine / Unknown  -- Refer to Clinical Documentation Reviewer    PROVIDER RESPONSE TEXT:    This patient has metabolic encephalopathy. Query created by:  Virgilio Franco on 1/7/2022 9:10 AM      Electronically signed by:  Glenford Halsted MD Reida Sacramento MD 1/11/2022 8:05 AM

## 2022-11-10 ENCOUNTER — HOSPITAL ENCOUNTER (INPATIENT)
Age: 87
LOS: 5 days | Discharge: HOME HEALTH | DRG: 291 | End: 2022-11-15
Attending: EMERGENCY MEDICINE | Admitting: FAMILY MEDICINE
Payer: MEDICARE

## 2022-11-10 ENCOUNTER — APPOINTMENT (OUTPATIENT)
Dept: GENERAL RADIOLOGY | Age: 87
DRG: 291 | End: 2022-11-10
Attending: EMERGENCY MEDICINE
Payer: MEDICARE

## 2022-11-10 DIAGNOSIS — J96.01 ACUTE RESPIRATORY FAILURE WITH HYPOXIA (HCC): ICD-10-CM

## 2022-11-10 DIAGNOSIS — I50.9 ACUTE CONGESTIVE HEART FAILURE, UNSPECIFIED HEART FAILURE TYPE (HCC): Primary | ICD-10-CM

## 2022-11-10 LAB
ALBUMIN SERPL-MCNC: 3.5 G/DL (ref 3.5–5)
ALBUMIN/GLOB SERPL: 0.9 {RATIO} (ref 1.1–2.2)
ALP SERPL-CCNC: 107 U/L (ref 45–117)
ALT SERPL-CCNC: 16 U/L (ref 12–78)
ANION GAP SERPL CALC-SCNC: 11 MMOL/L (ref 5–15)
AST SERPL W P-5'-P-CCNC: 19 U/L (ref 15–37)
BASOPHILS # BLD: 0.1 K/UL (ref 0–0.1)
BASOPHILS NFR BLD: 0 % (ref 0–1)
BILIRUB SERPL-MCNC: 0.3 MG/DL (ref 0.2–1)
BNP SERPL-MCNC: 6518 PG/ML
BUN SERPL-MCNC: 22 MG/DL (ref 6–20)
BUN/CREAT SERPL: 14 (ref 12–20)
CA-I BLD-MCNC: 8.7 MG/DL (ref 8.5–10.1)
CHLORIDE SERPL-SCNC: 106 MMOL/L (ref 97–108)
CO2 SERPL-SCNC: 20 MMOL/L (ref 21–32)
COVID-19 RAPID TEST, COVR: NOT DETECTED
CREAT SERPL-MCNC: 1.55 MG/DL (ref 0.55–1.02)
DIFFERENTIAL METHOD BLD: ABNORMAL
EOSINOPHIL # BLD: 0.1 K/UL (ref 0–0.4)
EOSINOPHIL NFR BLD: 1 % (ref 0–7)
ERYTHROCYTE [DISTWIDTH] IN BLOOD BY AUTOMATED COUNT: 12.7 % (ref 11.5–14.5)
FLUAV AG NPH QL IA: NEGATIVE
FLUBV AG NOSE QL IA: NEGATIVE
GLOBULIN SER CALC-MCNC: 3.9 G/DL (ref 2–4)
GLUCOSE SERPL-MCNC: 211 MG/DL (ref 65–100)
HCT VFR BLD AUTO: 36.5 % (ref 35–47)
HGB BLD-MCNC: 12 G/DL (ref 11.5–16)
IMM GRANULOCYTES # BLD AUTO: 0.1 K/UL (ref 0–0.04)
IMM GRANULOCYTES NFR BLD AUTO: 1 % (ref 0–0.5)
LACTATE SERPL-SCNC: 1.9 MMOL/L (ref 0.4–2)
LYMPHOCYTES # BLD: 2.4 K/UL (ref 0.8–3.5)
LYMPHOCYTES NFR BLD: 12 % (ref 12–49)
MCH RBC QN AUTO: 30.9 PG (ref 26–34)
MCHC RBC AUTO-ENTMCNC: 32.9 G/DL (ref 30–36.5)
MCV RBC AUTO: 94.1 FL (ref 80–99)
MONOCYTES # BLD: 1.8 K/UL (ref 0–1)
MONOCYTES NFR BLD: 9 % (ref 5–13)
NEUTS SEG # BLD: 16 K/UL (ref 1.8–8)
NEUTS SEG NFR BLD: 77 % (ref 32–75)
NRBC # BLD: 0 K/UL (ref 0–0.01)
NRBC BLD-RTO: 0 PER 100 WBC
PLATELET # BLD AUTO: 380 K/UL (ref 150–400)
PMV BLD AUTO: 10.1 FL (ref 8.9–12.9)
POTASSIUM SERPL-SCNC: 4.7 MMOL/L (ref 3.5–5.1)
PROT SERPL-MCNC: 7.4 G/DL (ref 6.4–8.2)
RBC # BLD AUTO: 3.88 M/UL (ref 3.8–5.2)
SODIUM SERPL-SCNC: 137 MMOL/L (ref 136–145)
TROPONIN-HIGH SENSITIVITY: 50 NG/L (ref 0–51)
WBC # BLD AUTO: 20.5 K/UL (ref 3.6–11)

## 2022-11-10 PROCEDURE — 71045 X-RAY EXAM CHEST 1 VIEW: CPT

## 2022-11-10 PROCEDURE — 36415 COLL VENOUS BLD VENIPUNCTURE: CPT

## 2022-11-10 PROCEDURE — 94640 AIRWAY INHALATION TREATMENT: CPT

## 2022-11-10 PROCEDURE — 74011000250 HC RX REV CODE- 250: Performed by: EMERGENCY MEDICINE

## 2022-11-10 PROCEDURE — 96375 TX/PRO/DX INJ NEW DRUG ADDON: CPT

## 2022-11-10 PROCEDURE — 96374 THER/PROPH/DIAG INJ IV PUSH: CPT

## 2022-11-10 PROCEDURE — 83605 ASSAY OF LACTIC ACID: CPT

## 2022-11-10 PROCEDURE — 74011250636 HC RX REV CODE- 250/636: Performed by: EMERGENCY MEDICINE

## 2022-11-10 PROCEDURE — 87040 BLOOD CULTURE FOR BACTERIA: CPT

## 2022-11-10 PROCEDURE — 84484 ASSAY OF TROPONIN QUANT: CPT

## 2022-11-10 PROCEDURE — 87635 SARS-COV-2 COVID-19 AMP PRB: CPT

## 2022-11-10 PROCEDURE — 87804 INFLUENZA ASSAY W/OPTIC: CPT

## 2022-11-10 PROCEDURE — 74011250636 HC RX REV CODE- 250/636: Performed by: FAMILY MEDICINE

## 2022-11-10 PROCEDURE — 83880 ASSAY OF NATRIURETIC PEPTIDE: CPT

## 2022-11-10 PROCEDURE — 74011000250 HC RX REV CODE- 250: Performed by: FAMILY MEDICINE

## 2022-11-10 PROCEDURE — 65270000029 HC RM PRIVATE

## 2022-11-10 PROCEDURE — 85025 COMPLETE CBC W/AUTO DIFF WBC: CPT

## 2022-11-10 PROCEDURE — 74011250637 HC RX REV CODE- 250/637: Performed by: EMERGENCY MEDICINE

## 2022-11-10 PROCEDURE — 99285 EMERGENCY DEPT VISIT HI MDM: CPT

## 2022-11-10 PROCEDURE — 80053 COMPREHEN METABOLIC PANEL: CPT

## 2022-11-10 RX ORDER — ACETAMINOPHEN 325 MG/1
650 TABLET ORAL
Status: DISCONTINUED | OUTPATIENT
Start: 2022-11-10 | End: 2022-11-15 | Stop reason: HOSPADM

## 2022-11-10 RX ORDER — SODIUM CHLORIDE 0.9 % (FLUSH) 0.9 %
5-10 SYRINGE (ML) INJECTION AS NEEDED
Status: DISCONTINUED | OUTPATIENT
Start: 2022-11-10 | End: 2022-11-15 | Stop reason: HOSPADM

## 2022-11-10 RX ORDER — ONDANSETRON 2 MG/ML
4 INJECTION INTRAMUSCULAR; INTRAVENOUS
Status: DISCONTINUED | OUTPATIENT
Start: 2022-11-10 | End: 2022-11-11 | Stop reason: SDUPTHER

## 2022-11-10 RX ORDER — ONDANSETRON 4 MG/1
4 TABLET, ORALLY DISINTEGRATING ORAL
Status: DISCONTINUED | OUTPATIENT
Start: 2022-11-10 | End: 2022-11-15 | Stop reason: HOSPADM

## 2022-11-10 RX ORDER — ENOXAPARIN SODIUM 100 MG/ML
30 INJECTION SUBCUTANEOUS DAILY
Status: DISCONTINUED | OUTPATIENT
Start: 2022-11-11 | End: 2022-11-11

## 2022-11-10 RX ORDER — ONDANSETRON 2 MG/ML
4 INJECTION INTRAMUSCULAR; INTRAVENOUS
Status: COMPLETED | OUTPATIENT
Start: 2022-11-10 | End: 2022-11-10

## 2022-11-10 RX ORDER — IPRATROPIUM BROMIDE AND ALBUTEROL SULFATE 2.5; .5 MG/3ML; MG/3ML
3 SOLUTION RESPIRATORY (INHALATION)
Status: COMPLETED | OUTPATIENT
Start: 2022-11-10 | End: 2022-11-10

## 2022-11-10 RX ORDER — MAGNESIUM SULFATE 100 %
4 CRYSTALS MISCELLANEOUS AS NEEDED
Status: DISCONTINUED | OUTPATIENT
Start: 2022-11-10 | End: 2022-11-15 | Stop reason: HOSPADM

## 2022-11-10 RX ORDER — ONDANSETRON 2 MG/ML
4 INJECTION INTRAMUSCULAR; INTRAVENOUS
Status: DISCONTINUED | OUTPATIENT
Start: 2022-11-10 | End: 2022-11-15 | Stop reason: HOSPADM

## 2022-11-10 RX ORDER — LEVOTHYROXINE SODIUM 100 UG/1
100 TABLET ORAL DAILY
Status: DISCONTINUED | OUTPATIENT
Start: 2022-11-11 | End: 2022-11-15 | Stop reason: HOSPADM

## 2022-11-10 RX ORDER — ONDANSETRON 4 MG/1
4 TABLET, ORALLY DISINTEGRATING ORAL
Status: DISCONTINUED | OUTPATIENT
Start: 2022-11-10 | End: 2022-11-11 | Stop reason: SDUPTHER

## 2022-11-10 RX ORDER — IPRATROPIUM BROMIDE AND ALBUTEROL SULFATE 2.5; .5 MG/3ML; MG/3ML
3 SOLUTION RESPIRATORY (INHALATION)
Status: DISCONTINUED | OUTPATIENT
Start: 2022-11-10 | End: 2022-11-15 | Stop reason: HOSPADM

## 2022-11-10 RX ORDER — POLYETHYLENE GLYCOL 3350 17 G/17G
17 POWDER, FOR SOLUTION ORAL DAILY PRN
Status: DISCONTINUED | OUTPATIENT
Start: 2022-11-10 | End: 2022-11-15 | Stop reason: HOSPADM

## 2022-11-10 RX ORDER — GUAIFENESIN 100 MG/5ML
81 LIQUID (ML) ORAL DAILY
Status: DISCONTINUED | OUTPATIENT
Start: 2022-11-11 | End: 2022-11-15 | Stop reason: HOSPADM

## 2022-11-10 RX ORDER — ACETAMINOPHEN 650 MG/1
650 SUPPOSITORY RECTAL
Status: DISCONTINUED | OUTPATIENT
Start: 2022-11-10 | End: 2022-11-15 | Stop reason: HOSPADM

## 2022-11-10 RX ORDER — POLYETHYLENE GLYCOL 3350 17 G/17G
17 POWDER, FOR SOLUTION ORAL DAILY PRN
Status: DISCONTINUED | OUTPATIENT
Start: 2022-11-10 | End: 2022-11-11 | Stop reason: SDUPTHER

## 2022-11-10 RX ORDER — FUROSEMIDE 10 MG/ML
60 INJECTION INTRAMUSCULAR; INTRAVENOUS
Status: COMPLETED | OUTPATIENT
Start: 2022-11-10 | End: 2022-11-10

## 2022-11-10 RX ORDER — METOPROLOL SUCCINATE 50 MG/1
50 TABLET, EXTENDED RELEASE ORAL DAILY
Status: DISCONTINUED | OUTPATIENT
Start: 2022-11-11 | End: 2022-11-15 | Stop reason: HOSPADM

## 2022-11-10 RX ORDER — INSULIN LISPRO 100 [IU]/ML
INJECTION, SOLUTION INTRAVENOUS; SUBCUTANEOUS
Status: DISCONTINUED | OUTPATIENT
Start: 2022-11-11 | End: 2022-11-15 | Stop reason: HOSPADM

## 2022-11-10 RX ORDER — ACETAMINOPHEN 325 MG/1
650 TABLET ORAL
Status: DISCONTINUED | OUTPATIENT
Start: 2022-11-10 | End: 2022-11-11 | Stop reason: SDUPTHER

## 2022-11-10 RX ORDER — ATORVASTATIN CALCIUM 40 MG/1
40 TABLET, FILM COATED ORAL
Status: DISCONTINUED | OUTPATIENT
Start: 2022-11-10 | End: 2022-11-15 | Stop reason: HOSPADM

## 2022-11-10 RX ORDER — ALBUTEROL SULFATE 2.5 MG/.5ML
2.5 SOLUTION RESPIRATORY (INHALATION)
Status: DISCONTINUED | OUTPATIENT
Start: 2022-11-10 | End: 2022-11-15 | Stop reason: HOSPADM

## 2022-11-10 RX ORDER — BUDESONIDE AND FORMOTEROL FUMARATE DIHYDRATE 160; 4.5 UG/1; UG/1
2 AEROSOL RESPIRATORY (INHALATION) 2 TIMES DAILY
Status: DISCONTINUED | OUTPATIENT
Start: 2022-11-10 | End: 2022-11-11

## 2022-11-10 RX ORDER — FUROSEMIDE 10 MG/ML
40 INJECTION INTRAMUSCULAR; INTRAVENOUS DAILY
Status: DISCONTINUED | OUTPATIENT
Start: 2022-11-11 | End: 2022-11-11

## 2022-11-10 RX ORDER — SODIUM CHLORIDE 9 MG/ML
75 INJECTION, SOLUTION INTRAVENOUS CONTINUOUS
Status: DISCONTINUED | OUTPATIENT
Start: 2022-11-10 | End: 2022-11-10

## 2022-11-10 RX ORDER — FOLIC ACID 1 MG/1
1 TABLET ORAL DAILY
Status: DISCONTINUED | OUTPATIENT
Start: 2022-11-11 | End: 2022-11-15 | Stop reason: HOSPADM

## 2022-11-10 RX ORDER — IPRATROPIUM BROMIDE AND ALBUTEROL SULFATE 2.5; .5 MG/3ML; MG/3ML
3 SOLUTION RESPIRATORY (INHALATION)
Status: DISCONTINUED | OUTPATIENT
Start: 2022-11-11 | End: 2022-11-13

## 2022-11-10 RX ADMIN — IPRATROPIUM BROMIDE AND ALBUTEROL SULFATE 3 ML: .5; 2.5 SOLUTION RESPIRATORY (INHALATION) at 20:15

## 2022-11-10 RX ADMIN — FUROSEMIDE 60 MG: 10 INJECTION, SOLUTION INTRAMUSCULAR; INTRAVENOUS at 21:22

## 2022-11-10 RX ADMIN — ONDANSETRON 4 MG: 2 INJECTION INTRAMUSCULAR; INTRAVENOUS at 20:19

## 2022-11-10 RX ADMIN — NITROGLYCERIN 1 INCH: 20 OINTMENT TOPICAL at 21:23

## 2022-11-10 RX ADMIN — METHYLPREDNISOLONE SODIUM SUCCINATE 125 MG: 125 INJECTION, POWDER, FOR SOLUTION INTRAMUSCULAR; INTRAVENOUS at 20:20

## 2022-11-10 RX ADMIN — METHYLPREDNISOLONE SODIUM SUCCINATE 40 MG: 40 INJECTION, POWDER, FOR SOLUTION INTRAMUSCULAR; INTRAVENOUS at 23:56

## 2022-11-10 RX ADMIN — SODIUM CHLORIDE, PRESERVATIVE FREE 10 ML: 5 INJECTION INTRAVENOUS at 21:23

## 2022-11-10 RX ADMIN — CEFTRIAXONE SODIUM 1 G: 1 INJECTION, POWDER, FOR SOLUTION INTRAMUSCULAR; INTRAVENOUS at 23:56

## 2022-11-10 NOTE — Clinical Note
Status[de-identified] INPATIENT [101]   Type of Bed: Remote Telemetry [29]   Cardiac Monitoring Required?: No   Inpatient Hospitalization Certified Necessary for the Following Reasons: 3.  Patient receiving treatment that can only be provided in an inpatient setting (further clarification in H&P documentation)   Admitting Diagnosis: CHF (congestive heart failure) [facility]) [[id number]]   Admitting Physician: Jarad Gray [[id number]]   Attending Physician: Jarad Gray [[id number]]   Estimated Length of Stay: 2 Midnights   Discharge Plan[de-identified] Home with Office Follow-up

## 2022-11-11 LAB
ALBUMIN SERPL-MCNC: 3 G/DL (ref 3.5–5)
ALBUMIN/GLOB SERPL: 0.8 {RATIO} (ref 1.1–2.2)
ALP SERPL-CCNC: 87 U/L (ref 45–117)
ALT SERPL-CCNC: 12 U/L (ref 12–78)
ANION GAP SERPL CALC-SCNC: 10 MMOL/L (ref 5–15)
APPEARANCE UR: CLEAR
AST SERPL W P-5'-P-CCNC: 17 U/L (ref 15–37)
BACTERIA URNS QL MICRO: NEGATIVE /HPF
BASOPHILS # BLD: 0 K/UL (ref 0–0.1)
BASOPHILS NFR BLD: 0 % (ref 0–1)
BILIRUB SERPL-MCNC: 0.2 MG/DL (ref 0.2–1)
BILIRUB UR QL: NEGATIVE
BNP SERPL-MCNC: ABNORMAL PG/ML
BUN SERPL-MCNC: 25 MG/DL (ref 6–20)
BUN/CREAT SERPL: 14 (ref 12–20)
CA-I BLD-MCNC: 8.4 MG/DL (ref 8.5–10.1)
CHLORIDE SERPL-SCNC: 105 MMOL/L (ref 97–108)
CHLORIDE UR-SCNC: 102 MMOL/L
CO2 SERPL-SCNC: 21 MMOL/L (ref 21–32)
COLOR UR: ABNORMAL
CREAT SERPL-MCNC: 1.84 MG/DL (ref 0.55–1.02)
CREAT UR-MCNC: 41 MG/DL
DIFFERENTIAL METHOD BLD: ABNORMAL
EOSINOPHIL # BLD: 0 K/UL (ref 0–0.4)
EOSINOPHIL NFR BLD: 0 % (ref 0–7)
ERYTHROCYTE [DISTWIDTH] IN BLOOD BY AUTOMATED COUNT: 12.6 % (ref 11.5–14.5)
GLOBULIN SER CALC-MCNC: 3.8 G/DL (ref 2–4)
GLUCOSE BLD STRIP.AUTO-MCNC: 160 MG/DL (ref 65–100)
GLUCOSE BLD STRIP.AUTO-MCNC: 184 MG/DL (ref 65–100)
GLUCOSE BLD STRIP.AUTO-MCNC: 186 MG/DL (ref 65–100)
GLUCOSE BLD STRIP.AUTO-MCNC: 204 MG/DL (ref 65–100)
GLUCOSE SERPL-MCNC: 242 MG/DL (ref 65–100)
GLUCOSE UR STRIP.AUTO-MCNC: NEGATIVE MG/DL
HCT VFR BLD AUTO: 31 % (ref 35–47)
HGB BLD-MCNC: 10.3 G/DL (ref 11.5–16)
HGB UR QL STRIP: NEGATIVE
IMM GRANULOCYTES # BLD AUTO: 0 K/UL (ref 0–0.04)
IMM GRANULOCYTES NFR BLD AUTO: 0 % (ref 0–0.5)
KETONES UR QL STRIP.AUTO: NEGATIVE MG/DL
LACTATE SERPL-SCNC: 1.7 MMOL/L (ref 0.4–2)
LEUKOCYTE ESTERASE UR QL STRIP.AUTO: NEGATIVE
LYMPHOCYTES # BLD: 0.5 K/UL (ref 0.8–3.5)
LYMPHOCYTES NFR BLD: 5 % (ref 12–49)
MCH RBC QN AUTO: 31.3 PG (ref 26–34)
MCHC RBC AUTO-ENTMCNC: 33.2 G/DL (ref 30–36.5)
MCV RBC AUTO: 94.2 FL (ref 80–99)
MONOCYTES # BLD: 0.2 K/UL (ref 0–1)
MONOCYTES NFR BLD: 1 % (ref 5–13)
NEUTS SEG # BLD: 9.9 K/UL (ref 1.8–8)
NEUTS SEG NFR BLD: 94 % (ref 32–75)
NITRITE UR QL STRIP.AUTO: NEGATIVE
NRBC # BLD: 0 K/UL (ref 0–0.01)
NRBC BLD-RTO: 0 PER 100 WBC
PERFORMED BY, TECHID: ABNORMAL
PH UR STRIP: 5 [PH] (ref 5–8)
PLATELET # BLD AUTO: 284 K/UL (ref 150–400)
PMV BLD AUTO: 10.1 FL (ref 8.9–12.9)
POTASSIUM SERPL-SCNC: 3.4 MMOL/L (ref 3.5–5.1)
PROT SERPL-MCNC: 6.8 G/DL (ref 6.4–8.2)
PROT UR STRIP-MCNC: NEGATIVE MG/DL
PROT UR-MCNC: 12 MG/DL (ref 0–11.9)
PROT/CREAT UR-RTO: 0.3
RBC # BLD AUTO: 3.29 M/UL (ref 3.8–5.2)
RBC #/AREA URNS HPF: ABNORMAL /HPF (ref 0–5)
SODIUM SERPL-SCNC: 136 MMOL/L (ref 136–145)
SODIUM UR-SCNC: 67 MMOL/L
SP GR UR REFRACTOMETRY: 1.01 (ref 1–1.03)
UA: UC IF INDICATED,UAUC: ABNORMAL
UROBILINOGEN UR QL STRIP.AUTO: 0.1 EU/DL (ref 0.1–1)
WBC # BLD AUTO: 10.5 K/UL (ref 3.6–11)
WBC URNS QL MICRO: ABNORMAL /HPF (ref 0–4)

## 2022-11-11 PROCEDURE — 74011250636 HC RX REV CODE- 250/636: Performed by: FAMILY MEDICINE

## 2022-11-11 PROCEDURE — 84156 ASSAY OF PROTEIN URINE: CPT

## 2022-11-11 PROCEDURE — 74011000250 HC RX REV CODE- 250: Performed by: INTERNAL MEDICINE

## 2022-11-11 PROCEDURE — 74011250637 HC RX REV CODE- 250/637: Performed by: FAMILY MEDICINE

## 2022-11-11 PROCEDURE — 83036 HEMOGLOBIN GLYCOSYLATED A1C: CPT

## 2022-11-11 PROCEDURE — 83605 ASSAY OF LACTIC ACID: CPT

## 2022-11-11 PROCEDURE — 74011000250 HC RX REV CODE- 250: Performed by: FAMILY MEDICINE

## 2022-11-11 PROCEDURE — 81001 URINALYSIS AUTO W/SCOPE: CPT

## 2022-11-11 PROCEDURE — 65270000029 HC RM PRIVATE

## 2022-11-11 PROCEDURE — 84300 ASSAY OF URINE SODIUM: CPT

## 2022-11-11 PROCEDURE — 94640 AIRWAY INHALATION TREATMENT: CPT

## 2022-11-11 PROCEDURE — 82436 ASSAY OF URINE CHLORIDE: CPT

## 2022-11-11 PROCEDURE — 80053 COMPREHEN METABOLIC PANEL: CPT

## 2022-11-11 PROCEDURE — 83880 ASSAY OF NATRIURETIC PEPTIDE: CPT

## 2022-11-11 PROCEDURE — 82962 GLUCOSE BLOOD TEST: CPT

## 2022-11-11 PROCEDURE — 74011636637 HC RX REV CODE- 636/637: Performed by: FAMILY MEDICINE

## 2022-11-11 PROCEDURE — 85025 COMPLETE CBC W/AUTO DIFF WBC: CPT

## 2022-11-11 PROCEDURE — 87086 URINE CULTURE/COLONY COUNT: CPT

## 2022-11-11 RX ORDER — FUROSEMIDE 10 MG/ML
40 INJECTION INTRAMUSCULAR; INTRAVENOUS 2 TIMES DAILY
Status: DISCONTINUED | OUTPATIENT
Start: 2022-11-11 | End: 2022-11-11

## 2022-11-11 RX ORDER — CHOLECALCIFEROL (VITAMIN D3) 125 MCG
5 CAPSULE ORAL
Status: DISCONTINUED | OUTPATIENT
Start: 2022-11-11 | End: 2022-11-15 | Stop reason: HOSPADM

## 2022-11-11 RX ORDER — HEPARIN SODIUM 5000 [USP'U]/ML
5000 INJECTION, SOLUTION INTRAVENOUS; SUBCUTANEOUS EVERY 12 HOURS
Status: DISCONTINUED | OUTPATIENT
Start: 2022-11-11 | End: 2022-11-15 | Stop reason: HOSPADM

## 2022-11-11 RX ORDER — POTASSIUM CHLORIDE 750 MG/1
40 TABLET, FILM COATED, EXTENDED RELEASE ORAL
Status: COMPLETED | OUTPATIENT
Start: 2022-11-11 | End: 2022-11-11

## 2022-11-11 RX ORDER — BUDESONIDE 0.5 MG/2ML
500 INHALANT ORAL
Status: DISCONTINUED | OUTPATIENT
Start: 2022-11-11 | End: 2022-11-15 | Stop reason: HOSPADM

## 2022-11-11 RX ORDER — FUROSEMIDE 10 MG/ML
40 INJECTION INTRAMUSCULAR; INTRAVENOUS DAILY
Status: DISCONTINUED | OUTPATIENT
Start: 2022-11-13 | End: 2022-11-14

## 2022-11-11 RX ADMIN — MELATONIN TAB 5 MG 5 MG: 5 TAB at 22:07

## 2022-11-11 RX ADMIN — INSULIN LISPRO 3 UNITS: 100 INJECTION, SOLUTION INTRAVENOUS; SUBCUTANEOUS at 14:10

## 2022-11-11 RX ADMIN — IPRATROPIUM BROMIDE AND ALBUTEROL SULFATE 3 ML: .5; 2.5 SOLUTION RESPIRATORY (INHALATION) at 07:24

## 2022-11-11 RX ADMIN — POTASSIUM CHLORIDE 40 MEQ: 750 TABLET, FILM COATED, EXTENDED RELEASE ORAL at 10:30

## 2022-11-11 RX ADMIN — INSULIN LISPRO 3 UNITS: 100 INJECTION, SOLUTION INTRAVENOUS; SUBCUTANEOUS at 16:50

## 2022-11-11 RX ADMIN — METHYLPREDNISOLONE SODIUM SUCCINATE 40 MG: 40 INJECTION, POWDER, FOR SOLUTION INTRAMUSCULAR; INTRAVENOUS at 22:44

## 2022-11-11 RX ADMIN — ATORVASTATIN CALCIUM 40 MG: 40 TABLET, FILM COATED ORAL at 21:58

## 2022-11-11 RX ADMIN — IPRATROPIUM BROMIDE AND ALBUTEROL SULFATE 3 ML: .5; 2.5 SOLUTION RESPIRATORY (INHALATION) at 13:14

## 2022-11-11 RX ADMIN — FUROSEMIDE 40 MG: 10 INJECTION, SOLUTION INTRAMUSCULAR; INTRAVENOUS at 21:58

## 2022-11-11 RX ADMIN — ACETAMINOPHEN 650 MG: 325 TABLET ORAL at 02:26

## 2022-11-11 RX ADMIN — METOPROLOL SUCCINATE 50 MG: 50 TABLET, EXTENDED RELEASE ORAL at 09:11

## 2022-11-11 RX ADMIN — BUDESONIDE AND FORMOTEROL FUMARATE DIHYDRATE 2 PUFF: 160; 4.5 AEROSOL RESPIRATORY (INHALATION) at 07:24

## 2022-11-11 RX ADMIN — BUDESONIDE 500 MCG: 0.5 INHALANT ORAL at 17:57

## 2022-11-11 RX ADMIN — INSULIN LISPRO 4 UNITS: 100 INJECTION, SOLUTION INTRAVENOUS; SUBCUTANEOUS at 09:11

## 2022-11-11 RX ADMIN — IPRATROPIUM BROMIDE AND ALBUTEROL SULFATE 3 ML: .5; 2.5 SOLUTION RESPIRATORY (INHALATION) at 01:08

## 2022-11-11 RX ADMIN — ATORVASTATIN CALCIUM 40 MG: 40 TABLET, FILM COATED ORAL at 02:26

## 2022-11-11 RX ADMIN — METHYLPREDNISOLONE SODIUM SUCCINATE 40 MG: 40 INJECTION, POWDER, FOR SOLUTION INTRAMUSCULAR; INTRAVENOUS at 14:10

## 2022-11-11 RX ADMIN — ASPIRIN 81 MG 81 MG: 81 TABLET ORAL at 09:12

## 2022-11-11 RX ADMIN — LEVOTHYROXINE SODIUM 100 MCG: 0.1 TABLET ORAL at 09:12

## 2022-11-11 RX ADMIN — METHYLPREDNISOLONE SODIUM SUCCINATE 40 MG: 40 INJECTION, POWDER, FOR SOLUTION INTRAMUSCULAR; INTRAVENOUS at 16:50

## 2022-11-11 RX ADMIN — FOLIC ACID 1 MG: 1 TABLET ORAL at 09:12

## 2022-11-11 RX ADMIN — IPRATROPIUM BROMIDE AND ALBUTEROL SULFATE 3 ML: .5; 2.5 SOLUTION RESPIRATORY (INHALATION) at 17:57

## 2022-11-11 RX ADMIN — METHYLPREDNISOLONE SODIUM SUCCINATE 40 MG: 40 INJECTION, POWDER, FOR SOLUTION INTRAMUSCULAR; INTRAVENOUS at 05:32

## 2022-11-11 RX ADMIN — FUROSEMIDE 40 MG: 10 INJECTION, SOLUTION INTRAMUSCULAR; INTRAVENOUS at 09:11

## 2022-11-11 RX ADMIN — IPRATROPIUM BROMIDE AND ALBUTEROL SULFATE 3 ML: .5; 2.5 SOLUTION RESPIRATORY (INHALATION) at 01:07

## 2022-11-11 RX ADMIN — HEPARIN SODIUM 5000 UNITS: 5000 INJECTION INTRAVENOUS; SUBCUTANEOUS at 09:11

## 2022-11-11 RX ADMIN — HEPARIN SODIUM 5000 UNITS: 5000 INJECTION INTRAVENOUS; SUBCUTANEOUS at 21:58

## 2022-11-11 RX ADMIN — CEFTRIAXONE SODIUM 1 G: 1 INJECTION, POWDER, FOR SOLUTION INTRAMUSCULAR; INTRAVENOUS at 22:06

## 2022-11-11 NOTE — H&P
History and Physical    NAME: Eva Sharma   :  1930   MRN:  [id number]     Date/Time:  2022 8:24 AM    Patient PCP: None  ______________________________________________________________________    Subjective:     CHIEF COMPLAINT: shortness of breath    HISTORY OF PRESENT ILLNESS:       Eva Sharma is a 80-year-old female with a history of COPD, anxiety, MI, anemia, HTN, hypothyroidism presenting for shortness of breath. According to EMS patient developed shortness of breath today. States that they noticed that she was significantly wheezing. Patient started becoming very anxious and according to family becomes very anxious when she is having shortness of breath at a point where she starts to have nausea and dry heaving. They placed her on 3 L nasal cannula for comfort but she did not have any desaturation. Remained around 94 to 96% in route. Patient states she has been having chest tightness and shortness of breath with wheezing. Denies any abdominal pain, diarrhea, constipation, fevers     There are no other complaints, changes, or physical findings at this time. Past Medical History:   Diagnosis Date    Anemia     Chronic obstructive pulmonary disease ([address]     Heart attack ([address]     Hypertension     Thyroid disease     hypothyroidism        Past Surgical History:   Procedure Laterality Date    HX ORTHOPAEDIC  2018    total hip replacement    HX PACEMAKER         Social History     Tobacco Use    Smoking status: Never    Smokeless tobacco: Never   Substance Use Topics    Alcohol use: Never        Family History   Problem Relation Age of Onset    Stroke Mother     Heart Disease Mother     Stroke Father     Heart Disease Father        No Known Allergies     Prior to Admission medications    Medication Sig Start Date End Date Taking? Authorizing Provider   amoxicillin-clavulanate (Augmentin) 875-125 mg per tablet Take 1 Tablet by mouth two (2) times a day.  22   Dimitrios Villanueva, MD   albuterol-ipratropium (DUO-NEB) 2.5 mg-0.5 mg/3 ml nebu 3 mL by Nebulization route every six (6) hours as needed for Wheezing. 9/27/21   James Villanueva Res, MD   apixaban (ELIQUIS) 2.5 mg tablet Take 1 Tablet by mouth two (2) times a day. Indications: treatment to prevent blood clots in chronic atrial fibrillation 9/27/21   James Villanueva Res, MD   aspirin 81 mg chewable tablet Take 1 Tablet by mouth daily. 9/28/21   James Villanueva Res, MD   atorvastatin (LIPITOR) 40 mg tablet Take 1 Tablet by mouth nightly. 9/27/21   James Villanueva Res, MD   budesonide-formoteroL [facility]) 160-4.5 mcg/actuation HFAA Take 2 Puffs by inhalation two (2) times a day. 9/27/21   James Villanueva Res, MD   predniSONE (DELTASONE) 10 mg tablet Take 10 mg by mouth daily (with breakfast). 9/28/21   James Villanueva Res, MD   metoprolol succinate (TOPROL-XL) 50 mg XL tablet Take 1 Tablet by mouth daily. 9/28/21   James Villanueva Res, MD   albuterol (PROVENTIL VENTOLIN) 2.5 mg /3 mL (0.083 %) nebu Take 3 mL by inhalation every six (6) hours as needed.  9/17/21   Provider, Historical   folic acid (FOLVITE) 1 mg tablet TAKE 1 TABLET BY MOUTH EVERY DAY 11/9/20   Provider, Historical   levothyroxine (SYNTHROID) 100 mcg tablet TAKE 1 TABLET BY MOUTH EVERY DAY 11/16/20   Provider, Historical         Current Facility-Administered Medications:     heparin (porcine) injection 5,000 Units, 5,000 Units, SubCUTAneous, Q12H, Raphael Villanueva MD    sodium chloride (NS) flush 5-10 mL, 5-10 mL, IntraVENous, PRN, Raphael Villanueva MD, 10 mL at 11/10/22 2123    albuterol CONCENTRATE 2.5mg/0.5 mL neb soln, 2.5 mg, Inhalation, Q6H PRN, Raphael Villanueva MD    albuterol-ipratropium (DUO-NEB) 2.5 MG-0.5 MG/3 ML, 3 mL, Nebulization, Q6H PRN, Raphael Villanueva MD, 3 mL at 11/11/22 0107    aspirin chewable tablet 81 mg, 81 mg, Oral, DAILY, Raphael Villanueva MD    atorvastatin (LIPITOR) tablet 40 mg, 40 mg, Oral, QHS, Raphael Villanueva MD, 40 mg at 11/11/22 9047 budesonide-formoteroL (SYMBICORT) 160-4.5 mcg/actuation HFA inhaler 2 Puff, 2 Puff, Inhalation, BID, Raphael Villanueva MD, 2 Puff at 68/39/08 0392    folic acid (FOLVITE) tablet 1 mg, 1 mg, Oral, DAILY, Parth Villanueva MD    levothyroxine (SYNTHROID) tablet 100 mcg, 100 mcg, Oral, DAILY, Parth Villanueva MD    metoprolol succinate (TOPROL-XL) XL tablet 50 mg, 50 mg, Oral, DAILY, Parth Villanueva MD    insulin lispro (HUMALOG) injection, , SubCUTAneous, AC&HS, Parth Villanueva MD    glucose chewable tablet 16 g, 4 Tablet, Oral, PRN, Parth Villanueva MD    glucagon (GLUCAGEN) injection 1 mg, 1 mg, IntraMUSCular, PRN, Parth Villanueva MD    acetaminophen (TYLENOL) tablet 650 mg, 650 mg, Oral, Q6H PRN, 650 mg at 11/11/22 0226 **OR** acetaminophen (TYLENOL) suppository 650 mg, 650 mg, Rectal, Q6H PRN, Raphael Villanueva MD    polyethylene glycol (MIRALAX) packet 17 g, 17 g, Oral, DAILY PRN, Raphael Villanueva MD    ondansetron (ZOFRAN ODT) tablet 4 mg, 4 mg, Oral, Q8H PRN **OR** ondansetron (ZOFRAN) injection 4 mg, 4 mg, IntraVENous, Q6H PRN, Raphael Villanueva MD    albuterol-ipratropium (DUO-NEB) 2.5 MG-0.5 MG/3 ML, 3 mL, Nebulization, Q6H RT, Raphael Villanueva MD, 3 mL at 11/11/22 0724    furosemide (LASIX) injection 40 mg, 40 mg, IntraVENous, DAILY, Parth Villanueva MD    methylPREDNISolone (PF) (SOLU-MEDROL) injection 40 mg, 40 mg, IntraVENous, Q6H, Raphael Villanueva MD, 40 mg at 11/11/22 0532    cefTRIAXone (ROCEPHIN) 1 g in sterile water (preservative free) 10 mL IV syringe, 1 g, IntraVENous, Q24H, Raphael Villanueva MD, 1 g at 11/10/22 5968    Current Outpatient Medications:     amoxicillin-clavulanate (Augmentin) 875-125 mg per tablet, Take 1 Tablet by mouth two (2) times a day., Disp: 20 Tablet, Rfl: 0    albuterol-ipratropium (DUO-NEB) 2.5 mg-0.5 mg/3 ml nebu, 3 mL by Nebulization route every six (6) hours as needed for Wheezing., Disp: 30 Nebule, Rfl: 0    apixaban (ELIQUIS) 2.5 mg tablet, Take 1 Tablet by mouth two (2) times a day. Indications: treatment to prevent blood clots in chronic atrial fibrillation, Disp: 60 Tablet, Rfl: 0    aspirin 81 mg chewable tablet, Take 1 Tablet by mouth daily. , Disp: 30 Tablet, Rfl: 0    atorvastatin (LIPITOR) 40 mg tablet, Take 1 Tablet by mouth nightly., Disp: 60 Tablet, Rfl: 0    budesonide-formoteroL (SYMBICORT) 160-4.5 mcg/actuation HFAA, Take 2 Puffs by inhalation two (2) times a day., Disp: 10.2 g, Rfl: 1    predniSONE (DELTASONE) 10 mg tablet, Take 10 mg by mouth daily (with breakfast). , Disp: 10 Tablet, Rfl: 0    metoprolol succinate (TOPROL-XL) 50 mg XL tablet, Take 1 Tablet by mouth daily. , Disp: 50 Tablet, Rfl: 0    albuterol (PROVENTIL VENTOLIN) 2.5 mg /3 mL (0.083 %) nebu, Take 3 mL by inhalation every six (6) hours as needed. , Disp: , Rfl:     folic acid (FOLVITE) 1 mg tablet, TAKE 1 TABLET BY MOUTH EVERY DAY, Disp: , Rfl:     levothyroxine (SYNTHROID) 100 mcg tablet, TAKE 1 TABLET BY MOUTH EVERY DAY, Disp: , Rfl:     LAB DATA REVIEWED:    Recent Results (from the past 24 hour(s))   TROPONIN-HIGH SENSITIVITY    Collection Time: 11/10/22  8:16 PM   Result Value Ref Range    Troponin-High Sensitivity 50 0 - 51 ng/L   NT-PRO BNP    Collection Time: 11/10/22  8:16 PM   Result Value Ref Range    NT pro-BNP 6,518 (H) <450 pg/mL   CBC WITH AUTOMATED DIFF    Collection Time: 11/10/22  8:16 PM   Result Value Ref Range    WBC 20.5 (H) 3.6 - 11.0 K/uL    RBC 3.88 3.80 - 5.20 M/uL    HGB 12.0 11.5 - 16.0 g/dL    HCT 36.5 35.0 - 47.0 %    MCV 94.1 80.0 - 99.0 FL    MCH 30.9 26.0 - 34.0 PG    MCHC 32.9 30.0 - 36.5 g/dL    RDW 12.7 11.5 - 14.5 %    PLATELET 390 522 - 507 K/uL    MPV 10.1 8.9 - 12.9 FL    NRBC 0.0 0.0  WBC    ABSOLUTE NRBC 0.00 0.00 - 0.01 K/uL    NEUTROPHILS 77 (H) 32 - 75 %    LYMPHOCYTES 12 12 - 49 %    MONOCYTES 9 5 - 13 %    EOSINOPHILS 1 0 - 7 %    BASOPHILS 0 0 - 1 %    IMMATURE GRANULOCYTES 1 (H) 0 - 0.5 %    ABS.  NEUTROPHILS 16.0 (H) 1.8 - 8.0 K/UL ABS. LYMPHOCYTES 2.4 0.8 - 3.5 K/UL    ABS. MONOCYTES 1.8 (H) 0.0 - 1.0 K/UL    ABS. EOSINOPHILS 0.1 0.0 - 0.4 K/UL    ABS. BASOPHILS 0.1 0.0 - 0.1 K/UL    ABS. IMM. GRANS. 0.1 (H) 0.00 - 0.04 K/UL    DF AUTOMATED     METABOLIC PANEL, COMPREHENSIVE    Collection Time: 11/10/22  8:16 PM   Result Value Ref Range    Sodium 137 136 - 145 mmol/L    Potassium 4.7 3.5 - 5.1 mmol/L    Chloride 106 97 - 108 mmol/L    CO2 20 (L) 21 - 32 mmol/L    Anion gap 11 5 - 15 mmol/L    Glucose 211 (H) 65 - 100 mg/dL    BUN 22 (H) 6 - 20 mg/dL    Creatinine 1.55 (H) 0.55 - 1.02 mg/dL    BUN/Creatinine ratio 14 12 - 20      eGFR 31 (L) >60 ml/min/1.73m2    Calcium 8.7 8.5 - 10.1 mg/dL    Bilirubin, total 0.3 0.2 - 1.0 mg/dL    AST (SGOT) 19 15 - 37 U/L    ALT (SGPT) 16 12 - 78 U/L    Alk.  phosphatase 107 45 - 117 U/L    Protein, total 7.4 6.4 - 8.2 g/dL    Albumin 3.5 3.5 - 5.0 g/dL    Globulin 3.9 2.0 - 4.0 g/dL    A-G Ratio 0.9 (L) 1.1 - 2.2     CULTURE, BLOOD, PAIRED    Collection Time: 11/10/22  9:17 PM    Specimen: Blood   Result Value Ref Range    Special Requests: No Special Requests      Culture result: No growth after 7 hours     LACTIC ACID    Collection Time: 11/10/22  9:17 PM   Result Value Ref Range    Lactic acid 1.9 0.4 - 2.0 mmol/L   COVID-19 RAPID TEST    Collection Time: 11/10/22  9:17 PM   Result Value Ref Range    COVID-19 rapid test Not Detected Not Detected     INFLUENZA A & B AG (RAPID TEST)    Collection Time: 11/10/22  9:17 PM   Result Value Ref Range    Influenza A Antigen Negative Negative      Influenza B Antigen Negative Negative     LACTIC ACID    Collection Time: 11/11/22  1:14 AM   Result Value Ref Range    Lactic acid 1.7 0.4 - 2.0 mmol/L   URINALYSIS W/ REFLEX CULTURE    Collection Time: 11/11/22  2:34 AM    Specimen: Urine   Result Value Ref Range    Color Yellow/Straw      Appearance Clear Clear      Specific gravity 1.008 1.003 - 1.030      pH (UA) 5.0 5.0 - 8.0      Protein Negative Negative mg/dL Glucose Negative Negative mg/dL    Ketone Negative Negative mg/dL    Bilirubin Negative Negative      Blood Negative Negative      Urobilinogen 0.1 0.1 - 1.0 EU/dL    Nitrites Negative Negative      Leukocyte Esterase Negative Negative      UA:UC IF INDICATED Urine Culture Ordered (A) Culture not indicated by UA result      WBC 0-5 0 - 4 /hpf    RBC 0-5 0 - 5 /hpf    Bacteria Negative Negative /hpf   METABOLIC PANEL, COMPREHENSIVE    Collection Time: 11/11/22  3:39 AM   Result Value Ref Range    Sodium 136 136 - 145 mmol/L    Potassium 3.4 (L) 3.5 - 5.1 mmol/L    Chloride 105 97 - 108 mmol/L    CO2 21 21 - 32 mmol/L    Anion gap 10 5 - 15 mmol/L    Glucose 242 (H) 65 - 100 mg/dL    BUN 25 (H) 6 - 20 mg/dL    Creatinine 1.84 (H) 0.55 - 1.02 mg/dL    BUN/Creatinine ratio 14 12 - 20      eGFR 25 (L) >60 ml/min/1.73m2    Calcium 8.4 (L) 8.5 - 10.1 mg/dL    Bilirubin, total 0.2 0.2 - 1.0 mg/dL    AST (SGOT) 17 15 - 37 U/L    ALT (SGPT) 12 12 - 78 U/L    Alk. phosphatase 87 45 - 117 U/L    Protein, total 6.8 6.4 - 8.2 g/dL    Albumin 3.0 (L) 3.5 - 5.0 g/dL    Globulin 3.8 2.0 - 4.0 g/dL    A-G Ratio 0.8 (L) 1.1 - 2.2     CBC WITH AUTOMATED DIFF    Collection Time: 11/11/22  3:39 AM   Result Value Ref Range    WBC 10.5 3.6 - 11.0 K/uL    RBC 3.29 (L) 3.80 - 5.20 M/uL    HGB 10.3 (L) 11.5 - 16.0 g/dL    HCT 31.0 (L) 35.0 - 47.0 %    MCV 94.2 80.0 - 99.0 FL    MCH 31.3 26.0 - 34.0 PG    MCHC 33.2 30.0 - 36.5 g/dL    RDW 12.6 11.5 - 14.5 %    PLATELET 946 655 - 734 K/uL    MPV 10.1 8.9 - 12.9 FL    NRBC 0.0 0.0  WBC    ABSOLUTE NRBC 0.00 0.00 - 0.01 K/uL    NEUTROPHILS 94 (H) 32 - 75 %    LYMPHOCYTES 5 (L) 12 - 49 %    MONOCYTES 1 (L) 5 - 13 %    EOSINOPHILS 0 0 - 7 %    BASOPHILS 0 0 - 1 %    IMMATURE GRANULOCYTES 0 0 - 0.5 %    ABS. NEUTROPHILS 9.9 (H) 1.8 - 8.0 K/UL    ABS. LYMPHOCYTES 0.5 (L) 0.8 - 3.5 K/UL    ABS. MONOCYTES 0.2 0.0 - 1.0 K/UL    ABS. EOSINOPHILS 0.0 0.0 - 0.4 K/UL    ABS.  BASOPHILS 0.0 0.0 - 0.1 K/UL    ABS. IMM. GRANS. 0.0 0.00 - 0.04 K/UL    DF AUTOMATED     NT-PRO BNP    Collection Time: 11/11/22  3:39 AM   Result Value Ref Range    NT pro-BNP 14,494 (H) <450 pg/mL       XR Results (most recent):  Results from Hospital Encounter encounter on 11/10/22    XR CHEST PORT    Narrative  INDICATION:  sob copd    EXAM: Chest single view. COMPARISON: 1/4/2022. FINDINGS: A single frontal view of the chest at 2017 hours shows diffuse  interstitial infiltrate new since the prior study. No focal consolidation. .  The  heart, mediastinum and pulmonary vasculature are are stable . The bony thorax  is unremarkable for age. .    Impression  Interstitial edema pattern. .  . XR CHEST PORT   Final Result   Interstitial edema pattern. .  . Review of Systems   Constitutional:  Negative for chills and fever. Respiratory:  Positive for shortness of breath and wheezing. Negative for cough. Cardiovascular:  Negative for chest pain. Gastrointestinal:  Positive for nausea. Negative for abdominal pain, constipation, diarrhea and vomiting. Genitourinary:  Negative for dysuria, frequency and hematuria. Neurological:  Negative for weakness and numbness. Psychiatric/Behavioral:  The patient is nervous/anxious. All other systems reviewed and are negative. Objective:   VITALS:    Visit Vitals  BP (!) 117/55   Pulse 94   Temp 97.3 °F (36.3 °C)   Resp 26   Ht 5' 5\" (1.651 m)   Wt 67.1 kg (148 lb)   SpO2 97%   BMI 24.63 kg/m²       Physical Exam  Vitals and nursing note reviewed. Constitutional:       Appearance: She is well-developed. HENT:      Head: Normocephalic and atraumatic. Nose: Nose normal.      Mouth/Throat:      Mouth: Mucous membranes are moist.   Eyes:      Extraocular Movements: Extraocular movements intact. Conjunctiva/sclera: Conjunctivae normal.   Cardiovascular:      Rate and Rhythm: Normal rate and regular rhythm.    Pulmonary:      Effort: Pulmonary effort is normal. Tachypnea present. No respiratory distress. Breath sounds: Wheezing and rhonchi present. Abdominal:      General: There is no distension. Palpations: Abdomen is soft. Tenderness: There is no abdominal tenderness. Musculoskeletal:         General: Normal range of motion. Cervical back: Normal range of motion and neck supple. Skin:     General: Skin is warm and dry. Neurological:      General: No focal deficit present. Mental Status: She is alert and oriented to person, place, and time. Mental status is at baseline. Psychiatric:         Mood and Affect: Mood is anxious.       Comments: Patient is very anxious       ASSESSMENT:      PLAN:      ________________________________________________________________________    Signed: Quintin Vásquez

## 2022-11-11 NOTE — ED PROVIDER NOTES
EMERGENCY DEPARTMENT HISTORY AND PHYSICAL EXAM      Date: 11/10/2022  Patient Name: Kirstie Mcadams  Patient Age and Sex: 80 y.o. female     History of Presenting Illness     Chief Complaint   Patient presents with    Shortness of Breath    Nausea       History Provided By: Patient, ems    HPI: Kirstie Mcadams is a 80-year-old female with a history of COPD, anxiety, MI presenting for shortness of breath. According to EMS patient developed shortness of breath today. States that they noticed that she was significantly wheezing. Patient started becoming very anxious and according to family becomes very anxious when she is having shortness of breath at a point where she starts to have nausea and dry heaving. They placed her on 3 L nasal cannula for comfort but she did not have any desaturation. Remained around 94 to 96% in route. Patient states she has been having chest tightness and shortness of breath with wheezing. Denies any abdominal pain, diarrhea, constipation, fevers    There are no other complaints, changes, or physical findings at this time. PCP: None    No current facility-administered medications on file prior to encounter. Current Outpatient Medications on File Prior to Encounter   Medication Sig Dispense Refill    amoxicillin-clavulanate (Augmentin) 875-125 mg per tablet Take 1 Tablet by mouth two (2) times a day. 20 Tablet 0    albuterol-ipratropium (DUO-NEB) 2.5 mg-0.5 mg/3 ml nebu 3 mL by Nebulization route every six (6) hours as needed for Wheezing. 30 Nebule 0    apixaban (ELIQUIS) 2.5 mg tablet Take 1 Tablet by mouth two (2) times a day. Indications: treatment to prevent blood clots in chronic atrial fibrillation 60 Tablet 0    aspirin 81 mg chewable tablet Take 1 Tablet by mouth daily. 30 Tablet 0    atorvastatin (LIPITOR) 40 mg tablet Take 1 Tablet by mouth nightly.  60 Tablet 0    budesonide-formoteroL (SYMBICORT) 160-4.5 mcg/actuation HFAA Take 2 Puffs by inhalation two (2) times a day. 10.2 g 1    predniSONE (DELTASONE) 10 mg tablet Take 10 mg by mouth daily (with breakfast). 10 Tablet 0    metoprolol succinate (TOPROL-XL) 50 mg XL tablet Take 1 Tablet by mouth daily. 50 Tablet 0    albuterol (PROVENTIL VENTOLIN) 2.5 mg /3 mL (0.083 %) nebu Take 3 mL by inhalation every six (6) hours as needed. folic acid (FOLVITE) 1 mg tablet TAKE 1 TABLET BY MOUTH EVERY DAY      levothyroxine (SYNTHROID) 100 mcg tablet TAKE 1 TABLET BY MOUTH EVERY DAY         Past History     Past Medical History:  Past Medical History:   Diagnosis Date    Anemia     Chronic obstructive pulmonary disease ([facility]     Heart attack ([facility] [address]     Hypertension     Thyroid disease     hypothyroidism       Past Surgical History:  Past Surgical History:   Procedure Laterality Date    HX ORTHOPAEDIC  2018    total hip replacement    HX PACEMAKER         Family History:  Family History   Problem Relation Age of Onset    Stroke Mother     Heart Disease Mother     Stroke Father     Heart Disease Father        Social History:  Social History     Tobacco Use    Smoking status: Never    Smokeless tobacco: Never   Substance Use Topics    Alcohol use: Never    Drug use: Never       Allergies:  No Known Allergies      Review of Systems   Review of Systems   Constitutional:  Negative for chills and fever. Respiratory:  Positive for shortness of breath and wheezing. Negative for cough. Cardiovascular:  Negative for chest pain. Gastrointestinal:  Positive for nausea. Negative for abdominal pain, constipation, diarrhea and vomiting. Genitourinary:  Negative for dysuria, frequency and hematuria. Neurological:  Negative for weakness and numbness. Psychiatric/Behavioral:  The patient is nervous/anxious. All other systems reviewed and are negative. Physical Exam   Physical Exam  Vitals and nursing note reviewed. Constitutional:       Appearance: She is well-developed. HENT:      Head: Normocephalic and atraumatic.       Nose: Nose normal.      Mouth/Throat:      Mouth: Mucous membranes are moist.   Eyes:      Extraocular Movements: Extraocular movements intact. Conjunctiva/sclera: Conjunctivae normal.   Cardiovascular:      Rate and Rhythm: Normal rate and regular rhythm. Pulmonary:      Effort: Pulmonary effort is normal. Tachypnea present. No respiratory distress. Breath sounds: Wheezing and rhonchi present. Abdominal:      General: There is no distension. Palpations: Abdomen is soft. Tenderness: There is no abdominal tenderness. Musculoskeletal:         General: Normal range of motion. Cervical back: Normal range of motion and neck supple. Skin:     General: Skin is warm and dry. Neurological:      General: No focal deficit present. Mental Status: She is alert and oriented to person, place, and time. Mental status is at baseline. Psychiatric:         Mood and Affect: Mood is anxious. Comments: Patient is very anxious        Diagnostic Study Results     Labs -     Recent Results (from the past 12 hour(s))   TROPONIN-HIGH SENSITIVITY    Collection Time: 11/10/22  8:16 PM   Result Value Ref Range    Troponin-High Sensitivity 50 0 - 51 ng/L   NT-PRO BNP    Collection Time: 11/10/22  8:16 PM   Result Value Ref Range    NT pro-BNP 6,518 (H) <450 pg/mL   CBC WITH AUTOMATED DIFF    Collection Time: 11/10/22  8:16 PM   Result Value Ref Range    WBC 20.5 (H) 3.6 - 11.0 K/uL    RBC 3.88 3.80 - 5.20 M/uL    HGB 12.0 11.5 - 16.0 g/dL    HCT 36.5 35.0 - 47.0 %    MCV 94.1 80.0 - 99.0 FL    MCH 30.9 26.0 - 34.0 PG    MCHC 32.9 30.0 - 36.5 g/dL    RDW 12.7 11.5 - 14.5 %    PLATELET 084 692 - 791 K/uL    MPV 10.1 8.9 - 12.9 FL    NRBC 0.0 0.0  WBC    ABSOLUTE NRBC 0.00 0.00 - 0.01 K/uL    NEUTROPHILS 77 (H) 32 - 75 %    LYMPHOCYTES 12 12 - 49 %    MONOCYTES 9 5 - 13 %    EOSINOPHILS 1 0 - 7 %    BASOPHILS 0 0 - 1 %    IMMATURE GRANULOCYTES 1 (H) 0 - 0.5 %    ABS.  NEUTROPHILS 16.0 (H) 1.8 - 8.0 K/UL ABS. LYMPHOCYTES 2.4 0.8 - 3.5 K/UL    ABS. MONOCYTES 1.8 (H) 0.0 - 1.0 K/UL    ABS. EOSINOPHILS 0.1 0.0 - 0.4 K/UL    ABS. BASOPHILS 0.1 0.0 - 0.1 K/UL    ABS. IMM. GRANS. 0.1 (H) 0.00 - 0.04 K/UL    DF AUTOMATED     METABOLIC PANEL, COMPREHENSIVE    Collection Time: 11/10/22  8:16 PM   Result Value Ref Range    Sodium 137 136 - 145 mmol/L    Potassium 4.7 3.5 - 5.1 mmol/L    Chloride 106 97 - 108 mmol/L    CO2 20 (L) 21 - 32 mmol/L    Anion gap 11 5 - 15 mmol/L    Glucose 211 (H) 65 - 100 mg/dL    BUN 22 (H) 6 - 20 mg/dL    Creatinine 1.55 (H) 0.55 - 1.02 mg/dL    BUN/Creatinine ratio 14 12 - 20      eGFR 31 (L) >60 ml/min/1.73m2    Calcium 8.7 8.5 - 10.1 mg/dL    Bilirubin, total 0.3 0.2 - 1.0 mg/dL    AST (SGOT) 19 15 - 37 U/L    ALT (SGPT) 16 12 - 78 U/L    Alk. phosphatase 107 45 - 117 U/L    Protein, total 7.4 6.4 - 8.2 g/dL    Albumin 3.5 3.5 - 5.0 g/dL    Globulin 3.9 2.0 - 4.0 g/dL    A-G Ratio 0.9 (L) 1.1 - 2.2     LACTIC ACID    Collection Time: 11/10/22  9:17 PM   Result Value Ref Range    Lactic acid 1.9 0.4 - 2.0 mmol/L   COVID-19 RAPID TEST    Collection Time: 11/10/22  9:17 PM   Result Value Ref Range    COVID-19 rapid test Not Detected Not Detected     INFLUENZA A & B AG (RAPID TEST)    Collection Time: 11/10/22  9:17 PM   Result Value Ref Range    Influenza A Antigen Negative Negative      Influenza B Antigen Negative Negative         Radiologic Studies -   XR CHEST PORT   Final Result   Interstitial edema pattern. .  . CT Results  (Last 48 hours)      None          CXR Results  (Last 48 hours)                 11/10/22 2024  XR CHEST PORT Final result    Impression:  Interstitial edema pattern. .  . Narrative:  INDICATION:  sob copd        EXAM: Chest single view. COMPARISON: 1/4/2022. FINDINGS: A single frontal view of the chest at 2017 hours shows diffuse   interstitial infiltrate new since the prior study. No focal consolidation. .  The   heart, mediastinum and pulmonary vasculature are are stable . The bony thorax   is unremarkable for age. .                     Medical Decision Making   I am the first provider for this patient. I reviewed the vital signs, available nursing notes, past medical history, past surgical history, family history and social history. Vital Signs-Reviewed the patient's vital signs. Patient Vitals for the past 12 hrs:   Temp Pulse Resp BP SpO2   11/10/22 2301 -- 92 27 (!) 119/50 96 %   11/10/22 2246 -- 92 20 107/65 95 %   11/10/22 2231 -- 93 21 (!) 118/54 96 %   11/10/22 2201 -- 91 23 128/88 96 %   11/10/22 2146 -- 100 24 (!) 127/50 96 %   11/10/22 2131 -- 100 30 (!) 123/54 --   11/10/22 2046 -- (!) 108 26 (!) 145/65 93 %   11/10/22 2031 -- (!) 115 (!) 33 (!) 162/75 93 %   11/10/22 2016 -- (!) 124 (!) 33 (!) 195/83 93 %   11/10/22 2015 -- -- -- -- 94 %   11/10/22 2006 97.3 °F (36.3 °C) (!) 129 (!) 40 (!) 205/97 94 %   11/10/22 2005 -- -- -- -- (!) 88 %       Records Reviewed: Nursing Notes and Old Medical Records    Provider Notes (Medical Decision Making):   Patient presenting with shortness of breath, tachypnea but has significant wheezing and rhonchi all throughout. Also very anxious. Differential includes COPD exacerbation, pneumonia, bronchitis, CHF, less likely ACS. Will get EKG, lab work, chest x-ray and provide her with albuterol treatment as well as Solu-Medrol. ED Course:   Initial assessment performed. The patients presenting problems have been discussed, and they are in agreement with the care plan formulated and outlined with them. I have encouraged them to ask questions as they arise throughout their visit. ED Course as of 11/10/22 2329   Thu Nov 10, 2022   2010 EKG interpreted by me shows sinus tachycardia with a heart rate of 124. Left bundle branch block. No ST elevations depressions concerning for ischemia.  [JS]   2011 EKG from today is different from the 1 in January 2022 where she had sinus bradycardia and no bundle branch block. [JS]   2058 Patient's caregiver at bedside stating that she does have a history of CHF. Blood pressures have improved as well as her heart rate. Saturating around 92% on 2 L nasal cannula. She is more calm now. proBNP is more elevated than baseline her chest x-ray shows edema so giving some IV Lasix and Nitropaste. Her white count was 20,000 but she is afebrile. Could be stress response or because of her dry heaving when she first got here. We will get lactate flu, COVID tests and urinalysis [JS]   2113 No signs of sepsis. While her white count was 20,000 and her heart rate was 1 week. Her chest x-ray was negative for any pneumonia and she is not having any dysuria symptoms. We will get blood cultures and lactate anyway. Spoke with caregiver and believe this is all related to acute congestive heart failure. [JS]      ED Course User Index  [JS] Derrick Mosquera MD     Critical Care Time:   CRITICAL CARE NOTE :    11:29 PM  IMPENDING DETERIORATION -Airway, Respiratory, and Cardiovascular  ASSOCIATED RISK FACTORS - Hypotension, Shock, Hypoxia, and Dysrhythmia  MANAGEMENT- Bedside Assessment and Supervision of Care  INTERPRETATION -  Xrays, Blood Gases, ECG, Blood Pressure, and Cardiac Output Measures   INTERVENTIONS - hemodynamic mngmt and vent mngmt  CASE REVIEW - Hospitalist/Intensivist, Nursing, and Family  TREATMENT RESPONSE -Stable  PERFORMED BY - Self    NOTES   :    HARPER, Cortez Sanchez, have spent 30 minutes of critical care time involved in lab review, consultations with specialist, family decision- making, bedside attention and documentation. This time excludes time spent in any separate billed procedures. During this entire length of time I was immediately available to the patient.       Disposition:    Admission Note:  Patient is being admitted to the hospital by Dr. Jagdish Baldwin, Service: Hospitalist.  The results of their tests and reasons for their admission have been discussed with them and available family. They convey agreement and understanding for the need to be admitted and for their admission diagnosis. Diagnosis     Clinical Impression:   1. Acute congestive heart failure, unspecified heart failure type ([address]    2. Acute respiratory failure with hypoxia (HCC)        Attestations:  Jake Miranda M.D., am the primary clinician of record. Please note that this dictation was completed with [employer], the computer voice recognition software. Quite often unanticipated grammatical, syntax, homophones, and other interpretive errors are inadvertently transcribed by the computer software. Please disregard these errors. Please excuse any errors that have escaped final proofreading. Thank you.

## 2022-11-11 NOTE — H&P
History and Physical    NAME: Anjelica Pierson   :  1930   MRN:  [id number]     Date/Time:  2022 9:41 AM    Patient PCP: None  ______________________________________________________________________             Subjective:     CHIEF COMPLAINT:     Sob     HISTORY OF PRESENT ILLNESS:       Patient is a 80y.o. year old female past medical history of COPD hypertension hypothyroidism CHF anxiety disorder came to emergency room complaining of shortness of breath patient started shortness of breath for last few days today getting more worst no fever no chills shortness of breath on little exertion seen by the ER physician work-up done in the ER shows acute congestive heart failure last echo showed ejection fraction of 40 to 23% and diastolic dysfunction    Past Medical History:   Diagnosis Date    Anemia     Chronic obstructive pulmonary disease ([address]     Heart attack ([address]     Hypertension     Thyroid disease     hypothyroidism        Past Surgical History:   Procedure Laterality Date    HX ORTHOPAEDIC  2018    total hip replacement    HX PACEMAKER         Social History     Tobacco Use    Smoking status: Never    Smokeless tobacco: Never   Substance Use Topics    Alcohol use: Never        Family History   Problem Relation Age of Onset    Stroke Mother     Heart Disease Mother     Stroke Father     Heart Disease Father        No Known Allergies     Prior to Admission medications    Medication Sig Start Date End Date Taking? Authorizing Provider   amoxicillin-clavulanate (Augmentin) 875-125 mg per tablet Take 1 Tablet by mouth two (2) times a day. 22   Lory Villanueva MD   albuterol-ipratropium (DUO-NEB) 2.5 mg-0.5 mg/3 ml nebu 3 mL by Nebulization route every six (6) hours as needed for Wheezing. 21   Lory Villanueva MD   apixaban (ELIQUIS) 2.5 mg tablet Take 1 Tablet by mouth two (2) times a day.  Indications: treatment to prevent blood clots in chronic atrial fibrillation 21   Rich Kristyn Carlos MD   aspirin 81 mg chewable tablet Take 1 Tablet by mouth daily. 9/28/21   Kristyn Villanueva MD   atorvastatin (LIPITOR) 40 mg tablet Take 1 Tablet by mouth nightly. 9/27/21   Kristyn Villanueva MD   budesonide-formoteroL [facility]) 160-4.5 mcg/actuation HFAA Take 2 Puffs by inhalation two (2) times a day. 9/27/21   Kristyn Villanueva MD   predniSONE (DELTASONE) 10 mg tablet Take 10 mg by mouth daily (with breakfast). 9/28/21   Kristyn Villanueva MD   metoprolol succinate (TOPROL-XL) 50 mg XL tablet Take 1 Tablet by mouth daily. 9/28/21   Kristyn Villanueva MD   albuterol (PROVENTIL VENTOLIN) 2.5 mg /3 mL (0.083 %) nebu Take 3 mL by inhalation every six (6) hours as needed.  9/17/21   Provider, Historical   folic acid (FOLVITE) 1 mg tablet TAKE 1 TABLET BY MOUTH EVERY DAY 11/9/20   Provider, Historical   levothyroxine (SYNTHROID) 100 mcg tablet TAKE 1 TABLET BY MOUTH EVERY DAY 11/16/20   Provider, Historical         Current Facility-Administered Medications:     heparin (porcine) injection 5,000 Units, 5,000 Units, SubCUTAneous, Q12H, Raphael Villanueva MD, 5,000 Units at 11/11/22 0911    potassium chloride SR (KLOR-CON 10) tablet 40 mEq, 40 mEq, Oral, NOW, Kristyn Villanueva MD    furosemide (LASIX) injection 40 mg, 40 mg, IntraVENous, BID, Raphael Villanueva MD    sodium chloride (NS) flush 5-10 mL, 5-10 mL, IntraVENous, PRN, Raphael Villanueva MD, 10 mL at 11/10/22 2123    albuterol CONCENTRATE 2.5mg/0.5 mL neb soln, 2.5 mg, Inhalation, Q6H PRN, Raphael Villanueva MD    albuterol-ipratropium (DUO-NEB) 2.5 MG-0.5 MG/3 ML, 3 mL, Nebulization, Q6H PRN, Raphael Villanueva MD, 3 mL at 11/11/22 0107    aspirin chewable tablet 81 mg, 81 mg, Oral, DAILY, Raphael Villanueva MD, 81 mg at 11/11/22 0912    atorvastatin (LIPITOR) tablet 40 mg, 40 mg, Oral, QHS, Raphael Villanueva MD, 40 mg at 11/11/22 0226    budesonide-formoteroL (SYMBICORT) 160-4.5 mcg/actuation HFA inhaler 2 Puff, 2 Puff, Inhalation, BID, Gavi Barker MD, 2 Puff at 29/83/08 0171    folic acid (FOLVITE) tablet 1 mg, 1 mg, Oral, DAILY, Raphael Villanueva MD, 1 mg at 11/11/22 6281    levothyroxine (SYNTHROID) tablet 100 mcg, 100 mcg, Oral, DAILY, Raphael Villanueva MD, 100 mcg at 11/11/22 0912    metoprolol succinate (TOPROL-XL) XL tablet 50 mg, 50 mg, Oral, DAILY, Raphael Villanueva MD, 50 mg at 11/11/22 0911    insulin lispro (HUMALOG) injection, , SubCUTAneous, AC&HS, Raphael Villanueva MD, 4 Units at 11/11/22 0911    glucose chewable tablet 16 g, 4 Tablet, Oral, PRN, Matty Villanueva MD    glucagon (GLUCAGEN) injection 1 mg, 1 mg, IntraMUSCular, PRN, Matty Villanueva MD    acetaminophen (TYLENOL) tablet 650 mg, 650 mg, Oral, Q6H PRN, 650 mg at 11/11/22 0226 **OR** acetaminophen (TYLENOL) suppository 650 mg, 650 mg, Rectal, Q6H PRN, Raphael Villanueva MD    polyethylene glycol (MIRALAX) packet 17 g, 17 g, Oral, DAILY PRN, Raphael Villanueva MD    ondansetron (ZOFRAN ODT) tablet 4 mg, 4 mg, Oral, Q8H PRN **OR** ondansetron (ZOFRAN) injection 4 mg, 4 mg, IntraVENous, Q6H PRN, Raphael Villanueva MD    albuterol-ipratropium (DUO-NEB) 2.5 MG-0.5 MG/3 ML, 3 mL, Nebulization, Q6H RT, Raphael Villanueva MD, 3 mL at 11/11/22 0724    methylPREDNISolone (PF) (SOLU-MEDROL) injection 40 mg, 40 mg, IntraVENous, Q6H, Raphael Villanueva MD, 40 mg at 11/11/22 0532    cefTRIAXone (ROCEPHIN) 1 g in sterile water (preservative free) 10 mL IV syringe, 1 g, IntraVENous, Q24H, Raphael Villanueva MD, 1 g at 11/10/22 2322    Current Outpatient Medications:     amoxicillin-clavulanate (Augmentin) 875-125 mg per tablet, Take 1 Tablet by mouth two (2) times a day., Disp: 20 Tablet, Rfl: 0    albuterol-ipratropium (DUO-NEB) 2.5 mg-0.5 mg/3 ml nebu, 3 mL by Nebulization route every six (6) hours as needed for Wheezing., Disp: 30 Nebule, Rfl: 0    apixaban (ELIQUIS) 2.5 mg tablet, Take 1 Tablet by mouth two (2) times a day.  Indications: treatment to prevent blood clots in chronic atrial fibrillation, Disp: 60 Tablet, Rfl: 0    aspirin 81 mg chewable tablet, Take 1 Tablet by mouth daily. , Disp: 30 Tablet, Rfl: 0    atorvastatin (LIPITOR) 40 mg tablet, Take 1 Tablet by mouth nightly., Disp: 60 Tablet, Rfl: 0    budesonide-formoteroL (SYMBICORT) 160-4.5 mcg/actuation HFAA, Take 2 Puffs by inhalation two (2) times a day., Disp: 10.2 g, Rfl: 1    predniSONE (DELTASONE) 10 mg tablet, Take 10 mg by mouth daily (with breakfast). , Disp: 10 Tablet, Rfl: 0    metoprolol succinate (TOPROL-XL) 50 mg XL tablet, Take 1 Tablet by mouth daily. , Disp: 50 Tablet, Rfl: 0    albuterol (PROVENTIL VENTOLIN) 2.5 mg /3 mL (0.083 %) nebu, Take 3 mL by inhalation every six (6) hours as needed. , Disp: , Rfl:     folic acid (FOLVITE) 1 mg tablet, TAKE 1 TABLET BY MOUTH EVERY DAY, Disp: , Rfl:     levothyroxine (SYNTHROID) 100 mcg tablet, TAKE 1 TABLET BY MOUTH EVERY DAY, Disp: , Rfl:     LAB DATA REVIEWED:    Recent Results (from the past 24 hour(s))   TROPONIN-HIGH SENSITIVITY    Collection Time: 11/10/22  8:16 PM   Result Value Ref Range    Troponin-High Sensitivity 50 0 - 51 ng/L   NT-PRO BNP    Collection Time: 11/10/22  8:16 PM   Result Value Ref Range    NT pro-BNP 6,518 (H) <450 pg/mL   CBC WITH AUTOMATED DIFF    Collection Time: 11/10/22  8:16 PM   Result Value Ref Range    WBC 20.5 (H) 3.6 - 11.0 K/uL    RBC 3.88 3.80 - 5.20 M/uL    HGB 12.0 11.5 - 16.0 g/dL    HCT 36.5 35.0 - 47.0 %    MCV 94.1 80.0 - 99.0 FL    MCH 30.9 26.0 - 34.0 PG    MCHC 32.9 30.0 - 36.5 g/dL    RDW 12.7 11.5 - 14.5 %    PLATELET 742 824 - 092 K/uL    MPV 10.1 8.9 - 12.9 FL    NRBC 0.0 0.0  WBC    ABSOLUTE NRBC 0.00 0.00 - 0.01 K/uL    NEUTROPHILS 77 (H) 32 - 75 %    LYMPHOCYTES 12 12 - 49 %    MONOCYTES 9 5 - 13 %    EOSINOPHILS 1 0 - 7 %    BASOPHILS 0 0 - 1 %    IMMATURE GRANULOCYTES 1 (H) 0 - 0.5 %    ABS. NEUTROPHILS 16.0 (H) 1.8 - 8.0 K/UL    ABS. LYMPHOCYTES 2.4 0.8 - 3.5 K/UL    ABS. MONOCYTES 1.8 (H) 0.0 - 1.0 K/UL    ABS.  EOSINOPHILS 0.1 0.0 - 0.4 K/UL    ABS. BASOPHILS 0.1 0.0 - 0.1 K/UL    ABS. IMM. GRANS. 0.1 (H) 0.00 - 0.04 K/UL    DF AUTOMATED     METABOLIC PANEL, COMPREHENSIVE    Collection Time: 11/10/22  8:16 PM   Result Value Ref Range    Sodium 137 136 - 145 mmol/L    Potassium 4.7 3.5 - 5.1 mmol/L    Chloride 106 97 - 108 mmol/L    CO2 20 (L) 21 - 32 mmol/L    Anion gap 11 5 - 15 mmol/L    Glucose 211 (H) 65 - 100 mg/dL    BUN 22 (H) 6 - 20 mg/dL    Creatinine 1.55 (H) 0.55 - 1.02 mg/dL    BUN/Creatinine ratio 14 12 - 20      eGFR 31 (L) >60 ml/min/1.73m2    Calcium 8.7 8.5 - 10.1 mg/dL    Bilirubin, total 0.3 0.2 - 1.0 mg/dL    AST (SGOT) 19 15 - 37 U/L    ALT (SGPT) 16 12 - 78 U/L    Alk.  phosphatase 107 45 - 117 U/L    Protein, total 7.4 6.4 - 8.2 g/dL    Albumin 3.5 3.5 - 5.0 g/dL    Globulin 3.9 2.0 - 4.0 g/dL    A-G Ratio 0.9 (L) 1.1 - 2.2     CULTURE, BLOOD, PAIRED    Collection Time: 11/10/22  9:17 PM    Specimen: Blood   Result Value Ref Range    Special Requests: No Special Requests      Culture result: No growth after 7 hours     LACTIC ACID    Collection Time: 11/10/22  9:17 PM   Result Value Ref Range    Lactic acid 1.9 0.4 - 2.0 mmol/L   COVID-19 RAPID TEST    Collection Time: 11/10/22  9:17 PM   Result Value Ref Range    COVID-19 rapid test Not Detected Not Detected     INFLUENZA A & B AG (RAPID TEST)    Collection Time: 11/10/22  9:17 PM   Result Value Ref Range    Influenza A Antigen Negative Negative      Influenza B Antigen Negative Negative     LACTIC ACID    Collection Time: 11/11/22  1:14 AM   Result Value Ref Range    Lactic acid 1.7 0.4 - 2.0 mmol/L   URINALYSIS W/ REFLEX CULTURE    Collection Time: 11/11/22  2:34 AM    Specimen: Urine   Result Value Ref Range    Color Yellow/Straw      Appearance Clear Clear      Specific gravity 1.008 1.003 - 1.030      pH (UA) 5.0 5.0 - 8.0      Protein Negative Negative mg/dL    Glucose Negative Negative mg/dL    Ketone Negative Negative mg/dL    Bilirubin Negative Negative Blood Negative Negative      Urobilinogen 0.1 0.1 - 1.0 EU/dL    Nitrites Negative Negative      Leukocyte Esterase Negative Negative      UA:UC IF INDICATED Urine Culture Ordered (A) Culture not indicated by UA result      WBC 0-5 0 - 4 /hpf    RBC 0-5 0 - 5 /hpf    Bacteria Negative Negative /hpf   METABOLIC PANEL, COMPREHENSIVE    Collection Time: 11/11/22  3:39 AM   Result Value Ref Range    Sodium 136 136 - 145 mmol/L    Potassium 3.4 (L) 3.5 - 5.1 mmol/L    Chloride 105 97 - 108 mmol/L    CO2 21 21 - 32 mmol/L    Anion gap 10 5 - 15 mmol/L    Glucose 242 (H) 65 - 100 mg/dL    BUN 25 (H) 6 - 20 mg/dL    Creatinine 1.84 (H) 0.55 - 1.02 mg/dL    BUN/Creatinine ratio 14 12 - 20      eGFR 25 (L) >60 ml/min/1.73m2    Calcium 8.4 (L) 8.5 - 10.1 mg/dL    Bilirubin, total 0.2 0.2 - 1.0 mg/dL    AST (SGOT) 17 15 - 37 U/L    ALT (SGPT) 12 12 - 78 U/L    Alk. phosphatase 87 45 - 117 U/L    Protein, total 6.8 6.4 - 8.2 g/dL    Albumin 3.0 (L) 3.5 - 5.0 g/dL    Globulin 3.8 2.0 - 4.0 g/dL    A-G Ratio 0.8 (L) 1.1 - 2.2     CBC WITH AUTOMATED DIFF    Collection Time: 11/11/22  3:39 AM   Result Value Ref Range    WBC 10.5 3.6 - 11.0 K/uL    RBC 3.29 (L) 3.80 - 5.20 M/uL    HGB 10.3 (L) 11.5 - 16.0 g/dL    HCT 31.0 (L) 35.0 - 47.0 %    MCV 94.2 80.0 - 99.0 FL    MCH 31.3 26.0 - 34.0 PG    MCHC 33.2 30.0 - 36.5 g/dL    RDW 12.6 11.5 - 14.5 %    PLATELET 501 646 - 653 K/uL    MPV 10.1 8.9 - 12.9 FL    NRBC 0.0 0.0  WBC    ABSOLUTE NRBC 0.00 0.00 - 0.01 K/uL    NEUTROPHILS 94 (H) 32 - 75 %    LYMPHOCYTES 5 (L) 12 - 49 %    MONOCYTES 1 (L) 5 - 13 %    EOSINOPHILS 0 0 - 7 %    BASOPHILS 0 0 - 1 %    IMMATURE GRANULOCYTES 0 0 - 0.5 %    ABS. NEUTROPHILS 9.9 (H) 1.8 - 8.0 K/UL    ABS. LYMPHOCYTES 0.5 (L) 0.8 - 3.5 K/UL    ABS. MONOCYTES 0.2 0.0 - 1.0 K/UL    ABS. EOSINOPHILS 0.0 0.0 - 0.4 K/UL    ABS. BASOPHILS 0.0 0.0 - 0.1 K/UL    ABS. IMM.  GRANS. 0.0 0.00 - 0.04 K/UL    DF AUTOMATED     NT-PRO BNP    Collection Time: 11/11/22  3:39 AM   Result Value Ref Range    NT pro-BNP 14,494 (H) <450 pg/mL   GLUCOSE, POC    Collection Time: 11/11/22  8:30 AM   Result Value Ref Range    Glucose (POC) 204 (H) 65 - 100 mg/dL    Performed by Dale Clayton        XR Results (most recent):  Results from Hospital Encounter encounter on 11/10/22    XR CHEST PORT    Narrative  INDICATION:  sob copd    EXAM: Chest single view. COMPARISON: 1/4/2022. FINDINGS: A single frontal view of the chest at 2017 hours shows diffuse  interstitial infiltrate new since the prior study. No focal consolidation. .  The  heart, mediastinum and pulmonary vasculature are are stable . The bony thorax  is unremarkable for age. .    Impression  Interstitial edema pattern. .  . XR CHEST PORT   Final Result   Interstitial edema pattern. .  . Review of Systems:  Constitutional: Negative for chills and fever. HENT: Negative. Eyes: Negative. Respiratory: Shortness of breath   cardiovascular: Negative. Gastrointestinal: Negative for abdominal pain and nausea. Skin: Negative. Neurological: Negative. Objective:   VITALS:    Visit Vitals  BP (!) 117/55   Pulse 94   Temp 97.3 °F (36.3 °C)   Resp 26   Ht 5' 5\" (1.651 m)   Wt 67.1 kg (148 lb)   SpO2 97%   BMI 24.63 kg/m²       Physical Exam:   Constitutional: pt is oriented to person, place, and time. HENT:   Head: Normocephalic and atraumatic. Eyes: Pupils are equal, round, and reactive to light. EOM are normal.   Cardiovascular: Normal rate, regular rhythm and normal heart sounds. Pulmonary/Chest: Decreased breath sound bilaterally with Rales   abdominal: Soft. Bowel sounds are normal. There is no abdominal tenderness. There is no rebound and no guarding. Musculoskeletal: Normal range of motion. Neurological: pt is alert and oriented to person, place, and time. Alert. Normal strength. No cranial nerve deficit or sensory deficit. Displays a negative Romberg sign. ASSESSMENT & PLAN:    Acute systolic and diastolic heart failure  Hypertension  Hypothyroidism  Hyperlipidemia  COPD  Type 2 diabetes      Current Facility-Administered Medications:     heparin (porcine) injection 5,000 Units, 5,000 Units, SubCUTAneous, Q12H, Raphael Villanueva MD, 5,000 Units at 11/11/22 0911    potassium chloride SR (KLOR-CON 10) tablet 40 mEq, 40 mEq, Oral, NOW, Ayden Villanueva MD    furosemide (LASIX) injection 40 mg, 40 mg, IntraVENous, BID, Raphael Villanueva MD    sodium chloride (NS) flush 5-10 mL, 5-10 mL, IntraVENous, PRN, Raphael Villanueva MD, 10 mL at 11/10/22 2123    albuterol CONCENTRATE 2.5mg/0.5 mL neb soln, 2.5 mg, Inhalation, Q6H PRN, Raphael Villanueva MD    albuterol-ipratropium (DUO-NEB) 2.5 MG-0.5 MG/3 ML, 3 mL, Nebulization, Q6H PRN, Raphael Villanueva MD, 3 mL at 11/11/22 0107    aspirin chewable tablet 81 mg, 81 mg, Oral, DAILY, Raphael Villanueva MD, 81 mg at 11/11/22 0912    atorvastatin (LIPITOR) tablet 40 mg, 40 mg, Oral, QHS, Raphael Villanueva MD, 40 mg at 11/11/22 0226    budesonide-formoteroL (SYMBICORT) 160-4.5 mcg/actuation HFA inhaler 2 Puff, 2 Puff, Inhalation, BID, Raphael Villanueva MD, 2 Puff at 28/37/21 7230    folic acid (FOLVITE) tablet 1 mg, 1 mg, Oral, DAILY, Raphael Villanueva MD, 1 mg at 11/11/22 7335    levothyroxine (SYNTHROID) tablet 100 mcg, 100 mcg, Oral, DAILY, Raphael Villanueva MD, 100 mcg at 11/11/22 0912    metoprolol succinate (TOPROL-XL) XL tablet 50 mg, 50 mg, Oral, DAILY, Raphael Villanueva MD, 50 mg at 11/11/22 0911    insulin lispro (HUMALOG) injection, , SubCUTAneous, AC&HS, Raphael Villanueva MD, 4 Units at 11/11/22 0911    glucose chewable tablet 16 g, 4 Tablet, Oral, PRN, Raphael Villanueva MD    glucagon (GLUCAGEN) injection 1 mg, 1 mg, IntraMUSCular, PRN, Raphael Villanueva MD    acetaminophen (TYLENOL) tablet 650 mg, 650 mg, Oral, Q6H PRN, 650 mg at 11/11/22 0226 **OR** acetaminophen (TYLENOL) suppository 650 mg, 650 mg, Rectal, Q6H PRN, Sparkle Stafford MD    polyethylene glycol (MIRALAX) packet 17 g, 17 g, Oral, DAILY PRN, Zoey Villanueva MD    ondansetron (ZOFRAN ODT) tablet 4 mg, 4 mg, Oral, Q8H PRN **OR** ondansetron (ZOFRAN) injection 4 mg, 4 mg, IntraVENous, Q6H PRN, Raphael Villanueva MD    albuterol-ipratropium (DUO-NEB) 2.5 MG-0.5 MG/3 ML, 3 mL, Nebulization, Q6H RT, Raphael Villanueva MD, 3 mL at 11/11/22 0724    methylPREDNISolone (PF) (SOLU-MEDROL) injection 40 mg, 40 mg, IntraVENous, Q6H, Raphael Villanueva MD, 40 mg at 11/11/22 0532    cefTRIAXone (ROCEPHIN) 1 g in sterile water (preservative free) 10 mL IV syringe, 1 g, IntraVENous, Q24H, Raphael Villanueva MD, 1 g at 11/10/22 2356    Current Outpatient Medications:     amoxicillin-clavulanate (Augmentin) 875-125 mg per tablet, Take 1 Tablet by mouth two (2) times a day., Disp: 20 Tablet, Rfl: 0    albuterol-ipratropium (DUO-NEB) 2.5 mg-0.5 mg/3 ml nebu, 3 mL by Nebulization route every six (6) hours as needed for Wheezing., Disp: 30 Nebule, Rfl: 0    apixaban (ELIQUIS) 2.5 mg tablet, Take 1 Tablet by mouth two (2) times a day. Indications: treatment to prevent blood clots in chronic atrial fibrillation, Disp: 60 Tablet, Rfl: 0    aspirin 81 mg chewable tablet, Take 1 Tablet by mouth daily. , Disp: 30 Tablet, Rfl: 0    atorvastatin (LIPITOR) 40 mg tablet, Take 1 Tablet by mouth nightly., Disp: 60 Tablet, Rfl: 0    budesonide-formoteroL (SYMBICORT) 160-4.5 mcg/actuation HFAA, Take 2 Puffs by inhalation two (2) times a day., Disp: 10.2 g, Rfl: 1    predniSONE (DELTASONE) 10 mg tablet, Take 10 mg by mouth daily (with breakfast). , Disp: 10 Tablet, Rfl: 0    metoprolol succinate (TOPROL-XL) 50 mg XL tablet, Take 1 Tablet by mouth daily. , Disp: 50 Tablet, Rfl: 0    albuterol (PROVENTIL VENTOLIN) 2.5 mg /3 mL (0.083 %) nebu, Take 3 mL by inhalation every six (6) hours as needed. , Disp: , Rfl:     folic acid (FOLVITE) 1 mg tablet, TAKE 1 TABLET BY MOUTH EVERY DAY, Disp: , Rfl: levothyroxine (SYNTHROID) 100 mcg tablet, TAKE 1 TABLET BY MOUTH EVERY DAY, Disp: , Rfl:      Cardiology nephrology and pulmonary consult      ________________________________________________________________________    Signed: Carrol Sim MD

## 2022-11-11 NOTE — ED TRIAGE NOTES
Pt. Arrives from home via EMS for SOB and difficulty breathing. Per EMS end tidal was 28-31. Pt. Has hx of COPD. Arrives on 3L O2, nasal cannula, placed by EMS.

## 2022-11-11 NOTE — PROGRESS NOTES
Reason for Admission:  SPB, Nausea                      RUR Score:   13%                  Plan for utilizing home health:  Not at this time        PCP: First and Last name:  None     Name of Practice:    Are you a current patient: Yes/No:    Approximate date of last visit:    Can you participate in a virtual visit with your PCP:                     Current Advanced Directive/Advance Care Plan: Full Code      Healthcare Decision Maker:   Click here to complete [address] including selection of the Healthcare Decision Maker Relationship (ie \"Primary\")             Primary Decision MakerBhenna Parsons - Daughter - [phone]                  Transition of Care Plan:      Pt is hard of hearing. Called Pt daughter, Kierra Fernandez, she confirmed that the information on the face sheet is correct. Pt daughter stated that Pt has 25 hour aids who stay with her. Pt daughter stated that she hires them and that Pt cannot stay at home alone. Pt daughter stated that Pt aids will sit with her while she is in the hospital.      Pt daughter stated no HH and stated that they do not want HH, stated that Pt is 80years old and does not need PT/OT. Pt daughter stated that Pt has a walker and cane. Pt daughter stated that Pt can do her ADL. Pt daughter stated that Pt uses [facility] [address]. Pt daughter stated that family will give her a ride when she is D/C. CM dispo: TBD.

## 2022-11-11 NOTE — CONSULTS
PULMONARY CONSULT  [facility]    Name: Denise Bolivar MRN: [id number]   : 1930 Hospital: [facility]   Date: 2022  Admission date: 11/10/2022 Hospital Day: 2       HPI:     Hospital Problems  Date Reviewed: 2020            Codes Class Noted POA    Acute congestive heart failure ([facility] ICD-10-CM: I50.9  ICD-9-CM: 428.0  11/10/2022 Unknown        CHF (congestive heart failure) ([facility] ICD-10-CM: I50.9  ICD-9-CM: 428.0  11/10/2022 Unknown                [x] High complexity decision making was performed  [x] See my orders for details      Subjective/Initial History:     I was asked by Stephania Garcia MD to see Denise Bolivar  a 80 y.o.    female in consultation     Excerpts from admission 11/10/2022 or consult notes as follows:   58-year-old lady came in because of shortness of breath past medical history of asthma hypertension coronary artery disease hypothyroidism she is hard of hearing she has hearing loss came to the hospital because of shortness of breath and dyspnea was getting worse, chest x-ray was done which shows some congestive changes her COVID-19 test came back negative so she is admitted and pulmonary consult was called      No Known Allergies     MAR reviewed and pertinent medications noted or modified as needed     Current Facility-Administered Medications   Medication    heparin (porcine) injection 5,000 Units    furosemide (LASIX) injection 40 mg    sodium chloride (NS) flush 5-10 mL    albuterol CONCENTRATE 2.5mg/0.5 mL neb soln    albuterol-ipratropium (DUO-NEB) 2.5 MG-0.5 MG/3 ML    aspirin chewable tablet 81 mg    atorvastatin (LIPITOR) tablet 40 mg    budesonide-formoteroL (SYMBICORT) 160-4.5 mcg/actuation HFA inhaler 2 Puff    folic acid (FOLVITE) tablet 1 mg    levothyroxine (SYNTHROID) tablet 100 mcg    metoprolol succinate (TOPROL-XL) XL tablet 50 mg    insulin lispro (HUMALOG) injection    glucose chewable tablet 16 g    glucagon (GLUCAGEN) injection 1 mg    acetaminophen (TYLENOL) tablet 650 mg    Or    acetaminophen (TYLENOL) suppository 650 mg    polyethylene glycol (MIRALAX) packet 17 g    ondansetron (ZOFRAN ODT) tablet 4 mg    Or    ondansetron (ZOFRAN) injection 4 mg    albuterol-ipratropium (DUO-NEB) 2.5 MG-0.5 MG/3 ML    methylPREDNISolone (PF) (SOLU-MEDROL) injection 40 mg    cefTRIAXone (ROCEPHIN) 1 g in sterile water (preservative free) 10 mL IV syringe     Current Outpatient Medications   Medication Sig    amoxicillin-clavulanate (Augmentin) 875-125 mg per tablet Take 1 Tablet by mouth two (2) times a day. albuterol-ipratropium (DUO-NEB) 2.5 mg-0.5 mg/3 ml nebu 3 mL by Nebulization route every six (6) hours as needed for Wheezing. apixaban (ELIQUIS) 2.5 mg tablet Take 1 Tablet by mouth two (2) times a day. Indications: treatment to prevent blood clots in chronic atrial fibrillation    aspirin 81 mg chewable tablet Take 1 Tablet by mouth daily. atorvastatin (LIPITOR) 40 mg tablet Take 1 Tablet by mouth nightly. budesonide-formoteroL (SYMBICORT) 160-4.5 mcg/actuation HFAA Take 2 Puffs by inhalation two (2) times a day. predniSONE (DELTASONE) 10 mg tablet Take 10 mg by mouth daily (with breakfast). metoprolol succinate (TOPROL-XL) 50 mg XL tablet Take 1 Tablet by mouth daily. albuterol (PROVENTIL VENTOLIN) 2.5 mg /3 mL (0.083 %) nebu Take 3 mL by inhalation every six (6) hours as needed. folic acid (FOLVITE) 1 mg tablet TAKE 1 TABLET BY MOUTH EVERY DAY    levothyroxine (SYNTHROID) 100 mcg tablet TAKE 1 TABLET BY MOUTH EVERY DAY      Patient PCP: None  PMH:  has a past medical history of Anemia, Chronic obstructive pulmonary disease ([address] Heart attack ([address] Hypertension, and Thyroid disease. PSH:   has a past surgical history that includes hx orthopaedic (2018) and hx pacemaker. FHX: family history includes Heart Disease in her father and mother; Stroke in her father and mother.    SHX:  reports that she has never smoked. She has never used smokeless tobacco. She reports that she does not drink alcohol and does not use drugs. ROS:    Review of Systems   Constitutional:  Positive for malaise/fatigue. HENT: Negative. Eyes: Negative. Respiratory:  Positive for cough, shortness of breath and wheezing. Cardiovascular: Negative. Gastrointestinal: Negative. Genitourinary: Negative. Musculoskeletal: Negative. Skin: Negative. Neurological: Negative. Psychiatric/Behavioral: Negative. Objective:     Vital Signs: Telemetry:    normal sinus rhythm Intake/Output:   Visit Vitals  BP (!) 144/52   Pulse 89   Temp 97.3 °F (36.3 °C)   Resp 27   Ht 5' 5\" (1.651 m)   Wt 67.1 kg (148 lb)   SpO2 96%   BMI 24.63 kg/m²       Temp (24hrs), Av.3 °F (36.3 °C), Min:97.3 °F (36.3 °C), Max:97.3 °F (36.3 °C)        O2 Device: None (Room air) O2 Flow Rate (L/min): 2 l/min       Wt Readings from Last 4 Encounters:   11/10/22 67.1 kg (148 lb)   22 67.4 kg (148 lb 9.4 oz)   21 64.4 kg (141 lb 15.6 oz)   21 68 kg (150 lb)        No intake or output data in the 24 hours ending 22 1133    Last shift:      No intake/output data recorded. Last 3 shifts: No intake/output data recorded. Physical Exam:     Physical Exam  Constitutional:       Appearance: Normal appearance. HENT:      Head: Normocephalic and atraumatic. Nose: Nose normal.      Mouth/Throat:      Mouth: Mucous membranes are moist.   Eyes:      Pupils: Pupils are equal, round, and reactive to light. Cardiovascular:      Rate and Rhythm: Normal rate. Pulses: Normal pulses. Heart sounds: Normal heart sounds. Pulmonary:      Effort: Pulmonary effort is normal.      Breath sounds: Wheezing present. Comments: Coarse breath sound expiratory wheeze mostly upper airway  Abdominal:      General: Abdomen is flat. Bowel sounds are normal.      Palpations: Abdomen is soft.    Musculoskeletal:         General: Swelling present. Normal range of motion. Cervical back: Normal range of motion. Skin:     General: Skin is warm. Neurological:      General: No focal deficit present. Mental Status: She is alert. Psychiatric:         Mood and Affect: Mood normal.        Labs:    Recent Labs     11/11/22  0339 11/10/22  2016   WBC 10.5 20.5*   HGB 10.3* 12.0    380     Recent Labs     11/11/22  0339 11/11/22  0114 11/10/22  2117 11/10/22  2016     --   --  137   K 3.4*  --   --  4.7     --   --  106   CO2 21  --   --  20*   *  --   --  211*   BUN 25*  --   --  22*   CREA 1.84*  --   --  1.55*   CA 8.4*  --   --  8.7   LAC  --  1.7 1.9  --    ALB 3.0*  --   --  3.5   ALT 12  --   --  16     No results for input(s): PH, PCO2, PO2, HCO3, FIO2 in the last 72 hours. No results for input(s): CPK, CKNDX, TROIQ in the last 72 hours. No lab exists for component: CPKMB  No results found for: BNPP, BNP   Lab Results   Component Value Date/Time    Culture result: No growth after 7 hours 11/10/2022 09:17 PM   No results found for: TSH, TSHEXT    Imaging:    CXR Results  (Last 48 hours)                 11/10/22 2024  XR CHEST PORT Final result    Impression:  Interstitial edema pattern. .  . Narrative:  INDICATION:  sob copd        EXAM: Chest single view. COMPARISON: 1/4/2022. FINDINGS: A single frontal view of the chest at 2017 hours shows diffuse   interstitial infiltrate new since the prior study. No focal consolidation. .  The   heart, mediastinum and pulmonary vasculature are are stable . The bony thorax   is unremarkable for age. Albert Nathan Results from Hospital Encounter encounter on 11/10/22    XR CHEST PORT    Narrative  INDICATION:  sob copd    EXAM: Chest single view. COMPARISON: 1/4/2022. FINDINGS: A single frontal view of the chest at 2017 hours shows diffuse  interstitial infiltrate new since the prior study. No focal consolidation. .  The  heart, mediastinum and pulmonary vasculature are are stable . The bony thorax  is unremarkable for age. .    Impression  Interstitial edema pattern. .  . Results from [place] encounter on 01/04/22    XR CHEST PORT    Narrative  1 new comparison September 20. Impression  The cardiomediastinal silhouette is appropriate for age, technique,  and lung expansion. Pulmonary vasculature is not congested. The lungs are  essentially clear. No effusion or pneumothorax is seen. Results from Hospital Encounter encounter on 09/20/21    XR CHEST PORT    Narrative  History: Evidence of breath    A single view of the chest was obtained. Heart size is stable. There is some  atherosclerotic change of the aorta. Lungs are clear. No effusions or  pneumothorax. Bony structures are within normal limits. Impression  Normal findings. Results from [place] encounter on 01/04/22    CT ABD PELV W CONT    Narrative  CT abdomen and pelvis without IV contrast    Comparison CT abdomen and pelvis November 19, 2019. Axial images are reviewed along with reformatted sagittal/coronal images. 100 mL  Isovue 370 administered. Motion limited study. Dose reduction: All CT scans at this facility are performed using dose reduction  optimization techniques as appropriate to a performed exam including the  following-  automated exposure control, adjustments of mA and/or Kv according to patient  size, or use of iterative reconstructive technique. Lung bases are clear. .    Normal enhancement of the liver. Gallbladder distention. Small gallstones noted  dependently within the gallbladder lumen. Pancreas, spleen, and bilateral  adrenal glands appear unremarkable. Cortical thinning bilateral kidneys. No hydronephrosis. Stomach and small bowel loops are decompressed. Appendix unchanged. There is  stool and air through colon. Distal descending and sigmoid colon diverticula. No  CT evidence for appendicitis or diverticulitis.  No ascites. Unchanged uterine calcification likely related to small degenerated uterine  fibroid. Dense intimal calcification normal caliber abdominal aorta, advanced abdominal  aorta atherosclerosis. Atherosclerosis extends to proximal abdominal aorta  branch vessels and pelvic arteries. Left hip hardware. Impression  No bowel obstruction. Colonic diverticulosis. No CT evidence for  appendicitis or diverticulitis. Unchanged appearance appendix. Gallbladder distention. Few small dependent gallstones within gallbladder lumen. Cortical atrophy each kidney. No hydronephrosis. Advanced atherosclerotic change abdominal aorta and pelvic arteries. No  abdominal aortic aneurysm. Left hip hardware.         IMPRESSION:   Exacerbation of CHF diastolic dysfunction  Asthma with exacerbation  Hypertension hypothyroidism by history  Profound significant hearing loss  Pt is requiring Drug therapy requiring intensive monitoring for toxicity  Pt is unstable, unpredictable needing inpatient monitoring; is acutely ill and at high risk of sudden decline and decompensation with severe consequenses and continued end organ dysfunction and failure  Prognosis guarded       RECOMMENDATIONS/PLAN:     40-year-old lady came in because of shortness of breath dyspnea and shows fluid overload elevated BNP  Start patient on Lasix  Also started on IV Solu-Medrol nebulizer treatment  We will start patient on Pulmicort  Started on Rocephin   Supplemental O2 to keep sats > 93%  Aspiration precautions  Labs to follow electrolytes, renal function and and blood counts  Glucose monitoring and SSI  Bronchial hygiene with respiratory therapy techniques, bronchodilators  DVT, SUP prophylaxis       This care involved high complexity medical decision making: I personally:  Reviewed the flowsheet and previous days notes  Reviewed and summarized records or history from previous days note or discussions with staff, family  High Risk Drug therapy requiring intensive monitoring for toxicity: eg steroids, pressors, antibiotics  Reviewed and/or ordered Clinical lab tests  Reviewed images and/or ordered Radiology tests  Reviewed the patients ECG / Telemetry  Reviewed and/or adjusted NiPPV settings  Called and arranged for Radiologic procedures or interventions  performed or ordered Diagnostic endoscopies with identified risk factors.   discussed my assessment/management with : Nursing, Hospitalist and Family for coordination of care          Nafisa Arguello MD

## 2022-11-11 NOTE — NURSE NAVIGATOR
Heart Failure Nurse Navigator: Heart Failure Education    RN performs hand hygiene. RN Introduces herself to the patient and informs the patient of the reasoning for the visit. Patient Filomena Vann is hard of hearing and informs RN-NN she cannot understand her. RN-NN makes multiple attempts and the patient states she still cannot understand the RN-NN    Persons present for Education: Patient    Time Spent: At least 10minutes      Education Packets Given: Living with Heart Failure book, Heart Failure symptom management calendar/tracker, Heart Failure Self Check plan, Heart Failure: Partnering Your Treatment Questionnaire, Sodium tracker, and Eat Smart with Nutrition labels. Education packet is left at bedside. No family is at bedside at time of the visit. RN-NN will make a second attempt later if available.

## 2022-11-11 NOTE — DISCHARGE INSTRUCTIONS
---------------------      Download the Heart Failure Blain Sedrick: Search in your [employer] (Android) or [employer] Sedrick Store (Dasherphone): Search for- HF Blain Sedrick.    [employer]    HF Blain is a brand-new phone sedrick that helps you track daily symptoms, vitals, mood, energy level and more. You can even add your heart failure care team members to view your data and monitor your condition at home. HF Blain lets you:  Track symptoms, medications and more  Share health information with your health care team  Connect with others living with heart failure  ---------------   Discharge Instructions       PATIENT ID: Denise Bolivar  MRN: [id number]   YOB: 1930    DATE OF ADMISSION: [unfilled]    DATE OF DISCHARGE: 11/15/2022    PRIMARY CARE PROVIDER: @PCP@     ATTENDING PHYSICIAN: [unfilled]  DISCHARGING PROVIDER: Stephania Garcia MD    To contact this individual call [phone] and ask the  to page. If unavailable ask to be transferred the Adult Hospitalist Department. DISCHARGE DIAGNOSES chf    CONSULTATIONS: [unfilled]    PROCEDURES/SURGERIES: * No surgery found *    PENDING TEST RESULTS:   At the time of discharge the following test results are still pending: None    FOLLOW UP APPOINTMENTS:   @[facility]@     ADDITIONAL CARE RECOMMENDATIONS: ***    DIET: Resume previous diet      ACTIVITY: Activity as tolerated    Wound care: [facility]}    EQUIPMENT needed: ***      DISCHARGE MEDICATIONS:   See Medication Reconciliation Form    It is important that you take the medication exactly as they are prescribed. Keep your medication in the bottles provided by the pharmacist and keep a list of the medication names, dosages, and times to be taken in your wallet. Do not take other medications without consulting your doctor.        NOTIFY YOUR PHYSICIAN FOR ANY OF THE FOLLOWING:   Fever over 101 degrees for 24 hours. Chest pain, shortness of breath, fever, chills, nausea, vomiting, diarrhea, change in mentation, falling, weakness, bleeding. Severe pain or pain not relieved by medications. Or, any other signs or symptoms that you may have questions about. DISPOSITION:    Home With:   OT  PT  HH  RN       SNF/Inpatient Rehab/LTAC    Independent/assisted living    Hospice    Other:         PROBLEM LIST Updated:  Yes ***       Signed:   Parvez Wen MD  11/15/2022  12:46 PM    Discharge Instructions       PATIENT ID: Apolonia Marcum  MRN: [id number]   YOB: 1930    DATE OF ADMISSION: [unfilled]    DATE OF DISCHARGE: 11/15/2022    PRIMARY CARE PROVIDER: @PCP@     ATTENDING PHYSICIAN: [unfilled]  DISCHARGING PROVIDER: Parvez Wen MD    To contact this individual call [phone] and ask the  to page. If unavailable ask to be transferred the Adult Hospitalist Department. DISCHARGE DIAGNOSES ***    CONSULTATIONS: [unfilled]    PROCEDURES/SURGERIES: * No surgery found *    PENDING TEST RESULTS:   At the time of discharge the following test results are still pending: ***    FOLLOW UP APPOINTMENTS:   @[facility]@     ADDITIONAL CARE RECOMMENDATIONS: ***    DIET: {diet:40462}      ACTIVITY: {discharge activity:86943}    Wound care: {[facility] Wound Care Instructions:06129}    EQUIPMENT needed: ***      DISCHARGE MEDICATIONS:   See Medication Reconciliation Form    It is important that you take the medication exactly as they are prescribed. Keep your medication in the bottles provided by the pharmacist and keep a list of the medication names, dosages, and times to be taken in your wallet. Do not take other medications without consulting your doctor. NOTIFY YOUR PHYSICIAN FOR ANY OF THE FOLLOWING:   Fever over 101 degrees for 24 hours.    Chest pain, shortness of breath, fever, chills, nausea, vomiting, diarrhea, change in mentation, falling, weakness, bleeding. Severe pain or pain not relieved by medications. Or, any other signs or symptoms that you may have questions about.       DISPOSITION:    Home With:   OT  PT  HH  RN       SNF/Inpatient Rehab/LTAC    Independent/assisted living    Hospice    Other:         PROBLEM LIST Updated:  Yes ***       Signed:   Heidi Murray MD  11/15/2022  12:46 PM

## 2022-11-12 LAB
EST. AVERAGE GLUCOSE BLD GHB EST-MCNC: 108 MG/DL
GLUCOSE BLD STRIP.AUTO-MCNC: 130 MG/DL (ref 65–100)
GLUCOSE BLD STRIP.AUTO-MCNC: 150 MG/DL (ref 65–100)
GLUCOSE BLD STRIP.AUTO-MCNC: 172 MG/DL (ref 65–100)
GLUCOSE BLD STRIP.AUTO-MCNC: 182 MG/DL (ref 65–100)
GLUCOSE BLD STRIP.AUTO-MCNC: 279 MG/DL (ref 65–100)
HBA1C MFR BLD: 5.4 % (ref 4–5.6)
PERFORMED BY, TECHID: ABNORMAL

## 2022-11-12 PROCEDURE — 94640 AIRWAY INHALATION TREATMENT: CPT

## 2022-11-12 PROCEDURE — 74011250636 HC RX REV CODE- 250/636: Performed by: FAMILY MEDICINE

## 2022-11-12 PROCEDURE — 74011636637 HC RX REV CODE- 636/637: Performed by: FAMILY MEDICINE

## 2022-11-12 PROCEDURE — 82962 GLUCOSE BLOOD TEST: CPT

## 2022-11-12 PROCEDURE — 94761 N-INVAS EAR/PLS OXIMETRY MLT: CPT

## 2022-11-12 PROCEDURE — 74011250637 HC RX REV CODE- 250/637: Performed by: FAMILY MEDICINE

## 2022-11-12 PROCEDURE — 74011000250 HC RX REV CODE- 250: Performed by: FAMILY MEDICINE

## 2022-11-12 PROCEDURE — 74011000250 HC RX REV CODE- 250: Performed by: INTERNAL MEDICINE

## 2022-11-12 PROCEDURE — 65270000029 HC RM PRIVATE

## 2022-11-12 RX ADMIN — HEPARIN SODIUM 5000 UNITS: 5000 INJECTION INTRAVENOUS; SUBCUTANEOUS at 21:42

## 2022-11-12 RX ADMIN — CEFTRIAXONE SODIUM 1 G: 1 INJECTION, POWDER, FOR SOLUTION INTRAMUSCULAR; INTRAVENOUS at 22:09

## 2022-11-12 RX ADMIN — IPRATROPIUM BROMIDE AND ALBUTEROL SULFATE 3 ML: .5; 2.5 SOLUTION RESPIRATORY (INHALATION) at 07:15

## 2022-11-12 RX ADMIN — ASPIRIN 81 MG 81 MG: 81 TABLET ORAL at 08:32

## 2022-11-12 RX ADMIN — METHYLPREDNISOLONE SODIUM SUCCINATE 40 MG: 40 INJECTION, POWDER, FOR SOLUTION INTRAMUSCULAR; INTRAVENOUS at 23:01

## 2022-11-12 RX ADMIN — METOPROLOL SUCCINATE 50 MG: 50 TABLET, EXTENDED RELEASE ORAL at 08:32

## 2022-11-12 RX ADMIN — METHYLPREDNISOLONE SODIUM SUCCINATE 40 MG: 40 INJECTION, POWDER, FOR SOLUTION INTRAMUSCULAR; INTRAVENOUS at 05:39

## 2022-11-12 RX ADMIN — MELATONIN TAB 5 MG 5 MG: 5 TAB at 21:42

## 2022-11-12 RX ADMIN — IPRATROPIUM BROMIDE AND ALBUTEROL SULFATE 3 ML: .5; 2.5 SOLUTION RESPIRATORY (INHALATION) at 02:13

## 2022-11-12 RX ADMIN — BUDESONIDE 500 MCG: 0.5 INHALANT ORAL at 07:15

## 2022-11-12 RX ADMIN — HEPARIN SODIUM 5000 UNITS: 5000 INJECTION INTRAVENOUS; SUBCUTANEOUS at 10:54

## 2022-11-12 RX ADMIN — INSULIN LISPRO 3 UNITS: 100 INJECTION, SOLUTION INTRAVENOUS; SUBCUTANEOUS at 07:30

## 2022-11-12 RX ADMIN — ATORVASTATIN CALCIUM 40 MG: 40 TABLET, FILM COATED ORAL at 21:42

## 2022-11-12 RX ADMIN — IPRATROPIUM BROMIDE AND ALBUTEROL SULFATE 3 ML: .5; 2.5 SOLUTION RESPIRATORY (INHALATION) at 13:10

## 2022-11-12 RX ADMIN — INSULIN LISPRO 3 UNITS: 100 INJECTION, SOLUTION INTRAVENOUS; SUBCUTANEOUS at 11:52

## 2022-11-12 RX ADMIN — FOLIC ACID 1 MG: 1 TABLET ORAL at 08:32

## 2022-11-12 RX ADMIN — BUDESONIDE 500 MCG: 0.5 INHALANT ORAL at 20:50

## 2022-11-12 RX ADMIN — METHYLPREDNISOLONE SODIUM SUCCINATE 40 MG: 40 INJECTION, POWDER, FOR SOLUTION INTRAMUSCULAR; INTRAVENOUS at 17:53

## 2022-11-12 RX ADMIN — METHYLPREDNISOLONE SODIUM SUCCINATE 40 MG: 40 INJECTION, POWDER, FOR SOLUTION INTRAMUSCULAR; INTRAVENOUS at 11:52

## 2022-11-12 RX ADMIN — IPRATROPIUM BROMIDE AND ALBUTEROL SULFATE 3 ML: .5; 2.5 SOLUTION RESPIRATORY (INHALATION) at 20:50

## 2022-11-12 RX ADMIN — INSULIN LISPRO 3 UNITS: 100 INJECTION, SOLUTION INTRAVENOUS; SUBCUTANEOUS at 16:31

## 2022-11-12 RX ADMIN — LEVOTHYROXINE SODIUM 100 MCG: 0.1 TABLET ORAL at 08:32

## 2022-11-12 NOTE — PROGRESS NOTES
Bedside and Verbal shift change report given to Pool Brito RN (oncoming nurse) by Farzad Luis RN (offgoing nurse). Report included the following information SBAR, Kardex, MAR, and Recent Results.

## 2022-11-12 NOTE — CONSULTS
NAME:  Sri Chino   :   1930   MRN:   [id number]     ATTENDING: Moe Kerns MD  PCP:  None    Date/Time:  2022 10:08 PM      Subjective:   REQUESTING PHYSICIAN:Raphael Villanueva MD  REASON FOR CONSULT: LOPEZ       History of presenting illness: Patient is a 27-year-old female with a past medical history of coronary artery disease, COPD, hypertension presented to the emergency room with complaints of shortness of breath. Initial chest x-ray reported interstitial edema pattern. Patient received some IV diuretics with improvement in her symptoms. Patient had a creatinine of 1.55 yesterday which is increased to 1.84 this morning and a nephrology consultation is requested for further evaluation and management. Patient seen at bedside, she is alert awake answering simple questions but appears to have some confusion and hard of hearing. She is feeling better now not giving any complaints of shortness of breath or chest pain. She is not aware of any prior kidney disease, review of old labs showed a creatinine of 1.1 in 2022. She is hemodynamically stable now. No history of any NSAID use or use of ACE inhibitor's(home medications reviewed). Old echocardiogram in 2021 showed 40 to 45% ejection fraction    Past Medical History:   Diagnosis Date    Anemia     Chronic obstructive pulmonary disease ([address]     Heart attack ([address]     Hypertension     Thyroid disease     hypothyroidism      Past Surgical History:   Procedure Laterality Date    HX ORTHOPAEDIC  2018    total hip replacement    HX PACEMAKER       Social History     Tobacco Use    Smoking status: Never    Smokeless tobacco: Never   Substance Use Topics    Alcohol use: Never      Family History   Problem Relation Age of Onset    Stroke Mother     Heart Disease Mother     Stroke Father     Heart Disease Father        No Known Allergies   Prior to Admission medications    Medication Sig Start Date End Date Taking?  Authorizing Provider   amoxicillin-clavulanate (Augmentin) 875-125 mg per tablet Take 1 Tablet by mouth two (2) times a day. 1/9/22   Bernie Villanueva MD   albuterol-ipratropium (DUO-NEB) 2.5 mg-0.5 mg/3 ml nebu 3 mL by Nebulization route every six (6) hours as needed for Wheezing. 9/27/21   Bernie Villanueva MD   apixaban (ELIQUIS) 2.5 mg tablet Take 1 Tablet by mouth two (2) times a day. Indications: treatment to prevent blood clots in chronic atrial fibrillation 9/27/21   Bernie Villanueva MD   aspirin 81 mg chewable tablet Take 1 Tablet by mouth daily. 9/28/21   Bernie Villanueva MD   atorvastatin (LIPITOR) 40 mg tablet Take 1 Tablet by mouth nightly. 9/27/21   Bernie Villanueva MD   budesonide-formoteroL [facility]) 160-4.5 mcg/actuation HFAA Take 2 Puffs by inhalation two (2) times a day. 9/27/21   Bernie Villanueva MD   predniSONE (DELTASONE) 10 mg tablet Take 10 mg by mouth daily (with breakfast). 9/28/21   Bernie Villanueva MD   metoprolol succinate (TOPROL-XL) 50 mg XL tablet Take 1 Tablet by mouth daily. 9/28/21   Bernie Villanueva MD   albuterol (PROVENTIL VENTOLIN) 2.5 mg /3 mL (0.083 %) nebu Take 3 mL by inhalation every six (6) hours as needed.  9/17/21   Provider, Historical   folic acid (FOLVITE) 1 mg tablet TAKE 1 TABLET BY MOUTH EVERY DAY 11/9/20   Provider, Historical   levothyroxine (SYNTHROID) 100 mcg tablet TAKE 1 TABLET BY MOUTH EVERY DAY 11/16/20   Provider, Historical     Current Facility-Administered Medications   Medication Dose Route Frequency    heparin (porcine) injection 5,000 Units  5,000 Units SubCUTAneous Q12H    furosemide (LASIX) injection 40 mg  40 mg IntraVENous BID    budesonide (PULMICORT) 500 mcg/2 ml nebulizer suspension  500 mcg Nebulization BID RT    melatonin tablet 5 mg  5 mg Oral QHS    sodium chloride (NS) flush 5-10 mL  5-10 mL IntraVENous PRN    albuterol CONCENTRATE 2.5mg/0.5 mL neb soln  2.5 mg Inhalation Q6H PRN    albuterol-ipratropium (DUO-NEB) 2.5 MG-0.5 MG/3 ML  3 mL Nebulization Q6H PRN    aspirin chewable tablet 81 mg  81 mg Oral DAILY    atorvastatin (LIPITOR) tablet 40 mg  40 mg Oral QHS    folic acid (FOLVITE) tablet 1 mg  1 mg Oral DAILY    levothyroxine (SYNTHROID) tablet 100 mcg  100 mcg Oral DAILY    metoprolol succinate (TOPROL-XL) XL tablet 50 mg  50 mg Oral DAILY    insulin lispro (HUMALOG) injection   SubCUTAneous AC&HS    glucose chewable tablet 16 g  4 Tablet Oral PRN    glucagon (GLUCAGEN) injection 1 mg  1 mg IntraMUSCular PRN    acetaminophen (TYLENOL) tablet 650 mg  650 mg Oral Q6H PRN    Or    acetaminophen (TYLENOL) suppository 650 mg  650 mg Rectal Q6H PRN    polyethylene glycol (MIRALAX) packet 17 g  17 g Oral DAILY PRN    ondansetron (ZOFRAN ODT) tablet 4 mg  4 mg Oral Q8H PRN    Or    ondansetron (ZOFRAN) injection 4 mg  4 mg IntraVENous Q6H PRN    albuterol-ipratropium (DUO-NEB) 2.5 MG-0.5 MG/3 ML  3 mL Nebulization Q6H RT    methylPREDNISolone (PF) (SOLU-MEDROL) injection 40 mg  40 mg IntraVENous Q6H    cefTRIAXone (ROCEPHIN) 1 g in sterile water (preservative free) 10 mL IV syringe  1 g IntraVENous Q24H       REVIEW OF SYSTEMS:     Complaints as mentioned in the history of presenting illness. No other significant complaints on complete review of systems. Objective:   VITALS:    Visit Vitals  BP (!) 144/53 (BP 1 Location: Right upper arm, BP Patient Position: At rest)   Pulse 99   Temp 99.2 °F (37.3 °C)   Resp 19   Ht 5' 5\" (1.651 m)   Wt 67.1 kg (148 lb)   SpO2 95%   BMI 24.63 kg/m²     Temp (24hrs), Av.6 °F (37 °C), Min:98.2 °F (36.8 °C), Max:99.2 °F (37.3 °C)      PHYSICAL EXAM:   General: alert awake , no acute distress. HEENT: EOMI, no Icterus, no Pallor, pupils reactive, mucosa dry normal inspection of ears and nose, throat clear. Neck: Neck is supple, No JVD, no thyromegaly, . Lungs: breathsounds normal,  no rhonchi, no rales,  CVS: heart sounds normal, no murmurs, no rubs.   GI: soft, nontender, normal BS, no palpable organomegaly, no renal angle tenderness. Extremeties: no clubbing, no cyanosis, no edema,  Neuro: Alert, awake, answering simple questions, memory deficits present, moving all extremeties   Skin: normal skin turgor, no skin rashes   Musculoskeletal: no acute joint swellings. LAB DATA REVIEWED:    Recent Results (from the past 24 hour(s))   LACTIC ACID    Collection Time: 11/11/22  1:14 AM   Result Value Ref Range    Lactic acid 1.7 0.4 - 2.0 mmol/L   URINALYSIS W/ REFLEX CULTURE    Collection Time: 11/11/22  2:34 AM    Specimen: Urine   Result Value Ref Range    Color Yellow/Straw      Appearance Clear Clear      Specific gravity 1.008 1.003 - 1.030      pH (UA) 5.0 5.0 - 8.0      Protein Negative Negative mg/dL    Glucose Negative Negative mg/dL    Ketone Negative Negative mg/dL    Bilirubin Negative Negative      Blood Negative Negative      Urobilinogen 0.1 0.1 - 1.0 EU/dL    Nitrites Negative Negative      Leukocyte Esterase Negative Negative      UA:UC IF INDICATED Urine Culture Ordered (A) Culture not indicated by UA result      WBC 0-5 0 - 4 /hpf    RBC 0-5 0 - 5 /hpf    Bacteria Negative Negative /hpf   METABOLIC PANEL, COMPREHENSIVE    Collection Time: 11/11/22  3:39 AM   Result Value Ref Range    Sodium 136 136 - 145 mmol/L    Potassium 3.4 (L) 3.5 - 5.1 mmol/L    Chloride 105 97 - 108 mmol/L    CO2 21 21 - 32 mmol/L    Anion gap 10 5 - 15 mmol/L    Glucose 242 (H) 65 - 100 mg/dL    BUN 25 (H) 6 - 20 mg/dL    Creatinine 1.84 (H) 0.55 - 1.02 mg/dL    BUN/Creatinine ratio 14 12 - 20      eGFR 25 (L) >60 ml/min/1.73m2    Calcium 8.4 (L) 8.5 - 10.1 mg/dL    Bilirubin, total 0.2 0.2 - 1.0 mg/dL    AST (SGOT) 17 15 - 37 U/L    ALT (SGPT) 12 12 - 78 U/L    Alk.  phosphatase 87 45 - 117 U/L    Protein, total 6.8 6.4 - 8.2 g/dL    Albumin 3.0 (L) 3.5 - 5.0 g/dL    Globulin 3.8 2.0 - 4.0 g/dL    A-G Ratio 0.8 (L) 1.1 - 2.2     CBC WITH AUTOMATED DIFF    Collection Time: 11/11/22  3:39 AM   Result Value Ref Range    WBC 10.5 3.6 - 11.0 K/uL    RBC 3.29 (L) 3.80 - 5.20 M/uL    HGB 10.3 (L) 11.5 - 16.0 g/dL    HCT 31.0 (L) 35.0 - 47.0 %    MCV 94.2 80.0 - 99.0 FL    MCH 31.3 26.0 - 34.0 PG    MCHC 33.2 30.0 - 36.5 g/dL    RDW 12.6 11.5 - 14.5 %    PLATELET 198 706 - 145 K/uL    MPV 10.1 8.9 - 12.9 FL    NRBC 0.0 0.0  WBC    ABSOLUTE NRBC 0.00 0.00 - 0.01 K/uL    NEUTROPHILS 94 (H) 32 - 75 %    LYMPHOCYTES 5 (L) 12 - 49 %    MONOCYTES 1 (L) 5 - 13 %    EOSINOPHILS 0 0 - 7 %    BASOPHILS 0 0 - 1 %    IMMATURE GRANULOCYTES 0 0 - 0.5 %    ABS. NEUTROPHILS 9.9 (H) 1.8 - 8.0 K/UL    ABS. LYMPHOCYTES 0.5 (L) 0.8 - 3.5 K/UL    ABS. MONOCYTES 0.2 0.0 - 1.0 K/UL    ABS. EOSINOPHILS 0.0 0.0 - 0.4 K/UL    ABS. BASOPHILS 0.0 0.0 - 0.1 K/UL    ABS. IMM. GRANS. 0.0 0.00 - 0.04 K/UL    DF AUTOMATED     NT-PRO BNP    Collection Time: 11/11/22  3:39 AM   Result Value Ref Range    NT pro-BNP 14,494 (H) <450 pg/mL   GLUCOSE, POC    Collection Time: 11/11/22  8:30 AM   Result Value Ref Range    Glucose (POC) 204 (H) 65 - 100 mg/dL    Performed by [email or web address], POC    Collection Time: 11/11/22 12:10 PM   Result Value Ref Range    Glucose (POC) 186 (H) 65 - 100 mg/dL    Performed by Serafin Rahman    GLUCOSE, POC    Collection Time: 11/11/22  4:42 PM   Result Value Ref Range    Glucose (POC) 160 (H) 65 - 100 mg/dL    Performed by [address] [place] [address] Road, POC    Collection Time: 11/11/22  7:55 PM   Result Value Ref Range    Glucose (POC) 184 (H) 65 - 100 mg/dL    Performed by Rissa Liu and plan:     1. Acute Kidney Injury on ? CKD: probably prerenal azotemia secondary to use of Lasix and ?cardiorenal  We will decrease diuretics to Lasix 40 mg once daily  Recommend to check urinalysis,urine random electrolytes, creatinine and protein.    Check CPK levels to rule out rhabdomyolysis or myopathy  Will get an ultrasound of kidneys and bladder to rule out obstruction   will continue to monitor renal functions and adjust management as needed    2. Chronic kidney disease: probably has stage 3 chronic kidney disease related to nephrosclerosis. Recommend to check urine random protein creatinine ratio to assess proteinuria. Will get phosphorus level, intact PTH level, vitamin D levels to assess for renal osteodystrophy related to chronic kidney disease. We will continue to monitor renal functions to assess the baseline    3. Shortness of breath/suspected congestive heart failure :  No significant edema noted  Recommend to decrease Lasix to once daily and continue to monitor inputs and outputs  History of asthma/COPD: Seen by pulmonary, on bronchodilators and IV steroids    4. hypertension: Fluctuating blood pressures  Stable now, continue metoprolol and monitor blood pressures    5. Hypokalemia: Secondary to diuretics  Supplement p.o. potassium  Continue to monitor potassium levels    6.  Anemia: Probably related to chronic kidney disease  Continue to monitor H&H      Signed: Uriel Cruz MD

## 2022-11-12 NOTE — PROGRESS NOTES
PULMONARY NOTE  [facility]    Name: Donna Montelongo MRN: [id number]   : 1930 Hospital: [facility]   Date: 2022  Admission date: 11/10/2022 Hospital Day: 3       HPI:     Hospital Problems  Date Reviewed: 2020            Codes Class Noted POA    Ac[facility] congest[facility] failure [facility] ICD-10-CM: I50.9  ICD-9-CM: 428.0  11/10/2022 Unknown        CHF (congestive heart failure) ([facility] ICD-10-CM: I50.9  ICD-9-CM: 428.0  11/10/2022 Unknown              [x] High complexity decision making was performed  [x] See my orders for details      Subjective/Initial History:     I was asked by Vane Lopez MD to see Donna Montelongo  a 80 y.o.    female in consultation     Excerpts from admission 11/10/2022 or consult notes as follows:   68-year-old lady came in because of shortness of breath past medical history of asthma hypertension coronary artery disease hypothyroidism she is hard of hearing she has hearing loss came to the hospital because of shortness of breath and dyspnea was getting worse, chest x-ray was done which shows some congestive changes her COVID-19 test came back negative so she is admitted and pulmonary consult was called      No Known Allergies     MAR reviewed and pertinent medications noted or modified as needed     Current Facility-Administered Medications   Medication    heparin (porcine) injection 5,000 Units    budesonide (PULMICORT) 500 mcg/2 ml nebulizer suspension    melatonin tablet 5 mg    [START ON 2022] furosemide (LASIX) injection 40 mg    sodium chloride (NS) flush 5-10 mL    albuterol CONCENTRATE 2.5mg/0.5 mL neb soln    albuterol-ipratropium (DUO-NEB) 2.5 MG-0.5 MG/3 ML    aspirin chewable tablet 81 mg    atorvastatin (LIPITOR) tablet 40 mg    folic acid (FOLVITE) tablet 1 mg    levothyroxine (SYNTHROID) tablet 100 mcg    metoprolol succinate (TOPROL-XL) XL tablet 50 mg    insulin lispro (HUMALOG) injection    glucose chewable tablet 16 g glucagon (GLUCAGEN) injection 1 mg    acetaminophen (TYLENOL) tablet 650 mg    Or    acetaminophen (TYLENOL) suppository 650 mg    polyethylene glycol (MIRALAX) packet 17 g    ondansetron (ZOFRAN ODT) tablet 4 mg    Or    ondansetron (ZOFRAN) injection 4 mg    albuterol-ipratropium (DUO-NEB) 2.5 MG-0.5 MG/3 ML    methylPREDNISolone (PF) (SOLU-MEDROL) injection 40 mg    cefTRIAXone (ROCEPHIN) 1 g in sterile water (preservative free) 10 mL IV syringe      Patient PCP: None  PMH:  has a past medical history of Anemia, Chronic obstructive pulmonary disease ([address] Heart attack ([address] Hypertension, and Thyroid disease. PSH:   has a past surgical history that includes hx orthopaedic (2018) and hx pacemaker. FHX: family history includes Heart Disease in her father and mother; Stroke in her father and mother. SHX:  reports that she has never smoked. She has never used smokeless tobacco. She reports that she does not drink alcohol and does not use drugs. ROS:    Review of Systems   Constitutional:  Positive for malaise/fatigue. HENT: Negative. Eyes: Negative. Respiratory:  Positive for cough, shortness of breath and wheezing. Cardiovascular: Negative. Gastrointestinal: Negative. Genitourinary: Negative. Musculoskeletal: Negative. Skin: Negative. Neurological: Negative. Psychiatric/Behavioral: Negative.         Objective:     Vital Signs: Telemetry:    normal sinus rhythm Intake/Output:   Visit Vitals  BP (!) 108/46 (BP 1 Location: Left upper arm, BP Patient Position: At rest)   Pulse (!) 102   Temp 98.3 °F (36.8 °C)   Resp 18   Ht 5' 5\" (1.651 m)   Wt 67.1 kg (148 lb)   SpO2 94%   BMI 24.63 kg/m²       Temp (24hrs), Av.5 °F (36.9 °C), Min:98.2 °F (36.8 °C), Max:99.2 °F (37.3 °C)        O2 Device: None (Room air) O2 Flow Rate (L/min): 2 l/min       Wt Readings from Last 4 Encounters:   22 67.1 kg (148 lb)   22 67.4 kg (148 lb 9.4 oz)   21 64.4 kg (141 lb 15.6 oz) 06/02/21 68 kg (150 lb)          Intake/Output Summary (Last 24 hours) at 11/12/2022 0944  Last data filed at 11/12/2022 0536  Gross per 24 hour   Intake --   Output 500 ml   Net -500 ml       Last shift:      No intake/output data recorded. Last 3 shifts: 11/10 1901 - 11/12 0700  In: -   Out: 500 [Urine:500]       Physical Exam:     Physical Exam  Constitutional:       Appearance: Normal appearance. HENT:      Head: Normocephalic and atraumatic. Nose: Nose normal.      Mouth/Throat:      Mouth: Mucous membranes are moist.   Eyes:      Pupils: Pupils are equal, round, and reactive to light. Cardiovascular:      Rate and Rhythm: Normal rate. Pulses: Normal pulses. Heart sounds: Normal heart sounds. Pulmonary:      Effort: Pulmonary effort is normal.      Breath sounds: Wheezing present. Comments: Coarse breath sound expiratory wheeze mostly upper airway  Abdominal:      General: Abdomen is flat. Bowel sounds are normal.      Palpations: Abdomen is soft. Musculoskeletal:         General: Swelling present. Normal range of motion. Cervical back: Normal range of motion. Skin:     General: Skin is warm. Neurological:      General: No focal deficit present. Mental Status: She is alert. Psychiatric:         Mood and Affect: Mood normal.        Labs:    Recent Labs     11/11/22  0339 11/10/22  2016   WBC 10.5 20.5*   HGB 10.3* 12.0    380       Recent Labs     11/11/22  0339 11/11/22  0114 11/10/22  2117 11/10/22  2016     --   --  137   K 3.4*  --   --  4.7     --   --  106   CO2 21  --   --  20*   *  --   --  211*   BUN 25*  --   --  22*   CREA 1.84*  --   --  1.55*   CA 8.4*  --   --  8.7   LAC  --  1.7 1.9  --    ALB 3.0*  --   --  3.5   ALT 12  --   --  16       No results for input(s): PH, PCO2, PO2, HCO3, FIO2 in the last 72 hours. No results for input(s): CPK, CKNDX, TROIQ in the last 72 hours.     No lab exists for component: CPKMB  No results found for: BNPP, BNP   Lab Results   Component Value Date/Time    Culture result: No growth after 7 hours 11/10/2022 09:17 PM     No results found for: TSH, TSHEXT, TSHEXT    Imaging:    CXR Results  (Last 48 hours)                 11/10/22 2024  XR CHEST PORT Final result    Impression:  Interstitial edema pattern. .  . Narrative:  INDICATION:  sob copd        EXAM: Chest single view. COMPARISON: 1/4/2022. FINDINGS: A single frontal view of the chest at 2017 hours shows diffuse   interstitial infiltrate new since the prior study. No focal consolidation. .  The   heart, mediastinum and pulmonary vasculature are are stable . The bony thorax   is unremarkable for age. Heath Chatman Results from Hospital Encounter encounter on 11/10/22    XR CHEST PORT    Narrative  INDICATION:  sob copd    EXAM: Chest single view. COMPARISON: 1/4/2022. FINDINGS: A single frontal view of the chest at 2017 hours shows diffuse  interstitial infiltrate new since the prior study. No focal consolidation. .  The  heart, mediastinum and pulmonary vasculature are are stable . The bony thorax  is unremarkable for age. .    Impression  Interstitial edema pattern. .  . Results from [place] encounter on 01/04/22    XR CHEST PORT    Narrative  1 new comparison September 20. Impression  The cardiomediastinal silhouette is appropriate for age, technique,  and lung expansion. Pulmonary vasculature is not congested. The lungs are  essentially clear. No effusion or pneumothorax is seen. Results from Hospital Encounter encounter on 09/20/21    XR CHEST PORT    Narrative  History: Evidence of breath    A single view of the chest was obtained. Heart size is stable. There is some  atherosclerotic change of the aorta. Lungs are clear. No effusions or  pneumothorax. Bony structures are within normal limits. Impression  Normal findings.     Results from [place] encounter on 01/04/22    CT ABD PELV W CONT    Narrative  CT abdomen and pelvis without IV contrast    Comparison CT abdomen and pelvis November 19, 2019. Axial images are reviewed along with reformatted sagittal/coronal images. 100 mL  Isovue 370 administered. Motion limited study. Dose reduction: All CT scans at this facility are performed using dose reduction  optimization techniques as appropriate to a performed exam including the  following-  automated exposure control, adjustments of mA and/or Kv according to patient  size, or use of iterative reconstructive technique. Lung bases are clear. .    Normal enhancement of the liver. Gallbladder distention. Small gallstones noted  dependently within the gallbladder lumen. Pancreas, spleen, and bilateral  adrenal glands appear unremarkable. Cortical thinning bilateral kidneys. No hydronephrosis. Stomach and small bowel loops are decompressed. Appendix unchanged. There is  stool and air through colon. Distal descending and sigmoid colon diverticula. No  CT evidence for appendicitis or diverticulitis. No ascites. Unchanged uterine calcification likely related to small degenerated uterine  fibroid. Dense intimal calcification normal caliber abdominal aorta, advanced abdominal  aorta atherosclerosis. Atherosclerosis extends to proximal abdominal aorta  branch vessels and pelvic arteries. Left hip hardware. Impression  No bowel obstruction. Colonic diverticulosis. No CT evidence for  appendicitis or diverticulitis. Unchanged appearance appendix. Gallbladder distention. Few small dependent gallstones within gallbladder lumen. Cortical atrophy each kidney. No hydronephrosis. Advanced atherosclerotic change abdominal aorta and pelvic arteries. No  abdominal aortic aneurysm. Left hip hardware.         IMPRESSION:   Exacerbation of CHF diastolic dysfunction  Asthma with exacerbation  Hypertension hypothyroidism by history  Profound significant hearing loss  LOPEZ RECOMMENDATIONS/PLAN:     26-year-old lady came in because of shortness of breath dyspnea and shows fluid overload elevated BNP  On Lasix daily dose  Also started on IV Solu-Medrol nebulizer treatment  We will start patient on Pulmicort  Started on Rocephin                    Fox Braga MD

## 2022-11-12 NOTE — CONSULTS
Consult    NAME: Mally Pichardo   :  1930   MRN:  [id number]     Date/Time:  2022 11:06 AM    Patient PCP: None  ________________________________________________________________________     Problem List:   -Admitted to the hospital with shortness of breath  -History of coronary artery disease with proximal LAD disease  -Cardiomyopathy with last known ejection fraction of 40 to 45% with moderate aortic insufficiency as of 2022.  -COPD  -Hypertension  -Hypothyroidism  -Renal insufficiency  -History of old myocardial infarction        Assessment/Plan:   -43-year-old well-preserved white female admitted to the hospital with shortness of breath.  -Cardiology consult was invited secondary to established relationship with me from the office in addition to history of paroxysmal atrial fibrillation systolic heart failure and coronary artery disease.  -When I saw the patient this morning she was lying comfortably in the bed denies any symptoms of chest pain shortness of breath or any other anginal equivalent.  -Cardiac enzyme has been unremarkable EKG shows no acute changes and BNP is greater than 14,000.  -Based on my overall cardiac assessment we will be conservative and no further cardiovascular testing is warranted based on my assessment as it would not change my cardiac management and we will continue current medical management    You for the consultation and letting me participate in the care of Ms. Fady Hollingsworth      []        High complexity decision making was performed      Patient Active Problem List   Diagnosis Code    Onychomycosis B35.1    SOB (shortness of breath) R06.02    COPD exacerbation ([facility]) J44.1    COPD (chronic obstructive pulmonary disease) ([facility]) J44.9    NSTEMI (non-ST elevated myocardial infarction) ([facility]) I21.4    Lethargy R53.83    AMS (altered mental status) R41.82    Acute congestive heart failure ([facility]) I50.9    CHF (congestive heart failure) ([facility]) I50.9 Subjective:   CHIEF COMPLAINT:     HISTORY OF PRESENT ILLNESS:     Marylene Bucy is a 80 y.o.  female who has known nonobstructive coronary artery disease involving proximal left anterior descending artery admitted to the hospital with shortness of breath. Patient was in the hospital in September 2022 and diagnosed with cardiomyopathy with ejection fraction of 40 to 45%. Patient also has a history of paroxysmal atrial fibrillation and aortic insufficiency. She is otherwise in good shape and well-preserved for her age and I will continue current conservative medical management with no further cardiovascular testing as it would not change my cardiac management unless patient become hemodynamically unstable and or if EKG shows acute changes. We were asked to consult for work up and evaluation of the above problems. Past Medical History:   Diagnosis Date    Anemia     Chronic obstructive pulmonary disease ([address]     Heart attack ([address]     Hypertension     Thyroid disease     hypothyroidism      Past Surgical History:   Procedure Laterality Date    HX ORTHOPAEDIC  2018    total hip replacement    HX PACEMAKER       No Known Allergies   Meds:  See below  Social History     Tobacco Use    Smoking status: Never    Smokeless tobacco: Never   Substance Use Topics    Alcohol use: Never      Family History   Problem Relation Age of Onset    Stroke Mother     Heart Disease Mother     Stroke Father     Heart Disease Father        REVIEW OF SYSTEMS:     []      Limited review of the system pertinent to cardiovascular symptoms are unremarkable    Pertinent Positives include :    Objective:      Physical Exam:    Last 24hrs VS reviewed since prior progress note.  Most recent are:    Visit Vitals  BP (!) 108/46 (BP 1 Location: Left upper arm, BP Patient Position: At rest)   Pulse (!) 102   Temp 98.3 °F (36.8 °C)   Resp 18   Ht 5' 5\" (1.651 m)   Wt 67.1 kg (148 lb)   SpO2 94%   BMI 24.63 kg/m²       Intake/Output Summary (Last 24 hours) at 11/12/2022 1106  Last data filed at 11/12/2022 0536  Gross per 24 hour   Intake --   Output 500 ml   Net -500 ml        General Appearance: Well preserved elderly female lying comfortably in the bed. Ears/Nose/Mouth/Throat: Pupils equal and round, Hearing grossly normal.  Neck: Supple. JVP within normal limits. Carotids good upstrokes, with no bruit. Chest: Lungs clear to auscultation bilaterally. Cardiovascular: Regular rate and rhythm, S1S2 normal, no murmur, rubs, gallops. Abdomen: Soft, non-tender, bowel sounds are active. No organomegaly. Extremities: No edema bilaterally. Femoral pulses +2, Distal Pulses +1. Skin: Warm and dry. Neuro: Awake following command moving all 4 extremities. Detailed examination deferred. []         Post-cath site without hematoma, bruit, tenderness, or thrill. Distal pulses intact. XR CHEST PORT   Final Result   Interstitial edema pattern. .  . US RETROPERITONEUM COMP    (Results Pending)        Data:      Telemetry:    EKG:  []  No new EKG for review. Prior to Admission medications    Medication Sig Start Date End Date Taking? Authorizing Provider   amoxicillin-clavulanate (Augmentin) 875-125 mg per tablet Take 1 Tablet by mouth two (2) times a day. 1/9/22   Miguel Villanueva MD   albuterol-ipratropium (DUO-NEB) 2.5 mg-0.5 mg/3 ml nebu 3 mL by Nebulization route every six (6) hours as needed for Wheezing. 9/27/21   Miguel Villanueva MD   apixaban (ELIQUIS) 2.5 mg tablet Take 1 Tablet by mouth two (2) times a day. Indications: treatment to prevent blood clots in chronic atrial fibrillation 9/27/21   Miguel Villanueva MD   aspirin 81 mg chewable tablet Take 1 Tablet by mouth daily. 9/28/21   Miguel Villanueva MD   atorvastatin (LIPITOR) 40 mg tablet Take 1 Tablet by mouth nightly. 9/27/21   Miguel Villanueva MD   budesonide-formoteroL [facility]) 160-4.5 mcg/actuation HFAA Take 2 Puffs by inhalation two (2) times a day.  9/27/21 Janeth Evans MD   predniSONE (DELTASONE) 10 mg tablet Take 10 mg by mouth daily (with breakfast). 9/28/21   Mohiuddin, Gibson Babinski, MD   metoprolol succinate (TOPROL-XL) 50 mg XL tablet Take 1 Tablet by mouth daily. 9/28/21   Mohiuddin, Gibson Babinski, MD   albuterol (PROVENTIL VENTOLIN) 2.5 mg /3 mL (0.083 %) nebu Take 3 mL by inhalation every six (6) hours as needed. 9/17/21   Provider, Historical   folic acid (FOLVITE) 1 mg tablet TAKE 1 TABLET BY MOUTH EVERY DAY 11/9/20   Provider, Historical   levothyroxine (SYNTHROID) 100 mcg tablet TAKE 1 TABLET BY MOUTH EVERY DAY 11/16/20   Provider, Historical       Recent Results (from the past 24 hour(s))   GLUCOSE, POC    Collection Time: 11/11/22 12:10 PM   Result Value Ref Range    Glucose (POC) 186 (H) 65 - 100 mg/dL    Performed by Jaleesa House    GLUCOSE, POC    Collection Time: 11/11/22  4:42 PM   Result Value Ref Range    Glucose (POC) 160 (H) 65 - 100 mg/dL    Performed by Madelyn Bains, POC    Collection Time: 11/11/22  7:55 PM   Result Value Ref Range    Glucose (POC) 184 (H) 65 - 100 mg/dL    Performed by Claudia Younger    SODIUM, UR, RANDOM    Collection Time: 11/11/22 10:30 PM   Result Value Ref Range    Sodium,urine random 67 mmol/L   CHLORIDE, URINE RANDOM    Collection Time: 11/11/22 10:30 PM   Result Value Ref Range    Chloride,urine random 102 mmol/L   PROTEIN/CREATININE RATIO, URINE    Collection Time: 11/11/22 10:30 PM   Result Value Ref Range    Protein, urine random 12 (H) 0.0 - 11.9 mg/dL    Creatinine, urine random 41.00 mg/dL    Protein/Creat.  urine Ratio 0.3     GLUCOSE, POC    Collection Time: 11/12/22  7:45 AM   Result Value Ref Range    Glucose (POC) 182 (H) 65 - 100 mg/dL    Performed by Shelly Najera, POC    Collection Time: 11/12/22  8:40 AM   Result Value Ref Range    Glucose (POC) 279 (H) 65 - 100 mg/dL    Performed by Christian Cadena MD

## 2022-11-12 NOTE — PROGRESS NOTES
General Daily Progress Note          Patient Name:   Milo Michael       YOB: 1930       Age:  80 y.o. Admit Date: 11/10/2022      Subjective:     Patient is a 80y.o. year old female past medical history of COPD hypertension hypothyroidism CHF anxiety disorder came to emergency room complaining of shortness of breath patient started shortness of breath for last few days today getting more worst no fever no chills shortness of breath on little exertion seen by the ER physician work-up done in the ER shows acute congestive heart failure last echo showed ejection fraction of 40 to 39% and diastolic dysfunction             Objective:     Visit Vitals  BP (!) 108/46 (BP 1 Location: Left upper arm, BP Patient Position: At rest)   Pulse (!) 102   Temp 98.3 °F (36.8 °C)   Resp 18   Ht 5' 5\" (1.651 m)   Wt 67.1 kg (148 lb)   SpO2 94%   BMI 24.63 kg/m²        Recent Results (from the past 24 hour(s))   GLUCOSE, POC    Collection Time: 11/11/22  4:42 PM   Result Value Ref Range    Glucose (POC) 160 (H) 65 - 100 mg/dL    Performed by [facility], POC    Collection Time: 11/11/22  7:55 PM   Result Value Ref Range    Glucose (POC) 184 (H) 65 - 100 mg/dL    Performed by [id number], , RANDOM    Collection Time: 11/11/22 10:30 PM   Result Value Ref Range    Sodium,urine random 67 mmol/L   CHLORIDE, URINE RANDOM    Collection Time: 11/11/22 10:30 PM   Result Value Ref Range    Chloride,urine random 102 mmol/L   PROTEIN/CREATININE RATIO, URINE    Collection Time: 11/11/22 10:30 PM   Result Value Ref Range    Protein, urine random 12 (H) 0.0 - 11.9 mg/dL    Creatinine, urine random 41.00 mg/dL    Protein/Creat.  urine Ratio 0.3     GLUCOSE, POC    Collection Time: 11/12/22  7:45 AM   Result Value Ref Range    Glucose (POC) 182 (H) 65 - 100 mg/dL    Performed by [employer] , POC    Collection Time: 11/12/22  8:40 AM   Result Value Ref Range    Glucose (POC) 279 (H) 65 - 100 mg/dL    Performed by Ivana Arias, POC    Collection Time: 11/12/22 11:11 AM   Result Value Ref Range    Glucose (POC) 172 (H) 65 - 100 mg/dL    Performed by Kelle Hess      [unfilled]      Review of Systems    Constitutional: Negative for chills and fever. HENT: Negative. Eyes: Negative. Respiratory: Negative. Cardiovascular: Negative. Gastrointestinal: Negative for abdominal pain and nausea. Skin: Negative. Neurological: Negative. Physical Exam:      Constitutional: pt is oriented to person, place, and time. HENT:   Head: Normocephalic and atraumatic. Eyes: Pupils are equal, round, and reactive to light. EOM are normal.   Cardiovascular: Normal rate, regular rhythm and normal heart sounds. Pulmonary/Chest: Breath sounds normal. No wheezes. No rales. Exhibits no tenderness. Abdominal: Soft. Bowel sounds are normal. There is no abdominal tenderness. There is no rebound and no guarding. Musculoskeletal: Normal range of motion. Neurological: pt is alert and oriented to person, place, and time. XR CHEST PORT   Final Result   Interstitial edema pattern. .  .          US RETROPERITONEUM COMP    (Results Pending)        Recent Results (from the past 24 hour(s))   GLUCOSE, POC    Collection Time: 11/11/22  4:42 PM   Result Value Ref Range    Glucose (POC) 160 (H) 65 - 100 mg/dL    Performed by Ivana Arias, POC    Collection Time: 11/11/22  7:55 PM   Result Value Ref Range    Glucose (POC) 184 (H) 65 - 100 mg/dL    Performed by Aiden Millan    SODIUM, UR, RANDOM    Collection Time: 11/11/22 10:30 PM   Result Value Ref Range    Sodium,urine random 67 mmol/L   CHLORIDE, URINE RANDOM    Collection Time: 11/11/22 10:30 PM   Result Value Ref Range    Chloride,urine random 102 mmol/L   PROTEIN/CREATININE RATIO, URINE    Collection Time: 11/11/22 10:30 PM   Result Value Ref Range    Protein, urine random 12 (H) 0.0 - 11.9 mg/dL    Creatinine, urine random 41.00 mg/dL    Protein/Creat. urine Ratio 0.3     GLUCOSE, POC    Collection Time: 11/12/22  7:45 AM   Result Value Ref Range    Glucose (POC) 182 (H) 65 - 100 mg/dL    Performed by Shelly Fowler , POC    Collection Time: 11/12/22  8:40 AM   Result Value Ref Range    Glucose (POC) 279 (H) 65 - 100 mg/dL    Performed by [address] [place] [address], POC    Collection Time: 11/12/22 11:11 AM   Result Value Ref Range    Glucose (POC) 172 (H) 65 - 100 mg/dL    Performed by Arian Sutherland        Results       Procedure Component Value Units Date/Time    CULTURE, URINE [[id number]] Collected: 11/11/22 0234    Order Status: No result Specimen: Urine Updated: 11/11/22 0437    CULTURE, BLOOD, PAIRED [[id number]] Collected: 11/10/22 2117    Order Status: Completed Specimen: Blood Updated: 11/11/22 0453     Special Requests: No Special Requests        Culture result: No growth after 7 hours       INFLUENZA A & B AG (RAPID TEST) [[id number]] Collected: 11/10/22 2117    Order Status: Canceled Specimen: Nasopharyngeal from Nasal washing     COVID-19 RAPID TEST [[id number]] Collected: 11/10/22 2117    Order Status: Completed Specimen: Nasopharyngeal Updated: 11/10/22 2227     COVID-19 rapid test Not Detected        Comment: Rapid Abbott ID Now   The specimen is NEGATIVE for SARS-CoV2, the novel coronavirus associated with COVID-19. A negative result does not rule out COVID-19. This test has been authorized by the FDA under an Emergency Use Authorization (EUA) for use by authorized laboratories.    Fact sheet for Healthcare Providers:  http://www.joaquin-reddy.biz/ Fact sheet for Patients: http://www.nuñez-reddy.biz/   Methodology: Isothermal Nucleic Acid Amplification       INFLUENZA A & B AG (RAPID TEST) [[id number]] Collected: 11/10/22 2117    Order Status: Completed Specimen: Nasal washing Updated: 11/10/22 2227     Influenza A Antigen Negative        Influenza B Antigen Negative Labs:     Recent Labs     11/11/22  0339 11/10/22  2016   WBC 10.5 20.5*   HGB 10.3* 12.0   HCT 31.0* 36.5    380     Recent Labs     11/11/22  0339 11/10/22  2016    137   K 3.4* 4.7    106   CO2 21 20*   BUN 25* 22*   CREA 1.84* 1.55*   * 211*   CA 8.4* 8.7     Recent Labs     11/11/22  0339 11/10/22  2016   ALT 12 16   AP 87 107   TBILI 0.2 0.3   TP 6.8 7.4   ALB 3.0* 3.5   GLOB 3.8 3.9     No results for input(s): INR, PTP, APTT, INREXT in the last 72 hours. No results for input(s): FE, TIBC, PSAT, FERR in the last 72 hours. No results found for: FOL, RBCF   No results for input(s): PH, PCO2, PO2 in the last 72 hours. No results for input(s): CPK, CKNDX, TROIQ in the last 72 hours. No lab exists for component: CPKMB  No results found for: CHOL, CHOLX, CHLST, CHOLV, HDL, HDLP, LDL, LDLC, DLDLP, TGLX, TRIGL, TRIGP, CHHD, CHHDX  Lab Results   Component Value Date/Time    Glucose (POC) 172 (H) 11/12/2022 11:11 AM    Glucose (POC) 279 (H) 11/12/2022 08:40 AM    Glucose (POC) 182 (H) 11/12/2022 07:45 AM    Glucose (POC) 184 (H) 11/11/2022 07:55 PM    Glucose (POC) 160 (H) 11/11/2022 04:42 PM     Lab Results   Component Value Date/Time    Color Yellow/Straw 11/11/2022 02:34 AM    Appearance Clear 11/11/2022 02:34 AM    Specific gravity 1.008 11/11/2022 02:34 AM    Specific gravity 1.013 01/04/2022 11:00 AM    pH (UA) 5.0 11/11/2022 02:34 AM    Protein Negative 11/11/2022 02:34 AM    Glucose Negative 11/11/2022 02:34 AM    Ketone Negative 11/11/2022 02:34 AM    Bilirubin Negative 11/11/2022 02:34 AM    Urobilinogen 0.1 11/11/2022 02:34 AM    Nitrites Negative 11/11/2022 02:34 AM    Leukocyte Esterase Negative 11/11/2022 02:34 AM    Bacteria Negative 11/11/2022 02:34 AM    WBC 0-5 11/11/2022 02:34 AM    RBC 0-5 11/11/2022 02:34 AM         Assessment:     Acute systolic and diastolic heart failure  Hypertension  Hypothyroidism  Hyperlipidemia  COPD  Type 2 diabetes      Plan:        To current medications repeat the labs  Today's labs are pending  PT OT consult      Current Facility-Administered Medications:     heparin (porcine) injection 5,000 Units, 5,000 Units, SubCUTAneous, Q12H, Raphael Villanueva MD, 5,000 Units at 11/12/22 1054    budesonide (PULMICORT) 500 mcg/2 ml nebulizer suspension, 500 mcg, Nebulization, BID RT, Haider Rhodes MD, 500 mcg at 11/12/22 0715    melatonin tablet 5 mg, 5 mg, Oral, QHS, aRphael Villanueva MD, 5 mg at 11/11/22 2207    [START ON 11/13/2022] furosemide (LASIX) injection 40 mg, 40 mg, IntraVENous, DAILY, Alessandro Bhatt MD    sodium chloride (NS) flush 5-10 mL, 5-10 mL, IntraVENous, PRN, Raphael Villanueva MD, 10 mL at 11/10/22 2123    albuterol CONCENTRATE 2.5mg/0.5 mL neb soln, 2.5 mg, Inhalation, Q6H PRN, Raphael Villanueva MD    albuterol-ipratropium (DUO-NEB) 2.5 MG-0.5 MG/3 ML, 3 mL, Nebulization, Q6H PRN, Raphael Villanueva MD, 3 mL at 11/11/22 0107    aspirin chewable tablet 81 mg, 81 mg, Oral, DAILY, Raphael Villanueva MD, 81 mg at 11/12/22 2909    atorvastatin (LIPITOR) tablet 40 mg, 40 mg, Oral, QHS, Raphael Villanueva MD, 40 mg at 17/43/60 8752    folic acid (FOLVITE) tablet 1 mg, 1 mg, Oral, DAILY, Raphael Villanueva MD, 1 mg at 11/12/22 0044    levothyroxine (SYNTHROID) tablet 100 mcg, 100 mcg, Oral, DAILY, Raphael Villanueva MD, 100 mcg at 11/12/22 3938    metoprolol succinate (TOPROL-XL) XL tablet 50 mg, 50 mg, Oral, DAILY, Raphael Villanueva MD, 50 mg at 11/12/22 5695    insulin lispro (HUMALOG) injection, , SubCUTAneous, AC&HS, Raphael Villanueva MD, 3 Units at 11/12/22 1152    glucose chewable tablet 16 g, 4 Tablet, Oral, PRN, Raphael Villanueva MD    glucagon (GLUCAGEN) injection 1 mg, 1 mg, IntraMUSCular, PRN, Raphael Villanueva MD    acetaminophen (TYLENOL) tablet 650 mg, 650 mg, Oral, Q6H PRN, 650 mg at 11/11/22 0226 **OR** acetaminophen (TYLENOL) suppository 650 mg, 650 mg, Rectal, Q6H PRN, Jonathan Villanueva MD    polyethylene glycol (MIRALAX) packet 17 g, 17 g, Oral, DAILY PRN, Raphael Villanueva MD    ondansetron (ZOFRAN ODT) tablet 4 mg, 4 mg, Oral, Q8H PRN **OR** ondansetron (ZOFRAN) injection 4 mg, 4 mg, IntraVENous, Q6H PRN, Raphael Villanueva MD    albuterol-ipratropium (DUO-NEB) 2.5 MG-0.5 MG/3 ML, 3 mL, Nebulization, Q6H RT, Raphael Villanueva MD, 3 mL at 11/12/22 0715    methylPREDNISolone (PF) (SOLU-MEDROL) injection 40 mg, 40 mg, IntraVENous, Q6H, Raphael Villanueva MD, 40 mg at 11/12/22 1159    cefTRIAXone (ROCEPHIN) 1 g in sterile water (preservative free) 10 mL IV syringe, 1 g, IntraVENous, Q24H, Raphael Villanueva MD, 1 g at 11/11/22 4921

## 2022-11-12 NOTE — PROGRESS NOTES
Renal Progress Note    Patient: Denise Bolivar MRN: [id number]  SSN: [id number]    YOB: 1930  Age: 80 y.o. Sex: female      Admit Date: 11/10/2022    LOS: 2 days     Subjective:   Patient seen at bedside. Alert and awake, no acute distress. Patient is not giving any new complaints today.    Repeat labs were not done yet today  No complaints of any swelling in her lower extremities, no complaints of any shortness of breath        Current Facility-Administered Medications   Medication Dose Route Frequency    heparin (porcine) injection 5,000 Units  5,000 Units SubCUTAneous Q12H    budesonide (PULMICORT) 500 mcg/2 ml nebulizer suspension  500 mcg Nebulization BID RT    melatonin tablet 5 mg  5 mg Oral QHS    [START ON 11/13/2022] furosemide (LASIX) injection 40 mg  40 mg IntraVENous DAILY    sodium chloride (NS) flush 5-10 mL  5-10 mL IntraVENous PRN    albuterol CONCENTRATE 2.5mg/0.5 mL neb soln  2.5 mg Inhalation Q6H PRN    albuterol-ipratropium (DUO-NEB) 2.5 MG-0.5 MG/3 ML  3 mL Nebulization Q6H PRN    aspirin chewable tablet 81 mg  81 mg Oral DAILY    atorvastatin (LIPITOR) tablet 40 mg  40 mg Oral QHS    folic acid (FOLVITE) tablet 1 mg  1 mg Oral DAILY    levothyroxine (SYNTHROID) tablet 100 mcg  100 mcg Oral DAILY    metoprolol succinate (TOPROL-XL) XL tablet 50 mg  50 mg Oral DAILY    insulin lispro (HUMALOG) injection   SubCUTAneous AC&HS    glucose chewable tablet 16 g  4 Tablet Oral PRN    glucagon (GLUCAGEN) injection 1 mg  1 mg IntraMUSCular PRN    acetaminophen (TYLENOL) tablet 650 mg  650 mg Oral Q6H PRN    Or    acetaminophen (TYLENOL) suppository 650 mg  650 mg Rectal Q6H PRN    polyethylene glycol (MIRALAX) packet 17 g  17 g Oral DAILY PRN    ondansetron (ZOFRAN ODT) tablet 4 mg  4 mg Oral Q8H PRN    Or    ondansetron (ZOFRAN) injection 4 mg  4 mg IntraVENous Q6H PRN    albuterol-ipratropium (DUO-NEB) 2.5 MG-0.5 MG/3 ML  3 mL Nebulization Q6H RT    methylPREDNISolone (PF) (SOLU-MEDROL) injection 40 mg  40 mg IntraVENous Q6H    cefTRIAXone (ROCEPHIN) 1 g in sterile water (preservative free) 10 mL IV syringe  1 g IntraVENous Q24H        Vitals:    11/12/22 0549 11/12/22 0715 11/12/22 0824 11/12/22 1310   BP:   (!) 108/46    Pulse:   (!) 102    Resp:   18    Temp:   98.3 °F (36.8 °C)    SpO2:  97% 94% 98%   Weight: 67.1 kg (148 lb)      Height:         Objective:   General: alert awake , no acute distress. HEENT: EOMI, no Icterus, no Pallor, pupils reactive, mucosa dry normal inspection of ears and nose, throat clear. Neck: Neck is supple, No JVD, no thyromegaly, . Lungs: breathsounds normal,  no rhonchi, no rales,  CVS: heart sounds normal, no murmurs, no rubs. GI: soft, nontender, normal BS, no palpable organomegaly, no renal angle tenderness. Extremeties: no clubbing, no cyanosis, no edema,  Neuro: Alert, awake, minimal verbal interaction, memory deficits present, moving all extremeties   Skin: normal skin turgor, no skin rashes        Intake and Output:  Current Shift: No intake/output data recorded. Last three shifts: 11/10 1901 - 11/12 0700  In: -   Out: 500 [Urine:500]      Lab/Data Review:  Recent Labs     11/11/22  0339 11/10/22  2016   WBC 10.5 20.5*   HGB 10.3* 12.0   HCT 31.0* 36.5    380     Recent Labs     11/11/22  0339 11/10/22  2016    137   K 3.4* 4.7    106   CO2 21 20*   * 211*   BUN 25* 22*   CREA 1.84* 1.55*   CA 8.4* 8.7   ALB 3.0* 3.5   TBILI 0.2 0.3   ALT 12 16     No results for input(s): PH, PCO2, PO2, HCO3, FIO2 in the last 72 hours.   Recent Results (from the past 24 hour(s))   GLUCOSE, POC    Collection Time: 11/11/22  4:42 PM   Result Value Ref Range    Glucose (POC) 160 (H) 65 - 100 mg/dL    Performed by [address] [place] [address], POC    Collection Time: 11/11/22  7:55 PM   Result Value Ref Range    Glucose (POC) 184 (H) 65 - 100 mg/dL    Performed by Junior [id number], RANDOM    Collection Time: 11/11/22 10:30 PM Result Value Ref Range    Sodium,urine random 67 mmol/L   CHLORIDE, URINE RANDOM    Collection Time: 11/11/22 10:30 PM   Result Value Ref Range    Chloride,urine random 102 mmol/L   PROTEIN/CREATININE RATIO, URINE    Collection Time: 11/11/22 10:30 PM   Result Value Ref Range    Protein, urine random 12 (H) 0.0 - 11.9 mg/dL    Creatinine, urine random 41.00 mg/dL    Protein/Creat. urine Ratio 0.3     GLUCOSE, POC    Collection Time: 11/12/22  7:45 AM   Result Value Ref Range    Glucose (POC) 182 (H) 65 - 100 mg/dL    Performed by [address] [place] [address], POC    Collection Time: 11/12/22  8:40 AM   Result Value Ref Range    Glucose (POC) 279 (H) 65 - 100 mg/dL    Performed by [address] [place] [address], POC    Collection Time: 11/12/22 11:11 AM   Result Value Ref Range    Glucose (POC) 172 (H) 65 - 100 mg/dL    Performed by Jaime Viera and Plan:     1. Acute Kidney Injury on ? CKD: probably prerenal azotemia secondary to use of Lasix and ?cardiorenal  decreased diuretics to Lasix 40 mg once daily  Repeat labs were not done today  Recommend to check urinalysis,urine random electrolytes, creatinine and protein. Check CPK levels to rule out rhabdomyolysis or myopathy  Will get an ultrasound of kidneys and bladder to rule out obstruction   will continue to monitor renal functions and adjust management as needed    2. Chronic kidney disease: probably has stage 3 chronic kidney disease related to nephrosclerosis. Recommend to check urine random protein creatinine ratio to assess proteinuria. Will get phosphorus level, intact PTH level, vitamin D levels to assess for renal osteodystrophy related to chronic kidney disease. We will continue to monitor renal functions to assess the baseline    3.   Shortness of breath/suspected congestive heart failure :  No significant edema noted  decreased Lasix to once daily and continue to monitor inputs and outputs    History of asthma/COPD: Seen by pulmonary, on bronchodilators and IV steroids     4. hypertension: Fluctuating blood pressures  Stable now, continue metoprolol and monitor blood pressures    5. Hypokalemia: Secondary to diuretics  Supplemented p.o. potassium  Continue to monitor potassium levels     6.  Anemia: Probably related to chronic kidney disease  Continue to monitor H&H    Signed By: Srinivas Arauz MD     November 12, 2022

## 2022-11-12 NOTE — PROGRESS NOTES
Problem: Pain  Goal: *Control of Pain  Outcome: Progressing Towards Goal     Problem: Patient Education: Go to Patient Education Activity  Goal: Patient/Family Education  Outcome: Progressing Towards Goal     Problem: Falls - Risk of  Goal: *Absence of Falls  Description: Document Mikaela Lianet Fall Risk and appropriate interventions in the flowsheet.   Outcome: Progressing Towards Goal  Note: Fall Risk Interventions:       Mentation Interventions: Bed/chair exit alarm    Medication Interventions: Bed/chair exit alarm, Patient to call before getting OOB    Elimination Interventions: Call light in reach, Bed/chair exit alarm

## 2022-11-13 LAB
ALBUMIN SERPL-MCNC: 3 G/DL (ref 3.5–5)
ANION GAP SERPL CALC-SCNC: 11 MMOL/L (ref 5–15)
BACTERIA SPEC CULT: NORMAL
BUN SERPL-MCNC: 48 MG/DL (ref 6–20)
BUN/CREAT SERPL: 27 (ref 12–20)
CA-I BLD-MCNC: 8.7 MG/DL (ref 8.5–10.1)
CHLORIDE SERPL-SCNC: 103 MMOL/L (ref 97–108)
CO2 SERPL-SCNC: 23 MMOL/L (ref 21–32)
CREAT SERPL-MCNC: 1.81 MG/DL (ref 0.55–1.02)
GLUCOSE BLD STRIP.AUTO-MCNC: 142 MG/DL (ref 65–100)
GLUCOSE BLD STRIP.AUTO-MCNC: 161 MG/DL (ref 65–100)
GLUCOSE BLD STRIP.AUTO-MCNC: 180 MG/DL (ref 65–100)
GLUCOSE BLD STRIP.AUTO-MCNC: 195 MG/DL (ref 65–100)
GLUCOSE SERPL-MCNC: 135 MG/DL (ref 65–100)
MAGNESIUM SERPL-MCNC: 2.2 MG/DL (ref 1.6–2.4)
PERFORMED BY, TECHID: ABNORMAL
PHOSPHATE SERPL-MCNC: 3 MG/DL (ref 2.6–4.7)
POTASSIUM SERPL-SCNC: 3.7 MMOL/L (ref 3.5–5.1)
SODIUM SERPL-SCNC: 137 MMOL/L (ref 136–145)
SPECIAL REQUESTS,SREQ: NORMAL

## 2022-11-13 PROCEDURE — 74011250636 HC RX REV CODE- 250/636: Performed by: FAMILY MEDICINE

## 2022-11-13 PROCEDURE — 82962 GLUCOSE BLOOD TEST: CPT

## 2022-11-13 PROCEDURE — 74011636637 HC RX REV CODE- 636/637: Performed by: FAMILY MEDICINE

## 2022-11-13 PROCEDURE — 74011250637 HC RX REV CODE- 250/637: Performed by: FAMILY MEDICINE

## 2022-11-13 PROCEDURE — 36415 COLL VENOUS BLD VENIPUNCTURE: CPT

## 2022-11-13 PROCEDURE — 74011000250 HC RX REV CODE- 250: Performed by: FAMILY MEDICINE

## 2022-11-13 PROCEDURE — 94640 AIRWAY INHALATION TREATMENT: CPT

## 2022-11-13 PROCEDURE — 74011250637 HC RX REV CODE- 250/637: Performed by: INTERNAL MEDICINE

## 2022-11-13 PROCEDURE — 74011250636 HC RX REV CODE- 250/636: Performed by: INTERNAL MEDICINE

## 2022-11-13 PROCEDURE — 94761 N-INVAS EAR/PLS OXIMETRY MLT: CPT

## 2022-11-13 PROCEDURE — 65270000029 HC RM PRIVATE

## 2022-11-13 PROCEDURE — 74011000250 HC RX REV CODE- 250: Performed by: INTERNAL MEDICINE

## 2022-11-13 PROCEDURE — 80069 RENAL FUNCTION PANEL: CPT

## 2022-11-13 PROCEDURE — 97161 PT EVAL LOW COMPLEX 20 MIN: CPT

## 2022-11-13 PROCEDURE — 83735 ASSAY OF MAGNESIUM: CPT

## 2022-11-13 PROCEDURE — 97116 GAIT TRAINING THERAPY: CPT

## 2022-11-13 RX ORDER — LANOLIN ALCOHOL/MO/W.PET/CERES
400 CREAM (GRAM) TOPICAL
Status: COMPLETED | OUTPATIENT
Start: 2022-11-13 | End: 2022-11-13

## 2022-11-13 RX ORDER — IPRATROPIUM BROMIDE AND ALBUTEROL SULFATE 2.5; .5 MG/3ML; MG/3ML
3 SOLUTION RESPIRATORY (INHALATION)
Status: DISCONTINUED | OUTPATIENT
Start: 2022-11-13 | End: 2022-11-15 | Stop reason: HOSPADM

## 2022-11-13 RX ADMIN — MELATONIN TAB 5 MG 5 MG: 5 TAB at 21:03

## 2022-11-13 RX ADMIN — IPRATROPIUM BROMIDE AND ALBUTEROL SULFATE 3 ML: .5; 2.5 SOLUTION RESPIRATORY (INHALATION) at 07:10

## 2022-11-13 RX ADMIN — INSULIN LISPRO 3 UNITS: 100 INJECTION, SOLUTION INTRAVENOUS; SUBCUTANEOUS at 16:10

## 2022-11-13 RX ADMIN — FOLIC ACID 1 MG: 1 TABLET ORAL at 08:47

## 2022-11-13 RX ADMIN — HEPARIN SODIUM 5000 UNITS: 5000 INJECTION INTRAVENOUS; SUBCUTANEOUS at 21:03

## 2022-11-13 RX ADMIN — BUDESONIDE 500 MCG: 0.5 INHALANT ORAL at 19:50

## 2022-11-13 RX ADMIN — BUDESONIDE 500 MCG: 0.5 INHALANT ORAL at 07:10

## 2022-11-13 RX ADMIN — HEPARIN SODIUM 5000 UNITS: 5000 INJECTION INTRAVENOUS; SUBCUTANEOUS at 10:55

## 2022-11-13 RX ADMIN — METHYLPREDNISOLONE SODIUM SUCCINATE 40 MG: 40 INJECTION, POWDER, FOR SOLUTION INTRAMUSCULAR; INTRAVENOUS at 17:18

## 2022-11-13 RX ADMIN — ASPIRIN 81 MG 81 MG: 81 TABLET ORAL at 08:47

## 2022-11-13 RX ADMIN — METHYLPREDNISOLONE SODIUM SUCCINATE 40 MG: 40 INJECTION, POWDER, FOR SOLUTION INTRAMUSCULAR; INTRAVENOUS at 05:06

## 2022-11-13 RX ADMIN — FUROSEMIDE 40 MG: 10 INJECTION, SOLUTION INTRAMUSCULAR; INTRAVENOUS at 08:46

## 2022-11-13 RX ADMIN — IPRATROPIUM BROMIDE AND ALBUTEROL SULFATE 3 ML: .5; 2.5 SOLUTION RESPIRATORY (INHALATION) at 13:56

## 2022-11-13 RX ADMIN — METOPROLOL SUCCINATE 50 MG: 50 TABLET, EXTENDED RELEASE ORAL at 08:47

## 2022-11-13 RX ADMIN — CEFTRIAXONE SODIUM 1 G: 1 INJECTION, POWDER, FOR SOLUTION INTRAMUSCULAR; INTRAVENOUS at 21:05

## 2022-11-13 RX ADMIN — ATORVASTATIN CALCIUM 40 MG: 40 TABLET, FILM COATED ORAL at 21:03

## 2022-11-13 RX ADMIN — METHYLPREDNISOLONE SODIUM SUCCINATE 40 MG: 40 INJECTION, POWDER, FOR SOLUTION INTRAMUSCULAR; INTRAVENOUS at 11:36

## 2022-11-13 RX ADMIN — IPRATROPIUM BROMIDE AND ALBUTEROL SULFATE 3 ML: .5; 2.5 SOLUTION RESPIRATORY (INHALATION) at 19:49

## 2022-11-13 RX ADMIN — Medication 400 MG: at 06:05

## 2022-11-13 RX ADMIN — INSULIN LISPRO 3 UNITS: 100 INJECTION, SOLUTION INTRAVENOUS; SUBCUTANEOUS at 08:47

## 2022-11-13 RX ADMIN — INSULIN LISPRO 3 UNITS: 100 INJECTION, SOLUTION INTRAVENOUS; SUBCUTANEOUS at 11:36

## 2022-11-13 RX ADMIN — LEVOTHYROXINE SODIUM 100 MCG: 0.1 TABLET ORAL at 08:47

## 2022-11-13 NOTE — PROGRESS NOTES
Received a call from telemetry that the patient had 6 beats wide complex and after 6 minutes another 6 beats of wide complex appeared. Called cardiologist with orders to give Magnesium Oxide 400 mg, and to repeat magnesium level.

## 2022-11-13 NOTE — PROGRESS NOTES
General Daily Progress Note          Patient Name:   Anjelica Pierson       YOB: 1930       Age:  80 y.o. Admit Date: 11/10/2022      Subjective:     Patient is a 80y.o. year old female past medical history of COPD hypertension hypothyroidism CHF anxiety disorder came to emergency room complaining of shortness of breath patient started shortness of breath for last few days today getting more worst no fever no chills shortness of breath on little exertion seen by the ER physician work-up done in the ER shows acute congestive heart failure last echo showed ejection fraction of 40 to 94% and diastolic dysfunction       Patient doing well denies any chest pain or shortness of breath no nausea no vomiting      Objective:     Visit Vitals  BP (!) 142/53 (BP 1 Location: Left upper arm, BP Patient Position: At rest)   Pulse 79   Temp 97.6 °F (36.4 °C)   Resp 19   Ht 5' 5\" (1.651 m)   Wt 67.1 kg (148 lb)   SpO2 98%   BMI 24.63 kg/m²        Recent Results (from the past 24 hour(s))   GLUCOSE, POC    Collection Time: 11/12/22  3:04 PM   Result Value Ref Range    Glucose (POC) 150 (H) 65 - 100 mg/dL    Performed by Bella Collado, POC    Collection Time: 11/12/22  7:06 PM   Result Value Ref Range    Glucose (POC) 130 (H) 65 - 100 mg/dL    Performed by Candi Solorzano    GLUCOSE, POC    Collection Time: 11/13/22  8:11 AM   Result Value Ref Range    Glucose (POC) 180 (H) 65 - 100 mg/dL    Performed by Jordan Wayne    GLUCOSE, POC    Collection Time: 11/13/22 11:15 AM   Result Value Ref Range    Glucose (POC) 142 (H) 65 - 100 mg/dL    Performed by Jordan Wayne      [unfilled]      Review of Systems    Constitutional: Negative for chills and fever. HENT: Negative. Eyes: Negative. Respiratory: Negative. Cardiovascular: Negative. Gastrointestinal: Negative for abdominal pain and nausea. Skin: Negative. Neurological: Negative.         Physical Exam:      Constitutional: pt is oriented to person, place, and time. HENT:   Head: Normocephalic and atraumatic. Eyes: Pupils are equal, round, and reactive to light. EOM are normal.   Cardiovascular: Normal rate, regular rhythm and normal heart sounds. Pulmonary/Chest: Breath sounds normal. No wheezes. No rales. Exhibits no tenderness. Abdominal: Soft. Bowel sounds are normal. There is no abdominal tenderness. There is no rebound and no guarding. Musculoskeletal: Normal range of motion. Neurological: pt is alert and oriented to person, place, and time. XR CHEST PORT   Final Result   Interstitial edema pattern. .  .          US RETROPERITONEUM COMP    (Results Pending)        Recent Results (from the past 24 hour(s))   GLUCOSE, POC    Collection Time: 11/12/22  3:04 PM   Result Value Ref Range    Glucose (POC) 150 (H) 65 - 100 mg/dL    Performed by Bella Collado, POC    Collection Time: 11/12/22  7:06 PM   Result Value Ref Range    Glucose (POC) 130 (H) 65 - 100 mg/dL    Performed by Steven Hoang    GLUCOSE, POC    Collection Time: 11/13/22  8:11 AM   Result Value Ref Range    Glucose (POC) 180 (H) 65 - 100 mg/dL    Performed by Nina Nephew    GLUCOSE, POC    Collection Time: 11/13/22 11:15 AM   Result Value Ref Range    Glucose (POC) 142 (H) 65 - 100 mg/dL    Performed by Nina Nephew        Results       Procedure Component Value Units Date/Time    CULTURE, URINE [[id number]] Collected: 11/11/22 0234    Order Status: Completed Specimen: Urine Updated: 11/13/22 0721     Special Requests: --        No Special Requests  Reflexed from [id number]       Culture result: No Growth (<1000 cfu/mL)       CULTURE, BLOOD, PAIRED [[id number]] Collected: 11/10/22 2117    Order Status: Completed Specimen: Blood Updated: 11/13/22 0651     Special Requests: No Special Requests        Culture result: No growth 3 days       INFLUENZA A & B AG (RAPID TEST) [[id number]] Collected: 11/10/22 2117    Order Status: Canceled Specimen: Nasopharyngeal from Nasal washing     COVID-19 RAPID TEST [[id number]] Collected: 11/10/22 2117    Order Status: Completed Specimen: Nasopharyngeal Updated: 11/10/22 2227     COVID-19 rapid test Not Detected        Comment: Rapid Abbott ID Now   The specimen is NEGATIVE for SARS-CoV2, the novel coronavirus associated with COVID-19. A negative result does not rule out COVID-19. This test has been authorized by the FDA under an Emergency Use Authorization (EUA) for use by authorized laboratories. Fact sheet for Healthcare Providers:  http://www.osmany.pedro/ Fact sheet for Patients: http://www.joaquinCogMetalbarry.biz/   Methodology: Isothermal Nucleic Acid Amplification       INFLUENZA A & B AG (RAPID TEST) [[id number]] Collected: 11/10/22 2117    Order Status: Completed Specimen: Nasal washing Updated: 11/10/22 2227     Influenza A Antigen Negative        Influenza B Antigen Negative                Labs:     Recent Labs     11/11/22  0339 11/10/22  2016   WBC 10.5 20.5*   HGB 10.3* 12.0   HCT 31.0* 36.5    380       Recent Labs     11/11/22  0339 11/10/22  2016    137   K 3.4* 4.7    106   CO2 21 20*   BUN 25* 22*   CREA 1.84* 1.55*   * 211*   CA 8.4* 8.7       Recent Labs     11/11/22  0339 11/10/22  2016   ALT 12 16   AP 87 107   TBILI 0.2 0.3   TP 6.8 7.4   ALB 3.0* 3.5   GLOB 3.8 3.9       No results for input(s): INR, PTP, APTT, INREXT, INREXT in the last 72 hours. No results for input(s): FE, TIBC, PSAT, FERR in the last 72 hours. No results found for: FOL, RBCF   No results for input(s): PH, PCO2, PO2 in the last 72 hours. No results for input(s): CPK, CKNDX, TROIQ in the last 72 hours.     No lab exists for component: CPKMB  No results found for: CHOL, CHOLX, CHLST, CHOLV, HDL, HDLP, LDL, LDLC, DLDLP, TGLX, TRIGL, TRIGP, CHHD, CHHDX  Lab Results   Component Value Date/Time    Glucose (POC) 142 (H) 11/13/2022 11:15 AM    Glucose (POC) 180 (H) 11/13/2022 08:11 AM    Glucose (POC) 130 (H) 11/12/2022 07:06 PM    Glucose (POC) 150 (H) 11/12/2022 03:04 PM    Glucose (POC) 172 (H) 11/12/2022 11:11 AM     Lab Results   Component Value Date/Time    Color Yellow/Straw 11/11/2022 02:34 AM    Appearance Clear 11/11/2022 02:34 AM    Specific gravity 1.008 11/11/2022 02:34 AM    Specific gravity 1.013 01/04/2022 11:00 AM    pH (UA) 5.0 11/11/2022 02:34 AM    Protein Negative 11/11/2022 02:34 AM    Glucose Negative 11/11/2022 02:34 AM    Ketone Negative 11/11/2022 02:34 AM    Bilirubin Negative 11/11/2022 02:34 AM    Urobilinogen 0.1 11/11/2022 02:34 AM    Nitrites Negative 11/11/2022 02:34 AM    Leukocyte Esterase Negative 11/11/2022 02:34 AM    Bacteria Negative 11/11/2022 02:34 AM    WBC 0-5 11/11/2022 02:34 AM    RBC 0-5 11/11/2022 02:34 AM         Assessment:     Acute systolic and diastolic heart failure  Hypertension  Hypothyroidism  Hyperlipidemia  COPD  Type 2 diabetes      Plan:        To current medications repeat the labs  Today's labs are pending  PT OT consult  Possible discharge in next 24 hours      Current Facility-Administered Medications:     albuterol-ipratropium (DUO-NEB) 2.5 MG-0.5 MG/3 ML, 3 mL, Nebulization, BID RT, Haider Rhodes MD    heparin (porcine) injection 5,000 Units, 5,000 Units, SubCUTAneous, Q12H, Raphael Villanueva MD, 5,000 Units at 11/13/22 1055    budesonide (PULMICORT) 500 mcg/2 ml nebulizer suspension, 500 mcg, Nebulization, BID RT, Haider Rhodes MD, 500 mcg at 11/13/22 0710    melatonin tablet 5 mg, 5 mg, Oral, QHS, Raphael Villanueva MD, 5 mg at 11/12/22 2142    furosemide (LASIX) injection 40 mg, 40 mg, IntraVENous, DAILY, Alessandro Bhatt MD, 40 mg at 11/13/22 0846    sodium chloride (NS) flush 5-10 mL, 5-10 mL, IntraVENous, PRN, Raphael Villanueva MD, 10 mL at 11/10/22 2123    albuterol CONCENTRATE 2.5mg/0.5 mL neb soln, 2.5 mg, Inhalation, Q6H PRN, Raphael Villanueva MD    albuterol-ipratropium (DUO-NEB) 2.5 MG-0.5 MG/3 ML, 3 mL, Nebulization, Q6H PRN, Raphael Villanueva MD, 3 mL at 11/11/22 0107    aspirin chewable tablet 81 mg, 81 mg, Oral, DAILY, Raphael Villanueva MD, 81 mg at 11/13/22 0847    atorvastatin (LIPITOR) tablet 40 mg, 40 mg, Oral, QHS, Raphael Villanueva MD, 40 mg at 04/31/70 4661    folic acid (FOLVITE) tablet 1 mg, 1 mg, Oral, DAILY, Raphael Villanueva MD, 1 mg at 11/13/22 0847    levothyroxine (SYNTHROID) tablet 100 mcg, 100 mcg, Oral, DAILY, Raphael Villanueva MD, 100 mcg at 11/13/22 0847    metoprolol succinate (TOPROL-XL) XL tablet 50 mg, 50 mg, Oral, DAILY, Raphael Villanueva MD, 50 mg at 11/13/22 0847    insulin lispro (HUMALOG) injection, , SubCUTAneous, AC&HS, Raphael Villanueva MD, 3 Units at 11/13/22 1136    glucose chewable tablet 16 g, 4 Tablet, Oral, PRN, Zoey Villanueva MD    glucagon (GLUCAGEN) injection 1 mg, 1 mg, IntraMUSCular, PRN, Zoey Villanueva MD    acetaminophen (TYLENOL) tablet 650 mg, 650 mg, Oral, Q6H PRN, 650 mg at 11/11/22 0226 **OR** acetaminophen (TYLENOL) suppository 650 mg, 650 mg, Rectal, Q6H PRN, Raphael Villanueva MD    polyethylene glycol (MIRALAX) packet 17 g, 17 g, Oral, DAILY PRN, Raphael Villanueva MD    ondansetron (ZOFRAN ODT) tablet 4 mg, 4 mg, Oral, Q8H PRN **OR** ondansetron (ZOFRAN) injection 4 mg, 4 mg, IntraVENous, Q6H PRN, Raphael Villanueva MD    methylPREDNISolone (PF) (SOLU-MEDROL) injection 40 mg, 40 mg, IntraVENous, Q6H, Raphael Villanueva MD, 40 mg at 11/13/22 1136    cefTRIAXone (ROCEPHIN) 1 g in sterile water (preservative free) 10 mL IV syringe, 1 g, IntraVENous, Q24H, Raphael Villanueva MD, 1 g at 11/12/22 4529

## 2022-11-13 NOTE — PROGRESS NOTES
PULMONARY NOTE  [facility]    Name: Octavia Hopkins MRN: [id number]   : 1930 Hospital: [facility]   Date: 2022  Admission date: 11/10/2022 Hospital Day: 4       HPI:     Hospital Problems  Date Reviewed: 2020            Codes Class Noted POA    Acute congestive heart failure ([facility] ICD-10-CM: I50.9  ICD-9-CM: 428.0  11/10/2022 Unknown        CHF (congestive heart failure) ([facility] ICD-10-CM: I50.9  ICD-9-CM: 428.0  11/10/2022 Unknown            [x] High complexity decision making was performed  [x] See my orders for details      Subjective/Initial History:     I was asked by Omar Pickering MD to see Octavia Hopkins  a 80 y.o.    female in consultation     Excerpts from admission 11/10/2022 or consult notes as follows:   80-year-old lady came in because of shortness of breath past medical history of asthma hypertension coronary artery disease hypothyroidism she is hard of hearing she has hearing loss came to the hospital because of shortness of breath and dyspnea was getting worse, chest x-ray was done which shows some congestive changes her COVID-19 test came back negative so she is admitted and pulmonary consult was called      No Known Allergies     MAR reviewed and pertinent medications noted or modified as needed     Current Facility-Administered Medications   Medication    heparin (porcine) injection 5,000 Units    budesonide (PULMICORT) 500 mcg/2 ml nebulizer suspension    melatonin tablet 5 mg    furosemide (LASIX) injection 40 mg    sodium chloride (NS) flush 5-10 mL    albuterol CONCENTRATE 2.5mg/0.5 mL neb soln    albuterol-ipratropium (DUO-NEB) 2.5 MG-0.5 MG/3 ML    aspirin chewable tablet 81 mg    atorvastatin (LIPITOR) tablet 40 mg    folic acid (FOLVITE) tablet 1 mg    levothyroxine (SYNTHROID) tablet 100 mcg    metoprolol succinate (TOPROL-XL) XL tablet 50 mg    insulin lispro (HUMALOG) injection    glucose chewable tablet 16 g    glucagon (GLUCAGEN) injection 1 mg    acetaminophen (TYLENOL) tablet 650 mg    Or    acetaminophen (TYLENOL) suppository 650 mg    polyethylene glycol (MIRALAX) packet 17 g    ondansetron (ZOFRAN ODT) tablet 4 mg    Or    ondansetron (ZOFRAN) injection 4 mg    albuterol-ipratropium (DUO-NEB) 2.5 MG-0.5 MG/3 ML    methylPREDNISolone (PF) (SOLU-MEDROL) injection 40 mg    cefTRIAXone (ROCEPHIN) 1 g in sterile water (preservative free) 10 mL IV syringe      Patient PCP: None  PMH:  has a past medical history of Anemia, Chronic obstructive pulmonary disease ([address] Heart attack ([address] Hypertension, and Thyroid disease. PSH:   has a past surgical history that includes hx orthopaedic (2018) and hx pacemaker. FHX: family history includes Heart Disease in her father and mother; Stroke in her father and mother. SHX:  reports that she has never smoked. She has never used smokeless tobacco. She reports that she does not drink alcohol and does not use drugs. ROS:    Review of Systems   Constitutional:  Positive for malaise/fatigue. HENT: Negative. Eyes: Negative. Respiratory:  Positive for cough, shortness of breath and wheezing. Cardiovascular: Negative. Gastrointestinal: Negative. Genitourinary: Negative. Musculoskeletal: Negative. Skin: Negative. Neurological: Negative. Psychiatric/Behavioral: Negative.         Objective:     Vital Signs: Telemetry:    normal sinus rhythm Intake/Output:   Visit Vitals  BP (!) 134/56 (BP 1 Location: Left upper arm, BP Patient Position: At rest)   Pulse 79   Temp 98.7 °F (37.1 °C)   Resp 19   Ht 5' 5\" (1.651 m)   Wt 67.1 kg (148 lb)   SpO2 98%   BMI 24.63 kg/m²       Temp (24hrs), Av.4 °F (36.9 °C), Min:98 °F (36.7 °C), Max:98.7 °F (37.1 °C)        O2 Device: None (Room air) O2 Flow Rate (L/min): 2 l/min       Wt Readings from Last 4 Encounters:   22 67.1 kg (148 lb)   22 67.4 kg (148 lb 9.4 oz)   21 64.4 kg (141 lb 15.6 oz)   21 68 kg (150 lb) Intake/Output Summary (Last 24 hours) at 11/13/2022 1109  Last data filed at 11/13/2022 0405  Gross per 24 hour   Intake --   Output 500 ml   Net -500 ml         Last shift:      No intake/output data recorded. Last 3 shifts: 11/11 1901 - 11/13 0700  In: -   Out: 1000 [Urine:1000]       Physical Exam:     Physical Exam  Constitutional:       Appearance: Normal appearance. HENT:      Head: Normocephalic and atraumatic. Nose: Nose normal.      Mouth/Throat:      Mouth: Mucous membranes are moist.   Eyes:      Pupils: Pupils are equal, round, and reactive to light. Cardiovascular:      Rate and Rhythm: Normal rate. Pulses: Normal pulses. Heart sounds: Normal heart sounds. Pulmonary:      Effort: Pulmonary effort is normal.      Breath sounds: Wheezing present. Comments: Coarse breath sound expiratory wheeze mostly upper airway  Abdominal:      General: Abdomen is flat. Bowel sounds are normal.      Palpations: Abdomen is soft. Musculoskeletal:         General: Swelling present. Normal range of motion. Cervical back: Normal range of motion. Skin:     General: Skin is warm. Neurological:      General: No focal deficit present. Mental Status: She is alert. Psychiatric:         Mood and Affect: Mood normal.        Labs:    Recent Labs     11/11/22  0339 11/10/22  2016   WBC 10.5 20.5*   HGB 10.3* 12.0    380       Recent Labs     11/11/22 0339 11/11/22  0114 11/10/22  2117 11/10/22  2016     --   --  137   K 3.4*  --   --  4.7     --   --  106   CO2 21  --   --  20*   *  --   --  211*   BUN 25*  --   --  22*   CREA 1.84*  --   --  1.55*   CA 8.4*  --   --  8.7   LAC  --  1.7 1.9  --    ALB 3.0*  --   --  3.5   ALT 12  --   --  16       No results for input(s): PH, PCO2, PO2, HCO3, FIO2 in the last 72 hours. No results for input(s): CPK, CKNDX, TROIQ in the last 72 hours.     No lab exists for component: CPKMB  No results found for: BNPP, BNP Lab Results   Component Value Date/Time    Culture result: No Growth (<1000 cfu/mL) 11/11/2022 02:34 AM    Culture result: No growth 3 days 11/10/2022 09:17 PM     No results found for: TSH, TSHEXT, TSHEXT    Imaging:    CXR Results  (Last 48 hours)      None          Results from Hospital Encounter encounter on 11/10/22    XR CHEST PORT    Narrative  INDICATION:  sob copd    EXAM: Chest single view. COMPARISON: 1/4/2022. FINDINGS: A single frontal view of the chest at 2017 hours shows diffuse  interstitial infiltrate new since the prior study. No focal consolidation. .  The  heart, mediastinum and pulmonary vasculature are are stable . The bony thorax  is unremarkable for age. .    Impression  Interstitial edema pattern. .  . Results from [place] encounter on 01/04/22    XR CHEST PORT    Narrative  1 new comparison September 20. Impression  The cardiomediastinal silhouette is appropriate for age, technique,  and lung expansion. Pulmonary vasculature is not congested. The lungs are  essentially clear. No effusion or pneumothorax is seen. Results from Hospital Encounter encounter on 09/20/21    XR CHEST PORT    Narrative  History: Evidence of breath    A single view of the chest was obtained. Heart size is stable. There is some  atherosclerotic change of the aorta. Lungs are clear. No effusions or  pneumothorax. Bony structures are within normal limits. Impression  Normal findings. Results from [place] encounter on 01/04/22    CT ABD PELV W CONT    Narrative  CT abdomen and pelvis without IV contrast    Comparison CT abdomen and pelvis November 19, 2019. Axial images are reviewed along with reformatted sagittal/coronal images. 100 mL  Isovue 370 administered. Motion limited study.   Dose reduction: All CT scans at this facility are performed using dose reduction  optimization techniques as appropriate to a performed exam including the  following-  automated exposure control, adjustments of mA and/or Kv according to patient  size, or use of iterative reconstructive technique. Lung bases are clear. .    Normal enhancement of the liver. Gallbladder distention. Small gallstones noted  dependently within the gallbladder lumen. Pancreas, spleen, and bilateral  adrenal glands appear unremarkable. Cortical thinning bilateral kidneys. No hydronephrosis. Stomach and small bowel loops are decompressed. Appendix unchanged. There is  stool and air through colon. Distal descending and sigmoid colon diverticula. No  CT evidence for appendicitis or diverticulitis. No ascites. Unchanged uterine calcification likely related to small degenerated uterine  fibroid. Dense intimal calcification normal caliber abdominal aorta, advanced abdominal  aorta atherosclerosis. Atherosclerosis extends to proximal abdominal aorta  branch vessels and pelvic arteries. Left hip hardware. Impression  No bowel obstruction. Colonic diverticulosis. No CT evidence for  appendicitis or diverticulitis. Unchanged appearance appendix. Gallbladder distention. Few small dependent gallstones within gallbladder lumen. Cortical atrophy each kidney. No hydronephrosis. Advanced atherosclerotic change abdominal aorta and pelvic arteries. No  abdominal aortic aneurysm. Left hip hardware. IMPRESSION:   Exacerbation of CHF diastolic dysfunction  Asthma with exacerbation  Hypertension hypothyroidism by history  Profound significant hearing loss  [place]OPE[facility]      RECOMMENDATIONS/PLAN:     80-year-old lady came in because of shortness of breath dyspnea and shows fluid overload elevated BNP  On Lasix daily dose  Also started on IV Solu-Medrol nebulizer treatment  We will start patient on Pulmicort  Started on Rocephin        11/13 Pt resting in bed. She is on room air, currently not on O2 at the hospital, SpO2 98%.     Jl Mason MD

## 2022-11-13 NOTE — PROGRESS NOTES
PHYSICAL THERAPY EVALUATION/DISCHARGE  Patient: Raine Deal (78 y.o. female)  Date: 11/13/2022  Primary Diagnosis: Acute congestive heart failure (HCC) [I50.9]  CH[facility] (congest[facility]) ([facility] [I50.9]       Precautions: standard       ASSESSMENT  Pt is a 80 y.o. female admitted on 11/10/2022 with PMH of COPD hypertension hypothyroidism CHF anxiety disorder came to emergency room complaining of shortness of breath patient started shortness of breath for last few days today getting more worst no fever no chills shortness of breath on little exertion seen by the ER physician work-up done in the ER shows acute congestive heart failure last echo showed ejection fraction of 40 to 44% and diastolic dysfunction      Pt was semi supine in bed upon PT arrival, agreeable to evaluation. Pt A&O x 4 and very [place]. Based on the objective data described below, the patient presents at functional baseline for mobility and amb. (See below for objective details and assist levels). Overall pt tolerated session well today with NAD. Patient performed bed mobility/transfers with stand by assist to supervision, able to assist with hygiene needs due to incontinent. She ambulated in hallway with RW and cg/csv due to c/o B knee pain due to OA. Patient stated this is her baseline and she has HHA to assist/supervise with ADLs and ambulation as needed. Pt has no skilled acute PT needs at this time noted by PT or reported by pt, will DC skilled PT following evaluation; pt verbalized understanding and agreement. Current Level of Function Impacting Discharge (mobility/balance):  Ax1    Other factors to consider for discharge:      PLAN :  Recommendations and Planned Interventions: DC from skilled PT services following evaluation    Recommend for staff: Out of bed to chair for meals, Encourage HEP in prep for ADLs/mobility, Amb to bathroom for toileting with gt belt and AD, and Amb in hallway    Rationale for discharge:  Goals achieved    Frequency/Duration: Patient will be followed by physical therapy: DC from services following evaluation due to pt being at functional baseline; no skilled therapy required during admission, please reorder if needed     Recommendation for discharge: (in order for the patient to meet his/her long term goals)  Home with [address] [place], [place] [address]    This discharge recommendation:  Has been made in collaboration with the attending provider and/or case management    IF patient discharges home will need the following DME: patient owns DME required for discharge       SUBJECTIVE:   Patient stated I fees much better.     OBJECTIVE DATA SUMMARY:   HISTORY:    Past Medical History:   Diagnosis Date    Anemia     Chronic obstructive pulmonary disease ([facility]     Heart attack ([facility]     Hypertension     Thyroid disease     hypothyroidism     Past Surgical History:   Procedure Laterality Date    HX ORTHOPAEDIC  2018    total hip replacement    HX PACEMAKER         Home Situation  Home Environment: Private residence  One/Two Story Residence: One story  Living Alone: Yes (24/7 caregiver)  Support Systems: Caregiver/Home Care Staff, Child(marcus)  Patient Expects to be Discharged to[de-identified] Home with family assistance  Current DME Used/Available at Home: Glucometer, Walker    PLOF: Per patient :  she requires supervision to assist x1 with  ADLs/IADLs, supervision for mobility, no falls in the last 3 months. EXAMINATION/PRESENTATION/DECISION MAKING:   Critical Behavior:  Neurologic State: Alert  Orientation Level: Oriented X4  Cognition: Follows commands, Appropriate decision making, Appropriate safety awareness, Appropriate for age attention/concentration [facility])     Hearing:   Auditory  Auditory Impairment: Hard of hearing, bilateral  Skin:  frail  Edema:   Range Of Motion:      WFL          Strength:        WFL           Functional Mobility:  Bed Mobility:  Rolling: Stand-by assistance  Supine to Sit: Stand-by assistance  Sit to Supine: Stand-by assistance  Scooting: Stand-by assistance    Transfers:  Sit to Stand: Stand-by assistance;Supervision  Stand to Sit: Supervision         Balance:   Sitting: Intact; Without support  Standing: Intact; With support    Ambulation/Gait Training:  Distance (ft): 100 Feet (ft)  Assistive Device: Walker  Ambulation - Level of Assistance: Contact guard assistance;Stand-by assistance     Gait Description (WDL): Exceptions to WDL  Gait Abnormalities: Antalgic        Base of Support: Narrowed     Speed/Vilma: Slow  Step Length: Right shortened (Knee pain)         Functional Measure:  [facility] AM-PAC 6 Clicks         Basic Mobility Inpatient Short Form  How much difficulty does the patient currently have. .. Unable A Lot A Little None   1. Turning over in bed (including adjusting bedclothes, sheets and blankets)? [] 1   [] 2   [] 3   [x] 4   2. Sitting down on and standing up from a chair with arms ( e.g., wheelchair, bedside commode, etc.)   [] 1   [] 2   [] 3   [x] 4   3. Moving from lying on back to sitting on the side of the bed? [] 1   [] 2   [] 3   [x] 4          How much help from another person does the patient currently need. .. Total A Lot A Little None   4. Moving to and from a bed to a chair (including a wheelchair)? [] 1   [] 2   [] 3   [x] 4   5. Need to walk in hospital room? [] 1   [] 2   [x] 3   [] 4   6. Climbing 3-5 steps with a railing? [] 1   [] 2   [x] 3   [] 4   © 2007, Trustees of [facility], under license to [employer]. All rights reserved     Score:  Initial: 22 Most Recent: X (Date: 11/13/22)   Interpretation of Tool:  Represents activities that are increasingly more difficult (i.e. Bed mobility, Transfers, Gait).   Score 24 23 22-20 19-15 14-10 9-7 6   Modifier CH CI CJ CK CL CM CN          Physical Therapy Evaluation Charge Determination   History Examination Presentation Decision-Making   MEDIUM  Complexity : 1-2 comorbidities / personal factors will impact the outcome/ POC  LOW Complexity : 1-2 Standardized tests and measures addressing body structure, function, activity limitation and / or participation in recreation  LOW Complexity : Stable, uncomplicated  Other outcome measures [facility] 6        Based on the above components, the patient evaluation is determined to be of the following complexity level: LOW     Pain Ratin/10    Activity Tolerance:   Good    After treatment patient left in no apparent distress:   Bed locked and in lowest position, Supine in bed and Call bell within reach and board updated      COMMUNICATION/EDUCATION:   The patients plan of care was discussed with: Physical therapist and Registered nurse. Fall prevention education was provided and the patient/caregiver indicated understanding. and Patient/family have participated as able in goal setting and plan of care.       Thank you for this referral.  Glenda Edward, PT, DPT   Time Calculation: 28 mins

## 2022-11-13 NOTE — PROGRESS NOTES
Renal Progress Note    Patient: Anjelica Pierson MRN: [id number]  SSN: [id number]    YOB: 1930  Age: 80 y.o. Sex: female      Admit Date: 11/10/2022    LOS: 3 days     Subjective:   Patient seen at bedside. Alert and awake, no acute distress. Patient is not giving any new complaints today.    Repeat labs were not done yet today          Current Facility-Administered Medications   Medication Dose Route Frequency    albuterol-ipratropium (DUO-NEB) 2.5 MG-0.5 MG/3 ML  3 mL Nebulization BID RT    heparin (porcine) injection 5,000 Units  5,000 Units SubCUTAneous Q12H    budesonide (PULMICORT) 500 mcg/2 ml nebulizer suspension  500 mcg Nebulization BID RT    melatonin tablet 5 mg  5 mg Oral QHS    furosemide (LASIX) injection 40 mg  40 mg IntraVENous DAILY    sodium chloride (NS) flush 5-10 mL  5-10 mL IntraVENous PRN    albuterol CONCENTRATE 2.5mg/0.5 mL neb soln  2.5 mg Inhalation Q6H PRN    albuterol-ipratropium (DUO-NEB) 2.5 MG-0.5 MG/3 ML  3 mL Nebulization Q6H PRN    aspirin chewable tablet 81 mg  81 mg Oral DAILY    atorvastatin (LIPITOR) tablet 40 mg  40 mg Oral QHS    folic acid (FOLVITE) tablet 1 mg  1 mg Oral DAILY    levothyroxine (SYNTHROID) tablet 100 mcg  100 mcg Oral DAILY    metoprolol succinate (TOPROL-XL) XL tablet 50 mg  50 mg Oral DAILY    insulin lispro (HUMALOG) injection   SubCUTAneous AC&HS    glucose chewable tablet 16 g  4 Tablet Oral PRN    glucagon (GLUCAGEN) injection 1 mg  1 mg IntraMUSCular PRN    acetaminophen (TYLENOL) tablet 650 mg  650 mg Oral Q6H PRN    Or    acetaminophen (TYLENOL) suppository 650 mg  650 mg Rectal Q6H PRN    polyethylene glycol (MIRALAX) packet 17 g  17 g Oral DAILY PRN    ondansetron (ZOFRAN ODT) tablet 4 mg  4 mg Oral Q8H PRN    Or    ondansetron (ZOFRAN) injection 4 mg  4 mg IntraVENous Q6H PRN    methylPREDNISolone (PF) (SOLU-MEDROL) injection 40 mg  40 mg IntraVENous Q6H    cefTRIAXone (ROCEPHIN) 1 g in sterile water (preservative free) 10 mL IV syringe  1 g IntraVENous Q24H        Vitals:    11/13/22 0400 11/13/22 0712 11/13/22 0722 11/13/22 1430   BP:   (!) 134/56 (!) 142/53   Pulse: 80  79 79   Resp:   19 19   Temp:   98.7 °F (37.1 °C) 97.6 °F (36.4 °C)   SpO2:  96% 98% 98%   Weight: 67.1 kg (148 lb)      Height:         Objective:   General: alert awake , no acute distress. HEENT: EOMI, no Icterus, no Pallor, pupils reactive, mucosa dry normal inspection of ears and nose, throat clear. Neck: Neck is supple, No JVD, no thyromegaly, . Lungs: breathsounds normal,  no rhonchi, no rales,  CVS: heart sounds normal, no murmurs, no rubs. GI: soft, nontender, normal BS, no palpable organomegaly, no renal angle tenderness. Extremeties: no clubbing, no cyanosis, no edema,  Neuro: Alert, awake, minimal verbal interaction, memory deficits present, moving all extremeties   Skin: normal skin turgor, no skin rashes        Intake and Output:  Current Shift: No intake/output data recorded. Last three shifts: 11/11 1901 - 11/13 0700  In: -   Out: 1000 [Urine:1000]      Lab/Data Review:  Recent Labs     11/11/22  0339 11/10/22  2016   WBC 10.5 20.5*   HGB 10.3* 12.0   HCT 31.0* 36.5    380     Recent Labs     11/13/22  1449 11/11/22  0339 11/10/22  2016    136 137   K 3.7 3.4* 4.7    105 106   CO2 23 21 20*   * 242* 211*   BUN 48* 25* 22*   CREA 1.81* 1.84* 1.55*   CA 8.7 8.4* 8.7   MG 2.2  --   --    PHOS 3.0  --   --    ALB 3.0* 3.0* 3.5   TBILI  --  0.2 0.3   ALT  --  12 16     No results for input(s): PH, PCO2, PO2, HCO3, FIO2 in the last 72 hours.   Recent Results (from the past 24 hour(s))   GLUCOSE, POC    Collection Time: 11/12/22  7:06 PM   Result Value Ref Range    Glucose (POC) 130 (H) 65 - 100 mg/dL    Performed by Santiago Wilkes    GLUCOSE, POC    Collection Time: 11/13/22  8:11 AM   Result Value Ref Range    Glucose (POC) 180 (H) 65 - 100 mg/dL    Performed by Josh Palomares, POC    Collection Time: 11/13/22 11:15 AM   Result Value Ref Range    Glucose (POC) 142 (H) 65 - 100 mg/dL    Performed by Prudence Clements    RENAL FUNCTION PANEL    Collection Time: 11/13/22  2:49 PM   Result Value Ref Range    Sodium 137 136 - 145 mmol/L    Potassium 3.7 3.5 - 5.1 mmol/L    Chloride 103 97 - 108 mmol/L    CO2 23 21 - 32 mmol/L    Anion gap 11 5 - 15 mmol/L    Glucose 135 (H) 65 - 100 mg/dL    BUN 48 (H) 6 - 20 mg/dL    Creatinine 1.81 (H) 0.55 - 1.02 mg/dL    BUN/Creatinine ratio 27 (H) 12 - 20      eGFR 26 (L) >60 ml/min/1.73m2    Calcium 8.7 8.5 - 10.1 mg/dL    Phosphorus 3.0 2.6 - 4.7 mg/dL    Albumin 3.0 (L) 3.5 - 5.0 g/dL   MAGNESIUM    Collection Time: 11/13/22  2:49 PM   Result Value Ref Range    Magnesium 2.2 1.6 - 2.4 mg/dL   GLUCOSE, POC    Collection Time: 11/13/22  3:21 PM   Result Value Ref Range    Glucose (POC) 161 (H) 65 - 100 mg/dL    Performed by Prudence Clements         Assessment and Plan:     1. Acute Kidney Injury on ? CKD: probably prerenal azotemia secondary to use of Lasix and ?cardiorenal  decreased diuretics to Lasix 40 mg once daily  Repeat labs showed a creatinine of 1.81  will continue to monitor renal functions and adjust management as needed    2. Chronic kidney disease: probably has stage 3 chronic kidney disease related to nephrosclerosis. Recommend to check urine random protein creatinine ratio to assess proteinuria. Will get phosphorus level, intact PTH level, vitamin D levels to assess for renal osteodystrophy related to chronic kidney disease. We will continue to monitor renal functions to assess the baseline    3.   Shortness of breath/suspected congestive heart failure :  No significant edema noted  decreased Lasix to once daily and continue to monitor inputs and outputs  No evidence of any scant fluid    History of asthma/COPD: Seen by pulmonary, on bronchodilators and IV steroids     4. hypertension: Fluctuating blood pressures  Stable now, continue metoprolol and monitor blood pressures    5. Hypokalemia: Secondary to diuretics  Supplemented p.o. potassium, potassium at 3.7  Continue to monitor potassium levels     6.  Anemia: Probably related to chronic kidney disease  Continue to monitor H&H    Signed By: Douglas Stone MD     November 13, 2022

## 2022-11-13 NOTE — PROGRESS NOTES
Bedside and Verbal shift change report given to Ilya(oncoming nurse) by Nighat Munoz RN (offgoing nurse). Report included the following information SBAR, Kardex, MAR, and Recent Results.

## 2022-11-13 NOTE — PROGRESS NOTES
Progress Note      11/13/2022 1:09 PM  NAME: Richard Stevens   MRN:  [id number]   Admit Diagnosis: Acute congestive heart failure (HCC) [I50.9]  CHF (congestive heart failure) ([facility] [I50.9]      Problem List:   -Admitted to the hospital with shortness of breath  -History of coronary artery disease with proximal LAD disease  -Cardiomyopathy with last known ejection fraction of 40 to 45% with moderate aortic insufficiency as of September 2022.  -COPD  -Hypertension  -Hypothyroidism  -Renal insufficiency  -History of old myocardial infarction     Assessment/Plan:   Note dictated dated 11/13/2022  -Patient is sitting up in the bed eating lunch. -She denies any chest pain shortness of breath or any other anginal equivalent and wants to go home.  -No acute overnight cardiac event noted and patient is otherwise clinically and hemodynamically stable. -Continue current conservative medical management for atrial fibrillation and no further cardiovascular testing as it would not change my cardiac management.  -We will follow while patient is in the hospital along with the team.         []       High complexity decision making was performed in this patient at high risk for decompensation with multiple organ involvement. Subjective:     Gwendolyn Keenan is a 80 y.o.  female who has known nonobstructive coronary artery disease involving proximal left anterior descending artery admitted to the hospital with shortness of breath. Patient was in the hospital in September 2022 and diagnosed with cardiomyopathy with ejection fraction of 40 to 45%. Patient also has a history of paroxysmal atrial fibrillation and aortic insufficiency.  She is otherwise in good shape and well-preserved for her age and I will continue current conservative medical management with no further cardiovascular testing as it would not change my cardiac management unless patient become hemodynamically unstable and or if EKG shows acute changes. Review of Systems:    Symptom Y/N Comments  Symptom Y/N Comments   Fever/Chills N   Chest Pain N    Poor Appetite N   Edema N    Cough N   Abdominal Pain N    Sputum N   Joint Pain N    SOB/JEFFERSON N   Pruritis/Rash N    Nausea/vomit N   Tolerating PT/OT Y    Diarrhea N   Tolerating Diet Y    Constipation N   Other       Could NOT obtain due to:      Objective:      Physical Exam:    Last 24hrs VS reviewed since prior progress note. Most recent are:    Visit Vitals  BP (!) 134/56 (BP 1 Location: Left upper arm, BP Patient Position: At rest)   Pulse 79   Temp 98.7 °F (37.1 °C)   Resp 19   Ht 5' 5\" (1.651 m)   Wt 67.1 kg (148 lb)   SpO2 98%   BMI 24.63 kg/m²       Intake/Output Summary (Last 24 hours) at 11/13/2022 1309  Last data filed at 11/13/2022 0405  Gross per 24 hour   Intake --   Output 500 ml   Net -500 ml        General Appearance: Well developed, well nourished, alert & oriented x 3,    no acute distress. Ears/Nose/Mouth/Throat: Hearing grossly normal.  Neck: Supple. Chest: Lungs clear to auscultation bilaterally. Cardiovascular: Regular rate and rhythm, S1S2 normal, no murmur. Abdomen: Soft, non-tender, bowel sounds are active. Extremities: No edema bilaterally. Skin: Warm and dry. []         Post-cath site without hematoma, bruit, tenderness, or thrill. Distal pulses intact. PMH/SH reviewed - no change compared to H&P    Data Review    Telemetry: normal sinus rhythm     EKG:   []  No new EKG for review  XR CHEST PORT   Final Result   Interstitial edema pattern. .  . US RETROPERITONEUM COMP    (Results Pending)        Lab Data Personally Reviewed:    Recent Labs     11/11/22  0339 11/10/22  2016   WBC 10.5 20.5*   HGB 10.3* 12.0   HCT 31.0* 36.5    380     No results for input(s): INR, PTP, APTT, INREXT in the last 72 hours.    Recent Labs     11/11/22  0339 11/10/22  2016    137   K 3.4* 4.7    106   CO2 21 20*   BUN 25* 22*   CREA 1.84* 1.55*   * 211* CA 8.4* 8.7     No results for input(s): CPK, CKNDX, TROIQ in the last 72 hours. No lab exists for component: CPKMB  No results found for: CHOL, CHOLX, CHLST, CHOLV, HDL, HDLP, LDL, LDLC, DLDLP, Evan Spotted, CHHD, CHHDX    Recent Labs     11/11/22  0339 11/10/22  2016   AP 87 107   TP 6.8 7.4   ALB 3.0* 3.5   GLOB 3.8 3.9     No results for input(s): PH, PCO2, PO2 in the last 72 hours.     Medications Personally Reviewed:    Current Facility-Administered Medications   Medication Dose Route Frequency    heparin (porcine) injection 5,000 Units  5,000 Units SubCUTAneous Q12H    budesonide (PULMICORT) 500 mcg/2 ml nebulizer suspension  500 mcg Nebulization BID RT    melatonin tablet 5 mg  5 mg Oral QHS    furosemide (LASIX) injection 40 mg  40 mg IntraVENous DAILY    sodium chloride (NS) flush 5-10 mL  5-10 mL IntraVENous PRN    albuterol CONCENTRATE 2.5mg/0.5 mL neb soln  2.5 mg Inhalation Q6H PRN    albuterol-ipratropium (DUO-NEB) 2.5 MG-0.5 MG/3 ML  3 mL Nebulization Q6H PRN    aspirin chewable tablet 81 mg  81 mg Oral DAILY    atorvastatin (LIPITOR) tablet 40 mg  40 mg Oral QHS    folic acid (FOLVITE) tablet 1 mg  1 mg Oral DAILY    levothyroxine (SYNTHROID) tablet 100 mcg  100 mcg Oral DAILY    metoprolol succinate (TOPROL-XL) XL tablet 50 mg  50 mg Oral DAILY    insulin lispro (HUMALOG) injection   SubCUTAneous AC&HS    glucose chewable tablet 16 g  4 Tablet Oral PRN    glucagon (GLUCAGEN) injection 1 mg  1 mg IntraMUSCular PRN    acetaminophen (TYLENOL) tablet 650 mg  650 mg Oral Q6H PRN    Or    acetaminophen (TYLENOL) suppository 650 mg  650 mg Rectal Q6H PRN    polyethylene glycol (MIRALAX) packet 17 g  17 g Oral DAILY PRN    ondansetron (ZOFRAN ODT) tablet 4 mg  4 mg Oral Q8H PRN    Or    ondansetron (ZOFRAN) injection 4 mg  4 mg IntraVENous Q6H PRN    albuterol-ipratropium (DUO-NEB) 2.5 MG-0.5 MG/3 ML  3 mL Nebulization Q6H RT    methylPREDNISolone (PF) (SOLU-MEDROL) injection 40 mg  40 mg IntraVENous Q6H    cefTRIAXone (ROCEPHIN) 1 g in sterile water (preservative free) 10 mL IV syringe  1 g IntraVENous Q24H         Cassidy Guerra MD

## 2022-11-13 NOTE — PROGRESS NOTES
PULMONARY NOTE  [facility]    Name: Hector Bellamy MRN: [id number]   : 1930 Hospital: [facility]   Date: 2022  Admission date: 11/10/2022 Hospital Day: 4       HPI:     Hospital Problems  Date Reviewed: 2020            Codes Class Noted POA    Acute congestive heart failure ([facility] ICD-10-CM: I50.9  ICD-9-CM: 428.0  11/10/2022 Unknown        CHF (congestive heart failure) ([facility] ICD-10-CM: I50.9  ICD-9-CM: 428.0  11/10/2022 Unknown            [x] High complexity decision making was performed  [x] See my orders for details      Subjective/Initial History:     I was asked by Anne Marie Wilson MD to see Hector Bellamy  a 80 y.o.    female in consultation     Excerpts from admission 11/10/2022 or consult notes as follows:   70-year-old lady came in because of shortness of breath past medical history of asthma hypertension coronary artery disease hypothyroidism she is hard of hearing she has hearing loss came to the hospital because of shortness of breath and dyspnea was getting worse, chest x-ray was done which shows some congestive changes her COVID-19 test came back negative so she is admitted and pulmonary consult was called      No Known Allergies     MAR reviewed and pertinent medications noted or modified as needed     Current Facility-Administered Medications   Medication    heparin (porcine) injection 5,000 Units    budesonide (PULMICORT) 500 mcg/2 ml nebulizer suspension    melatonin tablet 5 mg    furosemide (LASIX) injection 40 mg    sodium chloride (NS) flush 5-10 mL    albuterol CONCENTRATE 2.5mg/0.5 mL neb soln    albuterol-ipratropium (DUO-NEB) 2.5 MG-0.5 MG/3 ML    aspirin chewable tablet 81 mg    atorvastatin (LIPITOR) tablet 40 mg    folic acid (FOLVITE) tablet 1 mg    levothyroxine (SYNTHROID) tablet 100 mcg    metoprolol succinate (TOPROL-XL) XL tablet 50 mg    insulin lispro (HUMALOG) injection    glucose chewable tablet 16 g    glucagon (GLUCAGEN) injection 1 mg    acetaminophen (TYLENOL) tablet 650 mg    Or    acetaminophen (TYLENOL) suppository 650 mg    polyethylene glycol (MIRALAX) packet 17 g    ondansetron (ZOFRAN ODT) tablet 4 mg    Or    ondansetron (ZOFRAN) injection 4 mg    albuterol-ipratropium (DUO-NEB) 2.5 MG-0.5 MG/3 ML    methylPREDNISolone (PF) (SOLU-MEDROL) injection 40 mg    cefTRIAXone (ROCEPHIN) 1 g in sterile water (preservative free) 10 mL IV syringe      Patient PCP: None  PMH:  has a past medical history of Anemia, Chronic obstructive pulmonary disease ([address] Heart attack ([address] Hypertension, and Thyroid disease. PSH:   has a past surgical history that includes hx orthopaedic (2018) and hx pacemaker. FHX: family history includes Heart Disease in her father and mother; Stroke in her father and mother. SHX:  reports that she has never smoked. She has never used smokeless tobacco. She reports that she does not drink alcohol and does not use drugs. ROS:    Review of Systems   Constitutional:  Positive for malaise/fatigue. HENT: Negative. Eyes: Negative. Respiratory:  Positive for cough, shortness of breath and wheezing. Cardiovascular: Negative. Gastrointestinal: Negative. Genitourinary: Negative. Musculoskeletal: Negative. Skin: Negative. Neurological: Negative. Psychiatric/Behavioral: Negative.         Objective:     Vital Signs: Telemetry:    normal sinus rhythm Intake/Output:   Visit Vitals  BP (!) 134/56 (BP 1 Location: Left upper arm, BP Patient Position: At rest)   Pulse 79   Temp 98.7 °F (37.1 °C)   Resp 19   Ht 5' 5\" (1.651 m)   Wt 67.1 kg (148 lb)   SpO2 98%   BMI 24.63 kg/m²       Temp (24hrs), Av.4 °F (36.9 °C), Min:98 °F (36.7 °C), Max:98.7 °F (37.1 °C)        O2 Device: None (Room air) O2 Flow Rate (L/min): 2 l/min       Wt Readings from Last 4 Encounters:   22 67.1 kg (148 lb)   22 67.4 kg (148 lb 9.4 oz)   21 64.4 kg (141 lb 15.6 oz)   21 68 kg (150 lb) Intake/Output Summary (Last 24 hours) at 11/13/2022 1020  Last data filed at 11/13/2022 0405  Gross per 24 hour   Intake --   Output 500 ml   Net -500 ml         Last shift:      No intake/output data recorded. Last 3 shifts: 11/11 1901 - 11/13 0700  In: -   Out: 1000 [Urine:1000]       Physical Exam:     Physical Exam  Constitutional:       Appearance: Normal appearance. HENT:      Head: Normocephalic and atraumatic. Nose: Nose normal.      Mouth/Throat:      Mouth: Mucous membranes are moist.   Eyes:      Pupils: Pupils are equal, round, and reactive to light. Cardiovascular:      Rate and Rhythm: Normal rate. Pulses: Normal pulses. Heart sounds: Normal heart sounds. Pulmonary:      Effort: Pulmonary effort is normal.      Breath sounds: Wheezing present. Comments: Coarse breath sound expiratory wheeze mostly upper airway  Abdominal:      General: Abdomen is flat. Bowel sounds are normal.      Palpations: Abdomen is soft. Musculoskeletal:         General: Swelling present. Normal range of motion. Cervical back: Normal range of motion. Skin:     General: Skin is warm. Neurological:      General: No focal deficit present. Mental Status: She is alert. Psychiatric:         Mood and Affect: Mood normal.        Labs:    Recent Labs     11/11/22  0339 11/10/22  2016   WBC 10.5 20.5*   HGB 10.3* 12.0    380       Recent Labs     11/11/22 0339 11/11/22  0114 11/10/22  2117 11/10/22  2016     --   --  137   K 3.4*  --   --  4.7     --   --  106   CO2 21  --   --  20*   *  --   --  211*   BUN 25*  --   --  22*   CREA 1.84*  --   --  1.55*   CA 8.4*  --   --  8.7   LAC  --  1.7 1.9  --    ALB 3.0*  --   --  3.5   ALT 12  --   --  16       No results for input(s): PH, PCO2, PO2, HCO3, FIO2 in the last 72 hours. No results for input(s): CPK, CKNDX, TROIQ in the last 72 hours.     No lab exists for component: CPKMB  No results found for: BNPP, BNP Lab Results   Component Value Date/Time    Culture result: No Growth (<1000 cfu/mL) 11/11/2022 02:34 AM    Culture result: No growth 3 days 11/10/2022 09:17 PM     No results found for: TSH, TSHEXT, TSHEXT    Imaging:    CXR Results  (Last 48 hours)      None          Results from Hospital Encounter encounter on 11/10/22    XR CHEST PORT    Narrative  INDICATION:  sob copd    EXAM: Chest single view. COMPARISON: 1/4/2022. FINDINGS: A single frontal view of the chest at 2017 hours shows diffuse  interstitial infiltrate new since the prior study. No focal consolidation. .  The  heart, mediastinum and pulmonary vasculature are are stable . The bony thorax  is unremarkable for age. .    Impression  Interstitial edema pattern. .  . Results from [place] encounter on 01/04/22    XR CHEST PORT    Narrative  1 new comparison September 20. Impression  The cardiomediastinal silhouette is appropriate for age, technique,  and lung expansion. Pulmonary vasculature is not congested. The lungs are  essentially clear. No effusion or pneumothorax is seen. Results from Hospital Encounter encounter on 09/20/21    XR CHEST PORT    Narrative  History: Evidence of breath    A single view of the chest was obtained. Heart size is stable. There is some  atherosclerotic change of the aorta. Lungs are clear. No effusions or  pneumothorax. Bony structures are within normal limits. Impression  Normal findings. Results from [place] encounter on 01/04/22    CT ABD PELV W CONT    Narrative  CT abdomen and pelvis without IV contrast    Comparison CT abdomen and pelvis November 19, 2019. Axial images are reviewed along with reformatted sagittal/coronal images. 100 mL  Isovue 370 administered. Motion limited study.   Dose reduction: All CT scans at this facility are performed using dose reduction  optimization techniques as appropriate to a performed exam including the  following-  automated exposure control, adjustments of mA and/or Kv according to patient  size, or use of iterative reconstructive technique. Lung bases are clear. .    Normal enhancement of the liver. Gallbladder distention. Small gallstones noted  dependently within the gallbladder lumen. Pancreas, spleen, and bilateral  adrenal glands appear unremarkable. Cortical thinning bilateral kidneys. No hydronephrosis. Stomach and small bowel loops are decompressed. Appendix unchanged. There is  stool and air through colon. Distal descending and sigmoid colon diverticula. No  CT evidence for appendicitis or diverticulitis. No ascites. Unchanged uterine calcification likely related to small degenerated uterine  fibroid. Dense intimal calcification normal caliber abdominal aorta, advanced abdominal  aorta atherosclerosis. Atherosclerosis extends to proximal abdominal aorta  branch vessels and pelvic arteries. Left hip hardware. Impression  No bowel obstruction. Colonic diverticulosis. No CT evidence for  appendicitis or diverticulitis. Unchanged appearance appendix. Gallbladder distention. Few small dependent gallstones within gallbladder lumen. Cortical atrophy each kidney. No hydronephrosis. Advanced atherosclerotic change abdominal aorta and pelvic arteries. No  abdominal aortic aneurysm. Left hip hardware. IMPRESSION:   Exacerbation of CHF diastolic dysfunction  Asthma with exacerbation  Hypertension hypothyroidism by history  Profound significant hearing loss  [place]      RECOMMENDATIONS/PLAN:     70-year-old lady came in because of shortness of breath dyspnea and shows fluid overload elevated BNP  On Lasix daily dose  Also started on IV Solu-Medrol nebulizer treatment  We will start patient on Pulmicort  Started on Rocephin        11/13 Pt resting in bed. She is on room air, currently not on O2 at the hospital, SpO2 98%.     Jaky Dines

## 2022-11-13 NOTE — PROGRESS NOTES
Problem: Pain  Goal: *Control of Pain  Outcome: Progressing Towards Goal     Problem: Patient Education: Go to Patient Education Activity  Goal: Patient/Family Education  Outcome: Progressing Towards Goal     Problem: Falls - Risk of  Goal: *Absence of Falls  Description: Document [place] Fall Risk and appropriate interventions in the flowsheet.   Outcome: Progressing Towards Goal  Note: Fall Risk Interventions:       Mentation Interventions: Adequate sleep, hydration, pain control, Bed/chair exit alarm    Medication Interventions: Bed/chair exit alarm, Patient to call before getting OOB    Elimination Interventions: Bed/chair exit alarm, Call light in reach

## 2022-11-14 ENCOUNTER — APPOINTMENT (OUTPATIENT)
Dept: ULTRASOUND IMAGING | Age: 87
DRG: 291 | End: 2022-11-14
Attending: INTERNAL MEDICINE
Payer: MEDICARE

## 2022-11-14 ENCOUNTER — APPOINTMENT (OUTPATIENT)
Dept: GENERAL RADIOLOGY | Age: 87
DRG: 291 | End: 2022-11-14
Attending: FAMILY MEDICINE
Payer: MEDICARE

## 2022-11-14 LAB
ALBUMIN SERPL-MCNC: 3 G/DL (ref 3.5–5)
ANION GAP SERPL CALC-SCNC: 10 MMOL/L (ref 5–15)
BNP SERPL-MCNC: 4880 PG/ML
BUN SERPL-MCNC: 51 MG/DL (ref 6–20)
BUN/CREAT SERPL: 28 (ref 12–20)
CA-I BLD-MCNC: 8.7 MG/DL (ref 8.5–10.1)
CHLORIDE SERPL-SCNC: 104 MMOL/L (ref 97–108)
CO2 SERPL-SCNC: 24 MMOL/L (ref 21–32)
CREAT SERPL-MCNC: 1.8 MG/DL (ref 0.55–1.02)
ERYTHROCYTE [DISTWIDTH] IN BLOOD BY AUTOMATED COUNT: 12.7 % (ref 11.5–14.5)
GLUCOSE BLD STRIP.AUTO-MCNC: 129 MG/DL (ref 65–100)
GLUCOSE BLD STRIP.AUTO-MCNC: 131 MG/DL (ref 65–100)
GLUCOSE BLD STRIP.AUTO-MCNC: 182 MG/DL (ref 65–100)
GLUCOSE BLD STRIP.AUTO-MCNC: 199 MG/DL (ref 65–100)
GLUCOSE SERPL-MCNC: 205 MG/DL (ref 65–100)
HCT VFR BLD AUTO: 31.5 % (ref 35–47)
HGB BLD-MCNC: 10.6 G/DL (ref 11.5–16)
MCH RBC QN AUTO: 31.5 PG (ref 26–34)
MCHC RBC AUTO-ENTMCNC: 33.7 G/DL (ref 30–36.5)
MCV RBC AUTO: 93.5 FL (ref 80–99)
NRBC # BLD: 0 K/UL (ref 0–0.01)
NRBC BLD-RTO: 0 PER 100 WBC
PERFORMED BY, TECHID: ABNORMAL
PHOSPHATE SERPL-MCNC: 3.4 MG/DL (ref 2.6–4.7)
PLATELET # BLD AUTO: 285 K/UL (ref 150–400)
PMV BLD AUTO: 10.4 FL (ref 8.9–12.9)
POTASSIUM SERPL-SCNC: 3.7 MMOL/L (ref 3.5–5.1)
RBC # BLD AUTO: 3.37 M/UL (ref 3.8–5.2)
SODIUM SERPL-SCNC: 138 MMOL/L (ref 136–145)
WBC # BLD AUTO: 12.8 K/UL (ref 3.6–11)

## 2022-11-14 PROCEDURE — 65270000029 HC RM PRIVATE

## 2022-11-14 PROCEDURE — 74011000250 HC RX REV CODE- 250: Performed by: FAMILY MEDICINE

## 2022-11-14 PROCEDURE — 74011250636 HC RX REV CODE- 250/636: Performed by: FAMILY MEDICINE

## 2022-11-14 PROCEDURE — 74011250637 HC RX REV CODE- 250/637: Performed by: FAMILY MEDICINE

## 2022-11-14 PROCEDURE — 74011000250 HC RX REV CODE- 250: Performed by: INTERNAL MEDICINE

## 2022-11-14 PROCEDURE — 83880 ASSAY OF NATRIURETIC PEPTIDE: CPT

## 2022-11-14 PROCEDURE — 97530 THERAPEUTIC ACTIVITIES: CPT

## 2022-11-14 PROCEDURE — 82962 GLUCOSE BLOOD TEST: CPT

## 2022-11-14 PROCEDURE — 94640 AIRWAY INHALATION TREATMENT: CPT

## 2022-11-14 PROCEDURE — 80069 RENAL FUNCTION PANEL: CPT

## 2022-11-14 PROCEDURE — 71045 X-RAY EXAM CHEST 1 VIEW: CPT

## 2022-11-14 PROCEDURE — 85027 COMPLETE CBC AUTOMATED: CPT

## 2022-11-14 PROCEDURE — 76770 US EXAM ABDO BACK WALL COMP: CPT

## 2022-11-14 PROCEDURE — 97165 OT EVAL LOW COMPLEX 30 MIN: CPT

## 2022-11-14 PROCEDURE — 74011636637 HC RX REV CODE- 636/637: Performed by: FAMILY MEDICINE

## 2022-11-14 PROCEDURE — 36415 COLL VENOUS BLD VENIPUNCTURE: CPT

## 2022-11-14 PROCEDURE — 74011250636 HC RX REV CODE- 250/636: Performed by: INTERNAL MEDICINE

## 2022-11-14 RX ORDER — FUROSEMIDE 40 MG/1
40 TABLET ORAL DAILY
Status: DISCONTINUED | OUTPATIENT
Start: 2022-11-15 | End: 2022-11-15 | Stop reason: HOSPADM

## 2022-11-14 RX ORDER — PREDNISONE 5 MG/1
10 TABLET ORAL
Status: DISCONTINUED | OUTPATIENT
Start: 2022-11-15 | End: 2022-11-15 | Stop reason: HOSPADM

## 2022-11-14 RX ADMIN — CEFTRIAXONE SODIUM 1 G: 1 INJECTION, POWDER, FOR SOLUTION INTRAMUSCULAR; INTRAVENOUS at 21:42

## 2022-11-14 RX ADMIN — IPRATROPIUM BROMIDE AND ALBUTEROL SULFATE 3 ML: .5; 2.5 SOLUTION RESPIRATORY (INHALATION) at 07:26

## 2022-11-14 RX ADMIN — ATORVASTATIN CALCIUM 40 MG: 40 TABLET, FILM COATED ORAL at 21:40

## 2022-11-14 RX ADMIN — METOPROLOL SUCCINATE 50 MG: 50 TABLET, EXTENDED RELEASE ORAL at 09:41

## 2022-11-14 RX ADMIN — HEPARIN SODIUM 5000 UNITS: 5000 INJECTION INTRAVENOUS; SUBCUTANEOUS at 21:40

## 2022-11-14 RX ADMIN — ASPIRIN 81 MG 81 MG: 81 TABLET ORAL at 09:41

## 2022-11-14 RX ADMIN — METHYLPREDNISOLONE SODIUM SUCCINATE 40 MG: 40 INJECTION, POWDER, FOR SOLUTION INTRAMUSCULAR; INTRAVENOUS at 01:03

## 2022-11-14 RX ADMIN — INSULIN LISPRO 3 UNITS: 100 INJECTION, SOLUTION INTRAVENOUS; SUBCUTANEOUS at 07:30

## 2022-11-14 RX ADMIN — LEVOTHYROXINE SODIUM 100 MCG: 0.1 TABLET ORAL at 09:41

## 2022-11-14 RX ADMIN — INSULIN LISPRO 3 UNITS: 100 INJECTION, SOLUTION INTRAVENOUS; SUBCUTANEOUS at 17:17

## 2022-11-14 RX ADMIN — FOLIC ACID 1 MG: 1 TABLET ORAL at 09:41

## 2022-11-14 RX ADMIN — IPRATROPIUM BROMIDE AND ALBUTEROL SULFATE 3 ML: .5; 2.5 SOLUTION RESPIRATORY (INHALATION) at 20:05

## 2022-11-14 RX ADMIN — MELATONIN TAB 5 MG 5 MG: 5 TAB at 21:40

## 2022-11-14 RX ADMIN — METHYLPREDNISOLONE SODIUM SUCCINATE 40 MG: 40 INJECTION, POWDER, FOR SOLUTION INTRAMUSCULAR; INTRAVENOUS at 09:45

## 2022-11-14 RX ADMIN — BUDESONIDE 500 MCG: 0.5 INHALANT ORAL at 07:26

## 2022-11-14 RX ADMIN — FUROSEMIDE 40 MG: 10 INJECTION, SOLUTION INTRAMUSCULAR; INTRAVENOUS at 09:40

## 2022-11-14 RX ADMIN — HEPARIN SODIUM 5000 UNITS: 5000 INJECTION INTRAVENOUS; SUBCUTANEOUS at 09:45

## 2022-11-14 RX ADMIN — BUDESONIDE 500 MCG: 0.5 INHALANT ORAL at 20:05

## 2022-11-14 NOTE — PROGRESS NOTES
Renal Progress Note    Patient: Sae Atkinson MRN: [id number]  SSN: [id number]    YOB: 1930  Age: 80 y.o. Sex: female      Admit Date: 11/10/2022    LOS: 4 days     Subjective:   Patient seen at bedside. Alert and awake, no acute distress.    Demented, no meaningful conversations  Repeat labs showed a creatinine of 1.8  No evidence of any fluid overload  No shortness of breath        Current Facility-Administered Medications   Medication Dose Route Frequency    [START ON 11/15/2022] predniSONE (DELTASONE) tablet 10 mg  10 mg Oral DAILY WITH BREAKFAST    [START ON 11/15/2022] furosemide (LASIX) tablet 40 mg  40 mg Oral DAILY    albuterol-ipratropium (DUO-NEB) 2.5 MG-0.5 MG/3 ML  3 mL Nebulization BID RT    heparin (porcine) injection 5,000 Units  5,000 Units SubCUTAneous Q12H    budesonide (PULMICORT) 500 mcg/2 ml nebulizer suspension  500 mcg Nebulization BID RT    melatonin tablet 5 mg  5 mg Oral QHS    sodium chloride (NS) flush 5-10 mL  5-10 mL IntraVENous PRN    albuterol CONCENTRATE 2.5mg/0.5 mL neb soln  2.5 mg Inhalation Q6H PRN    albuterol-ipratropium (DUO-NEB) 2.5 MG-0.5 MG/3 ML  3 mL Nebulization Q6H PRN    aspirin chewable tablet 81 mg  81 mg Oral DAILY    atorvastatin (LIPITOR) tablet 40 mg  40 mg Oral QHS    folic acid (FOLVITE) tablet 1 mg  1 mg Oral DAILY    levothyroxine (SYNTHROID) tablet 100 mcg  100 mcg Oral DAILY    metoprolol succinate (TOPROL-XL) XL tablet 50 mg  50 mg Oral DAILY    insulin lispro (HUMALOG) injection   SubCUTAneous AC&HS    glucose chewable tablet 16 g  4 Tablet Oral PRN    glucagon (GLUCAGEN) injection 1 mg  1 mg IntraMUSCular PRN    acetaminophen (TYLENOL) tablet 650 mg  650 mg Oral Q6H PRN    Or    acetaminophen (TYLENOL) suppository 650 mg  650 mg Rectal Q6H PRN    polyethylene glycol (MIRALAX) packet 17 g  17 g Oral DAILY PRN    ondansetron (ZOFRAN ODT) tablet 4 mg  4 mg Oral Q8H PRN    Or    ondansetron (ZOFRAN) injection 4 mg  4 mg IntraVENous Q6H PRN cefTRIAXone (ROCEPHIN) 1 g in sterile water (preservative free) 10 mL IV syringe  1 g IntraVENous Q24H        Vitals:    11/14/22 0400 11/14/22 0724 11/14/22 0931 11/14/22 1438   BP:   (!) 150/61 (!) 163/60   Pulse:   73 71   Resp:   18 18   Temp:   97.9 °F (36.6 °C) 97.7 °F (36.5 °C)   SpO2:  97% 95% 98%   Weight: 67.1 kg (147 lb 14.9 oz)      Height:         Objective:   General: alert awake , no acute distress. HEENT: EOMI, no Icterus, no Pallor, pupils reactive, mucosa dry normal inspection of ears and nose, throat clear. Neck: Neck is supple, No JVD, no thyromegaly, . Lungs: breathsounds normal,  no rhonchi, no rales,  CVS: heart sounds normal, no murmurs, no rubs. GI: soft, nontender, normal BS, no palpable organomegaly, no renal angle tenderness. Extremeties: no clubbing, no cyanosis, no edema,  Neuro: Alert, awake, minimal verbal interaction, memory deficits present, moving all extremeties   Skin: normal skin turgor, no skin rashes        Intake and Output:  Current Shift: No intake/output data recorded. Last three shifts: 11/12 1901 - 11/14 0700  In: -   Out: 500 [Urine:500]      Lab/Data Review:  Recent Labs     11/14/22  0940   WBC 12.8*   HGB 10.6*   HCT 31.5*        Recent Labs     11/14/22  0940 11/13/22  1449    137   K 3.7 3.7    103   CO2 24 23   * 135*   BUN 51* 48*   CREA 1.80* 1.81*   CA 8.7 8.7   MG  --  2.2   PHOS 3.4 3.0   ALB 3.0* 3.0*     No results for input(s): PH, PCO2, PO2, HCO3, FIO2 in the last 72 hours.   Recent Results (from the past 24 hour(s))   GLUCOSE, POC    Collection Time: 11/13/22  8:35 PM   Result Value Ref Range    Glucose (POC) 195 (H) 65 - 100 mg/dL    Performed by Leeanna Galo    GLUCOSE, POC    Collection Time: 11/14/22  8:16 AM   Result Value Ref Range    Glucose (POC) 199 (H) 65 - 100 mg/dL    Performed by Francy Moran    CBC W/O DIFF    Collection Time: 11/14/22  9:40 AM   Result Value Ref Range    WBC 12.8 (H) 3.6 - 11.0 K/uL RBC 3.37 (L) 3.80 - 5.20 M/uL    HGB 10.6 (L) 11.5 - 16.0 g/dL    HCT 31.5 (L) 35.0 - 47.0 %    MCV 93.5 80.0 - 99.0 FL    MCH 31.5 26.0 - 34.0 PG    MCHC 33.7 30.0 - 36.5 g/dL    RDW 12.7 11.5 - 14.5 %    PLATELET 267 147 - 017 K/uL    MPV 10.4 8.9 - 12.9 FL    NRBC 0.0 0.0  WBC    ABSOLUTE NRBC 0.00 0.00 - 0.01 K/uL   RENAL FUNCTION PANEL    Collection Time: 11/14/22  9:40 AM   Result Value Ref Range    Sodium 138 136 - 145 mmol/L    Potassium 3.7 3.5 - 5.1 mmol/L    Chloride 104 97 - 108 mmol/L    CO2 24 21 - 32 mmol/L    Anion gap 10 5 - 15 mmol/L    Glucose 205 (H) 65 - 100 mg/dL    BUN 51 (H) 6 - 20 mg/dL    Creatinine 1.80 (H) 0.55 - 1.02 mg/dL    BUN/Creatinine ratio 28 (H) 12 - 20      eGFR 26 (L) >60 ml/min/1.73m2    Calcium 8.7 8.5 - 10.1 mg/dL    Phosphorus 3.4 2.6 - 4.7 mg/dL    Albumin 3.0 (L) 3.5 - 5.0 g/dL   NT-PRO BNP    Collection Time: 11/14/22  9:40 AM   Result Value Ref Range    NT pro-BNP 4,880 (H) <450 pg/mL   GLUCOSE, POC    Collection Time: 11/14/22 11:15 AM   Result Value Ref Range    Glucose (POC) 131 (H) 65 - 100 mg/dL    Performed by [facility]    GLUCOSE, POC    Collection Time: 11/14/22  4:06 PM   Result Value Ref Range    Glucose (POC) 182 (H) 65 - 100 mg/dL    Performed by Parviz Basilio and Plan:     1. Acute Kidney Injury on ? CKD: probably prerenal azotemia secondary to use of Lasix and ?cardiorenal  decreased diuretics to Lasix 40 mg once daily  Repeat labs showed a creatinine of 1.80  will continue to monitor renal functions and adjust management as needed    2. Chronic kidney disease: probably has stage 3 chronic kidney disease related to nephrosclerosis. Patient had a baseline creatinine of 1.1-1.2 in January 2022  No significant proteinuria on urine PCR    3. Renal osteodystrophy:   Calcium and phosphorus are within normal limits   intact PTH level, vitamin D levels are pending     3. Shortness of breath/suspected congestive heart failure :   No significant edema noted  decreased Lasix to once daily and continue to monitor inputs and outputs  No evidence of any fluid overload    History of asthma/COPD: Seen by pulmonary, on bronchodilators and IV steroids     4. hypertension: Fluctuating blood pressures  Stable now, continue metoprolol and monitor blood pressures    5. Hypokalemia: Secondary to diuretics  Supplemented p.o. potassium, potassium at 3.7  Continue to monitor potassium levels     6.  Anemia: Probably related to chronic kidney disease  Continue to monitor H&H    Patient is stable for discharge from renal standpoint  Signed By: Ely Cheung MD     November 14, 2022

## 2022-11-14 NOTE — PROGRESS NOTES
PULMONARY NOTE  [facility]    Name: Traci Bellamy MRN: [id number]   : 1930 Hospital: [facility]   Date: 2022  Admission date: 11/10/2022 Hospital Day: 5       HPI:     Hospital Problems  Date Reviewed: 2020            Codes Class Noted POA    Acute congestive heart failure ([facility] ICD-10-CM: I50.9  ICD-9-CM: 428.0  11/10/2022 Unknown        CHF (congestive heart failure) ([facility] ICD-10-CM: I50.9  ICD-9-CM: 428.0  11/10/2022 Unknown          [x] High complexity decision making was performed  [x] See my orders for details      Subjective/Initial History:     I was asked by Fabricio Queen MD to see Traci Bellamy  a 80 y.o.    female in consultation     Excerpts from admission 11/10/2022 or consult notes as follows:   27-year-old lady came in because of shortness of breath past medical history of asthma hypertension coronary artery disease hypothyroidism she is hard of hearing she has hearing loss came to the hospital because of shortness of breath and dyspnea was getting worse, chest x-ray was done which shows some congestive changes her COVID-19 test came back negative so she is admitted and pulmonary consult was called      No Known Allergies     MAR reviewed and pertinent medications noted or modified as needed     Current Facility-Administered Medications   Medication    albuterol-ipratropium (DUO-NEB) 2.5 MG-0.5 MG/3 ML    heparin (porcine) injection 5,000 Units    budesonide (PULMICORT) 500 mcg/2 ml nebulizer suspension    melatonin tablet 5 mg    furosemide (LASIX) injection 40 mg    sodium chloride (NS) flush 5-10 mL    albuterol CONCENTRATE 2.5mg/0.5 mL neb soln    albuterol-ipratropium (DUO-NEB) 2.5 MG-0.5 MG/3 ML    aspirin chewable tablet 81 mg    atorvastatin (LIPITOR) tablet 40 mg    folic acid (FOLVITE) tablet 1 mg    levothyroxine (SYNTHROID) tablet 100 mcg    metoprolol succinate (TOPROL-XL) XL tablet 50 mg    insulin lispro (HUMALOG) injection glucose chewable tablet 16 g    glucagon (GLUCAGEN) injection 1 mg    acetaminophen (TYLENOL) tablet 650 mg    Or    acetaminophen (TYLENOL) suppository 650 mg    polyethylene glycol (MIRALAX) packet 17 g    ondansetron (ZOFRAN ODT) tablet 4 mg    Or    ondansetron (ZOFRAN) injection 4 mg    methylPREDNISolone (PF) (SOLU-MEDROL) injection 40 mg    cefTRIAXone (ROCEPHIN) 1 g in sterile water (preservative free) 10 mL IV syringe      Patient PCP: None  PMH:  has a past medical history of Anemia, Chronic obstructive pulmonary disease ([facility] Heart attack ([facility] Hypertension, and Thyroid disease. PSH:   has a past surgical history that includes hx orthopaedic (2018) and hx pacemaker. FHX: family history includes Heart Disease in her father and mother; Stroke in her father and mother. SHX:  reports that she has never smoked. She has never used smokeless tobacco. She reports that she does not drink alcohol and does not use drugs. ROS:    Review of Systems   Constitutional:  Positive for malaise/fatigue. HENT: Negative. Eyes: Negative. Respiratory:  Positive for cough, shortness of breath and wheezing. Cardiovascular: Negative. Gastrointestinal: Negative. Genitourinary: Negative. Musculoskeletal: Negative. Skin: Negative. Neurological: Negative. Psychiatric/Behavioral: Negative.         Objective:     Vital Signs: Telemetry:    normal sinus rhythm Intake/Output:   Visit Vitals  BP (!) 150/61 (BP 1 Location: Left upper arm, BP Patient Position: At rest;Lying)   Pulse 73   Temp 97.9 °F (36.6 °C)   Resp 18   Ht 5' 5\" (1.651 m)   Wt 67.1 kg (147 lb 14.9 oz)   SpO2 95%   BMI 24.62 kg/m²       Temp (24hrs), Av.9 °F (36.6 °C), Min:97.6 °F (36.4 °C), Max:98.1 °F (36.7 °C)        O2 Device: None (Room air) O2 Flow Rate (L/min): 2 l/min       Wt Readings from Last 4 Encounters:   22 67.1 kg (147 lb 14.9 oz)   22 67.4 kg (148 lb 9.4 oz)   21 64.4 kg (141 lb 15.6 oz) 06/02/21 68 kg (150 lb)        No intake or output data in the 24 hours ending 11/14/22 0944      Last shift:      No intake/output data recorded. Last 3 shifts: 11/12 1901 - 11/14 0700  In: -   Out: 500 [Urine:500]       Physical Exam:     Physical Exam  Constitutional:       Appearance: Normal appearance. HENT:      Head: Normocephalic and atraumatic. Nose: Nose normal.      Mouth/Throat:      Mouth: Mucous membranes are moist.   Eyes:      Pupils: Pupils are equal, round, and reactive to light. Cardiovascular:      Rate and Rhythm: Normal rate. Pulses: Normal pulses. Heart sounds: Normal heart sounds. Pulmonary:      Effort: Pulmonary effort is normal.      Breath sounds: Wheezing present. Comments: Coarse breath sound expiratory wheeze mostly upper airway  Abdominal:      General: Abdomen is flat. Bowel sounds are normal.      Palpations: Abdomen is soft. Musculoskeletal:         General: Swelling present. Normal range of motion. Cervical back: Normal range of motion. Skin:     General: Skin is warm. Neurological:      General: No focal deficit present. Mental Status: She is alert. Psychiatric:         Mood and Affect: Mood normal.        Labs:    No results for input(s): WBC, HGB, PLT, INR, APTT, HGBEXT, PLTEXT, INREXT, HGBEXT, PLTEXT, INREXT in the last 72 hours. No lab exists for component: FIB, DDMER    Recent Labs     11/13/22  1449      K 3.7      CO2 23   *   BUN 48*   CREA 1.81*   CA 8.7   MG 2.2   PHOS 3.0   ALB 3.0*       No results for input(s): PH, PCO2, PO2, HCO3, FIO2 in the last 72 hours. No results for input(s): CPK, CKNDX, TROIQ in the last 72 hours.     No lab exists for component: CPKMB  No results found for: BNPP, BNP   Lab Results   Component Value Date/Time    Culture result: No Growth (<1000 cfu/mL) 11/11/2022 02:34 AM    Culture result: No growth 4 days 11/10/2022 09:17 PM     No results found for: TSH, TSHEXT, TSHEXT    Imaging:    CXR Results  (Last 48 hours)      None          Results from Hospital Encounter encounter on 11/10/22    XR CHEST PORT    Narrative  INDICATION:  sob copd    EXAM: Chest single view. COMPARISON: 1/4/2022. FINDINGS: A single frontal view of the chest at 2017 hours shows diffuse  interstitial infiltrate new since the prior study. No focal consolidation. .  The  heart, mediastinum and pulmonary vasculature are are stable . The bony thorax  is unremarkable for age. .    Impression  Interstitial edema pattern. .  . Results from [place] encounter on 01/04/22    XR CHEST PORT    Narrative  1 new comparison September 20. Impression  The cardiomediastinal silhouette is appropriate for age, technique,  and lung expansion. Pulmonary vasculature is not congested. The lungs are  essentially clear. No effusion or pneumothorax is seen. Results from Hospital Encounter encounter on 09/20/21    XR CHEST PORT    Narrative  History: Evidence of breath    A single view of the chest was obtained. Heart size is stable. There is some  atherosclerotic change of the aorta. Lungs are clear. No effusions or  pneumothorax. Bony structures are within normal limits. Impression  Normal findings. Results from [place] encounter on 01/04/22    CT ABD PELV W CONT    Narrative  CT abdomen and pelvis without IV contrast    Comparison CT abdomen and pelvis November 19, 2019. Axial images are reviewed along with reformatted sagittal/coronal images. 100 mL  Isovue 370 administered. Motion limited study. Dose reduction: All CT scans at this facility are performed using dose reduction  optimization techniques as appropriate to a performed exam including the  following-  automated exposure control, adjustments of mA and/or Kv according to patient  size, or use of iterative reconstructive technique. Lung bases are clear. .    Normal enhancement of the liver. Gallbladder distention.  Small gallstones noted  dependently within the gallbladder lumen. Pancreas, spleen, and bilateral  adrenal glands appear unremarkable. Cortical thinning bilateral kidneys. No hydronephrosis. Stomach and small bowel loops are decompressed. Appendix unchanged. There is  stool and air through colon. Distal descending and sigmoid colon diverticula. No  CT evidence for appendicitis or diverticulitis. No ascites. Unchanged uterine calcification likely related to small degenerated uterine  fibroid. Dense intimal calcification normal caliber abdominal aorta, advanced abdominal  aorta atherosclerosis. Atherosclerosis extends to proximal abdominal aorta  branch vessels and pelvic arteries. Left hip hardware. Impression  No bowel obstruction. Colonic diverticulosis. No CT evidence for  appendicitis or diverticulitis. Unchanged appearance appendix. Gallbladder distention. Few small dependent gallstones within gallbladder lumen. Cortical atrophy each kidney. No hydronephrosis. Advanced atherosclerotic change abdominal aorta and pelvic arteries. No  abdominal aortic aneurysm. Left hip hardware. IMPRESSION:   Exacerbation of CHF diastolic dysfunction  Asthma with exacerbation  Hypertension hypothyroidism by history  Profound significant hearing loss  [facility]      RECOMMENDATIONS/PLAN:     59-year-old lady came in because of shortness of breath dyspnea and shows fluid overload elevated BNP  On Lasix daily dose  Also started on IV Solu-Medrol nebulizer treatment  We will start patient on Pulmicort  Started on Rocephin        11/13 Pt resting in bed. She is on room air, currently not on O2 at the hospital, SpO2 98%. 11/14 Pt is resting in bed, she recently returned from ultrasound. Ultrasound showed no hydronephrosis or stones. She is on room air, SpO2 95%. She can be discharged from Pulmonary standpoint.     Puneet Márquez

## 2022-11-14 NOTE — PROGRESS NOTES
PULMONARY NOTE  [facility]    Name: April Locke MRN: [id number]   : 1930 Hospital: [facility]   Date: 2022  Admission date: 11/10/2022 Hospital Day: 5       HPI:     Hospital Problems  Date Reviewed: 2020            Codes Class Noted POA    Acute congestive heart failure ([facility] ICD-10-CM: I50.9  ICD-9-CM: 428.0  11/10/2022 Unknown        CH[facility] (congestive [facility]) [facility] ICD-10-CM: I50.9  ICD-9-CM: 428.0  11/10/2022 Unknown          [x] High complexity decision making was performed  [x] See my orders for details      Subjective/Initial History:     I was asked by River Hickman MD to see April Locke  a 80 y.o.    female in consultation     Excerpts from admission 11/10/2022 or consult notes as follows:   31-year-old lady came in because of shortness of breath past medical history of asthma hypertension coronary artery disease hypothyroidism she is hard of hearing she has hearing loss came to the hospital because of shortness of breath and dyspnea was getting worse, chest x-ray was done which shows some congestive changes her COVID-19 test came back negative so she is admitted and pulmonary consult was called      No Known Allergies     MAR reviewed and pertinent medications noted or modified as needed     Current Facility-Administered Medications   Medication    albuterol-ipratropium (DUO-NEB) 2.5 MG-0.5 MG/3 ML    heparin (porcine) injection 5,000 Units    budesonide (PULMICORT) 500 mcg/2 ml nebulizer suspension    melatonin tablet 5 mg    furosemide (LASIX) injection 40 mg    sodium chloride (NS) flush 5-10 mL    albuterol CONCENTRATE 2.5mg/0.5 mL neb soln    albuterol-ipratropium (DUO-NEB) 2.5 MG-0.5 MG/3 ML    aspirin chewable tablet 81 mg    atorvastatin (LIPITOR) tablet 40 mg    folic acid (FOLVITE) tablet 1 mg    levothyroxine (SYNTHROID) tablet 100 mcg    metoprolol succinate (TOPROL-XL) XL tablet 50 mg    insulin lispro (HUMALOG) injection glucose chewable tablet 16 g    glucagon (GLUCAGEN) injection 1 mg    acetaminophen (TYLENOL) tablet 650 mg    Or    acetaminophen (TYLENOL) suppository 650 mg    polyethylene glycol (MIRALAX) packet 17 g    ondansetron (ZOFRAN ODT) tablet 4 mg    Or    ondansetron (ZOFRAN) injection 4 mg    methylPREDNISolone (PF) (SOLU-MEDROL) injection 40 mg    cefTRIAXone (ROCEPHIN) 1 g in sterile water (preservative free) 10 mL IV syringe      Patient PCP: None  PMH:  has a past medical history of Anemia, Chronic obstructive pulmonary disease ([facility] Heart attack ([facility] Hypertension, and Thyroid disease. PSH:   has a past surgical history that includes hx orthopaedic (2018) and hx pacemaker. FHX: family history includes Heart Disease in her father and mother; Stroke in her father and mother. SHX:  reports that she has never smoked. She has never used smokeless tobacco. She reports that she does not drink alcohol and does not use drugs. ROS:    Review of Systems   Constitutional:  Positive for malaise/fatigue. HENT: Negative. Eyes: Negative. Respiratory:  Positive for cough, shortness of breath and wheezing. Cardiovascular: Negative. Gastrointestinal: Negative. Genitourinary: Negative. Musculoskeletal: Negative. Skin: Negative. Neurological: Negative. Psychiatric/Behavioral: Negative.         Objective:     Vital Signs: Telemetry:    normal sinus rhythm Intake/Output:   Visit Vitals  BP (!) 150/61 (BP 1 Location: Left upper arm, BP Patient Position: At rest;Lying)   Pulse 73   Temp 97.9 °F (36.6 °C)   Resp 18   Ht 5' 5\" (1.651 m)   Wt 67.1 kg (147 lb 14.9 oz)   SpO2 95%   BMI 24.62 kg/m²       Temp (24hrs), Av.9 °F (36.6 °C), Min:97.6 °F (36.4 °C), Max:98.1 °F (36.7 °C)        O2 Device: None (Room air) O2 Flow Rate (L/min): 2 l/min       Wt Readings from Last 4 Encounters:   22 67.1 kg (147 lb 14.9 oz)   22 67.4 kg (148 lb 9.4 oz)   21 64.4 kg (141 lb 15.6 oz) 06/02/21 68 kg (150 lb)        No intake or output data in the 24 hours ending 11/14/22 1022      Last shift:      No intake/output data recorded. Last 3 shifts: 11/12 1901 - 11/14 0700  In: -   Out: 500 [Urine:500]       Physical Exam:     Physical Exam  Constitutional:       Appearance: Normal appearance. HENT:      Head: Normocephalic and atraumatic. Nose: Nose normal.      Mouth/Throat:      Mouth: Mucous membranes are moist.   Eyes:      Pupils: Pupils are equal, round, and reactive to light. Cardiovascular:      Rate and Rhythm: Normal rate. Pulses: Normal pulses. Heart sounds: Normal heart sounds. Pulmonary:      Effort: Pulmonary effort is normal.      Breath sounds: Wheezing present. Comments: Coarse breath sound expiratory wheeze mostly upper airway  Abdominal:      General: Abdomen is flat. Bowel sounds are normal.      Palpations: Abdomen is soft. Musculoskeletal:         General: Swelling present. Normal range of motion. Cervical back: Normal range of motion. Skin:     General: Skin is warm. Neurological:      General: No focal deficit present. Mental Status: She is alert. Psychiatric:         Mood and Affect: Mood normal.        Labs:    Recent Labs     11/14/22  0940   WBC 12.8*   HGB 10.6*          Recent Labs     11/14/22  0940 11/13/22  1449    137   K 3.7 3.7    103   CO2 24 23   * 135*   BUN 51* 48*   CREA 1.80* 1.81*   CA 8.7 8.7   MG  --  2.2   PHOS 3.4 3.0   ALB 3.0* 3.0*       No results for input(s): PH, PCO2, PO2, HCO3, FIO2 in the last 72 hours. No results for input(s): CPK, CKNDX, TROIQ in the last 72 hours.     No lab exists for component: CPKMB  No results found for: BNPP, BNP   Lab Results   Component Value Date/Time    Culture result: No Growth (<1000 cfu/mL) 11/11/2022 02:34 AM    Culture result: No growth 4 days 11/10/2022 09:17 PM     No results found for: TSH, TSHEXT, TSHEXT    Imaging:    CXR Results  (Last 48 hours)      None          Results from Hospital Encounter encounter on 11/10/22    XR CHEST PORT    Narrative  INDICATION:  sob copd    EXAM: Chest single view. COMPARISON: 1/4/2022. FINDINGS: A single frontal view of the chest at 2017 hours shows diffuse  interstitial infiltrate new since the prior study. No focal consolidation. .  The  heart, mediastinum and pulmonary vasculature are are stable . The bony thorax  is unremarkable for age. .    Impression  Interstitial edema pattern. .  . Results from [place] encounter on 01/04/22    XR CHEST PORT    Narrative  1 new comparison September 20. Impression  The cardiomediastinal silhouette is appropriate for age, technique,  and lung expansion. Pulmonary vasculature is not congested. The lungs are  essentially clear. No effusion or pneumothorax is seen. Results from Hospital Encounter encounter on 09/20/21    XR CHEST PORT    Narrative  History: Evidence of breath    A single view of the chest was obtained. Heart size is stable. There is some  atherosclerotic change of the aorta. Lungs are clear. No effusions or  pneumothorax. Bony structures are within normal limits. Impression  Normal findings. Results from [place] encounter on 01/04/22    CT ABD PELV W CONT    Narrative  CT abdomen and pelvis without IV contrast    Comparison CT abdomen and pelvis November 19, 2019. Axial images are reviewed along with reformatted sagittal/coronal images. 100 mL  Isovue 370 administered. Motion limited study. Dose reduction: All CT scans at this facility are performed using dose reduction  optimization techniques as appropriate to a performed exam including the  following-  automated exposure control, adjustments of mA and/or Kv according to patient  size, or use of iterative reconstructive technique. Lung bases are clear. .    Normal enhancement of the liver. Gallbladder distention.  Small gallstones noted  dependently within the gallbladder lumen. Pancreas, spleen, and bilateral  adrenal glands appear unremarkable. Cortical thinning bilateral kidneys. No hydronephrosis. Stomach and small bowel loops are decompressed. Appendix unchanged. There is  stool and air through colon. Distal descending and sigmoid colon diverticula. No  CT evidence for appendicitis or diverticulitis. No ascites. Unchanged uterine calcification likely related to small degenerated uterine  fibroid. Dense intimal calcification normal caliber abdominal aorta, advanced abdominal  aorta atherosclerosis. Atherosclerosis extends to proximal abdominal aorta  branch vessels and pelvic arteries. Left hip hardware. Impression  No bowel obstruction. Colonic diverticulosis. No CT evidence for  appendicitis or diverticulitis. Unchanged appearance appendix. Gallbladder distention. Few small dependent gallstones within gallbladder lumen. Cortical atrophy each kidney. No hydronephrosis. Advanced atherosclerotic change abdominal aorta and pelvic arteries. No  abdominal aortic aneurysm. Left hip hardware. IMPRESSION:   Exacerbation of CHF diastolic dysfunction  Asthma with exacerbation  Hypertension hypothyroidism by history  Profound significant hearing loss  [facility]      RECOMMENDATIONS/PLAN:     80-year-old lady came in because of shortness of breath dyspnea and shows fluid overload elevated BNP  On Lasix daily dose  On prednisone and nebulizer treatment  Patient on Pulmicort  On Rocephin        11/13 Pt resting in bed. She is on room air, currently not on O2 at the hospital, SpO2 98%. 11/14 Pt is resting in bed, she recently returned from ultrasound. Ultrasound showed no hydronephrosis or stones. She is on room air, SpO2 95%.   Getting physical therapy    Michaela Malave MD

## 2022-11-14 NOTE — PROGRESS NOTES
Progress Note      11/14/2022 1:09 PM  NAME: Sri Chino   MRN:  [id number]   Admit Diagnosis: Acute congestive heart failure (HCC) [I50.9]  CH[facility]congest[facility]) [facility] [I50.9]      Problem List:   -Admitted to the hospital with shortness of breath  -History of coronary artery disease with proximal LAD disease  -Cardiomyopathy with last known ejection fraction of 40 to 45% with moderate aortic insufficiency as of September 2022.  -COPD  -Hypertension  -Hypothyroidism  -Renal insufficiency  -History of old myocardial infarction     Assessment/Plan:   Dated 11/14/2022  -Patient is sitting up in the bed and in no acute distress.  -No acute cardiovascular issue at this time and patient shortness of breath has improved since admission.  -Patient is well-known to me from my office and has acute on chronic systolic heart failure with last known ejection fraction of 40 to 45%. -No further cardiovascular testing and continue current conservative medical management based on my overall cardiovascular assessment. Note dictated dated 11/13/2022  -Patient is sitting up in the bed eating lunch. -She denies any chest pain shortness of breath or any other anginal equivalent and wants to go home.  -No acute overnight cardiac event noted and patient is otherwise clinically and hemodynamically stable. -Continue current conservative medical management for atrial fibrillation and no further cardiovascular testing as it would not change my cardiac management.  -We will follow while patient is in the hospital along with the team.         []       High complexity decision making was performed in this patient at high risk for decompensation with multiple organ involvement. Subjective:     Ryan Mcgregor is a 80 y.o.  female who has known nonobstructive coronary artery disease involving proximal left anterior descending artery admitted to the hospital with shortness of breath.  Patient was in the hospital in September 2022 and diagnosed with cardiomyopathy with ejection fraction of 40 to 45%. Patient also has a history of paroxysmal atrial fibrillation and aortic insufficiency. She is otherwise in good shape and well-preserved for her age and I will continue current conservative medical management with no further cardiovascular testing as it would not change my cardiac management unless patient become hemodynamically unstable and or if EKG shows acute changes. Review of Systems:    Symptom Y/N Comments  Symptom Y/N Comments   Fever/Chills N   Chest Pain N    Poor Appetite N   Edema N    Cough N   Abdominal Pain N    Sputum N   Joint Pain N    SOB/JEFFERSON N   Pruritis/Rash N    Nausea/vomit N   Tolerating PT/OT Y    Diarrhea N   Tolerating Diet Y    Constipation N   Other       Could NOT obtain due to:      Objective:      Physical Exam:    Last 24hrs VS reviewed since prior progress note. Most recent are:    Visit Vitals  BP (!) 163/60 (BP 1 Location: Left upper arm, BP Patient Position: Lying; At rest) Comment: nurse notified   Pulse 71   Temp 97.7 °F (36.5 °C)   Resp 18   Ht 5' 5\" (1.651 m)   Wt 67.1 kg (147 lb 14.9 oz)   SpO2 98%   BMI 24.62 kg/m²     No intake or output data in the 24 hours ending 11/14/22 1726       General Appearance: Well developed, well nourished, alert & oriented x 3,    no acute distress. Ears/Nose/Mouth/Throat: Hearing grossly normal.  Neck: Supple. Chest: Lungs clear to auscultation bilaterally. Cardiovascular: Regular rate and rhythm, S1S2 normal, no murmur. Abdomen: Soft, non-tender, bowel sounds are active. Extremities: No edema bilaterally. Skin: Warm and dry. []         Post-cath site without hematoma, bruit, tenderness, or thrill. Distal pulses intact. PMH/SH reviewed - no change compared to H&P    Data Review    Telemetry: normal sinus rhythm     EKG:   []  No new EKG for review  XR CHEST PORT   Final Result   1.  No acute disease          US RETROPERITONEUM COMP   Final Result   No hydronephrosis or stones. XR CHEST PORT   Final Result   Interstitial edema pattern. .  . Lab Data Personally Reviewed:    Recent Labs     11/14/22  0940   WBC 12.8*   HGB 10.6*   HCT 31.5*          No results for input(s): INR, PTP, APTT, INREXT, INREXT in the last 72 hours. Recent Labs     11/14/22  0940 11/13/22  1449    137   K 3.7 3.7    103   CO2 24 23   BUN 51* 48*   CREA 1.80* 1.81*   * 135*   CA 8.7 8.7   MG  --  2.2       No results for input(s): CPK, CKNDX, TROIQ in the last 72 hours. No lab exists for component: CPKMB  No results found for: CHOL, CHOLX, CHLST, CHOLV, HDL, HDLP, LDL, LDLC, DLDLP, TGLX, TRIGL, TRIGP, CHHD, CHHDX    Recent Labs     11/14/22  0940 11/13/22  1449   ALB 3.0* 3.0*       No results for input(s): PH, PCO2, PO2 in the last 72 hours.     Medications Personally Reviewed:    Current Facility-Administered Medications   Medication Dose Route Frequency    [START ON 11/15/2022] predniSONE (DELTASONE) tablet 10 mg  10 mg Oral DAILY WITH BREAKFAST    [START ON 11/15/2022] furosemide (LASIX) tablet 40 mg  40 mg Oral DAILY    albuterol-ipratropium (DUO-NEB) 2.5 MG-0.5 MG/3 ML  3 mL Nebulization BID RT    heparin (porcine) injection 5,000 Units  5,000 Units SubCUTAneous Q12H    budesonide (PULMICORT) 500 mcg/2 ml nebulizer suspension  500 mcg Nebulization BID RT    melatonin tablet 5 mg  5 mg Oral QHS    sodium chloride (NS) flush 5-10 mL  5-10 mL IntraVENous PRN    albuterol CONCENTRATE 2.5mg/0.5 mL neb soln  2.5 mg Inhalation Q6H PRN    albuterol-ipratropium (DUO-NEB) 2.5 MG-0.5 MG/3 ML  3 mL Nebulization Q6H PRN    aspirin chewable tablet 81 mg  81 mg Oral DAILY    atorvastatin (LIPITOR) tablet 40 mg  40 mg Oral QHS    folic acid (FOLVITE) tablet 1 mg  1 mg Oral DAILY    levothyroxine (SYNTHROID) tablet 100 mcg  100 mcg Oral DAILY    metoprolol succinate (TOPROL-XL) XL tablet 50 mg  50 mg Oral DAILY    insulin lispro (HUMALOG) injection   SubCUTAneous AC&HS    glucose chewable tablet 16 g  4 Tablet Oral PRN    glucagon (GLUCAGEN) injection 1 mg  1 mg IntraMUSCular PRN    acetaminophen (TYLENOL) tablet 650 mg  650 mg Oral Q6H PRN    Or    acetaminophen (TYLENOL) suppository 650 mg  650 mg Rectal Q6H PRN    polyethylene glycol (MIRALAX) packet 17 g  17 g Oral DAILY PRN    ondansetron (ZOFRAN ODT) tablet 4 mg  4 mg Oral Q8H PRN    Or    ondansetron (ZOFRAN) injection 4 mg  4 mg IntraVENous Q6H PRN    cefTRIAXone (ROCEPHIN) 1 g in sterile water (preservative free) 10 mL IV syringe  1 g IntraVENous Q24H         Ryan Mcneil MD

## 2022-11-14 NOTE — PROGRESS NOTES
OCCUPATIONAL THERAPY EVALUATION  Patient: Eva Sharma (68 y.o. female)  Date: 11/14/2022  Primary Diagnosis: Acute congestive heart failure (HCC) [I50.9]  [facility] [I50.9]       Precautions: fall risk       ASSESSMENT  Pt is a 80 y.o. female presenting to [facility] with c/o SOB, admitted 11/10 and currently being treated for acute CHF. Chest x-ray shows interstitial edema on 11/10. Pt received semi-supine in bed upon arrival w/ caregiver in room (remained permission), AXO x3 (disoriented to month; hard to tell if it's d/t difficulty hearing), and agreeable to OT evaluation. Pt is [place] in B ears; hearing aide in place in L ear. Based on current observations, pt presents with deficits in generalized strength/AROM, static/dynamic standing balance (see PT note for gait details), functional activity tolerance, and pain currently impacting overall performance of ADLs and functional transfers/mobility (see below for objective details and assist levels). Overall, pt tolerates session fair w/out c/o pain, SOB, or dizziness. Pt completed bed mobility/ambulation w/ CGA using RW. No LOB noted but req'd cues for walker placement and safe transfers on/off surfaces. She req'd CGA for standing ADLs; tolerated standing for ~2 mins w/out LOB. Able to complete anterior pericare INDly on bathroom commode. OT educated pt on BUE exs including shoulder flexion, forward chest press, and bicep curls to complete throughout the day; verbalized understanding. Pt would benefit from continued skilled OT services to address current impairments and improve IND and safety with self cares and functional transfers/mobility. Current OT d/c recommendation return home w/ prior level of care and HHOT once medically appropriate.     Other factors to consider for discharge: family/social support, DME, time since onset, severity of deficits, functional baseline     Patient will benefit from skilled therapy intervention to address the above noted impairments. PLAN :  Recommendations and Planned Interventions: self care training, functional mobility training, therapeutic exercise, balance training, therapeutic activities, endurance activities, patient education, and home safety training    Recommend with staff: Amb to bathroom for toileting with gt belt and AD    Recommend next session: standing tolerance and bathing    Frequency/Duration: Patient will be followed by occupational therapy:  3-5x/week to address goals. Recommendation for discharge: (in order for the patient to meet his/her long term goals)  Home w/ prior level of care and HHOT    This discharge recommendation:  Has been made in collaboration with the attending provider and/or case management    IF patient discharges home will need the following DME: none at this time       SUBJECTIVE:   Patient stated I don't have any pain.     OBJECTIVE DATA SUMMARY:   HISTORY:   Past Medical History:   Diagnosis Date    Anemia     Chronic obstructive pulmonary disease ([facility]     Heart attack ([facility]     Hypertension     Thyroid disease     hypothyroidism     Past Surgical History:   Procedure Laterality Date    HX ORTHOPAEDIC  2018    total hip replacement    HX PACEMAKER         Per pt:   Home Situation  Home Environment: Private residence  One/Two Story Residence: One story  Living Alone: No (24/7 aides)  Support Systems: Child(marcus)  Patient Expects to be Discharged to[de-identified] Home with family assistance  Current DME Used/Available at Home: Walker, rolling  Tub or Shower Type: Tub/Shower combination (utilizes RW)    Hand dominance: Left    EXAMINATION OF PERFORMANCE DEFICITS:  Cognitive/Behavioral Status:  Neurologic State: Alert  Orientation Level: Oriented to place;Oriented to person;Disoriented to time (disoriented to month)  Cognition: Appropriate decision making; Appropriate for age attention/concentration; Appropriate safety awareness; Follows commands Hearing: Auditory  Auditory Impairment: Hard of hearing, bilateral (L ear has hearing aide)        Range of Motion:  AROM: Generally decreased, functional                         Strength:  Strength: Generally decreased, functional                Coordination:     Fine Motor Skills-Upper: Left Intact; Right Intact    Gross Motor Skills-Upper: Left Intact; Right Intact    Tone & Sensation:     Sensation: Intact                      Balance:  Sitting: Intact; Without support  Standing: Impaired; With support  Standing - Static: Fair;Constant support  Standing - Dynamic : Fair;Constant support    Functional Mobility and Transfers for ADLs:  Bed Mobility:  Supine to Sit: Contact guard assistance  Scooting: Contact guard assistance    Transfers:  Sit to Stand: Contact guard assistance  Stand to Sit: Contact guard assistance  Bed to Chair: Contact guard assistance  Bathroom Mobility: Contact guard assistance  Toilet Transfer : Contact guard assistance  Assistive Device : Walker, rolling      ADL Intervention and task modifications: Toileting  Bladder Hygiene: Independent (sitting on commode)         Therapeutic Exercise:  Pt will benefit from BUE HEP to improve participation in ADLs and mobility. Plan will be initiated at next session. Functional Measure:    Karl Rodriguez AM-PAC \"6 Clicks\"                                                       Daily Activity Inpatient Short Form  How much help from another person does the patient currently need. .. Total; A Lot A Little None   1. Putting on and taking off regular lower body clothing? []  1 []  2 [x]  3 []  4   2. Bathing (including washing, rinsing, drying)? []  1 []  2 [x]  3 []  4   3. Toileting, which includes using toilet, bedpan or urinal? [] 1 []  2 [x]  3 []  4   4. Putting on and taking off regular upper body clothing? []  1 []  2 []  3 [x]  4   5. Taking care of personal grooming such as brushing teeth?  []  1 []  2 [x]  3 []  4   6. Eating meals? []  1 []  2 []  3 [x]  4   © 2007, Trustees of [facility] [employer], under license to [employer]. All rights reserved     Score: 20/24     Interpretation of Tool:  Represents clinically-significant functional categories (i.e. Activities of daily living). Percentage of Impairment CH    0%   CI    1-19% CJ    20-39% CK    40-59% CL    60-79% CM    80-99% CN     100%   [facility]  Score 6-24 24 23 20-22 15-19 10-14 7-9 6     Occupational Therapy Evaluation Charge Determination   History Examination Decision-Making   LOW Complexity : Brief history review  LOW Complexity : 1-3 performance deficits relating to physical, cognitive , or psychosocial skils that result in activity limitations and / or participation restrictions  MEDIUM Complexity : Patient may present with comorbidities that affect occupational performnce. Miniml to moderate modification of tasks or assistance (eg, physical or verbal ) with assesment(s) is necessary to enable patient to complete evaluation       Based on the above components, the patient evaluation is determined to be of the following complexity level: LOW   Pain Rating:  No reports of pain     Activity Tolerance:   Fair and requires rest breaks    After treatment patient left in no apparent distress:    Sitting in chair, Call bell within reach, and cargiver present , bed locked and in lowest position    COMMUNICATION/EDUCATION:   The patients plan of care was discussed with: Registered nurse. Patient/family have participated as able in goal setting and plan of care. and Patient/family agree to work toward stated goals and plan of care. This patients plan of care is appropriate for delegation to [facility].      Thank you for this referral.  Chelsea Ramsey OT  Time Calculation: 29 mins   Problem: Self Care Deficits Care Plan (Adult)  Goal: *Acute Goals and Plan of Care (Insert Text)  Description: FUNCTIONAL STATUS PRIOR TO ADMISSION: Pt reports she utilized RW for functional mobility and was IND w/ ADLs but has aides 24/7 that help w/ IADLs. HOME SUPPORT: Pt has aides 24/7 at her home.     Occupational Therapy Goals  Initiated 11/14/2022    Pt stated goal I want to go home  Pt will be IND sup <> sit in prep for EOB ADLs  Pt will be Mod I grooming standing sink side LRAD  Pt will be Mod I UB dressing sitting EOB/long sit   Pt will be Mod I LE dressing sitting EOB/long sit  Pt will be Mod I sit <>  prep for toileting LRAD  Pt will be Mod I toileting/toilet transfer/cloth mgmt LRAD  Pt will be IND following UE HEP in prep for self care tasks   Outcome: Not Met

## 2022-11-14 NOTE — PROGRESS NOTES
General Daily Progress Note    Patient Name: Kirstie Mcadams       YOB: 1930       Age: 80 y.o. Admit Date: 11/10/2022    Subjective:     Kirstie Mcadams is a 80-year-old female with a history of COPD, CHF, anxiety, MI, anemia, HTN, hypothyroidism presenting for shortness of breath. According to EMS patient developed shortness of breath today. States that they noticed that she was significantly wheezing. Patient started becoming very anxious and according to family becomes very anxious when she is having shortness of breath at a point where she starts to have nausea and dry heaving. They placed her on 3 L nasal cannula for comfort but she did not have any desaturation. Work-up done in the ER shows acute congestive heart failure last echo showed ejection fraction of 40 to 86% and diastolic dysfunction. Patient states she has been having chest tightness and shortness of breath with wheezing. 11/14 Patient seen working with PT. Caretaker present. No SOB or cough. No nausea or vomiting.      WBC 12.8   Creatinine 1.8  BUN 51      Objective:     Visit Vitals  BP (!) 147/59   Pulse 91   Temp 98 °F (36.7 °C)   Resp 18   Ht 5' 5\" (1.651 m)   Wt 67.1 kg (147 lb 14.9 oz)   SpO2 97%   BMI 24.62 kg/m²        Recent Results (from the past 24 hour(s))   GLUCOSE, POC    Collection Time: 11/13/22 11:15 AM   Result Value Ref Range    Glucose (POC) 142 (H) 65 - 100 mg/dL    Performed by Trell Muniz    RENAL FUNCTION PANEL    Collection Time: 11/13/22  2:49 PM   Result Value Ref Range    Sodium 137 136 - 145 mmol/L    Potassium 3.7 3.5 - 5.1 mmol/L    Chloride 103 97 - 108 mmol/L    CO2 23 21 - 32 mmol/L    Anion gap 11 5 - 15 mmol/L    Glucose 135 (H) 65 - 100 mg/dL    BUN 48 (H) 6 - 20 mg/dL    Creatinine 1.81 (H) 0.55 - 1.02 mg/dL    BUN/Creatinine ratio 27 (H) 12 - 20      eGFR 26 (L) >60 ml/min/1.73m2    Calcium 8.7 8.5 - 10.1 mg/dL    Phosphorus 3.0 2.6 - 4.7 mg/dL    Albumin 3.0 (L) 3.5 - 5.0 g/dL   MAGNESIUM    Collection Time: 11/13/22  2:49 PM   Result Value Ref Range    Magnesium 2.2 1.6 - 2.4 mg/dL   GLUCOSE, POC    Collection Time: 11/13/22  3:21 PM   Result Value Ref Range    Glucose (POC) 161 (H) 65 - 100 mg/dL    Performed by Pura aMradiaga    GLUCOSE, POC    Collection Time: 11/13/22  8:35 PM   Result Value Ref Range    Glucose (POC) 195 (H) 65 - 100 mg/dL    Performed by Marco Abdi    GLUCOSE, POC    Collection Time: 11/14/22  8:16 AM   Result Value Ref Range    Glucose (POC) 199 (H) 65 - 100 mg/dL    Performed by Alma Zambrano      [unfilled]      Review of Systems    Constitutional: Negative for chills and fever. HENT: Negative. Eyes: Negative. Respiratory: Negative. Cardiovascular: Negative. Gastrointestinal: Negative for abdominal pain and nausea. Skin: Negative. Neurological: Negative. Physical Exam:      Constitutional: pt is oriented to person, place, and time. HENT:   Head: Normocephalic and atraumatic. Eyes: Pupils are equal, round, and reactive to light. EOM are normal.   Cardiovascular: Normal rate, regular rhythm and normal heart sounds. Pulmonary/Chest: Mild wheezing present. Abdominal: Soft. Bowel sounds are normal. There is no abdominal tenderness. There is no rebound and no guarding. Musculoskeletal: Normal range of motion. Neurological: pt is alert and oriented to person, place, and time. PVD: no peripheral edema     XR CHEST PORT   Final Result   Interstitial edema pattern. .  .          US RETROPERITONEUM COMP    (Results Pending)        Recent Results (from the past 24 hour(s))   GLUCOSE, POC    Collection Time: 11/13/22 11:15 AM   Result Value Ref Range    Glucose (POC) 142 (H) 65 - 100 mg/dL    Performed by Pura Maradiaga    RENAL FUNCTION PANEL    Collection Time: 11/13/22  2:49 PM   Result Value Ref Range    Sodium 137 136 - 145 mmol/L    Potassium 3.7 3.5 - 5.1 mmol/L    Chloride 103 97 - 108 mmol/L    CO2 23 21 - 32 mmol/L    Anion gap 11 5 - 15 mmol/L    Glucose 135 (H) 65 - 100 mg/dL    BUN 48 (H) 6 - 20 mg/dL    Creatinine 1.81 (H) 0.55 - 1.02 mg/dL    BUN/Creatinine ratio 27 (H) 12 - 20      eGFR 26 (L) >60 ml/min/1.73m2    Calcium 8.7 8.5 - 10.1 mg/dL    Phosphorus 3.0 2.6 - 4.7 mg/dL    Albumin 3.0 (L) 3.5 - 5.0 g/dL   MAGNESIUM    Collection Time: 11/13/22  2:49 PM   Result Value Ref Range    Magnesium 2.2 1.6 - 2.4 mg/dL   GLUCOSE, POC    Collection Time: 11/13/22  3:21 PM   Result Value Ref Range    Glucose (POC) 161 (H) 65 - 100 mg/dL    Performed by Daryl Mukherjee    GLUCOSE, POC    Collection Time: 11/13/22  8:35 PM   Result Value Ref Range    Glucose (POC) 195 (H) 65 - 100 mg/dL    Performed by Jean Carlos Banuelos    GLUCOSE, POC    Collection Time: 11/14/22  8:16 AM   Result Value Ref Range    Glucose (POC) 199 (H) 65 - 100 mg/dL    Performed by Jacquelyn Tucker        Results       Procedure Component Value Units Date/Time    CULTURE, URINE [[id number]] Collected: 11/11/22 0234    Order Status: Completed Specimen: Urine Updated: 11/13/22 0721     Special Requests: --        No Special Requests  Reflexed from [id number]       Culture result: No Growth (<1000 cfu/mL)       CULTURE, BLOOD, PAIRED [[id number]] Collected: 11/10/22 2117    Order Status: Completed Specimen: Blood Updated: 11/14/22 0121     Special Requests: No Special Requests        Culture result: No growth 4 days       INFLUENZA A & B AG (RAPID TEST) [[id number]] Collected: 11/10/22 2117    Order Status: Canceled Specimen: Nasopharyngeal from Nasal washing     COVID-19 RAPID TEST [[id number]] Collected: 11/10/22 2117    Order Status: Completed Specimen: Nasopharyngeal Updated: 11/10/22 2227     COVID-19 rapid test Not Detected        Comment: Rapid Abbott ID Now   The specimen is NEGATIVE for SARS-CoV2, the novel coronavirus associated with COVID-19. A negative result does not rule out COVID-19.    This test has been authorized by the FDA under an Emergency Use Authorization (EUA) for use by authorized laboratories. Fact sheet for Healthcare Providers:  ConventionUpdate.co.nz Fact sheet for Patients: ConventionUpdate.co.nz   Methodology: Isothermal Nucleic Acid Amplification       INFLUENZA A & B AG (RAPID TEST) [[id number]] Collected: 11/10/22 2117    Order Status: Completed Specimen: Nasal washing Updated: 11/10/22 2227     Influenza A Antigen Negative        Influenza B Antigen Negative                Labs:     No results for input(s): WBC, HGB, HCT, PLT, HGBEXT, HCTEXT, PLTEXT, HGBEXT, HCTEXT, PLTEXT in the last 72 hours. Recent Labs     11/13/22  1449      K 3.7      CO2 23   BUN 48*   CREA 1.81*   *   CA 8.7   MG 2.2   PHOS 3.0       Recent Labs     11/13/22  1449   ALB 3.0*       No results for input(s): INR, PTP, APTT, INREXT, INREXT in the last 72 hours. No results for input(s): FE, TIBC, PSAT, FERR in the last 72 hours. No results found for: FOL, RBCF   No results for input(s): PH, PCO2, PO2 in the last 72 hours. No results for input(s): CPK, CKNDX, TROIQ in the last 72 hours.     No lab exists for component: CPKMB  No results found for: CHOL, CHOLX, CHLST, CHOLV, HDL, HDLP, LDL, LDLC, DLDLP, TGLX, TRIGL, TRIGP, CHHD, CHHDX  Lab Results   Component Value Date/Time    Glucose (POC) 199 (H) 11/14/2022 08:16 AM    Glucose (POC) 195 (H) 11/13/2022 08:35 PM    Glucose (POC) 161 (H) 11/13/2022 03:21 PM    Glucose (POC) 142 (H) 11/13/2022 11:15 AM    Glucose (POC) 180 (H) 11/13/2022 08:11 AM     Lab Results   Component Value Date/Time    Color Yellow/Straw 11/11/2022 02:34 AM    Appearance Clear 11/11/2022 02:34 AM    Specific gravity 1.008 11/11/2022 02:34 AM    Specific gravity 1.013 01/04/2022 11:00 AM    pH (UA) 5.0 11/11/2022 02:34 AM    Protein Negative 11/11/2022 02:34 AM    Glucose Negative 11/11/2022 02:34 AM    Ketone Negative 11/11/2022 02:34 AM    Bilirubin Negative 11/11/2022 02:34 AM Urobilinogen 0.1 11/11/2022 02:34 AM    Nitrites Negative 11/11/2022 02:34 AM    Leukocyte Esterase Negative 11/11/2022 02:34 AM    Bacteria Negative 11/11/2022 02:34 AM    WBC 0-5 11/11/2022 02:34 AM    RBC 0-5 11/11/2022 02:34 AM         Assessment:     Acute systolic and diastolic heart failure  Asthma  Hypertension  Hypothyroidism  Hyperlipidemia  COPD  Type 2 diabetes  Coronary artery disease   Renal insufficiency  History of old myocardial infarction      Plan:     Continue current medications repeat the labs  PT OT consult  Cardiology consult appreciated   Nephrology consult appreciated, reports patient probably has stage 3 chronic kidney disease related to nephrosclerosis. Ultrasound on 11/14 showed no hydronephrosis or stones.     Possible discharge in next 24 hours    Albuterol  Aspirin 81 mg PO daily  Atorvastatin 40 mg PO daily  Pulmicort nebulizer  Furosemide 40 mg IV daily  Heparin 5000U injection daily  Levothyroxine 100 mcg PO daily  Metoprolol succinate 50 mg PO daily  Prenisone 10 mg PO daily      Current Facility-Administered Medications:     albuterol-ipratropium (DUO-NEB) 2.5 MG-0.5 MG/3 ML, 3 mL, Nebulization, BID RT, Haider Rhodes MD, 3 mL at 11/14/22 0726    heparin (porcine) injection 5,000 Units, 5,000 Units, SubCUTAneous, Q12H, Raphael Villanueva MD, 5,000 Units at 11/13/22 2103    budesonide (PULMICORT) 500 mcg/2 ml nebulizer suspension, 500 mcg, Nebulization, BID RT, Haider Rhodes MD, 500 mcg at 11/14/22 0726    melatonin tablet 5 mg, 5 mg, Oral, QHS, Raphael Villanueva MD, 5 mg at 11/13/22 2103    furosemide (LASIX) injection 40 mg, 40 mg, IntraVENous, DAILY, Alessandro Bhatt MD, 40 mg at 11/13/22 0846    sodium chloride (NS) flush 5-10 mL, 5-10 mL, IntraVENous, PRN, Raphael Villanueva MD, 10 mL at 11/10/22 2123    albuterol CONCENTRATE 2.5mg/0.5 mL neb soln, 2.5 mg, Inhalation, Q6H PRN, Raphael Villanueva MD    albuterol-ipratropium (DUO-NEB) 2.5 MG-0.5 MG/3 ML, 3 mL, Nebulization, Q6H PRN, Raphale Villanueva MD, 3 mL at 11/11/22 0107    aspirin chewable tablet 81 mg, 81 mg, Oral, DAILY, Raphael Villanueva MD, 81 mg at 11/13/22 0847    atorvastatin (LIPITOR) tablet 40 mg, 40 mg, Oral, QHS, Raphael Villanueva MD, 40 mg at 08/43/95 2668    folic acid (FOLVITE) tablet 1 mg, 1 mg, Oral, DAILY, Raphael Villanueva MD, 1 mg at 11/13/22 0847    levothyroxine (SYNTHROID) tablet 100 mcg, 100 mcg, Oral, DAILY, Raphael Villanueva MD, 100 mcg at 11/13/22 0847    metoprolol succinate (TOPROL-XL) XL tablet 50 mg, 50 mg, Oral, DAILY, Raphael Villanueva MD, 50 mg at 11/13/22 0847    insulin lispro (HUMALOG) injection, , SubCUTAneous, AC&HS, Raphael Villanueva MD, 3 Units at 11/13/22 1610    glucose chewable tablet 16 g, 4 Tablet, Oral, PRN, Parth Villanueva MD    glucagon (GLUCAGEN) injection 1 mg, 1 mg, IntraMUSCular, PRN, Parth Villanueva MD    acetaminophen (TYLENOL) tablet 650 mg, 650 mg, Oral, Q6H PRN, 650 mg at 11/11/22 0226 **OR** acetaminophen (TYLENOL) suppository 650 mg, 650 mg, Rectal, Q6H PRN, Raphael Villanueva MD    polyethylene glycol (MIRALAX) packet 17 g, 17 g, Oral, DAILY PRN, Raphael Villanueva MD    ondansetron (ZOFRAN ODT) tablet 4 mg, 4 mg, Oral, Q8H PRN **OR** ondansetron (ZOFRAN) injection 4 mg, 4 mg, IntraVENous, Q6H PRN, Raphael Villanueva MD    methylPREDNISolone (PF) (SOLU-MEDROL) injection 40 mg, 40 mg, IntraVENous, Q6H, Raphael Villanueva MD, 40 mg at 11/14/22 0103    cefTRIAXone (ROCEPHIN) 1 g in sterile water (preservative free) 10 mL IV syringe, 1 g, IntraVENous, Q24H, Raphael Villanueva MD, 1 g at 11/13/22 6838

## 2022-11-14 NOTE — PROGRESS NOTES
Patient will discharge home self care. She has 24 hour HHA that remains with her. Also while at hospital. Daughter declined Alek Figueredo.

## 2022-11-15 VITALS
HEART RATE: 60 BPM | BODY MASS INDEX: 24.78 KG/M2 | WEIGHT: 148.7 LBS | DIASTOLIC BLOOD PRESSURE: 55 MMHG | SYSTOLIC BLOOD PRESSURE: 133 MMHG | HEIGHT: 65 IN | TEMPERATURE: 99.3 F | RESPIRATION RATE: 20 BRPM | OXYGEN SATURATION: 100 %

## 2022-11-15 LAB
ALBUMIN SERPL-MCNC: 3 G/DL (ref 3.5–5)
ANION GAP SERPL CALC-SCNC: 9 MMOL/L (ref 5–15)
BUN SERPL-MCNC: 46 MG/DL (ref 6–20)
BUN/CREAT SERPL: 31 (ref 12–20)
CA-I BLD-MCNC: 8.4 MG/DL (ref 8.5–10.1)
CHLORIDE SERPL-SCNC: 103 MMOL/L (ref 97–108)
CO2 SERPL-SCNC: 24 MMOL/L (ref 21–32)
CREAT SERPL-MCNC: 1.48 MG/DL (ref 0.55–1.02)
GLUCOSE BLD STRIP.AUTO-MCNC: 137 MG/DL (ref 65–100)
GLUCOSE BLD STRIP.AUTO-MCNC: 154 MG/DL (ref 65–100)
GLUCOSE SERPL-MCNC: 142 MG/DL (ref 65–100)
PERFORMED BY, TECHID: ABNORMAL
PERFORMED BY, TECHID: ABNORMAL
PHOSPHATE SERPL-MCNC: 3.1 MG/DL (ref 2.6–4.7)
POTASSIUM SERPL-SCNC: 3.6 MMOL/L (ref 3.5–5.1)
SODIUM SERPL-SCNC: 136 MMOL/L (ref 136–145)

## 2022-11-15 PROCEDURE — 82962 GLUCOSE BLOOD TEST: CPT

## 2022-11-15 PROCEDURE — 94640 AIRWAY INHALATION TREATMENT: CPT

## 2022-11-15 PROCEDURE — 74011636637 HC RX REV CODE- 636/637: Performed by: INTERNAL MEDICINE

## 2022-11-15 PROCEDURE — 80069 RENAL FUNCTION PANEL: CPT

## 2022-11-15 PROCEDURE — 74011000250 HC RX REV CODE- 250: Performed by: INTERNAL MEDICINE

## 2022-11-15 PROCEDURE — 74011250636 HC RX REV CODE- 250/636: Performed by: FAMILY MEDICINE

## 2022-11-15 PROCEDURE — 36415 COLL VENOUS BLD VENIPUNCTURE: CPT

## 2022-11-15 PROCEDURE — 74011250637 HC RX REV CODE- 250/637: Performed by: FAMILY MEDICINE

## 2022-11-15 PROCEDURE — 97530 THERAPEUTIC ACTIVITIES: CPT

## 2022-11-15 PROCEDURE — 74011636637 HC RX REV CODE- 636/637: Performed by: FAMILY MEDICINE

## 2022-11-15 RX ORDER — PREDNISONE 10 MG/1
10 TABLET ORAL
Qty: 10 TABLET | Refills: 0 | Status: SHIPPED | OUTPATIENT
Start: 2022-11-16

## 2022-11-15 RX ORDER — BUDESONIDE 0.5 MG/2ML
500 INHALANT ORAL 2 TIMES DAILY
Qty: 1 EACH | Refills: 2 | Status: SHIPPED | OUTPATIENT
Start: 2022-11-15

## 2022-11-15 RX ORDER — FUROSEMIDE 40 MG/1
TABLET ORAL
Qty: 60 TABLET | Refills: 0 | Status: SHIPPED | OUTPATIENT
Start: 2022-11-16

## 2022-11-15 RX ADMIN — LEVOTHYROXINE SODIUM 100 MCG: 0.1 TABLET ORAL at 09:25

## 2022-11-15 RX ADMIN — IPRATROPIUM BROMIDE AND ALBUTEROL SULFATE 3 ML: .5; 2.5 SOLUTION RESPIRATORY (INHALATION) at 07:12

## 2022-11-15 RX ADMIN — PREDNISONE 10 MG: 5 TABLET ORAL at 09:25

## 2022-11-15 RX ADMIN — ASPIRIN 81 MG 81 MG: 81 TABLET ORAL at 09:25

## 2022-11-15 RX ADMIN — FOLIC ACID 1 MG: 1 TABLET ORAL at 09:25

## 2022-11-15 RX ADMIN — HEPARIN SODIUM 5000 UNITS: 5000 INJECTION INTRAVENOUS; SUBCUTANEOUS at 09:25

## 2022-11-15 RX ADMIN — INSULIN LISPRO 3 UNITS: 100 INJECTION, SOLUTION INTRAVENOUS; SUBCUTANEOUS at 12:28

## 2022-11-15 RX ADMIN — BUDESONIDE 500 MCG: 0.5 INHALANT ORAL at 07:12

## 2022-11-15 RX ADMIN — METOPROLOL SUCCINATE 50 MG: 50 TABLET, EXTENDED RELEASE ORAL at 09:25

## 2022-11-15 RX ADMIN — FUROSEMIDE 40 MG: 40 TABLET ORAL at 09:25

## 2022-11-15 NOTE — PROGRESS NOTES
PULMONARY NOTE  [facility]    Name: Milo Michael MRN: [id number]   : 1930 Hospital: [facility]   Date: 11/15/2022  Admission date: 11/10/2022 Hospital Day: 6       HPI:     Hospital Problems  Date Reviewed: 2020            Codes Class Noted POA    Acute congestive heart failure ([facility] ICD-10-CM: I50.9  ICD-9-CM: 428.0  11/10/2022 Unknown        CHF (congestive heart failure) ([facility] ICD-10-CM: I50.9  ICD-9-CM: 428.0  11/10/2022 Unknown          [x] High complexity decision making was performed  [x] See my orders for details      Subjective/Initial History:     I was asked by Kenroy Mehta MD to see Milo Michael  a 80 y.o.    female in consultation     Excerpts from admission 11/10/2022 or consult notes as follows:   35-year-old lady came in because of shortness of breath past medical history of asthma hypertension coronary artery disease hypothyroidism she is hard of hearing she has hearing loss came to the hospital because of shortness of breath and dyspnea was getting worse, chest x-ray was done which shows some congestive changes her COVID-19 test came back negative so she is admitted and pulmonary consult was called      No Known Allergies     MAR reviewed and pertinent medications noted or modified as needed     Current Facility-Administered Medications   Medication    predniSONE (DELTASONE) tablet 10 mg    furosemide (LASIX) tablet 40 mg    albuterol-ipratropium (DUO-NEB) 2.5 MG-0.5 MG/3 ML    heparin (porcine) injection 5,000 Units    budesonide (PULMICORT) 500 mcg/2 ml nebulizer suspension    melatonin tablet 5 mg    sodium chloride (NS) flush 5-10 mL    albuterol CONCENTRATE 2.5mg/0.5 mL neb soln    albuterol-ipratropium (DUO-NEB) 2.5 MG-0.5 MG/3 ML    aspirin chewable tablet 81 mg    atorvastatin (LIPITOR) tablet 40 mg    folic acid (FOLVITE) tablet 1 mg    levothyroxine (SYNTHROID) tablet 100 mcg    metoprolol succinate (TOPROL-XL) XL tablet 50 mg insulin lispro (HUMALOG) injection    glucose chewable tablet 16 g    glucagon (GLUCAGEN) injection 1 mg    acetaminophen (TYLENOL) tablet 650 mg    Or    acetaminophen (TYLENOL) suppository 650 mg    polyethylene glycol (MIRALAX) packet 17 g    ondansetron (ZOFRAN ODT) tablet 4 mg    Or    ondansetron (ZOFRAN) injection 4 mg      Patient PCP: None  PMH:  has a past medical history of Anemia, Chronic obstructive pulmonary disease ([facility] Heart attack ([facility] Hypertension, and Thyroid disease. PSH:   has a past surgical history that includes hx orthopaedic (2018) and hx pacemaker. FHX: family history includes Heart Disease in her father and mother; Stroke in her father and mother. SHX:  reports that she has never smoked. She has never used smokeless tobacco. She reports that she does not drink alcohol and does not use drugs. ROS:    Review of Systems   Constitutional:  Positive for malaise/fatigue. HENT: Negative. Eyes: Negative. Respiratory:  Positive for cough, shortness of breath and wheezing. Cardiovascular: Negative. Gastrointestinal: Negative. Genitourinary: Negative. Musculoskeletal: Negative. Skin: Negative. Neurological: Negative. Psychiatric/Behavioral: Negative.         Objective:     Vital Signs: Telemetry:    normal sinus rhythm Intake/Output:   Visit Vitals  BP (!) 133/55 (BP 1 Location: Left upper arm, BP Patient Position: At rest;Lying)   Pulse 60   Temp 99.3 °F (37.4 °C)   Resp 20   Ht 5' 5\" (1.651 m)   Wt 67.4 kg (148 lb 11.2 oz)   SpO2 100%   BMI 24.74 kg/m²       Temp (24hrs), Av.1 °F (36.7 °C), Min:97.4 °F (36.3 °C), Max:99.3 °F (37.4 °C)        O2 Device: None (Room air) O2 Flow Rate (L/min): 2 l/min       Wt Readings from Last 4 Encounters:   11/15/22 67.4 kg (148 lb 11.2 oz)   22 67.4 kg (148 lb 9.4 oz)   21 64.4 kg (141 lb 15.6 oz)   21 68 kg (150 lb)        No intake or output data in the 24 hours ending 11/15/22 1024      Last shift: No intake/output data recorded. Last 3 shifts: No intake/output data recorded. Physical Exam:     Physical Exam  Constitutional:       Appearance: Normal appearance. HENT:      Head: Normocephalic and atraumatic. Nose: Nose normal.      Mouth/Throat:      Mouth: Mucous membranes are moist.   Eyes:      Pupils: Pupils are equal, round, and reactive to light. Cardiovascular:      Rate and Rhythm: Normal rate. Pulses: Normal pulses. Heart sounds: Normal heart sounds. Pulmonary:      Effort: Pulmonary effort is normal.      Breath sounds: Wheezing present. Comments: Coarse breath sound expiratory wheeze mostly upper airway  Abdominal:      General: Abdomen is flat. Bowel sounds are normal.      Palpations: Abdomen is soft. Musculoskeletal:         General: Swelling present. Normal range of motion. Cervical back: Normal range of motion. Skin:     General: Skin is warm. Neurological:      General: No focal deficit present. Mental Status: She is alert. Psychiatric:         Mood and Affect: Mood normal.        Labs:    Recent Labs     11/14/22  0940   WBC 12.8*   HGB 10.6*          Recent Labs     11/14/22  0940 11/13/22  1449    137   K 3.7 3.7    103   CO2 24 23   * 135*   BUN 51* 48*   CREA 1.80* 1.81*   CA 8.7 8.7   MG  --  2.2   PHOS 3.4 3.0   ALB 3.0* 3.0*       No results for input(s): PH, PCO2, PO2, HCO3, FIO2 in the last 72 hours. No results for input(s): CPK, CKNDX, TROIQ in the last 72 hours. No lab exists for component: CPKMB  No results found for: BNPP, BNP   Lab Results   Component Value Date/Time    Culture result: No Growth (<1000 cfu/mL) 11/11/2022 02:34 AM    Culture result: No growth 5 days 11/10/2022 09:17 PM     No results found for: TSH, TSHEXT, TSHEXT    Imaging:    CXR Results  (Last 48 hours)                 11/14/22 1201  XR CHEST PORT Final result    Impression:  1.  No acute disease           Narrative: INDICATION:  chf        Exam: Portable chest 1145. Comparison: 11/10/2022. Findings: Cardiomediastinal silhouette is within normal limits. Pulmonary   vasculature is not engorged. There are no focal parenchymal opacities,   effusions, or pneumothorax. Results from [place] encounter on 11/10/22    XR CHEST PORT    Narrative  INDICATION:  chf    Exam: Portable chest 1145. Comparison: 11/10/2022. Findings: Cardiomediastinal silhouette is within normal limits. Pulmonary  vasculature is not engorged. There are no focal parenchymal opacities,  effusions, or pneumothorax. Impression  1. No acute disease      XR CHEST PORT    Narrative  INDICATION:  sob copd    EXAM: Chest single view. COMPARISON: 1/4/2022. FINDINGS: A single frontal view of the chest at 2017 hours shows diffuse  interstitial infiltrate new since the prior study. No focal consolidation. .  The  heart, mediastinum and pulmonary vasculature are are stable . The bony thorax  is unremarkable for age. .    Impression  Interstitial edema pattern. .  . Results from [place] encounter on 01/04/22    XR CHEST PORT    Narrative  1 new comparison September 20. Impression  The cardiomediastinal silhouette is appropriate for age, technique,  and lung expansion. Pulmonary vasculature is not congested. The lungs are  essentially clear. No effusion or pneumothorax is seen. Results from [place] encounter on 01/04/22    CT ABD PELV W CONT    Narrative  CT abdomen and pelvis without IV contrast    Comparison CT abdomen and pelvis November 19, 2019. Axial images are reviewed along with reformatted sagittal/coronal images. 100 mL  Isovue 370 administered. Motion limited study.   Dose reduction: All CT scans at this facility are performed using dose reduction  optimization techniques as appropriate to a performed exam including the  following-  automated exposure control, adjustments of mA and/or Kv according to patient  size, or use of iterative reconstructive technique. Lung bases are clear. .    Normal enhancement of the liver. Gallbladder distention. Small gallstones noted  dependently within the gallbladder lumen. Pancreas, spleen, and bilateral  adrenal glands appear unremarkable. Cortical thinning bilateral kidneys. No hydronephrosis. Stomach and small bowel loops are decompressed. Appendix unchanged. There is  stool and air through colon. Distal descending and sigmoid colon diverticula. No  CT evidence for appendicitis or diverticulitis. No ascites. Unchanged uterine calcification likely related to small degenerated uterine  fibroid. Dense intimal calcification normal caliber abdominal aorta, advanced abdominal  aorta atherosclerosis. Atherosclerosis extends to proximal abdominal aorta  branch vessels and pelvic arteries. Left hip hardware. Impression  No bowel obstruction. Colonic diverticulosis. No CT evidence for  appendicitis or diverticulitis. Unchanged appearance appendix. Gallbladder distention. Few small dependent gallstones within gallbladder lumen. Cortical atrophy each kidney. No hydronephrosis. Advanced atherosclerotic change abdominal aorta and pelvic arteries. No  abdominal aortic aneurysm. Left hip hardware. IMPRESSION:   Exacerbation of CHF diastolic dysfunction  Asthma with exacerbation  Hypertension hypothyroidism by history  Profound significant hearing loss  [facility]      RECOMMENDATIONS/PLAN:     19-year-old lady came in because of shortness of breath dyspnea and shows fluid overload elevated BNP  On Lasix daily dose  On prednisone and nebulizer treatment  Patient on Pulmicort  On Rocephin        11/13 Pt resting in bed. She is on room air, currently not on O2 at the hospital, SpO2 98%. 11/14 Pt is resting in bed, she recently returned from ultrasound. Ultrasound showed no hydronephrosis or stones. She is on room air, SpO2 95%.   Getting physical therapy. 11/15 Pt is awake alert, resting in bed with her aide at her side. She is on room air, with an SpO2 100%. NT-PRO BNP 4,880.     Lucy Dillard

## 2022-11-15 NOTE — PROGRESS NOTES
Problem: Falls - Risk of  Goal: *Absence of Falls  Description: Document Kathy Elliott Fall Risk and appropriate interventions in the flowsheet.   Outcome: Progressing Towards Goal  Note: Fall Risk Interventions:  Mobility Interventions: Bed/chair exit alarm    Mentation Interventions: Bed/chair exit alarm    Medication Interventions: Bed/chair exit alarm    Elimination Interventions: Bed/chair exit alarm

## 2022-11-15 NOTE — PROGRESS NOTES
Renal Progress Note    Patient: Milo Michael MRN: [id number]  SSN: [id number]    YOB: 1930  Age: 80 y.o. Sex: female      Admit Date: 11/10/2022    LOS: 5 days     Subjective:   Patient seen at bedside. awake, no distress.    Demented, no meaningful conversations  Repeat labs showed a creatinine of 1.48  No evidence of any fluid overload  No shortness of breath        Current Facility-Administered Medications   Medication Dose Route Frequency    predniSONE (DELTASONE) tablet 10 mg  10 mg Oral DAILY WITH BREAKFAST    furosemide (LASIX) tablet 40 mg  40 mg Oral DAILY    albuterol-ipratropium (DUO-NEB) 2.5 MG-0.5 MG/3 ML  3 mL Nebulization BID RT    heparin (porcine) injection 5,000 Units  5,000 Units SubCUTAneous Q12H    budesonide (PULMICORT) 500 mcg/2 ml nebulizer suspension  500 mcg Nebulization BID RT    melatonin tablet 5 mg  5 mg Oral QHS    sodium chloride (NS) flush 5-10 mL  5-10 mL IntraVENous PRN    albuterol CONCENTRATE 2.5mg/0.5 mL neb soln  2.5 mg Inhalation Q6H PRN    albuterol-ipratropium (DUO-NEB) 2.5 MG-0.5 MG/3 ML  3 mL Nebulization Q6H PRN    aspirin chewable tablet 81 mg  81 mg Oral DAILY    atorvastatin (LIPITOR) tablet 40 mg  40 mg Oral QHS    folic acid (FOLVITE) tablet 1 mg  1 mg Oral DAILY    levothyroxine (SYNTHROID) tablet 100 mcg  100 mcg Oral DAILY    metoprolol succinate (TOPROL-XL) XL tablet 50 mg  50 mg Oral DAILY    insulin lispro (HUMALOG) injection   SubCUTAneous AC&HS    glucose chewable tablet 16 g  4 Tablet Oral PRN    glucagon (GLUCAGEN) injection 1 mg  1 mg IntraMUSCular PRN    acetaminophen (TYLENOL) tablet 650 mg  650 mg Oral Q6H PRN    Or    acetaminophen (TYLENOL) suppository 650 mg  650 mg Rectal Q6H PRN    polyethylene glycol (MIRALAX) packet 17 g  17 g Oral DAILY PRN    ondansetron (ZOFRAN ODT) tablet 4 mg  4 mg Oral Q8H PRN    Or    ondansetron (ZOFRAN) injection 4 mg  4 mg IntraVENous Q6H PRN     Current Outpatient Medications   Medication Sig [START ON 11/16/2022] predniSONE (DELTASONE) 10 mg tablet Take 10 mg by mouth daily (with breakfast). budesonide (PULMICORT) 0.5 mg/2 mL nbsp 2 mL by Nebulization route two (2) times a day. [START ON 11/16/2022] furosemide (LASIX) 40 mg tablet 1 tab poqd    metoprolol succinate (TOPROL-XL) 50 mg XL tablet Take 1 Tablet by mouth daily. folic acid (FOLVITE) 1 mg tablet TAKE 1 TABLET BY MOUTH EVERY DAY    levothyroxine (SYNTHROID) 100 mcg tablet TAKE 1 TABLET BY MOUTH EVERY DAY    albuterol-ipratropium (DUO-NEB) 2.5 mg-0.5 mg/3 ml nebu 3 mL by Nebulization route every six (6) hours as needed for Wheezing. aspirin 81 mg chewable tablet Take 1 Tablet by mouth daily. atorvastatin (LIPITOR) 40 mg tablet Take 1 Tablet by mouth nightly. albuterol (PROVENTIL VENTOLIN) 2.5 mg /3 mL (0.083 %) nebu Take 3 mL by inhalation every six (6) hours as needed. Vitals:    11/14/22 2005 11/15/22 0400 11/15/22 0713 11/15/22 0820   BP: (!) 132/55   (!) 133/55   Pulse: 60   60   Resp: 18   20   Temp: 97.4 °F (36.3 °C)   99.3 °F (37.4 °C)   SpO2: 96%  96% 100%   Weight:  67.4 kg (148 lb 11.2 oz)     Height:         Objective:   General: alert awake , no acute distress. HEENT: EOMI, no Icterus, no Pallor, pupils reactive, mucosa dry normal inspection of ears and nose, throat clear. Neck: Neck is supple, No JVD, no thyromegaly, . Lungs: breathsounds normal,  no rhonchi, no rales,  CVS: heart sounds normal, no murmurs, no rubs. GI: soft, nontender, normal BS, no palpable organomegaly, no renal angle tenderness. Extremeties: no clubbing, no cyanosis, no edema,  Neuro: Alert, awake, minimal verbal interaction, memory deficits present, moving all extremeties   Skin: normal skin turgor, no skin rashes        Intake and Output:  Current Shift: No intake/output data recorded. Last three shifts: No intake/output data recorded.       Lab/Data Review:  Recent Labs     11/14/22  0940   WBC 12.8*   HGB 10.6*   HCT 31.5*   PLT 285     Recent Labs     11/15/22  1100 11/14/22  0940 11/13/22  1449    138 137   K 3.6 3.7 3.7    104 103   CO2 24 24 23   * 205* 135*   BUN 46* 51* 48*   CREA 1.48* 1.80* 1.81*   CA 8.4* 8.7 8.7   MG  --   --  2.2   PHOS 3.1 3.4 3.0   ALB 3.0* 3.0* 3.0*     No results for input(s): PH, PCO2, PO2, HCO3, FIO2 in the last 72 hours. Recent Results (from the past 24 hour(s))   GLUCOSE, POC    Collection Time: 11/14/22  4:06 PM   Result Value Ref Range    Glucose (POC) 182 (H) 65 - 100 mg/dL    Performed by [address] [place] [id number], POC    Collection Time: 11/14/22  8:25 PM   Result Value Ref Range    Glucose (POC) 129 (H) 65 - 100 mg/dL    Performed by [address] [place] [address], POC    Collection Time: 11/15/22  8:18 AM   Result Value Ref Range    Glucose (POC) 137 (H) 65 - 100 mg/dL    Performed by [facility]    RENAL FUNCTION PANEL    Collection Time: 11/15/22 11:00 AM   Result Value Ref Range    Sodium 136 136 - 145 mmol/L    Potassium 3.6 3.5 - 5.1 mmol/L    Chloride 103 97 - 108 mmol/L    CO2 24 21 - 32 mmol/L    Anion gap 9 5 - 15 mmol/L    Glucose 142 (H) 65 - 100 mg/dL    BUN 46 (H) 6 - 20 mg/dL    Creatinine 1.48 (H) 0.55 - 1.02 mg/dL    BUN/Creatinine ratio 31 (H) 12 - 20      eGFR 33 (L) >60 ml/min/1.73m2    Calcium 8.4 (L) 8.5 - 10.1 mg/dL    Phosphorus 3.1 2.6 - 4.7 mg/dL    Albumin 3.0 (L) 3.5 - 5.0 g/dL   GLUCOSE, POC    Collection Time: 11/15/22 11:05 AM   Result Value Ref Range    Glucose (POC) 154 (H) 65 - 100 mg/dL    Performed by Whit Hernandez and Plan:     1. Acute Kidney Injury on ? CKD: probably prerenal azotemia secondary to use of Lasix and ?cardiorenal  decreased diuretics to Lasix 40 mg once daily  Repeat labs showed improvement in creatinine of 1.80--1,48 today  will continue to monitor renal functions and adjust management as needed    2. Chronic kidney disease: probably has stage 3 chronic kidney disease related to nephrosclerosis.   Patient had a baseline creatinine of 1.1-1.2 in January 2022  No significant proteinuria on urine PCR    3. Renal osteodystrophy:   Calcium and phosphorus are within normal limits    3. Shortness of breath/suspected congestive heart failure :  No significant edema noted  decreased Lasix to once daily and continue to monitor inputs and outputs  No evidence of any fluid overload    History of asthma/COPD: Seen by pulmonary, on bronchodilators and IV steroids     4. hypertension: Fluctuating blood pressures  Stable now, continue metoprolol and monitor blood pressures    5. Hypokalemia: Secondary to diuretics  Supplemented p.o. potassium, potassium at 3.7  Continue to monitor potassium levels     6.  Anemia: Probably related to chronic kidney disease  Continue to monitor H&H    Okay to discharge home today      Signed By: Kandace Lopez MD     November 15, 2022

## 2022-11-15 NOTE — PROGRESS NOTES
PULMONARY NOTE  [facility]    Name: Nataliya Wilson MRN: [id number]   : 1930 Hospital: [facility]   Date: 11/15/2022  Admission date: 11/10/2022 Hospital Day: 6       HPI:     Hospital Problems  Date Reviewed: 2020            Codes Class Noted POA    Acute congestive heart failure ([facility] [address] ICD-10-CM: I50.9  ICD-9-CM: 428.0  11/10/2022 Unknown        CHF (congestive heart failure) ([facility] [address] ICD-10-CM: I50.9  ICD-9-CM: 428.0  11/10/2022 Unknown          [x] High complexity decision making was performed  [x] See my orders for details      Subjective/Initial History:     I was asked by Del Feliciano MD to see Nataliya Wilson  a 80 y.o.    female in consultation     Excerpts from admission 11/10/2022 or consult notes as follows:   80-year-old lady came in because of shortness of breath past medical history of asthma hypertension coronary artery disease hypothyroidism she is hard of hearing she has hearing loss came to the hospital because of shortness of breath and dyspnea was getting worse, chest x-ray was done which shows some congestive changes her COVID-19 test came back negative so she is admitted and pulmonary consult was called      No Known Allergies     MAR reviewed and pertinent medications noted or modified as needed     Current Facility-Administered Medications   Medication    predniSONE (DELTASONE) tablet 10 mg    furosemide (LASIX) tablet 40 mg    albuterol-ipratropium (DUO-NEB) 2.5 MG-0.5 MG/3 ML    heparin (porcine) injection 5,000 Units    budesonide (PULMICORT) 500 mcg/2 ml nebulizer suspension    melatonin tablet 5 mg    sodium chloride (NS) flush 5-10 mL    albuterol CONCENTRATE 2.5mg/0.5 mL neb soln    albuterol-ipratropium (DUO-NEB) 2.5 MG-0.5 MG/3 ML    aspirin chewable tablet 81 mg    atorvastatin (LIPITOR) tablet 40 mg    folic acid (FOLVITE) tablet 1 mg    levothyroxine (SYNTHROID) tablet 100 mcg    metoprolol succinate (TOPROL-XL) XL tablet 50 mg insulin lispro (HUMALOG) injection    glucose chewable tablet 16 g    glucagon (GLUCAGEN) injection 1 mg    acetaminophen (TYLENOL) tablet 650 mg    Or    acetaminophen (TYLENOL) suppository 650 mg    polyethylene glycol (MIRALAX) packet 17 g    ondansetron (ZOFRAN ODT) tablet 4 mg    Or    ondansetron (ZOFRAN) injection 4 mg      Patient PCP: None  PMH:  has a past medical history of Anemia, Chronic obstructive pulmonary disease ([facility] [address] Heart attack ([facility] [address] Hypertension, and Thyroid disease. PSH:   has a past surgical history that includes hx orthopaedic (2018) and hx pacemaker. FHX: family history includes Heart Disease in her father and mother; Stroke in her father and mother. SHX:  reports that she has never smoked. She has never used smokeless tobacco. She reports that she does not drink alcohol and does not use drugs. ROS:    Review of Systems   Constitutional:  Positive for malaise/fatigue. HENT: Negative. Eyes: Negative. Respiratory:  Positive for cough, shortness of breath and wheezing. Cardiovascular: Negative. Gastrointestinal: Negative. Genitourinary: Negative. Musculoskeletal: Negative. Skin: Negative. Neurological: Negative. Psychiatric/Behavioral: Negative.         Objective:     Vital Signs: Telemetry:    normal sinus rhythm Intake/Output:   Visit Vitals  BP (!) 133/55 (BP 1 Location: Left upper arm, BP Patient Position: At rest;Lying)   Pulse 60   Temp 99.3 °F (37.4 °C)   Resp 20   Ht 5' 5\" (1.651 m)   Wt 67.4 kg (148 lb 11.2 oz)   SpO2 100%   BMI 24.74 kg/m²       Temp (24hrs), Av.1 °F (36.7 °C), Min:97.4 °F (36.3 °C), Max:99.3 °F (37.4 °C)        O2 Device: None (Room air) O2 Flow Rate (L/min): 2 l/min       Wt Readings from Last 4 Encounters:   11/15/22 67.4 kg (148 lb 11.2 oz)   22 67.4 kg (148 lb 9.4 oz)   21 64.4 kg (141 lb 15.6 oz)   21 68 kg (150 lb)        No intake or output data in the 24 hours ending 11/15/22 1029      Last shift: No intake/output data recorded. Last 3 shifts: No intake/output data recorded. Physical Exam:     Physical Exam  Constitutional:       Appearance: Normal appearance. HENT:      Head: Normocephalic and atraumatic. Nose: Nose normal.      Mouth/Throat:      Mouth: Mucous membranes are moist.   Eyes:      Pupils: Pupils are equal, round, and reactive to light. Cardiovascular:      Rate and Rhythm: Normal rate. Pulses: Normal pulses. Heart sounds: Normal heart sounds. Pulmonary:      Effort: Pulmonary effort is normal.      Breath sounds: Wheezing present. Comments: Coarse breath sound expiratory wheeze mostly upper airway  Abdominal:      General: Abdomen is flat. Bowel sounds are normal.      Palpations: Abdomen is soft. Musculoskeletal:         General: Swelling present. Normal range of motion. Cervical back: Normal range of motion. Skin:     General: Skin is warm. Neurological:      General: No focal deficit present. Mental Status: She is alert. Psychiatric:         Mood and Affect: Mood normal.        Labs:    Recent Labs     11/14/22  0940   WBC 12.8*   HGB 10.6*          Recent Labs     11/14/22  0940 11/13/22  1449    137   K 3.7 3.7    103   CO2 24 23   * 135*   BUN 51* 48*   CREA 1.80* 1.81*   CA 8.7 8.7   MG  --  2.2   PHOS 3.4 3.0   ALB 3.0* 3.0*       No results for input(s): PH, PCO2, PO2, HCO3, FIO2 in the last 72 hours. No results for input(s): CPK, CKNDX, TROIQ in the last 72 hours. No lab exists for component: CPKMB  No results found for: BNPP, BNP   Lab Results   Component Value Date/Time    Culture result: No Growth (<1000 cfu/mL) 11/11/2022 02:34 AM    Culture result: No growth 5 days 11/10/2022 09:17 PM     No results found for: TSH, TSHEXT, TSHEXT    Imaging:    CXR Results  (Last 48 hours)                 11/14/22 1201  XR CHEST PORT Final result    Impression:  1.  No acute disease           Narrative: INDICATION:  chf        Exam: Portable chest 1145. Comparison: 11/10/2022. Findings: Cardiomediastinal silhouette is within normal limits. Pulmonary   vasculature is not engorged. There are no focal parenchymal opacities,   effusions, or pneumothorax. Results from [place] encounter on 11/10/22    XR CHEST PORT    Narrative  INDICATION:  chf    Exam: Portable chest 1145. Comparison: 11/10/2022. Findings: Cardiomediastinal silhouette is within normal limits. Pulmonary  vasculature is not engorged. There are no focal parenchymal opacities,  effusions, or pneumothorax. Impression  1. No acute disease      XR CHEST PORT    Narrative  INDICATION:  sob copd    EXAM: Chest single view. COMPARISON: 1/4/2022. FINDINGS: A single frontal view of the chest at 2017 hours shows diffuse  interstitial infiltrate new since the prior study. No focal consolidation. .  The  heart, mediastinum and pulmonary vasculature are are stable . The bony thorax  is unremarkable for age. .    Impression  Interstitial edema pattern. .  . Results from [place] encounter on 01/04/22    XR CHEST PORT    Narrative  1 new comparison September 20. Impression  The cardiomediastinal silhouette is appropriate for age, technique,  and lung expansion. Pulmonary vasculature is not congested. The lungs are  essentially clear. No effusion or pneumothorax is seen. Results from [place] encounter on 01/04/22    CT ABD PELV W CONT    Narrative  CT abdomen and pelvis without IV contrast    Comparison CT abdomen and pelvis November 19, 2019. Axial images are reviewed along with reformatted sagittal/coronal images. 100 mL  Isovue 370 administered. Motion limited study.   Dose reduction: All CT scans at this facility are performed using dose reduction  optimization techniques as appropriate to a performed exam including the  following-  automated exposure control, adjustments of mA and/or Kv according to patient  size, or use of iterative reconstructive technique. Lung bases are clear. .    Normal enhancement of the liver. Gallbladder distention. Small gallstones noted  dependently within the gallbladder lumen. Pancreas, spleen, and bilateral  adrenal glands appear unremarkable. Cortical thinning bilateral kidneys. No hydronephrosis. Stomach and small bowel loops are decompressed. Appendix unchanged. There is  stool and air through colon. Distal descending and sigmoid colon diverticula. No  CT evidence for appendicitis or diverticulitis. No ascites. Unchanged uterine calcification likely related to small degenerated uterine  fibroid. Dense intimal calcification normal caliber abdominal aorta, advanced abdominal  aorta atherosclerosis. Atherosclerosis extends to proximal abdominal aorta  branch vessels and pelvic arteries. Left hip hardware. Impression  No bowel obstruction. Colonic diverticulosis. No CT evidence for  appendicitis or diverticulitis. Unchanged appearance appendix. Gallbladder distention. Few small dependent gallstones within gallbladder lumen. Cortical atrophy each kidney. No hydronephrosis. Advanced atherosclerotic change abdominal aorta and pelvic arteries. No  abdominal aortic aneurysm. Left hip hardware. IMPRESSION:   Exacerbation of CHF diastolic dysfunction  Asthma with exacerbation  Hypertension hypothyroidism by history  Profound significant hearing loss  [facility]      RECOMMENDATIONS/PLAN:     70-year-old lady came in because of shortness of breath dyspnea and shows fluid overload elevated BNP  On Lasix daily dose  On prednisone and nebulizer treatment  Patient on Pulmicort  On Rocephin        11/13 Pt resting in bed. She is on room air, currently not on O2 at the hospital, SpO2 98%. 11/14 Pt is resting in bed, she recently returned from ultrasound. Ultrasound showed no hydronephrosis or stones. She is on room air, SpO2 95%.   Getting physical therapy. 11/15 Pt is awake alert, resting in bed with her aide at her side. She is on room air, with an SpO2 100%. NT-PRO BNP 4,880.   She is on room air continue with current medication can be discharged home today or tomorrow    Sushila Jimenez MD

## 2022-11-15 NOTE — PROGRESS NOTES
General Daily Progress Note    Patient Name: Shalom Walls       YOB: 1930       Age: 80 y.o. Admit Date: 11/10/2022    Subjective:     Shalom Walls is a 80-year-old female with a history of COPD, CHF, anxiety, MI, anemia, HTN, hypothyroidism presenting for shortness of breath. According to EMS patient developed shortness of breath today. States that they noticed that she was significantly wheezing. Patient started becoming very anxious and according to family becomes very anxious when she is having shortness of breath at a point where she starts to have nausea and dry heaving. They placed her on 3 L nasal cannula for comfort but she did not have any desaturation. Work-up done in the ER shows acute congestive heart failure last echo showed ejection fraction of 40 to 88% and diastolic dysfunction. Patient states she has been having chest tightness and shortness of breath with wheezing. 11/14 Patient seen working with PT. Caretaker present. No SOB or cough. No nausea or vomiting. WBC 12.8   Creatinine 1.8  BUN 51    11/15 Awake and alert, resting comfortably, caretaker by bedside. No SOB or cough. She is on room air, with an SpO2 100%.     Labs this AM pending      Objective:     Visit Vitals  BP (!) 133/55 (BP 1 Location: Left upper arm, BP Patient Position: At rest;Lying)   Pulse 60   Temp 99.3 °F (37.4 °C)   Resp 20   Ht 5' 5\" (1.651 m)   Wt 67.4 kg (148 lb 11.2 oz)   SpO2 100%   BMI 24.74 kg/m²        Recent Results (from the past 24 hour(s))   CBC W/O DIFF    Collection Time: 11/14/22  9:40 AM   Result Value Ref Range    WBC 12.8 (H) 3.6 - 11.0 K/uL    RBC 3.37 (L) 3.80 - 5.20 M/uL    HGB 10.6 (L) 11.5 - 16.0 g/dL    HCT 31.5 (L) 35.0 - 47.0 %    MCV 93.5 80.0 - 99.0 FL    MCH 31.5 26.0 - 34.0 PG    MCHC 33.7 30.0 - 36.5 g/dL    RDW 12.7 11.5 - 14.5 %    PLATELET 481 669 - 897 K/uL    MPV 10.4 8.9 - 12.9 FL    NRBC 0.0 0.0  WBC    ABSOLUTE NRBC 0.00 0.00 - 0.01 K/uL RENAL FUNCTION PANEL    Collection Time: 11/14/22  9:40 AM   Result Value Ref Range    Sodium 138 136 - 145 mmol/L    Potassium 3.7 3.5 - 5.1 mmol/L    Chloride 104 97 - 108 mmol/L    CO2 24 21 - 32 mmol/L    Anion gap 10 5 - 15 mmol/L    Glucose 205 (H) 65 - 100 mg/dL    BUN 51 (H) 6 - 20 mg/dL    Creatinine 1.80 (H) 0.55 - 1.02 mg/dL    BUN/Creatinine ratio 28 (H) 12 - 20      eGFR 26 (L) >60 ml/min/1.73m2    Calcium 8.7 8.5 - 10.1 mg/dL    Phosphorus 3.4 2.6 - 4.7 mg/dL    Albumin 3.0 (L) 3.5 - 5.0 g/dL   NT-PRO BNP    Collection Time: 11/14/22  9:40 AM   Result Value Ref Range    NT pro-BNP 4,880 (H) <450 pg/mL   GLUCOSE, POC    Collection Time: 11/14/22 11:15 AM   Result Value Ref Range    Glucose (POC) 131 (H) 65 - 100 mg/dL    Performed by [facility]    GLUCOSE, POC    Collection Time: 11/14/22  4:06 PM   Result Value Ref Range    Glucose (POC) 182 (H) 65 - 100 mg/dL    Performed by Joceline Anderson    GLUCOSE, POC    Collection Time: 11/14/22  8:25 PM   Result Value Ref Range    Glucose (POC) 129 (H) 65 - 100 mg/dL    Performed by [address] [place] [address], POC    Collection Time: 11/15/22  8:18 AM   Result Value Ref Range    Glucose (POC) 137 (H) 65 - 100 mg/dL    Performed by Troy Yeung      [unfilled]      Review of Systems    Constitutional: Negative for chills and fever. HENT: Negative. Eyes: Negative. Respiratory: Negative. Cardiovascular: Negative. Gastrointestinal: Negative for abdominal pain and nausea. Skin: Negative. Neurological: Negative. Physical Exam:      Constitutional: pt is oriented to person, place, and time. HENT:   Head: Normocephalic and atraumatic. Eyes: Pupils are equal, round, and reactive to light. EOM are normal.   Cardiovascular: Normal rate, regular rhythm and normal heart sounds. Pulmonary/Chest: Mild wheezing present. Abdominal: Soft. Bowel sounds are normal. There is no abdominal tenderness. There is no rebound and no guarding. Musculoskeletal: Normal range of motion. Neurological: pt is alert and oriented to person, place, and time. PVD: no peripheral edema     XR CHEST PORT   Final Result   1. No acute disease          US RETROPERITONEUM COMP   Final Result   No hydronephrosis or stones. XR CHEST PORT   Final Result   Interstitial edema pattern. .  .               Recent Results (from the past 24 hour(s))   CBC W/O DIFF    Collection Time: 11/14/22  9:40 AM   Result Value Ref Range    WBC 12.8 (H) 3.6 - 11.0 K/uL    RBC 3.37 (L) 3.80 - 5.20 M/uL    HGB 10.6 (L) 11.5 - 16.0 g/dL    HCT 31.5 (L) 35.0 - 47.0 %    MCV 93.5 80.0 - 99.0 FL    MCH 31.5 26.0 - 34.0 PG    MCHC 33.7 30.0 - 36.5 g/dL    RDW 12.7 11.5 - 14.5 %    PLATELET 726 607 - 175 K/uL    MPV 10.4 8.9 - 12.9 FL    NRBC 0.0 0.0  WBC    ABSOLUTE NRBC 0.00 0.00 - 0.01 K/uL   RENAL FUNCTION PANEL    Collection Time: 11/14/22  9:40 AM   Result Value Ref Range    Sodium 138 136 - 145 mmol/L    Potassium 3.7 3.5 - 5.1 mmol/L    Chloride 104 97 - 108 mmol/L    CO2 24 21 - 32 mmol/L    Anion gap 10 5 - 15 mmol/L    Glucose 205 (H) 65 - 100 mg/dL    BUN 51 (H) 6 - 20 mg/dL    Creatinine 1.80 (H) 0.55 - 1.02 mg/dL    BUN/Creatinine ratio 28 (H) 12 - 20      eGFR 26 (L) >60 ml/min/1.73m2    Calcium 8.7 8.5 - 10.1 mg/dL    Phosphorus 3.4 2.6 - 4.7 mg/dL    Albumin 3.0 (L) 3.5 - 5.0 g/dL   NT-PRO BNP    Collection Time: 11/14/22  9:40 AM   Result Value Ref Range    NT pro-BNP 4,880 (H) <450 pg/mL   GLUCOSE, POC    Collection Time: 11/14/22 11:15 AM   Result Value Ref Range    Glucose (POC) 131 (H) 65 - 100 mg/dL    Performed by [facility] KATHI    GLUCOSE, POC    Collection Time: 11/14/22  4:06 PM   Result Value Ref Range    Glucose (POC) 182 (H) 65 - 100 mg/dL    Performed by [address] , POC    Collection Time: 11/14/22  8:25 PM   Result Value Ref Range    Glucose (POC) 129 (H) 65 - 100 mg/dL    Performed by [id number], POC    Collection Time: 11/15/22 8:18 AM   Result Value Ref Range    Glucose (POC) 137 (H) 65 - 100 mg/dL    Performed by Ang Redd        Results       Procedure Component Value Units Date/Time    CULTURE, URINE [[id number]] Collected: 11/11/22 0234    Order Status: Completed Specimen: Urine Updated: 11/13/22 0721     Special Requests: --        No Special Requests  Reflexed from [id number]       Culture result: No Growth (<1000 cfu/mL)       CULTURE, BLOOD, PAIRED [[id number]] Collected: 11/10/22 2117    Order Status: Completed Specimen: Blood Updated: 11/15/22 0600     Special Requests: No Special Requests        Culture result: No growth 5 days       INFLUENZA A & B AG (RAPID TEST) [[id number]] Collected: 11/10/22 2117    Order Status: Canceled Specimen: Nasopharyngeal from Nasal washing     COVID-19 RAPID TEST [[id number]] Collected: 11/10/22 2117    Order Status: Completed Specimen: Nasopharyngeal Updated: 11/10/22 2227     COVID-19 rapid test Not Detected        Comment: Rapid Abbott ID Now   The specimen is NEGATIVE for SARS-CoV2, the novel coronavirus associated with COVID-19. A negative result does not rule out COVID-19. This test has been authorized by the FDA under an Emergency Use Authorization (EUA) for use by authorized laboratories.    Fact sheet for Healthcare Providers:  http://www.joaquin-barry.birosalina/ Fact sheet for Patients: http://www.joaquin-barry.biz/   Methodology: Isothermal Nucleic Acid Amplification       INFLUENZA A & B AG (RAPID TEST) [[id number]] Collected: 11/10/22 2117    Order Status: Completed Specimen: Nasal washing Updated: 11/10/22 2227     Influenza A Antigen Negative        Influenza B Antigen Negative                Labs:     Recent Labs     11/14/22  0940   WBC 12.8*   HGB 10.6*   HCT 31.5*          Recent Labs     11/14/22 0940 11/13/22  1449    137   K 3.7 3.7    103   CO2 24 23   BUN 51* 48*   CREA 1.80* 1.81*   * 135*   CA 8.7 8.7   MG  --  2.2   PHOS 3.4 3.0 Recent Labs     11/14/22  0940 11/13/22  1449   ALB 3.0* 3.0*       No results for input(s): INR, PTP, APTT, INREXT, INREXT in the last 72 hours. No results for input(s): FE, TIBC, PSAT, FERR in the last 72 hours. No results found for: FOL, RBCF   No results for input(s): PH, PCO2, PO2 in the last 72 hours. No results for input(s): CPK, CKNDX, TROIQ in the last 72 hours.     No lab exists for component: CPKMB  No results found for: CHOL, CHOLX, CHLST, CHOLV, HDL, HDLP, LDL, LDLC, DLDLP, TGLX, TRIGL, TRIGP, CHHD, CHHDX  Lab Results   Component Value Date/Time    Glucose (POC) 137 (H) 11/15/2022 08:18 AM    Glucose (POC) 129 (H) 11/14/2022 08:25 PM    Glucose (POC) 182 (H) 11/14/2022 04:06 PM    Glucose (POC) 131 (H) 11/14/2022 11:15 AM    Glucose (POC) 199 (H) 11/14/2022 08:16 AM     Lab Results   Component Value Date/Time    Color Yellow/Straw 11/11/2022 02:34 AM    Appearance Clear 11/11/2022 02:34 AM    Specific gravity 1.008 11/11/2022 02:34 AM    Specific gravity 1.013 01/04/2022 11:00 AM    pH (UA) 5.0 11/11/2022 02:34 AM    Protein Negative 11/11/2022 02:34 AM    Glucose Negative 11/11/2022 02:34 AM    Ketone Negative 11/11/2022 02:34 AM    Bilirubin Negative 11/11/2022 02:34 AM    Urobilinogen 0.1 11/11/2022 02:34 AM    Nitrites Negative 11/11/2022 02:34 AM    Leukocyte Esterase Negative 11/11/2022 02:34 AM    Bacteria Negative 11/11/2022 02:34 AM    WBC 0-5 11/11/2022 02:34 AM    RBC 0-5 11/11/2022 02:34 AM         Assessment:     Acute systolic and diastolic heart failure  Asthma  Hypertension  Hypothyroidism  Hyperlipidemia  COPD  Type 2 diabetes  Coronary artery disease   Renal insufficiency  History of old myocardial infarction      Plan:     Continue current medications repeat the labs  PT OT consult  Cardiology consult appreciated, plan to continue current conservative medical management with no further cardiovascular testing  Nephrology consult appreciated, reports patient probably has stage 3 chronic kidney disease related to nephrosclerosis. Ultrasound on 11/14 showed no hydronephrosis or stones.     Possible discharge in next 24 hours    Albuterol  Aspirin 81 mg PO daily  Atorvastatin 40 mg PO daily  Pulmicort nebulizer  Furosemide 40 mg IV daily  Heparin 5000U injection daily  Levothyroxine 100 mcg PO daily  Metoprolol succinate 50 mg PO daily  Prenisone 10 mg PO daily      Current Facility-Administered Medications:     predniSONE (DELTASONE) tablet 10 mg, 10 mg, Oral, DAILY WITH BREAKFAST, Haider Rhodes MD    furosemide (LASIX) tablet 40 mg, 40 mg, Oral, DAILY, Raphael Villanueva MD    albuterol-ipratropium (DUO-NEB) 2.5 MG-0.5 MG/3 ML, 3 mL, Nebulization, BID RT, Haider Rhodes MD, 3 mL at 11/15/22 8753    heparin (porcine) injection 5,000 Units, 5,000 Units, SubCUTAneous, Q12H, Raphael Villanueva MD, 5,000 Units at 11/14/22 2140    budesonide (PULMICORT) 500 mcg/2 ml nebulizer suspension, 500 mcg, Nebulization, BID RT, Haider Rhodes MD, 500 mcg at 11/15/22 5924    melatonin tablet 5 mg, 5 mg, Oral, QHS, Raphael Villanueva MD, 5 mg at 11/14/22 2140    sodium chloride (NS) flush 5-10 mL, 5-10 mL, IntraVENous, PRN, Raphael Villanueva MD, 10 mL at 11/10/22 2123    albuterol CONCENTRATE 2.5mg/0.5 mL neb soln, 2.5 mg, Inhalation, Q6H PRN, Raphael Villanueva MD    albuterol-ipratropium (DUO-NEB) 2.5 MG-0.5 MG/3 ML, 3 mL, Nebulization, Q6H PRN, Raphael Villanueva MD, 3 mL at 11/11/22 0107    aspirin chewable tablet 81 mg, 81 mg, Oral, DAILY, Raphael Villanueva MD, 81 mg at 11/14/22 0941    atorvastatin (LIPITOR) tablet 40 mg, 40 mg, Oral, QHS, Raphael Villanueva MD, 40 mg at 43/16/75 2646    folic acid (FOLVITE) tablet 1 mg, 1 mg, Oral, DAILY, Raphael Villanueva MD, 1 mg at 11/14/22 0941    levothyroxine (SYNTHROID) tablet 100 mcg, 100 mcg, Oral, DAILY, Raphael Villanueva MD, 100 mcg at 11/14/22 0941    metoprolol succinate (TOPROL-XL) XL tablet 50 mg, 50 mg, Oral, DAILY, Raphael Villanueva MD, 50 mg at 11/14/22 0941    insulin lispro (HUMALOG) injection, , SubCUTAneous, AC&HS, Raphael Villanueva MD, 3 Units at 11/14/22 1717    glucose chewable tablet 16 g, 4 Tablet, Oral, PRN, Parth Villanueva MD    glucagon (GLUCAGEN) injection 1 mg, 1 mg, IntraMUSCular, PRN, Parth Villanueva MD    acetaminophen (TYLENOL) tablet 650 mg, 650 mg, Oral, Q6H PRN, 650 mg at 11/11/22 0226 **OR** acetaminophen (TYLENOL) suppository 650 mg, 650 mg, Rectal, Q6H PRN, Raphael Villanueva MD    polyethylene glycol (MIRALAX) packet 17 g, 17 g, Oral, DAILY PRN, Raphael Villanueva MD    ondansetron (ZOFRAN ODT) tablet 4 mg, 4 mg, Oral, Q8H PRN **OR** ondansetron (ZOFRAN) injection 4 mg, 4 mg, IntraVENous, Q6H PRN, Darshan Weiner MD

## 2022-11-15 NOTE — PROGRESS NOTES
VSS. Patient given discharge instructions. Medications and follow-up appointments reviewed. IV removed. Case management, provider, and primary nurse aware of discharge. Discharge plan of care/case management plan validated with provider discharge order. Patient being taken down in wheelchair.

## 2022-11-15 NOTE — DISCHARGE SUMMARY
Discharge Summary       PATIENT ID: April Locke  MRN: [id number]   YOB: 1930    DATE OF ADMISSION: 11/10/2022  7:59 PM    DATE OF DISCHARGE:   PRIMARY CARE PROVIDER: None     ATTENDING PHYSICIAN: Raphael Villanueva  DISCHARGING PROVIDER: Addi Villanueva      CONSULTATIONS: IP CONSULT TO PULMONOLOGY  IP CONSULT TO NEPHROLOGY  IP CONSULT TO CARDIOLOGY    PROCEDURES/SURGERIES: * No surgery found *    ADMITTING DIAGNOSES:    Patient Active Problem List    Diagnosis Date Noted    Acute congestive heart failure ([facility] 11/10/2022    CHF (congestive heart failure) ([facility] 11/10/2022    Lethargy 01/04/2022    AMS (altered mental status) 01/04/2022    SOB (shortness of breath) 09/20/2021    COPD exacerbation ([facility] 09/20/2021    COPD (chronic obstructive pulmonary disease) ([facility] 09/20/2021    NSTEMI (non-ST elevated myocardial infarction) ([facility] 09/20/2021    Onychomycosis 12/08/2020       DISCHARGE DIAGNOSES / PLAN:      Acute systolic and diastolic heart failure  Asthma  Hypertension  Hypothyroidism  Hyperlipidemia  COPD  Type 2 diabetes  Coronary artery disease   Renal insufficiency  History of old myocardial infarction        DISCHARGE MEDICATIONS:  Current Discharge Medication List        START taking these medications    Details   budesonide (PULMICORT) 0.5 mg/2 mL nbsp 2 mL by Nebulization route two (2) times a day. Qty: 1 Each, Refills: 2  Start date: 11/15/2022      furosemide (LASIX) 40 mg tablet 1 tab poqd  Qty: 60 Tablet, Refills: 0  Start date: 11/16/2022           CONTINUE these medications which have CHANGED    Details   predniSONE (DELTASONE) 10 mg tablet Take 10 mg by mouth daily (with breakfast). Qty: 10 Tablet, Refills: 0  Start date: 11/16/2022           CONTINUE these medications which have NOT CHANGED    Details   metoprolol succinate (TOPROL-XL) 50 mg XL tablet Take 1 Tablet by mouth daily.   Qty: 50 Tablet, Refills: 0      folic acid (FOLVITE) 1 mg tablet TAKE 1 TABLET BY MOUTH EVERY DAY levothyroxine (SYNTHROID) 100 mcg tablet TAKE 1 TABLET BY MOUTH EVERY DAY      albuterol-ipratropium (DUO-NEB) 2.5 mg-0.5 mg/3 ml nebu 3 mL by Nebulization route every six (6) hours as needed for Wheezing. Qty: 30 Nebule, Refills: 0      aspirin 81 mg chewable tablet Take 1 Tablet by mouth daily. Qty: 30 Tablet, Refills: 0      atorvastatin (LIPITOR) 40 mg tablet Take 1 Tablet by mouth nightly. Qty: 60 Tablet, Refills: 0      albuterol (PROVENTIL VENTOLIN) 2.5 mg /3 mL (0.083 %) nebu Take 3 mL by inhalation every six (6) hours as needed. STOP taking these medications       apixaban (ELIQUIS) 2.5 mg tablet Comments:   Reason for Stopping:         amoxicillin-clavulanate (Augmentin) 875-125 mg per tablet Comments:   Reason for Stopping:         budesonide-formoteroL (SYMBICORT) 160-4.5 mcg/actuation [facility] Comments:   Reason for Stopping:                 NOTIFY YOUR PHYSICIAN FOR ANY OF THE FOLLOWING:   Fever over 101 degrees for 24 hours. Chest pain, shortness of breath, fever, chills, nausea, vomiting, diarrhea, change in mentation, falling, weakness, bleeding. Severe pain or pain not relieved by medications. Or, any other signs or symptoms that you may have questions about. DISPOSITION:  x  Home With:   OT  PT  HH  RN       Long term SNF/Inpatient Rehab    Independent/assisted living    Hospice    Other:       PATIENT CONDITION AT DISCHARGE: Stable      PHYSICAL EXAMINATION AT DISCHARGE:  General:          Alert, cooperative, no distress, appears stated age. HEENT:           Atraumatic, anicteric sclerae, pink conjunctivae                          No oral ulcers, mucosa moist, throat clear, dentition fair  Neck:               Supple, symmetrical  Lungs:             Clear to auscultation bilaterally. No Wheezing or Rhonchi. No rales. Chest wall:      No tenderness  No Accessory muscle use. Heart:              Regular  rhythm,  No  murmur   No edema  Abdomen:        Soft, non-tender.  Not distended. Bowel sounds normal  Extremities:     No cyanosis. No clubbing,                            Skin turgor normal, Capillary refill normal  Skin:                Not pale. Not Jaundiced  No rashes   Psych:             Not anxious or agitated. Neurologic:      Alert, moves all extremities, answers questions appropriately and responds to commands     XR CHEST PORT   Final Result   1. No acute disease          US RETROPERITONEUM COMP   Final Result   No hydronephrosis or stones. XR CHEST PORT   Final Result   Interstitial edema pattern. .  . Recent Results (from the past 24 hour(s))   GLUCOSE, POC    Collection Time: 11/14/22  4:06 PM   Result Value Ref Range    Glucose (POC) 182 (H) 65 - 100 mg/dL    Performed by [address] [place] [address], POC    Collection Time: 11/14/22  8:25 PM   Result Value Ref Range    Glucose (POC) 129 (H) 65 - 100 mg/dL    Performed by [address] [place] [address], POC    Collection Time: 11/15/22  8:18 AM   Result Value Ref Range    Glucose (POC) 137 (H) 65 - 100 mg/dL    Performed by [facility]    RENAL FUNCTION PANEL    Collection Time: 11/15/22 11:00 AM   Result Value Ref Range    Sodium 136 136 - 145 mmol/L    Potassium 3.6 3.5 - 5.1 mmol/L    Chloride 103 97 - 108 mmol/L    CO2 24 21 - 32 mmol/L    Anion gap 9 5 - 15 mmol/L    Glucose 142 (H) 65 - 100 mg/dL    BUN 46 (H) 6 - 20 mg/dL    Creatinine 1.48 (H) 0.55 - 1.02 mg/dL    BUN/Creatinine ratio 31 (H) 12 - 20      eGFR 33 (L) >60 ml/min/1.73m2    Calcium 8.4 (L) 8.5 - 10.1 mg/dL    Phosphorus 3.1 2.6 - 4.7 mg/dL    Albumin 3.0 (L) 3.5 - 5.0 g/dL   GLUCOSE, POC    Collection Time: 11/15/22 11:05 AM   Result Value Ref Range    Glucose (POC) 154 (H) 65 - 100 mg/dL    Performed by [place] COURSE:    Joyce Norris is a 25-year-old female with a history of COPD, CHF, anxiety, MI, anemia, HTN, hypothyroidism presenting for shortness of breath.   According to EMS patient developed shortness of breath today. States that they noticed that she was significantly wheezing. Patient started becoming very anxious and according to family becomes very anxious when she is having shortness of breath at a point where she starts to have nausea and dry heaving. They placed her on 3 L nasal cannula for comfort but she did not have any desaturation. Work-up done in the ER shows acute congestive heart failure last echo showed ejection fraction of 40 to 75% and diastolic dysfunction. Patient states she has been having chest tightness and shortness of breath with wheezing. 11/14 Patient seen working with PT. Caretaker present. No SOB or cough. No nausea or vomiting. WBC 12.8   Creatinine 1.8  BUN 51     11/15 Awake and alert, resting comfortably, caretaker by bedside. No SOB or cough. She is on room air, with an SpO2 100%.     She denies any chest pain shortness with nausea vomiting discharged home in stable condition with home health    Medication reconciliation done time chart patient 35 minutes 50% time spent counseling and coordination of care      Signed:   Dontae Saldaña MD  11/15/2022  12:46 PM

## 2022-11-15 NOTE — PROGRESS NOTES
OCCUPATIONAL THERAPY TREATMENT  Patient: Nataliya Wilson (27 y.o. female)  Date: 11/15/2022  Diagnosis: Acute congestive heart failure (HCC) [I50.9]  [facility] [I50.9] <principal problem not specified>      Precautions: FALL  Chart, occupational therapy assessment, plan of care, and goals were reviewed. ASSESSMENT  Pt continues with skilled OT services and is progressing towards goals. Pt received semi-supine in bed upon arrival, AXO x3 and agreeable to COUCH tx at this time. Pt cooperative and demonstrated good effort during activities. Overall, pt continues to present with deficits in generalized strength/AROM, static/dynamic sitting balance, static/dynamic standing balance and functional activity tolerance during performance of ADLs/mobility (see below for objective details and assist levels). Pt HOE and requires increased time to follow commands. No complaints reported. Pt improved with bed mobility>EOB with SBA using bed rail with vc's for proper technique. Pt completed bed<>toilet tf 2x with CGA for steadying and vc's for RW mgmt for safety. Pt able to complete toileting hygiene with SBA. Pt performed simple grooming at sink with SBA/set-up with no LOB noted. EOB, pt engaged in starr UE exercises to improve strength/endurance with vc's to maximized ROM with good sitting balance. Pt has good potential to reach goals with HHOT. Will continue to progress. Recommend d/c to [facility] with family care once medically appropriate. Other factors to consider for discharge: Time of onset, medical prognosis/diagnosis, severity of deficits, PLOF, functional baseline, home environment, and family support          PLAN :  Patient continues to benefit from skilled intervention to address the above impairments. Continue treatment per established plan of care. to address goals.     Recommend with staff: Out of bed to chair for meals and Encourage HEP in prep for ADLs/mobility    Recommend next session: UB dressing and Standing grooming    Recommendation for discharge: (in order for the patient to meet his/her long term goals)  Home with Family Care    This discharge recommendation:  Has been made in collaboration with the attending provider and/or case management       IF patient discharges home will need the following DME: TBD       SUBJECTIVE:   Patient stated I can't hear you.     OBJECTIVE DATA SUMMARY:   Cognitive/Behavioral Status:  Neurologic State: Alert  Orientation Level: Oriented to person;Oriented to place;Oriented to situation  Cognition: Follows commands (HOE)             Functional Mobility and Transfers for ADLs:  Bed Mobility:  Rolling: Stand-by assistance  Supine to Sit: Stand-by assistance  Sit to Supine: Stand-by assistance  Scooting: Stand-by assistance    Transfers:  Sit to Stand: Stand-by assistance  Functional Transfers  Bathroom Mobility: Contact guard assistance  Toilet Transfer : Contact guard assistance       Balance:  Sitting: Intact  Standing: Impaired; With support  Standing - Static: Constant support;Good  Standing - Dynamic : Constant support; Fair    ADL Intervention:       Grooming  Grooming Assistance: Stand-by assistance;Set-up  Position Performed: Standing  Washing Face: Stand-by assistance  Washing Hands: Stand-by assistance       Toileting  Toileting Assistance: Stand-by assistance  Bladder Hygiene: Stand-by assistance           Exercise Sets Reps AROM AAROM PROM Self PROM Comments   Elbow E/F 1 10 [x] [] [] [] EOB   Chest press 1 10 [x] [] [] []    Ceiling punches 1 10 [x] [] [] []         Pain:  0/10    Activity Tolerance:   Fair and requires rest breaks    After treatment patient left in no apparent distress:   Supine in bed, Call bell within reach, Bed / chair alarm activated, Caregiver / family present, and Side rails x 3, bed locked and in lowest position    COMMUNICATION/COLLABORATION:   The patients plan of care was discussed with: Registered nurse. KHOA Whitman  Time Calculation: 25 mins       Problem: Self Care Deficits Care Plan (Adult)  Goal: *Acute Goals and Plan of Care (Insert Text)  Description: FUNCTIONAL STATUS PRIOR TO ADMISSION: Pt reports she utilized RW for functional mobility and was IND w/ ADLs but has aides 24/7 that help w/ IADLs. HOME SUPPORT: Pt has aides 24/7 at her home.     Occupational Therapy Goals  Initiated 11/14/2022    Pt stated goal I want to go home  Pt will be IND sup <> sit in prep for EOB ADLs  Pt will be Mod I grooming standing sink side LRAD  Pt will be Mod I UB dressing sitting EOB/long sit   Pt will be Mod I LE dressing sitting EOB/long sit  Pt will be Mod I sit <>  prep for toileting LRAD  Pt will be Mod I toileting/toilet transfer/cloth mgmt LRAD  Pt will be IND following UE HEP in prep for self care tasks   Outcome: Progressing Towards Goal

## 2022-11-15 NOTE — PROGRESS NOTES
Problem: Falls - Risk of  Goal: *Absence of Falls  Description: Document Kathy Elliott Fall Risk and appropriate interventions in the flowsheet.   Outcome: Progressing Towards Goal  Note: Fall Risk Interventions:  Mobility Interventions: Bed/chair exit alarm    Mentation Interventions: Bed/chair exit alarm, Family/sitter at bedside    Medication Interventions: Bed/chair exit alarm, Patient to call before getting OOB    Elimination Interventions: Bed/chair exit alarm, Call light in reach

## 2022-11-16 LAB
BACTERIA SPEC CULT: NORMAL
SPECIAL REQUESTS,SREQ: NORMAL

## 2022-11-16 NOTE — PROGRESS NOTES
Physician Progress Note      PATIENT:               David Clifton  CSN #:                  [id number]  :                       1930  ADMIT DATE:       11/10/2022 7:59 PM  Carlos Page [place] DATE:        11/15/2022 2:14 PM  RESPONDING  PROVIDER #:        Juan Thayer MD          QUERY TEXT:    Dear Dr. Guerrero Guzman,    Pt admitted with exacerbation of CHF, exacerbation of asthma. Noted documentation of acute hypoxic respiratory failure by ED physician. If possible, please document in progress notes and discharge summary:    The medical record reflects the following:  Risk Factors: 80 F presents with c/o difficulty breathing; admitted with exacerbation of CHF and asthma  Clinical Indicators: per ED MD note, patient with acute hypoxic respiratory failure; respiratory rate 26. ..24...20...27...22...25...22...21...23...26...34...22...27...20; 02 sats 93. ..96...95... 96% on 02 3lpm NC; 02 flow rate weaned to 2lpm NC with 02 sats 96-97%; patient currently on room air with 02 sats 94-98%  Treatment: labs, CXR, Pulmonology consult, Cardiology consult, 02, IV Lasix, IV Solu-Medrol, IV Ceftriaxone    Thank you,  SAMMY SanabriaN, RN, CRCR  Clinical   [email or web address] or contact via [employer]  Options provided:  -- Acute hypoxic respiratory failure confirmed present on admission  -- Acute hypoxic respiratory failure ruled out  -- Other - I will add my own diagnosis  -- Disagree - Not applicable / Not valid  -- Disagree - Clinically unable to determine / Unknown  -- Refer to Clinical Documentation Reviewer    PROVIDER RESPONSE TEXT:    The diagnosis of acute hypoxic respiratory failure was confirmed as present on admission.     Query created by: Verenice Cochran on 2022 9:41 AM      Electronically signed by:  Juan Thayer MD 2022 7:52 AM

## 2022-11-17 ENCOUNTER — TELEPHONE (OUTPATIENT)
Dept: CASE MANAGEMENT | Age: 87
End: 2022-11-17

## 2022-11-17 NOTE — TELEPHONE ENCOUNTER
Discharge follow-up phone call       Patient Discharged on: 11/15/2022    Patient Discharge with: Services [facility])    RN-NN introduces herself and informs the patient reason for calling. Patient verbalizes understanding. RN-NN calls the patient within 3 days of discharge. Patient confirms medications are affordable and has picked up medications at the pharmacy. Patient advised if any questions or concerns to notify RN or the doctors office.       Patient verbalizes understanding

## 2023-05-18 RX ORDER — ASPIRIN 81 MG/1
81 TABLET, CHEWABLE ORAL DAILY
COMMUNITY
Start: 2021-09-28

## 2023-05-18 RX ORDER — PREDNISONE 10 MG/1
10 TABLET ORAL
COMMUNITY
Start: 2022-11-16

## 2023-05-18 RX ORDER — FOLIC ACID 1 MG/1
1 TABLET ORAL DAILY
COMMUNITY
Start: 2020-11-09

## 2023-05-18 RX ORDER — BUDESONIDE 0.5 MG/2ML
500 INHALANT ORAL 2 TIMES DAILY
COMMUNITY
Start: 2022-11-15

## 2023-05-18 RX ORDER — ALBUTEROL SULFATE 2.5 MG/3ML
3 SOLUTION RESPIRATORY (INHALATION) EVERY 6 HOURS PRN
COMMUNITY
Start: 2021-09-17

## 2023-05-18 RX ORDER — ATORVASTATIN CALCIUM 40 MG/1
1 TABLET, FILM COATED ORAL NIGHTLY
COMMUNITY
Start: 2021-09-27

## 2023-05-18 RX ORDER — METOPROLOL SUCCINATE 50 MG/1
50 TABLET, EXTENDED RELEASE ORAL DAILY
COMMUNITY
Start: 2021-09-28

## 2023-05-18 RX ORDER — FUROSEMIDE 40 MG/1
TABLET ORAL
COMMUNITY
Start: 2022-11-16

## 2023-05-18 RX ORDER — IPRATROPIUM BROMIDE AND ALBUTEROL SULFATE 2.5; .5 MG/3ML; MG/3ML
3 SOLUTION RESPIRATORY (INHALATION) EVERY 6 HOURS PRN
COMMUNITY
Start: 2021-09-27

## 2023-05-18 RX ORDER — LEVOTHYROXINE SODIUM 0.1 MG/1
1 TABLET ORAL DAILY
COMMUNITY
Start: 2020-11-16

## 2024-04-10 ENCOUNTER — HOSPITAL ENCOUNTER (EMERGENCY)
Facility: HOSPITAL | Age: 89
Discharge: HOME | End: 2024-04-10
Attending: FAMILY MEDICINE
Payer: MEDICARE

## 2024-04-10 VITALS
HEART RATE: 84 BPM | DIASTOLIC BLOOD PRESSURE: 58 MMHG | TEMPERATURE: 98.4 F | RESPIRATION RATE: 20 BRPM | OXYGEN SATURATION: 98 % | SYSTOLIC BLOOD PRESSURE: 164 MMHG

## 2024-04-10 DIAGNOSIS — M79.644 PAIN OF FINGER OF RIGHT HAND: Primary | ICD-10-CM

## 2024-04-10 PROCEDURE — 99282 EMERGENCY DEPT VISIT SF MDM: CPT

## 2024-04-10 ASSESSMENT — PAIN - FUNCTIONAL ASSESSMENT: PAIN_FUNCTIONAL_ASSESSMENT: NONE - DENIES PAIN

## 2024-04-10 NOTE — ED TRIAGE NOTES
PT. TO ED WITH CAREGIVER WITH C/O BURNING IN HANDS FOR ABOUT 3 -4 DAYS, PER PT. DAUGHTER.  PT. C/O PAIN IN THUMBS.

## 2024-04-14 NOTE — ED PROVIDER NOTES
metoprolol succinate (TOPROL XL) 50 MG extended release tablet Take 1 tablet by mouth dailyHistorical Med      predniSONE (DELTASONE) 10 MG tablet Take 1 tablet by mouth daily (with breakfast)Historical Med               2.  Return to ED if worse       3.      Medication List        ASK your doctor about these medications      albuterol (2.5 MG/3ML) 0.083% nebulizer solution  Commonly known as: PROVENTIL     aspirin 81 MG chewable tablet     atorvastatin 40 MG tablet  Commonly known as: LIPITOR     budesonide 0.5 MG/2ML nebulizer suspension  Commonly known as: PULMICORT     folic acid 1 MG tablet  Commonly known as: FOLVITE     furosemide 40 MG tablet  Commonly known as: LASIX     ipratropium 0.5 mg-albuterol 2.5 mg 0.5-2.5 (3) MG/3ML Soln nebulizer solution  Commonly known as: DUONEB     levothyroxine 100 MCG tablet  Commonly known as: SYNTHROID     metoprolol succinate 50 MG extended release tablet  Commonly known as: TOPROL XL     predniSONE 10 MG tablet  Commonly known as: DELTASONE              4. PATIENT REFERRED TO:  Please make a follow-up appointment with your PCP as soon as possible               Diagnosis     Clinical Impression:    1. Pain of finger of right hand        Attestations:    Kendrick Velasquez DO    Please note that this dictation was completed with [employer], the computer voice recognition software.  Quite often unanticipated grammatical, syntax, homophones, and other interpretive errors are inadvertently transcribed by the computer software.  Please disregard these errors.  Please excuse any errors that have escaped final proofreading.  Thank you.         Kendrick Velasquez DO  04/14/24 [id number]

## 2024-04-16 ENCOUNTER — APPOINTMENT (OUTPATIENT)
Facility: HOSPITAL | Age: 89
End: 2024-04-16
Payer: MEDICARE

## 2024-04-16 ENCOUNTER — HOSPITAL ENCOUNTER (INPATIENT)
Facility: HOSPITAL | Age: 89
LOS: 2 days | Discharge: HOME | End: 2024-04-18
Attending: STUDENT IN AN ORGANIZED HEALTH CARE EDUCATION/TRAINING PROGRAM | Admitting: FAMILY MEDICINE
Payer: MEDICARE

## 2024-04-16 DIAGNOSIS — R53.83 OTHER FATIGUE: ICD-10-CM

## 2024-04-16 DIAGNOSIS — I44.7 LBBB (LEFT BUNDLE BRANCH BLOCK): Primary | ICD-10-CM

## 2024-04-16 DIAGNOSIS — I50.810 RIGHT-SIDED CONGESTIVE HEART FAILURE, UNSPECIFIED HF CHRONICITY (HCC): ICD-10-CM

## 2024-04-16 DIAGNOSIS — Z86.59 HISTORY OF DEMENTIA: ICD-10-CM

## 2024-04-16 DIAGNOSIS — N28.9 RENAL IMPAIRMENT: ICD-10-CM

## 2024-04-16 PROBLEM — R25.1 TREMOR: Status: ACTIVE | Noted: 2024-04-16

## 2024-04-16 LAB
AMMONIA PLAS-SCNC: <10 UMOL/L
ANION GAP SERPL CALC-SCNC: 5 MMOL/L (ref 5–15)
APPEARANCE UR: CLEAR
BACTERIA URNS QL MICRO: NEGATIVE /HPF
BASOPHILS # BLD: 0.1 K/UL (ref 0–0.1)
BASOPHILS NFR BLD: 1 % (ref 0–1)
BILIRUB UR QL: NEGATIVE
BUN SERPL-MCNC: 35 MG/DL (ref 6–20)
BUN/CREAT SERPL: 22 (ref 12–20)
CA-I BLD-MCNC: 9.3 MG/DL (ref 8.5–10.1)
CHLORIDE SERPL-SCNC: 112 MMOL/L (ref 97–108)
CO2 SERPL-SCNC: 24 MMOL/L (ref 21–32)
COLOR UR: ABNORMAL
CREAT SERPL-MCNC: 1.57 MG/DL (ref 0.55–1.02)
DIFFERENTIAL METHOD BLD: ABNORMAL
EKG ATRIAL RATE: 73 BPM
EKG DIAGNOSIS: NORMAL
EKG P AXIS: 77 DEGREES
EKG P-R INTERVAL: 114 MS
EKG Q-T INTERVAL: 444 MS
EKG QRS DURATION: 128 MS
EKG QTC CALCULATION (BAZETT): 489 MS
EKG R AXIS: -8 DEGREES
EKG T AXIS: 123 DEGREES
EKG VENTRICULAR RATE: 73 BPM
EOSINOPHIL # BLD: 0.2 K/UL (ref 0–0.4)
EOSINOPHIL NFR BLD: 2 % (ref 0–7)
EPITH CASTS URNS QL MICRO: ABNORMAL /LPF
ERYTHROCYTE [DISTWIDTH] IN BLOOD BY AUTOMATED COUNT: 12.3 % (ref 11.5–14.5)
GLUCOSE SERPL-MCNC: 108 MG/DL (ref 65–100)
GLUCOSE UR STRIP.AUTO-MCNC: NEGATIVE MG/DL
HCT VFR BLD AUTO: 34.5 % (ref 35–47)
HGB BLD-MCNC: 11.6 G/DL (ref 11.5–16)
HGB UR QL STRIP: NEGATIVE
IMM GRANULOCYTES # BLD AUTO: 0 K/UL (ref 0–0.04)
IMM GRANULOCYTES NFR BLD AUTO: 0 % (ref 0–0.5)
KETONES UR QL STRIP.AUTO: NEGATIVE MG/DL
LACTATE SERPL-SCNC: 0.6 MMOL/L (ref 0.4–2)
LEUKOCYTE ESTERASE UR QL STRIP.AUTO: NEGATIVE
LYMPHOCYTES # BLD: 2.1 K/UL (ref 0.8–3.5)
LYMPHOCYTES NFR BLD: 22 % (ref 12–49)
MAGNESIUM SERPL-MCNC: 2 MG/DL (ref 1.6–2.4)
MAGNESIUM SERPL-MCNC: 2.1 MG/DL (ref 1.6–2.4)
MCH RBC QN AUTO: 31.1 PG (ref 26–34)
MCHC RBC AUTO-ENTMCNC: 33.6 G/DL (ref 30–36.5)
MCV RBC AUTO: 92.5 FL (ref 80–99)
MONOCYTES # BLD: 0.8 K/UL (ref 0–1)
MONOCYTES NFR BLD: 9 % (ref 5–13)
MUCOUS THREADS URNS QL MICRO: ABNORMAL /LPF
NEUTS SEG # BLD: 6.4 K/UL (ref 1.8–8)
NEUTS SEG NFR BLD: 66 % (ref 32–75)
NITRITE UR QL STRIP.AUTO: NEGATIVE
NRBC # BLD: 0 K/UL (ref 0–0.01)
NRBC BLD-RTO: 0 PER 100 WBC
PH UR STRIP: 5 (ref 5–8)
PLATELET # BLD AUTO: 369 K/UL (ref 150–400)
PMV BLD AUTO: 9.9 FL (ref 8.9–12.9)
POTASSIUM SERPL-SCNC: 4.7 MMOL/L (ref 3.5–5.1)
PROT UR STRIP-MCNC: NEGATIVE MG/DL
RBC # BLD AUTO: 3.73 M/UL (ref 3.8–5.2)
RBC #/AREA URNS HPF: ABNORMAL /HPF (ref 0–5)
SODIUM SERPL-SCNC: 141 MMOL/L (ref 136–145)
SP GR UR REFRACTOMETRY: 1.02 (ref 1–1.03)
TROPONIN I SERPL HS-MCNC: 21 NG/L (ref 0–51)
TROPONIN I SERPL HS-MCNC: 21 NG/L (ref 0–51)
TROPONIN I SERPL HS-MCNC: 23 NG/L (ref 0–51)
TSH SERPL DL<=0.05 MIU/L-ACNC: 2.19 UIU/ML (ref 0.36–3.74)
URINE CULTURE IF INDICATED: ABNORMAL
UROBILINOGEN UR QL STRIP.AUTO: 0.1 EU/DL (ref 0.1–1)
WBC # BLD AUTO: 9.6 K/UL (ref 3.6–11)
WBC URNS QL MICRO: ABNORMAL /HPF (ref 0–4)

## 2024-04-16 PROCEDURE — 36415 COLL VENOUS BLD VENIPUNCTURE: CPT

## 2024-04-16 PROCEDURE — 99285 EMERGENCY DEPT VISIT HI MDM: CPT

## 2024-04-16 PROCEDURE — 70450 CT HEAD/BRAIN W/O DYE: CPT

## 2024-04-16 PROCEDURE — 81001 URINALYSIS AUTO W/SCOPE: CPT

## 2024-04-16 PROCEDURE — 85025 COMPLETE CBC W/AUTO DIFF WBC: CPT

## 2024-04-16 PROCEDURE — 93005 ELECTROCARDIOGRAM TRACING: CPT | Performed by: STUDENT IN AN ORGANIZED HEALTH CARE EDUCATION/TRAINING PROGRAM

## 2024-04-16 PROCEDURE — 2580000003 HC RX 258: Performed by: FAMILY MEDICINE

## 2024-04-16 PROCEDURE — 80048 BASIC METABOLIC PNL TOTAL CA: CPT

## 2024-04-16 PROCEDURE — 83735 ASSAY OF MAGNESIUM: CPT

## 2024-04-16 PROCEDURE — 83605 ASSAY OF LACTIC ACID: CPT

## 2024-04-16 PROCEDURE — 84443 ASSAY THYROID STIM HORMONE: CPT

## 2024-04-16 PROCEDURE — 83880 ASSAY OF NATRIURETIC PEPTIDE: CPT

## 2024-04-16 PROCEDURE — 84481 FREE ASSAY (FT-3): CPT

## 2024-04-16 PROCEDURE — 1100000000 HC RM PRIVATE

## 2024-04-16 PROCEDURE — 84484 ASSAY OF TROPONIN QUANT: CPT

## 2024-04-16 PROCEDURE — 80307 DRUG TEST PRSMV CHEM ANLYZR: CPT

## 2024-04-16 PROCEDURE — 94640 AIRWAY INHALATION TREATMENT: CPT

## 2024-04-16 PROCEDURE — 82140 ASSAY OF AMMONIA: CPT

## 2024-04-16 PROCEDURE — 80061 LIPID PANEL: CPT

## 2024-04-16 PROCEDURE — 6360000002 HC RX W HCPCS: Performed by: FAMILY MEDICINE

## 2024-04-16 PROCEDURE — 6370000000 HC RX 637 (ALT 250 FOR IP): Performed by: FAMILY MEDICINE

## 2024-04-16 RX ORDER — ONDANSETRON 4 MG/1
4 TABLET, ORALLY DISINTEGRATING ORAL EVERY 8 HOURS PRN
Status: DISCONTINUED | OUTPATIENT
Start: 2024-04-16 | End: 2024-04-18 | Stop reason: HOSPADM

## 2024-04-16 RX ORDER — ATORVASTATIN CALCIUM 40 MG/1
40 TABLET, FILM COATED ORAL NIGHTLY
Status: DISCONTINUED | OUTPATIENT
Start: 2024-04-16 | End: 2024-04-18 | Stop reason: HOSPADM

## 2024-04-16 RX ORDER — HEPARIN SODIUM 5000 [USP'U]/ML
5000 INJECTION, SOLUTION INTRAVENOUS; SUBCUTANEOUS EVERY 8 HOURS SCHEDULED
Status: DISCONTINUED | OUTPATIENT
Start: 2024-04-16 | End: 2024-04-18 | Stop reason: HOSPADM

## 2024-04-16 RX ORDER — ASPIRIN 81 MG/1
81 TABLET, CHEWABLE ORAL DAILY
Status: DISCONTINUED | OUTPATIENT
Start: 2024-04-17 | End: 2024-04-18 | Stop reason: HOSPADM

## 2024-04-16 RX ORDER — SODIUM CHLORIDE 9 MG/ML
INJECTION, SOLUTION INTRAVENOUS CONTINUOUS
Status: DISCONTINUED | OUTPATIENT
Start: 2024-04-16 | End: 2024-04-18 | Stop reason: HOSPADM

## 2024-04-16 RX ORDER — ACETAMINOPHEN 650 MG/1
650 SUPPOSITORY RECTAL EVERY 6 HOURS PRN
Status: DISCONTINUED | OUTPATIENT
Start: 2024-04-16 | End: 2024-04-18 | Stop reason: HOSPADM

## 2024-04-16 RX ORDER — LEVOTHYROXINE SODIUM 0.1 MG/1
100 TABLET ORAL DAILY
Status: DISCONTINUED | OUTPATIENT
Start: 2024-04-17 | End: 2024-04-18 | Stop reason: HOSPADM

## 2024-04-16 RX ORDER — SODIUM CHLORIDE 0.9 % (FLUSH) 0.9 %
5-40 SYRINGE (ML) INJECTION EVERY 12 HOURS SCHEDULED
Status: DISCONTINUED | OUTPATIENT
Start: 2024-04-16 | End: 2024-04-18 | Stop reason: HOSPADM

## 2024-04-16 RX ORDER — IPRATROPIUM BROMIDE AND ALBUTEROL SULFATE 2.5; .5 MG/3ML; MG/3ML
1 SOLUTION RESPIRATORY (INHALATION) EVERY 6 HOURS PRN
Status: DISCONTINUED | OUTPATIENT
Start: 2024-04-16 | End: 2024-04-18 | Stop reason: HOSPADM

## 2024-04-16 RX ORDER — BUDESONIDE 0.5 MG/2ML
500 INHALANT ORAL 2 TIMES DAILY
Status: DISCONTINUED | OUTPATIENT
Start: 2024-04-16 | End: 2024-04-18 | Stop reason: HOSPADM

## 2024-04-16 RX ORDER — SODIUM CHLORIDE 0.9 % (FLUSH) 0.9 %
5-40 SYRINGE (ML) INJECTION PRN
Status: DISCONTINUED | OUTPATIENT
Start: 2024-04-16 | End: 2024-04-18 | Stop reason: HOSPADM

## 2024-04-16 RX ORDER — HYDROXYZINE HYDROCHLORIDE 25 MG/1
25 TABLET, FILM COATED ORAL EVERY 6 HOURS PRN
Status: DISCONTINUED | OUTPATIENT
Start: 2024-04-16 | End: 2024-04-18 | Stop reason: HOSPADM

## 2024-04-16 RX ORDER — ONDANSETRON 2 MG/ML
4 INJECTION INTRAMUSCULAR; INTRAVENOUS EVERY 6 HOURS PRN
Status: DISCONTINUED | OUTPATIENT
Start: 2024-04-16 | End: 2024-04-18 | Stop reason: HOSPADM

## 2024-04-16 RX ORDER — POLYETHYLENE GLYCOL 3350 17 G/17G
17 POWDER, FOR SOLUTION ORAL DAILY PRN
Status: DISCONTINUED | OUTPATIENT
Start: 2024-04-16 | End: 2024-04-18 | Stop reason: HOSPADM

## 2024-04-16 RX ORDER — SODIUM CHLORIDE 9 MG/ML
INJECTION, SOLUTION INTRAVENOUS PRN
Status: DISCONTINUED | OUTPATIENT
Start: 2024-04-16 | End: 2024-04-18 | Stop reason: HOSPADM

## 2024-04-16 RX ORDER — ACETAMINOPHEN 325 MG/1
650 TABLET ORAL EVERY 6 HOURS PRN
Status: DISCONTINUED | OUTPATIENT
Start: 2024-04-16 | End: 2024-04-18 | Stop reason: HOSPADM

## 2024-04-16 RX ORDER — METOPROLOL SUCCINATE 50 MG/1
50 TABLET, EXTENDED RELEASE ORAL DAILY
Status: DISCONTINUED | OUTPATIENT
Start: 2024-04-17 | End: 2024-04-18 | Stop reason: HOSPADM

## 2024-04-16 RX ORDER — FUROSEMIDE 40 MG/1
20 TABLET ORAL DAILY
Status: DISCONTINUED | OUTPATIENT
Start: 2024-04-16 | End: 2024-04-18 | Stop reason: HOSPADM

## 2024-04-16 RX ORDER — FOLIC ACID 1 MG/1
1000 TABLET ORAL DAILY
Status: DISCONTINUED | OUTPATIENT
Start: 2024-04-17 | End: 2024-04-18 | Stop reason: HOSPADM

## 2024-04-16 RX ADMIN — BUDESONIDE INHALATION 500 MCG: 0.5 SUSPENSION RESPIRATORY (INHALATION) at 22:56

## 2024-04-16 RX ADMIN — HYDROXYZINE HYDROCHLORIDE 25 MG: 25 TABLET, FILM COATED ORAL at 22:09

## 2024-04-16 RX ADMIN — SODIUM CHLORIDE: 9 INJECTION, SOLUTION INTRAVENOUS at 19:30

## 2024-04-16 ASSESSMENT — PAIN SCALES - GENERAL: PAINLEVEL_OUTOF10: 0

## 2024-04-16 ASSESSMENT — LIFESTYLE VARIABLES
HOW MANY STANDARD DRINKS CONTAINING ALCOHOL DO YOU HAVE ON A TYPICAL DAY: PATIENT DECLINED
HOW OFTEN DO YOU HAVE A DRINK CONTAINING ALCOHOL: PATIENT DECLINED

## 2024-04-16 ASSESSMENT — PAIN - FUNCTIONAL ASSESSMENT: PAIN_FUNCTIONAL_ASSESSMENT: ADULT NONVERBAL PAIN SCALE (NPVS)

## 2024-04-16 NOTE — H&P
Reconciliation, Ar   budesonide (PULMICORT) 0.5 MG/2ML nebulizer suspension Inhale 2 mLs into the lungs 2 times daily 11/15/22   Automatic Reconciliation, Ar   folic acid (FOLVITE) 1 MG tablet Take 1 tablet by mouth daily 11/9/20   Automatic Reconciliation, Ar   furosemide (LASIX) 40 MG tablet 1 tab poqd 11/16/22   Automatic Reconciliation, Ar   ipratropium 0.5 mg-albuterol 2.5 mg (DUONEB) 0.5-2.5 (3) MG/3ML SOLN nebulizer solution Inhale 3 mLs into the lungs every 6 hours as needed 9/27/21   Automatic Reconciliation, Ar   levothyroxine (SYNTHROID) 100 MCG tablet Take 1 tablet by mouth daily 11/16/20   Automatic Reconciliation, Ar   metoprolol succinate (TOPROL XL) 50 MG extended release tablet Take 1 tablet by mouth daily 9/28/21   Automatic Reconciliation, Ar   predniSONE (DELTASONE) 10 MG tablet Take 1 tablet by mouth daily (with breakfast) 11/16/22   Automatic Reconciliation, Ar         Current Facility-Administered Medications:     albuterol (PROVENTIL) (2.5 MG/3ML) 0.083% nebulizer solution 2.5 mg, 2.5 mg, Nebulization, Q6H PRN, Beatris Mejía MD    aspirin chewable tablet 81 mg, 81 mg, Oral, Daily, Beatris Mejía MD    atorvastatin (LIPITOR) tablet 40 mg, 40 mg, Oral, Nightly, Beatris Mejía MD    budesonide (PULMICORT) nebulizer suspension 500 mcg, 500 mcg, Nebulization, BID, Beatris Mejía MD    folic acid (FOLVITE) tablet 1,000 mcg, 1,000 mcg, Oral, Daily, Beatris Mejía MD    furosemide (LASIX) tablet 20 mg, 20 mg, Oral, Daily, Beatris Mejía MD    ipratropium 0.5 mg-albuterol 2.5 mg (DUONEB) nebulizer solution 1 Dose, 1 Dose, Inhalation, Q6H PRN, Beatris Mejía MD    levothyroxine (SYNTHROID) tablet 100 mcg, 100 mcg, Oral, Daily, Beatris Mejía MD    metoprolol succinate (TOPROL XL) extended release tablet 50 mg, 50 mg, Oral, Daily, Beatris Mejía MD    0.9 % sodium chloride infusion, , IntraVENous, Continuous, Beatris Mejía MD    sodium chloride flush 0.9 % injection 5-40  650 mg, Oral, Q6H PRN **OR** acetaminophen (TYLENOL) suppository 650 mg, 650 mg, Rectal, Q6H PRN, Beatris Mejía MD    heparin (porcine) injection 5,000 Units, 5,000 Units, SubCUTAneous, 3 times per day, Beatris Mejía MD    Current Outpatient Medications:     albuterol (PROVENTIL) (2.5 MG/3ML) 0.083% nebulizer solution, Inhale 3 mLs into the lungs every 6 hours as needed, Disp: , Rfl:     aspirin 81 MG chewable tablet, Take 1 tablet by mouth daily, Disp: , Rfl:     atorvastatin (LIPITOR) 40 MG tablet, Take 1 tablet by mouth nightly, Disp: , Rfl:     budesonide (PULMICORT) 0.5 MG/2ML nebulizer suspension, Inhale 2 mLs into the lungs 2 times daily, Disp: , Rfl:     folic acid (FOLVITE) 1 MG tablet, Take 1 tablet by mouth daily, Disp: , Rfl:     furosemide (LASIX) 40 MG tablet, 1 tab poqd, Disp: , Rfl:     ipratropium 0.5 mg-albuterol 2.5 mg (DUONEB) 0.5-2.5 (3) MG/3ML SOLN nebulizer solution, Inhale 3 mLs into the lungs every 6 hours as needed, Disp: , Rfl:     levothyroxine (SYNTHROID) 100 MCG tablet, Take 1 tablet by mouth daily, Disp: , Rfl:     metoprolol succinate (TOPROL XL) 50 MG extended release tablet, Take 1 tablet by mouth daily, Disp: , Rfl:     predniSONE (DELTASONE) 10 MG tablet, Take 1 tablet by mouth daily (with breakfast), Disp: , Rfl:        ________________________________________________________________________    Signed: Beatris Mejía MD

## 2024-04-16 NOTE — ED TRIAGE NOTES
Arrives via ems from home, reports R arm twitching and L eye pain that is chronic. LKW Sunday.  per ems. Hx dementia

## 2024-04-16 NOTE — ED NOTES
Pt moved to ED3, wiped with warm wipes and clean brief applied, pt repositioned on hospital bed, given pillow and extra warm blankets.

## 2024-04-16 NOTE — ED PROVIDER NOTES
[facility] EMERGENCY DEPT  EMERGENCY DEPARTMENT HISTORY AND PHYSICAL EXAM      Date: 4/16/2024  Patient Name: Sylvie Siddiqi  MRN: [id number]  Birthdate 7/4/1930  Date of evaluation: 4/16/2024  Provider: Emery Pittman MD   Note Started: 1:27 PM EDT 4/16/24    HISTORY OF PRESENT ILLNESS     Chief Complaint   Patient presents with    Fatigue       History Provided By: Patient    HPI: Sylvie Siddiqi is a 89 y.o. female with PMH of HTN, CAD, COPD, and dementia comes to the ED by her caregiver due to new onset of twitching on the right upper extremity that started approximately over the weekend and was taken ready to proceed his ER where she had negative evaluation and workup.  Patient presents with a nurse aide who reports that the patient had twitching of the left eye of chronic nature.  Patient discussed triage that she has some fatigue and feeling tired.  However, on my assessment she had no new complaints.  He is denying any pain or trouble breathing currently.    PAST MEDICAL HISTORY   Past Medical History:  Past Medical History:   Diagnosis Date    Anemia     Chronic obstructive pulmonary disease (HCC)     Dementia (HCC)     Heart attack (HCC)     Hypertension     Thyroid disease     hypothyroidism       Past Surgical History:  Past Surgical History:   Procedure Laterality Date    ORTHOPEDIC SURGERY  2018    total hip replacement    PACEMAKER         Family History:  Family History   Problem Relation Age of Onset    Stroke Mother     Heart Disease Mother     Stroke Father     Heart Disease Father        Social History:  Social History     Tobacco Use    Smoking status: Never    Smokeless tobacco: Never   Substance Use Topics    Alcohol use: Never    Drug use: Never       Allergies:  No Known Allergies    PCP: Beatris Mejía MD    Current Meds:   No current facility-administered medications for this encounter.     Current Outpatient Medications   Medication Sig Dispense Refill    albuterol (PROVENTIL) (2.5  admission.    CONSULTS: See ED Course/MDM for further details.  IP CONSULT TO CARDIOLOGY       ED IMPRESSION     1. LBBB (left bundle branch block)    2. Other fatigue    3. Renal impairment    4. History of dementia          DISPOSITION/PLAN   DISPOSITION Admitted 04/16/2024 05:00:53 PM          I am the Primary Clinician of Record. Emery Pittman MD (electronically signed)    (Please note that parts of this dictation were completed with voice recognition software. Quite often unanticipated grammatical, syntax, homophones, and other interpretive errors are inadvertently transcribed by the computer software. Please disregards these errors. Please excuse any errors that have escaped final proofreading.)     Vince Farias, Emery DUGAN MD  04/16/24 [id number]

## 2024-04-17 ENCOUNTER — APPOINTMENT (OUTPATIENT)
Facility: HOSPITAL | Age: 89
End: 2024-04-17
Attending: INTERNAL MEDICINE
Payer: MEDICARE

## 2024-04-17 LAB
ALBUMIN SERPL-MCNC: 2.8 G/DL (ref 3.5–5)
ALBUMIN/GLOB SERPL: 0.9 (ref 1.1–2.2)
ALP SERPL-CCNC: 66 U/L (ref 45–117)
ALT SERPL-CCNC: 11 U/L (ref 12–78)
AMPHET UR QL SCN: NEGATIVE
ANION GAP SERPL CALC-SCNC: 9 MMOL/L (ref 5–15)
AST SERPL W P-5'-P-CCNC: 15 U/L (ref 15–37)
BARBITURATES UR QL SCN: NEGATIVE
BASOPHILS # BLD: 0.1 K/UL (ref 0–0.1)
BASOPHILS NFR BLD: 1 % (ref 0–1)
BENZODIAZ UR QL: NEGATIVE
BILIRUB SERPL-MCNC: 0.4 MG/DL (ref 0.2–1)
BNP SERPL-MCNC: 2252 PG/ML
BNP SERPL-MCNC: 2675 PG/ML
BUN SERPL-MCNC: 29 MG/DL (ref 6–20)
BUN/CREAT SERPL: 22 (ref 12–20)
CA-I BLD-MCNC: 8.3 MG/DL (ref 8.5–10.1)
CANNABINOIDS UR QL SCN: NEGATIVE
CHLORIDE SERPL-SCNC: 114 MMOL/L (ref 97–108)
CHOLEST SERPL-MCNC: 216 MG/DL
CO2 SERPL-SCNC: 20 MMOL/L (ref 21–32)
COCAINE UR QL SCN: NEGATIVE
CREAT SERPL-MCNC: 1.31 MG/DL (ref 0.55–1.02)
DIFFERENTIAL METHOD BLD: ABNORMAL
EOSINOPHIL # BLD: 0.4 K/UL (ref 0–0.4)
EOSINOPHIL NFR BLD: 4 % (ref 0–7)
ERYTHROCYTE [DISTWIDTH] IN BLOOD BY AUTOMATED COUNT: 12.4 % (ref 11.5–14.5)
GLOBULIN SER CALC-MCNC: 3.1 G/DL (ref 2–4)
GLUCOSE SERPL-MCNC: 87 MG/DL (ref 65–100)
HCT VFR BLD AUTO: 30.7 % (ref 35–47)
HDLC SERPL-MCNC: 42 MG/DL
HDLC SERPL: 5.1 (ref 0–5)
HGB BLD-MCNC: 10.4 G/DL (ref 11.5–16)
IMM GRANULOCYTES # BLD AUTO: 0 K/UL (ref 0–0.04)
IMM GRANULOCYTES NFR BLD AUTO: 0 % (ref 0–0.5)
LDLC SERPL CALC-MCNC: 151 MG/DL (ref 0–100)
LIPID PANEL: ABNORMAL
LYMPHOCYTES # BLD: 3 K/UL (ref 0.8–3.5)
LYMPHOCYTES NFR BLD: 32 % (ref 12–49)
Lab: NORMAL
MCH RBC QN AUTO: 30.9 PG (ref 26–34)
MCHC RBC AUTO-ENTMCNC: 33.9 G/DL (ref 30–36.5)
MCV RBC AUTO: 91.1 FL (ref 80–99)
METHADONE UR QL: NEGATIVE
MONOCYTES # BLD: 0.9 K/UL (ref 0–1)
MONOCYTES NFR BLD: 9 % (ref 5–13)
NEUTS SEG # BLD: 5.2 K/UL (ref 1.8–8)
NEUTS SEG NFR BLD: 54 % (ref 32–75)
NRBC # BLD: 0 K/UL (ref 0–0.01)
NRBC BLD-RTO: 0 PER 100 WBC
OPIATES UR QL: NEGATIVE
PCP UR QL: NEGATIVE
PLATELET # BLD AUTO: 313 K/UL (ref 150–400)
PMV BLD AUTO: 10.3 FL (ref 8.9–12.9)
POTASSIUM SERPL-SCNC: 3.9 MMOL/L (ref 3.5–5.1)
PROT SERPL-MCNC: 5.9 G/DL (ref 6.4–8.2)
RBC # BLD AUTO: 3.37 M/UL (ref 3.8–5.2)
SODIUM SERPL-SCNC: 143 MMOL/L (ref 136–145)
T3FREE SERPL-MCNC: 2.2 PG/ML (ref 2.2–4)
TRIGL SERPL-MCNC: 115 MG/DL
VLDLC SERPL CALC-MCNC: 23 MG/DL
WBC # BLD AUTO: 9.5 K/UL (ref 3.6–11)

## 2024-04-17 PROCEDURE — 2580000003 HC RX 258: Performed by: FAMILY MEDICINE

## 2024-04-17 PROCEDURE — 94761 N-INVAS EAR/PLS OXIMETRY MLT: CPT

## 2024-04-17 PROCEDURE — 6370000000 HC RX 637 (ALT 250 FOR IP): Performed by: FAMILY MEDICINE

## 2024-04-17 PROCEDURE — 2060000000 HC ICU INTERMEDIATE R&B

## 2024-04-17 PROCEDURE — 83880 ASSAY OF NATRIURETIC PEPTIDE: CPT

## 2024-04-17 PROCEDURE — 80053 COMPREHEN METABOLIC PANEL: CPT

## 2024-04-17 PROCEDURE — 94640 AIRWAY INHALATION TREATMENT: CPT

## 2024-04-17 PROCEDURE — 6360000002 HC RX W HCPCS: Performed by: FAMILY MEDICINE

## 2024-04-17 PROCEDURE — 85025 COMPLETE CBC W/AUTO DIFF WBC: CPT

## 2024-04-17 RX ADMIN — ATORVASTATIN CALCIUM 40 MG: 40 TABLET, FILM COATED ORAL at 21:28

## 2024-04-17 RX ADMIN — BUDESONIDE INHALATION 500 MCG: 0.5 SUSPENSION RESPIRATORY (INHALATION) at 20:54

## 2024-04-17 RX ADMIN — HEPARIN SODIUM 5000 UNITS: 5000 INJECTION INTRAVENOUS; SUBCUTANEOUS at 05:36

## 2024-04-17 RX ADMIN — HEPARIN SODIUM 5000 UNITS: 5000 INJECTION INTRAVENOUS; SUBCUTANEOUS at 14:45

## 2024-04-17 RX ADMIN — METOPROLOL SUCCINATE 50 MG: 50 TABLET, EXTENDED RELEASE ORAL at 08:33

## 2024-04-17 RX ADMIN — ASPIRIN 81 MG 81 MG: 81 TABLET ORAL at 08:33

## 2024-04-17 RX ADMIN — FOLIC ACID 1000 MCG: 1 TABLET ORAL at 08:33

## 2024-04-17 RX ADMIN — SODIUM CHLORIDE, PRESERVATIVE FREE 10 ML: 5 INJECTION INTRAVENOUS at 08:33

## 2024-04-17 RX ADMIN — BUDESONIDE INHALATION 500 MCG: 0.5 SUSPENSION RESPIRATORY (INHALATION) at 08:39

## 2024-04-17 RX ADMIN — SODIUM CHLORIDE, PRESERVATIVE FREE 10 ML: 5 INJECTION INTRAVENOUS at 21:28

## 2024-04-17 RX ADMIN — LEVOTHYROXINE SODIUM 100 MCG: 0.1 TABLET ORAL at 08:33

## 2024-04-17 RX ADMIN — HYDROXYZINE HYDROCHLORIDE 25 MG: 25 TABLET, FILM COATED ORAL at 05:44

## 2024-04-17 RX ADMIN — HEPARIN SODIUM 5000 UNITS: 5000 INJECTION INTRAVENOUS; SUBCUTANEOUS at 21:31

## 2024-04-17 RX ADMIN — HYDROXYZINE HYDROCHLORIDE 25 MG: 25 TABLET, FILM COATED ORAL at 21:28

## 2024-04-17 RX ADMIN — FUROSEMIDE 20 MG: 40 TABLET ORAL at 08:33

## 2024-04-17 RX ADMIN — SODIUM CHLORIDE, PRESERVATIVE FREE 10 ML: 5 INJECTION INTRAVENOUS at 00:34

## 2024-04-17 ASSESSMENT — PAIN SCALES - GENERAL: PAINLEVEL_OUTOF10: 0

## 2024-04-17 NOTE — PROGRESS NOTES
4 Eyes Skin Assessment     NAME:  Sylvie Siddiqi  YOB: 1930  MEDICAL RECORD NUMBER:  [id number]    The patient is being assessed for  Admission    I agree that at least one RN has performed a thorough Head to Toe Skin Assessment on the patient. ALL assessment sites listed below have been assessed.      Areas assessed by both nurses:    Head, Face, Ears, Shoulders, Back, Chest, Arms, Elbows, Hands, Sacrum. Buttock, Coccyx, Ischium, Legs. Feet and Heels, and Under Medical Devices         Does the Patient have a Wound? No noted wound(s)       Jules Prevention initiated by RN: No  Wound Care Orders initiated by RN: No    Pressure Injury (Stage 3,4, Unstageable, DTI, NWPT, and Complex wounds) if present, place Wound referral order by RN under :     Note;  REDNESS SACCRUM AREA,  STAGE I.    New Ostomies, if present place, Ostomy referral order under : No     Nurse 1 eSignature: Electronically signed by Earnestine Scott RN on 4/17/24 at 7:09 PM EDT    **SHARE this note so that the co-signing nurse can place an eSignature**    Nurse 2 eSignature: Electronically signed by Bernabe Ghosh RN on 4/17/24 at 7:11 PM EDT

## 2024-04-17 NOTE — ED NOTES
Patient brought back to room 3 due to a \" patient was in room 488\". Called floor and spoke with primary nurse who relayed that \"yes, another patient is in the room and then it needs to be cleaned\". This relayed to Melinda SUBRAMANIAN- Charge nurse.

## 2024-04-17 NOTE — ED NOTES
Patient anxious and continuously pulling at lines and IV. Mitts applied to patient, however, patient continues to remove mitts. MD Kym notified, see MAR for orders

## 2024-04-17 NOTE — ED NOTES
Assumed care of patient at this time.Patient placed on position of comfort at this time . Bed down, side rails up x 2. Call bell with sitter at this time. Instructed patient, sitter, and family friend that we are awaiting IP bed at this time.  Family and sitter verbalizes understanding. Relayed to patient/family to call for any needs.Placed on Monitor x 2 with alarms on.

## 2024-04-17 NOTE — ED NOTES
ED TO INPATIENT SBAR HANDOFF    Patient Name: Sylvie Siddiqi   Preferred Name: Sylvie  : 1930  89 y.o.   Family/Caregiver Present: no   Code Status Order: DNR  PO Status: NPO:No  Telemetry Order:   C-SSRS: Risk of Suicide: No Risk  Sitter no Safety  Restraints:     Sepsis Risk Score Sepsis Risk Score: 0.68    Situation  Chief Complaint   Patient presents with    Fatigue     Brief Description of Patient's Condition: Patient sent here from home, has 24 hour sitter, c/o right arm twitching and eye pain. These complaints are chronic, found LBBB on EKG while here. Hx of dementia. Has been using sitter.   Mental Status: disoriented and alert  Arrived from:Home  Imaging:   CT HEAD WO CONTRAST   Final Result   No acute intracranial process              Abnormal labs:   Abnormal Labs Reviewed   CBC WITH AUTO DIFFERENTIAL - Abnormal; Notable for the following components:       Result Value    RBC 3.73 (*)     Hematocrit 34.5 (*)     All other components within normal limits   BASIC METABOLIC PANEL - Abnormal; Notable for the following components:    Chloride 112 (*)     Glucose 108 (*)     BUN 35 (*)     Creatinine 1.57 (*)     Bun/Cre Ratio 22 (*)     Est, Glom Filt Rate 31 (*)     All other components within normal limits   URINALYSIS WITH REFLEX TO CULTURE - Abnormal; Notable for the following components:    Epithelial Cells UA Many (*)     Mucus, UA Trace (*)     All other components within normal limits   COMPREHENSIVE METABOLIC PANEL W/ REFLEX TO MG FOR LOW K - Abnormal; Notable for the following components:    Chloride 114 (*)     CO2 20 (*)     BUN 29 (*)     Creatinine 1.31 (*)     Bun/Cre Ratio 22 (*)     Est, Glom Filt Rate 38 (*)     Calcium 8.3 (*)     ALT 11 (*)     Total Protein 5.9 (*)     Albumin 2.8 (*)     Albumin/Globulin Ratio 0.9 (*)     All other components within normal limits   CBC WITH AUTO DIFFERENTIAL - Abnormal; Notable for the following components:    RBC 3.37 (*)     Hemoglobin 10.4 (*)  MALCOLM CANNON RN on 4/17/2024 at 3:51 PM

## 2024-04-17 NOTE — ED NOTES
Patient resting comfortably at this time. Patient is calm and sleeping. 1:1 sitter and patient's home aide at bedside. Lights dimmed, distractions limited. Respirations are non-labored, chest rise and fall observed.

## 2024-04-17 NOTE — CONSULTS
Cardiology Consult    NAME: Sylvie Siddiqi   :  1930   MRN:  [id number]     Date/Time:  2024 11:07 PM    Patient PCP: Beatris Mejía MD  ________________________________________________________________________     Assessment:   Patient is a 89-year-old white female with:  1.  Nonobstructive coronary artery disease  2.  Nonischemic cardiomyopathy  3.  Moderate aortic insufficiency  4.  COPD  5.  Hypertension with hypertensive heart disease  6.  Hypothyroidism  7.  Renal insufficiency  8.  Hard of hearing  9.  Metabolic encephalopathy  10.  Mild to moderate MR  11.  Dementia  12.  DNR      Plan:     EKG shows sinus rhythm, left bundle branch block with repolarization changes.  She is a poor historian.  Creatinine is 1.57, BUN 35.  Chloride 112, potassium 4.7.  Troponin is normal.  Hemoglobin 11.6, white count is normal, platelet 369.  D-dimer was mildly elevated and she is currently on heparin gtt.  Most recent echo available showed EF of 40 to 45%, moderate hypokinesis of anteroseptum and anterior wall with grade 1 diastolic dysfunction.  There was mild to moderate aortic regurgitation with mild to moderate MR    Plan:  1.  Check echocardiogram  2.  Telemetry  3.  Currently on aspirin, atorvastatin, oral Lasix and metoprolol.  Currently on heparin for DVT prophylaxis.      []        High complexity decision making was performed        Subjective:   CHIEF COMPLAINT:   Fatigue    REASON FOR CONSULT:  Left bundle branch block  CAD    HISTORY OF PRESENT ILLNESS:     Sylvie Siddiqi is a 89 y.o. White (non-) female who has a history of nonobstructive coronary arteries, COPD, dementia, hypothyroidism  She is poor historian.  History is taken from chart review and further discussion with other healthcare personnel and her sitter at bedside.  She was noticed to have new onset twitching of the right upper extremity the reason why she was brought to the emergency room.  Patient is morning  and she does not answer any questions appropriately.  She was recently seen in the [facility] emergency room due to abnormal labs per Sitter.    Past Medical History:   Diagnosis Date    Anemia     Chronic obstructive pulmonary disease (HCC)     Dementia (HCC)     Heart attack (HCC)     Hypertension     Thyroid disease     hypothyroidism      Past Surgical History:   Procedure Laterality Date    ORTHOPEDIC SURGERY  2018    total hip replacement    PACEMAKER       No Known Allergies   Meds:  See below  Social History     Tobacco Use    Smoking status: Never    Smokeless tobacco: Never   Substance Use Topics    Alcohol use: Never      Family History   Problem Relation Age of Onset    Stroke Mother     Heart Disease Mother     Stroke Father     Heart Disease Father        REVIEW OF SYSTEMS:     [x]         Unable to obtain  ROS due to ---   []         Total of 12 systems reviewed as follows:    Constitutional: negative fever, negative chills, negative weight loss  Eyes:   negative visual changes  ENT:   negative sore throat, tongue or lip swelling  Respiratory:  negative cough, negative dyspnea  Cards:  negative for chest pain, palpitations, lower extremity edema  GI:   negative for nausea, vomiting, diarrhea, and abdominal pain  Genitourinary: negative for frequency, dysuria  Integument:  negative for rash   Hematologic:  negative for easy bruising and gum/nose bleeding  Musculoskel: negative for myalgias,  back pain  Neurological:  negative for headaches, dizziness, vertigo, weakness  Behavl/Psych: negative for feelings of anxiety, depression     Pertinent Positives include :    Objective:      Physical Exam:    Last 24hrs VS reviewed since prior progress note. Most recent are:    BP 93/60   Pulse 62   Temp 98.4 °F (36.9 °C) (Oral)   Resp 17   Ht 1.6 m (5' 3\")   Wt 63.5 kg (140 lb)   SpO2 98%   BMI 24.80 kg/m²     Intake/Output Summary (Last 24 hours) at 4/16/2024 2306  Last data filed at 4/16/2024 1647  Gross

## 2024-04-17 NOTE — ED NOTES
Pulmonary Clinic  New Patient Evaluation    Name: Anne Weber MRN: [id number]     Age: 30 year old   YOB: 1992             HPI:   CC: Cough and dyspnea    Anne Weber is a 30 year old female with H/O aortic vegetation, nonischemic cardiomyopathy status post heart transplant (on tacrolimus and CellCept, recently discontinued prednisone) who presents to discuss persistent dyspnea, cough, chest wall pain and her postoperative course.    She had a heart transplant on November 30, 2022 at [facility].  This was complicated by hemopneumothorax.  She left the hospital in late February, but was living with her mother until late April.  Around April she developed worsening pleuritic type pain in her left lower lateral chest.  The pain is approximately 4 out of 10, sharp and stabbing in nature, primarily with deep inspiration, though she does occasionally have very mild pain at rest.    Additionally she has had a productive cough for approximately 1 month and persistent left upper lobe opacity.    This worsened and included fevers and rigors prompting her to visit the ER on 5/15 and was treated with antibiotics.    She was on supplemental oxygen during her hospitalization and this was discontinued approximately 1 month ago.    Currently she can walk around such as going shopping, but has to move quite slowly and occasionally has to stop and catch her breath.  Stairs are very difficult, though she can do 1 flight of stairs before stopping.  She is currently working with PT and OT and will be starting cardiac rehab soon.    Overall she feels much improved from when she was discharged, but not back to her baseline yet.  She feels that her breathing has been slowly improving with the exception of the probable infection that led to her ER visit on 5/15, though she feels that she has recovered to her preexacerbation baseline    She has no history of respiratory difficulty.  She was given an albuterol  Patient removed mitts and continues to pull at lines 1:1 safety sitter requested. Patient swinging and swatting at RN   inhaler during her hospitalization and feels that this does provide symptomatic benefit.        Exposure History:   Tobacco: Started at age 14, quit in .  Smoked 1 pack/day for approximately 8 pack years.  Other inhaled substances (Vaping, hookah. Marijuana): Used to use MJ   Occupation: Cosmetology   Pets: Hypoallergenic dog  Allergies: Cats, dogs, dust mites, probably pollen. Tested as a child with many positives   GERD: once a month           Past Medical History:     Past Medical History:   Diagnosis Date     ADHD (attention deficit hyperactivity disorder)      Anxiety      Aortic valve vegetation 10/20/2016    Not thought to be infectious by ID     Chronic systolic heart failure (H) 2016     ICD (implantable cardioverter-defibrillator) in place 10/20/2016     Nonischemic cardiomyopathy (H)     secondary to polysubstance abuse     Polysubstance abuse (H)      Pulmonary embolism (H) 10/20/2016    Bilateral-on coumadin             Past Surgical History:      Past Surgical History:   Procedure Laterality Date     CARDIAC DEFIBRILLATOR PLACEMENT       CARDIAC SURGERY      Wire sticking out.     LEFT VENTRICULAR ASSIST DEVICE       ZZC INSERT ELECTRD LEADS/REPOSTION  10/12/2016                  Social History:     Social History     Socioeconomic History     Marital status:      Spouse name: Not on file     Number of children: Not on file     Years of education: Not on file     Highest education level: Not on file   Occupational History     Not on file   Tobacco Use     Smoking status: Former     Years: 9.00     Types: Cigarettes     Quit date: 6/15/2017     Years since quittin.9     Smokeless tobacco: Not on file     Tobacco comments:     3 smokes per day.   Vaping Use     Vaping status: Not on file   Substance and Sexual Activity     Alcohol use: No     Drug use: Yes     Types: Marijuana, Heroin, Methamphetamines     Comment: smoked 2-3 days ago     Sexual activity: Yes     Partners: Male      Birth control/protection: Inserts   Other Topics Concern     Parent/sibling w/ CABG, MI or angioplasty before 65F 55M? No     Comment: adopted   Social History Narrative     Not on file     Social Determinants of Health     Financial Resource Strain: Not on file   Food Insecurity: Not on file   Transportation Needs: Not on file   Physical Activity: Not on file   Stress: Not on file   Social Connections: Not on file   Intimate Partner Violence: Not on file   Housing Stability: Not on file            Family History:   No family history on file.          Immunizations:     Immunization History   Administered Date(s) Administered     COVID-19 Monovalent 18+ (Moderna) 11/08/2021, 12/06/2021, 07/06/2022     Pneumococcal 23 valent 08/08/2016             Allergies:     Allergies   Allergen Reactions     Amoxicillin Hives     Per previous records     Ceclor [Cefaclor] Hives     Per previous records     Clindamycin      THROWING UP             Medications:   acetaminophen (TYLENOL) 325 MG tablet, Take 1 tablet (325 mg) by mouth every 4 hours as needed for mild pain or fever  ascorbic acid (VITAMIN C) 500 MG tablet, Take 2 tablets (1,000 mg) by mouth daily  Blood Pressure Monitor KIT, 1 each daily  bumetanide (BUMEX) 0.25 MG/ML infusion, Inject 1.5 mg/hr into the vein continuous  busPIRone 30 MG TABS, Take 30 mg by mouth 3 times daily  Cholecalciferol (VITAMIN D) 2000 UNITS tablet, Take 2,000 Units by mouth daily  Cyanocobalamin 1000 MCG CAPS, Take 1,000 Units by mouth daily  cyclobenzaprine (FLEXERIL) 10 MG tablet, Take 1 tablet (10 mg) by mouth 3 times daily as needed for muscle spasms  D5W 5 % SOLN 170 mL with DOBUTamine 250 MG/20ML SOLN 1,000 mg, Inject 0.36 mg/min into the vein continuous  digoxin (LANOXIN) 125 MCG tablet, Take 1 tablet (125 mcg) by mouth daily  DULoxetine (CYMBALTA) 30 MG EC capsule, 3 capsules (90 mg) by Oral or Feeding Tube route daily  ferrous sulfate (IRON) 325 (65 FE) MG tablet, Take 1 tablet (325  "mg) by mouth 2 times daily  hydrALAZINE (APRESOLINE) 100 MG TABS tablet, Take 1 tablet (100 mg) by mouth 4 times daily  hydrOXYzine (ATARAX) 50 MG tablet, Take 1 tablet (50 mg) by mouth 3 times daily  isosorbide dinitrate (ISORDIL) 5 MG tablet, Take 6 tablets (30 mg) by mouth 3 times daily  melatonin 3 MG tablet, Take 1 tablet (3 mg) by mouth nightly as needed for sleep (Patient taking differently: Take 3 mg by mouth At Bedtime )  multivitamin, therapeutic with minerals (THERA-VIT-M) TABS, Take 1 tablet by mouth daily  nitroprusside (NIPRIDE) 0.4 mg/mL, sodium thiosulfate 4 mg/mL in D5W 125 mL infusion, Inject 36.5 mcg/min into the vein continuous  ondansetron (ZOFRAN-ODT) 4 MG ODT tab, Take 1 tablet (4 mg) by mouth every 6 hours as needed for nausea  pantoprazole (PROTONIX) 40 MG EC tablet, Take 1 tablet (40 mg) by mouth every morning  polyethylene glycol (MIRALAX/GLYCOLAX) packet, Take 1 packet by mouth daily   Potassium Chloride ER 20 MEQ TBCR, Take 3 tablets (60 mEq) by mouth 2 times daily  sodium chloride, PF, 0.9% PF flush, 3 mLs by Intracatheter route every hour as needed for line flush  thiamine 100 MG tablet, Take 1 tablet (100 mg) by mouth daily  warfarin (COUMADIN) 2 MG tablet, Take 6mg on 1/2, then 4mg daily until you get your INR checked on 1/4 or 1/5.    No current facility-administered medications on file prior to visit.           Review of Systems:     Review of Systems   Constitutional: Negative for chills, fever and weight loss.   Respiratory: Positive for cough, sputum production and shortness of breath. Negative for hemoptysis.    Cardiovascular: Positive for chest pain.              Exam:   /65   Pulse 79   Resp 17   Ht 1.6 m (5' 3\")   SpO2 97%   BMI 32.91 kg/m      Physical Exam  Vitals and nursing note reviewed.   Constitutional:       Appearance: She is obese.   Cardiovascular:      Rate and Rhythm: Normal rate and regular rhythm.   Pulmonary:      Effort: Pulmonary effort is " normal.      Comments: Faint rhonchi in the left lower lobe  Skin:     General: Skin is warm and dry.   Neurological:      Mental Status: She is alert.       Fingernails without clubbing         Data:     PFT 5/23/2023      The FVC and FEV1 are reduced, the FEV1/FVC ratio is within normal limits.  The RV is within normal limits, the TLC is reduced, the RV/TLC ratio is elevated.  The diffusion capacity is reduced, however the diffusion capacity is not corrected for the patient's hemoglobin.    IMPRESSION:  Severe restriction with severe diffusion deficit      PFT 7/17/17 ([facility])            Procalcitonin 5/15/2023  0.05      All studies listed here were independently reviewed and interpreted by me today.          Assessment and Plan:     ## Productive cough, chronic  ## Dyspnea on exertion  ## Persistent left upper lobe opacity  ## Restriction on PFTs  ## Diffusion defect  ## Recent pneumonia treated with antibiotics  Her symptoms are likely largely due to her complicated surgery with heart transplant followed by tricuspid valve repair complicated by hemopneumothorax.  Her PFTs reveal restriction which is likely multifactorial from thoracotomy, cardiomegaly, and possible trapped lung.  Diffusion defect is at least partially due to anemia, though pulmonary infiltrates in the setting of recent pneumonia could also be contributing.  She is already working with PT and OT, and will soon be starting cardiac rehab.  At this point, I think we need to reevaluate with imaging.  This was discussed.  The patient has already had a long day and is not interested in remaining here for additional imaging as she has a long drive home, but will be back in town next week for an appointment with her cardiologist.  She will talk with her transplant team and have a CT scan ordered after which she will contact us.  -Continue as needed albuterol  -Sputum culture ordered and faxed to her local healthcare facility        Return to clinic: 3  months    I personally spent 74 minutes on the date of the encounter doing chart review, history and exam, documentation and further activities per the note.    Jam Diaz M.D.  Pulmonary & Critical Care  Pager: Click Here to page      The above note was dictated using voice recognition software and may include typographical errors. Please contact the author for any clarifications.

## 2024-04-17 NOTE — PROGRESS NOTES
History and Physical    NAME:   Sylvie Siddiqi   :  1930   MRN:  [id number]     Date/Time: 2024 1:30 PM    Patient PCP: Beatris Mejía MD  ______________________________________________________________________       Subjective:     CHIEF COMPLAINT:       Tremor and fatigue      HISTORY OF PRESENT ILLNESS:     General Daily Progress Note  Patient is a 89 y.o. year old female past medical history of hypertension CAD COPD dementia walk with a walker asthma hypothyroidism hyperlipidemia COPD CAD chronic renal insufficiency old MI stable Hospital from home because having some tremors in the left hand patient was seen in [place] ER few days ago and was sent home came back to the ER seen by the ER physician and recommended patient to be admitted for further evaluation and treatment   Hospitalist was consulted initially and then transfer to my service        Patient alert awake not much verbal eating drinking well no tremors no shaking at this time    Past Medical History:   Diagnosis Date    Anemia     Chronic obstructive pulmonary disease (HCC)     Dementia (HCC)     Heart attack (HCC)     Hypertension     Thyroid disease     hypothyroidism        Past Surgical History:   Procedure Laterality Date    ORTHOPEDIC SURGERY  2018    total hip replacement    PACEMAKER         Social History     Tobacco Use    Smoking status: Never    Smokeless tobacco: Never   Substance Use Topics    Alcohol use: Never        Family History   Problem Relation Age of Onset    Stroke Mother     Heart Disease Mother     Stroke Father     Heart Disease Father        No Known Allergies     Prior to Admission medications    Medication Sig Start Date End Date Taking? Authorizing Provider   folic acid (FOLVITE) 1 MG tablet Take 1 tablet by mouth daily 20   Automatic Reconciliation, Ar   levothyroxine (SYNTHROID) 100 MCG tablet Take 1 tablet by mouth daily 20   Automatic Reconciliation, Ar   metoprolol succinate (TOPROL

## 2024-04-17 NOTE — PROGRESS NOTES
Progress Note      4/17/2024 2:22 PM  NAME: Sylvie Siddiqi   MRN:  [id number]   Admit Diagnosis: LBBB (left bundle branch block) [I44.7]  Tremor [R25.1]      Problem List:   Patient is a 89-year-old white female with:  1.  Nonobstructive coronary artery disease  2.  Nonischemic cardiomyopathy  3.  Moderate aortic insufficiency  4.  COPD  5.  Hypertension with hypertensive heart disease  6.  Hypothyroidism  7.  Renal insufficiency  8.  Hard of hearing  9.  Metabolic encephalopathy  10.  Mild to moderate MR  11.  Dementia  12.  DNR         Assessment/Plan:   No acute events overnight  Blood pressure is normal  On IV fluid  Sodium 143, potassium is 3.9, creatinine 1.3  , BUN 29.    Continue supportive care             []       High complexity decision making was performed in this patient at high risk for decompensation with multiple organ involvement.    Subjective:     Sylvie Siddiqi nonverbal.  Discussed with RN events overnight.     Review of Systems:   Negative except for as noted above.    Objective:      Physical Exam:    Last 24hrs VS reviewed since prior progress note. Most recent are:    /72   Pulse 61   Temp 98.4 °F (36.9 °C) (Oral)   Resp 17   Ht 1.6 m (5' 3\")   Wt 63.5 kg (140 lb)   SpO2 96%   BMI 24.80 kg/m²     Intake/Output Summary (Last 24 hours) at 4/17/2024 1422  Last data filed at 4/17/2024 1110  Gross per 24 hour   Intake 1010 ml   Output 25 ml   Net 985 ml        General Appearance: Alert; no acute distress.  Ears/Nose/Mouth/Throat: moist mucous membranes  Neck: Supple.  Chest: Lungs clear to auscultation bilaterally.  Cardiovascular: Regular rate and rhythm, S1S2 normal  Abdomen: Soft, non-tender, bowel sounds are active.  Extremities: No edema bilaterally.  Skin: Warm and dry.      PMH/SH reviewed - no change compared to H&P    Telemetry:     EKG:     No valid procedures specified.    []  No new EKG for review    Lab Data Personally Reviewed:    Recent Labs

## 2024-04-18 ENCOUNTER — APPOINTMENT (OUTPATIENT)
Facility: HOSPITAL | Age: 89
End: 2024-04-18
Attending: INTERNAL MEDICINE
Payer: MEDICARE

## 2024-04-18 VITALS
TEMPERATURE: 97.5 F | WEIGHT: 142.86 LBS | OXYGEN SATURATION: 97 % | SYSTOLIC BLOOD PRESSURE: 143 MMHG | DIASTOLIC BLOOD PRESSURE: 47 MMHG | HEIGHT: 63 IN | BODY MASS INDEX: 25.31 KG/M2 | RESPIRATION RATE: 18 BRPM | HEART RATE: 69 BPM

## 2024-04-18 LAB
ECHO AO ASC DIAM: 1.6 CM
ECHO AO ASCENDING AORTA INDEX: 0.95 CM/M2
ECHO AO ROOT DIAM: 1.9 CM
ECHO AO ROOT INDEX: 1.13 CM/M2
ECHO AR MAX VEL PISA: 3.3 M/S
ECHO AV AREA PEAK VELOCITY: 1 CM2
ECHO AV AREA VTI: 1 CM2
ECHO AV AREA/BSA PEAK VELOCITY: 0.6 CM2/M2
ECHO AV AREA/BSA VTI: 0.6 CM2/M2
ECHO AV MEAN GRADIENT: 5 MMHG
ECHO AV MEAN VELOCITY: 1 M/S
ECHO AV PEAK GRADIENT: 7 MMHG
ECHO AV PEAK VELOCITY: 1.3 M/S
ECHO AV REGURGITANT PHT: 510 MS
ECHO AV VELOCITY RATIO: 0.62
ECHO AV VTI: 31.1 CM
ECHO BSA: 1.68 M2
ECHO EST RA PRESSURE: 3 MMHG
ECHO LA AREA 4C: 14.4 CM2
ECHO LA DIAMETER INDEX: 1.96 CM/M2
ECHO LA DIAMETER: 3.3 CM
ECHO LA MAJOR AXIS: 4.2 CM
ECHO LA TO AORTIC ROOT RATIO: 1.74
ECHO LA VOL MOD A4C: 38 ML (ref 22–52)
ECHO LA VOLUME INDEX MOD A4C: 23 ML/M2 (ref 16–34)
ECHO LV E' LATERAL VELOCITY: 6 CM/S
ECHO LV E' SEPTAL VELOCITY: 4 CM/S
ECHO LV EDV A4C: 71 ML
ECHO LV EDV INDEX A4C: 42 ML/M2
ECHO LV EJECTION FRACTION A4C: 41 %
ECHO LV ESV A4C: 42 ML
ECHO LV ESV INDEX A4C: 25 ML/M2
ECHO LV FRACTIONAL SHORTENING: 20 % (ref 28–44)
ECHO LV INTERNAL DIMENSION DIASTOLE INDEX: 2.74 CM/M2
ECHO LV INTERNAL DIMENSION DIASTOLIC: 4.6 CM (ref 3.9–5.3)
ECHO LV INTERNAL DIMENSION SYSTOLIC INDEX: 2.2 CM/M2
ECHO LV INTERNAL DIMENSION SYSTOLIC: 3.7 CM
ECHO LV IVSD: 0.8 CM (ref 0.6–0.9)
ECHO LV MASS 2D: 148.1 G (ref 67–162)
ECHO LV MASS INDEX 2D: 88.2 G/M2 (ref 43–95)
ECHO LV POSTERIOR WALL DIASTOLIC: 1.1 CM (ref 0.6–0.9)
ECHO LV RELATIVE WALL THICKNESS RATIO: 0.48
ECHO LVOT AREA: 1.8 CM2
ECHO LVOT AV VTI INDEX: 0.58
ECHO LVOT DIAM: 1.5 CM
ECHO LVOT MEAN GRADIENT: 1 MMHG
ECHO LVOT PEAK GRADIENT: 2 MMHG
ECHO LVOT PEAK VELOCITY: 0.8 M/S
ECHO LVOT STROKE VOLUME INDEX: 19 ML/M2
ECHO LVOT SV: 32 ML
ECHO LVOT VTI: 18.1 CM
ECHO MV A VELOCITY: 1.36 M/S
ECHO MV AREA VTI: 0.8 CM2
ECHO MV E DECELERATION TIME (DT): 235 MS
ECHO MV E VELOCITY: 1.15 M/S
ECHO MV E/A RATIO: 0.85
ECHO MV E/E' LATERAL: 19.17
ECHO MV E/E' RATIO (AVERAGED): 23.96
ECHO MV LVOT VTI INDEX: 2.09
ECHO MV MAX VELOCITY: 1.4 M/S
ECHO MV MEAN GRADIENT: 5 MMHG
ECHO MV MEAN VELOCITY: 1.1 M/S
ECHO MV PEAK GRADIENT: 8 MMHG
ECHO MV REGURGITANT ALIASING (NYQUIST) VELOCITY: 92 CM/S
ECHO MV REGURGITANT PEAK GRADIENT: 149 MMHG
ECHO MV REGURGITANT PEAK VELOCITY: 6.1 M/S
ECHO MV REGURGITANT RADIUS PISA: 0.4 CM
ECHO MV REGURGITANT VTIA: 215 CM
ECHO MV VTI: 37.8 CM
ECHO PV MAX VELOCITY: 0.8 M/S
ECHO PV PEAK GRADIENT: 2 MMHG
ECHO RA AREA 4C: 8.2 CM2
ECHO RA END SYSTOLIC VOLUME APICAL 4 CHAMBER INDEX BSA: 8 ML/M2
ECHO RA VOLUME: 14 ML
ECHO RIGHT VENTRICULAR SYSTOLIC PRESSURE (RVSP): 23 MMHG
ECHO RV BASAL DIMENSION: 2.4 CM
ECHO RV FREE WALL PEAK S': 10 CM/S
ECHO RV TAPSE: 1.7 CM (ref 1.7–?)
ECHO TV REGURGITANT MAX VELOCITY: 2.23 M/S
ECHO TV REGURGITANT PEAK GRADIENT: 20 MMHG

## 2024-04-18 PROCEDURE — G0425 INPT/ED TELECONSULT30: HCPCS | Performed by: PSYCHIATRY & NEUROLOGY

## 2024-04-18 PROCEDURE — 6360000002 HC RX W HCPCS: Performed by: FAMILY MEDICINE

## 2024-04-18 PROCEDURE — 6370000000 HC RX 637 (ALT 250 FOR IP): Performed by: FAMILY MEDICINE

## 2024-04-18 PROCEDURE — 93306 TTE W/DOPPLER COMPLETE: CPT

## 2024-04-18 PROCEDURE — 2580000003 HC RX 258: Performed by: FAMILY MEDICINE

## 2024-04-18 RX ORDER — HYDROXYZINE HYDROCHLORIDE 25 MG/1
25 TABLET, FILM COATED ORAL EVERY 6 HOURS PRN
Qty: 30 TABLET | Refills: 0 | Status: SHIPPED | OUTPATIENT
Start: 2024-04-18 | End: 2024-04-28

## 2024-04-18 RX ORDER — FUROSEMIDE 20 MG/1
20 TABLET ORAL DAILY
Qty: 60 TABLET | Refills: 3 | Status: SHIPPED | OUTPATIENT
Start: 2024-04-19

## 2024-04-18 RX ORDER — ASPIRIN 81 MG/1
81 TABLET, CHEWABLE ORAL DAILY
Qty: 30 TABLET | Refills: 1 | Status: SHIPPED | OUTPATIENT
Start: 2024-04-18

## 2024-04-18 RX ORDER — BUDESONIDE 0.5 MG/2ML
500 INHALANT ORAL 2 TIMES DAILY
Qty: 60 EACH | Refills: 3 | Status: SHIPPED | OUTPATIENT
Start: 2024-04-18

## 2024-04-18 RX ORDER — ATORVASTATIN CALCIUM 40 MG/1
40 TABLET, FILM COATED ORAL NIGHTLY
Qty: 30 TABLET | Refills: 1 | Status: SHIPPED | OUTPATIENT
Start: 2024-04-18

## 2024-04-18 RX ADMIN — HEPARIN SODIUM 5000 UNITS: 5000 INJECTION INTRAVENOUS; SUBCUTANEOUS at 13:27

## 2024-04-18 RX ADMIN — LEVOTHYROXINE SODIUM 100 MCG: 0.1 TABLET ORAL at 09:23

## 2024-04-18 RX ADMIN — ASPIRIN 81 MG 81 MG: 81 TABLET ORAL at 09:23

## 2024-04-18 RX ADMIN — FOLIC ACID 1000 MCG: 1 TABLET ORAL at 09:23

## 2024-04-18 RX ADMIN — FUROSEMIDE 20 MG: 40 TABLET ORAL at 09:23

## 2024-04-18 RX ADMIN — SODIUM CHLORIDE, PRESERVATIVE FREE 10 ML: 5 INJECTION INTRAVENOUS at 09:23

## 2024-04-18 RX ADMIN — METOPROLOL SUCCINATE 50 MG: 50 TABLET, EXTENDED RELEASE ORAL at 09:23

## 2024-04-18 RX ADMIN — HEPARIN SODIUM 5000 UNITS: 5000 INJECTION INTRAVENOUS; SUBCUTANEOUS at 05:57

## 2024-04-18 ASSESSMENT — PAIN SCALES - GENERAL: PAINLEVEL_OUTOF10: 0

## 2024-04-18 NOTE — CONSULTS
[facility] NEUROLOGY CONSULTATION          Chief Complaint/Admission Diagnosis: LBBB (left bundle branch block) [I44.7]  Tremor [R25.1]  Renal impairment [N28.9]  History of dementia [Z86.59]  Other fatigue [R53.83]  Right-sided congestive heart failure, unspecified HF chronicity (HCC) [I50.810]     I have been asked to see this 89 y.o. female in neurological consultation by Beatris Isaac MD  to render advice and opinion regarding tremor     ?     Impression/Recommendations:   Patient is a 89 y.o. year old female past medical history of hypertension CAD COPD dementia walk with a walker asthma hypothyroidism hyperlipidemia COPD CAD chronic renal insufficiency old MI stable is admitted from home because having some tremors in the left hand. CT head is negative for any acute finding. EKG shows NSR. Exam is non focal and does not show any tremors! No tremors noted in the face either.    No further neurological recommendations.  Thank you for the consult.            Rock Bauer MD  Teleneurologist    HPI:   History was obtained from a review of the electronic record and from the patient and family.??   Patient is a 89 y.o. year old female past medical history of hypertension CAD COPD dementia walk with a walker asthma hypothyroidism hyperlipidemia COPD CAD chronic renal insufficiency old MI stable Hospital from home because having some tremors in the left hand patient was seen in [place] ER few days ago and was sent home came back to the ER seen by the ER physician and recommended patient to be admitted for further evaluation and treatment    ?     Review of Symptoms:     A ten system review of constitutional, cardiovascular, respiratory, musculoskeletal, endocrine, skin, HEENT, genitourinary, neurological, and psychiatric systems was obtained and is negative except as noted above in HPI.     ?    Neurological Examination:   BP (!) 149/69   Pulse 59   Temp 98.6 °F (37 °C) (Oral)   Resp 18   Ht  periventricular white matter disease. There is no intracranial hemorrhage, extra-axial collection, or mass effect. The basilar cisterns are open. No CT evidence of acute infarct. The bone windows demonstrate no abnormalities. The visualized portions of the paranasal sinuses and mastoid air cells are clear.     No acute intracranial process            Video Attestation:             ?I discussed the risks and benefits of a telehealth visit and I obtained the patient's or legally authorized representative's informed verbal consent to conduct this assessment using telehealth tools.? All of the patient’s or legally authorized representative's questions regarding the telehealth interaction were addressed. I provided my name and disclosed to the patient or legally authorized representative my licensure, certification, or registration. ?This consultation was remotely performed at the request of Beatris Mejía MD with the assistance of a trained virtual health liaison working at the originating site.? The consultation used real time licensed and encrypted telehealth equipment which allowed a live video connection between my location and the patient's location.? Aspects of the evaluation that could not be adequately evaluated by virtual assessment were shared with both the patient and the consulting provider.?          A total of 45 minutes of time was spent in direct care and coordination of care with the patient.

## 2024-04-18 NOTE — PLAN OF CARE
Problem: Safety - Adult  Goal: Free from fall injury  Outcome: Progressing     Problem: Discharge Planning  Goal: Discharge to home or other facility with appropriate resources  Outcome: Progressing  Flowsheets (Taken 4/17/2024 1925)  Discharge to home or other facility with appropriate resources:   Identify barriers to discharge with patient and caregiver   Refer to discharge planning if patient needs post-hospital services based on physician order or complex needs related to functional status, cognitive ability or social support system     Problem: Skin/Tissue Integrity  Goal: Absence of new skin breakdown  Description: 1.  Monitor for areas of redness and/or skin breakdown  2.  Assess vascular access sites hourly  3.  Every 4-6 hours minimum:  Change oxygen saturation probe site  4.  Every 4-6 hours:  If on nasal continuous positive airway pressure, respiratory therapy assess nares and determine need for appliance change or resting period.  Outcome: Progressing     Problem: ABCDS Injury Assessment  Goal: Absence of physical injury  Outcome: Progressing

## 2024-04-18 NOTE — PROGRESS NOTES
Progress Note      4/18/2024 7:51 AM  NAME: Sylvie Siddiqi   MRN:  [id number]   Admit Diagnosis: LBBB (left bundle branch block) [I44.7]  Tremor [R25.1]  Renal impairment [N28.9]  History of dementia [Z86.59]  Other fatigue [R53.83]  Right-sided congestive heart failure, unspecified HF chronicity (HCC) [I50.810]      Problem List:   Patient is a 89-year-old white female with:  1.  Nonobstructive coronary artery disease  2.  Nonischemic cardiomyopathy  3.  Moderate aortic insufficiency  4.  COPD  5.  Hypertension with hypertensive heart disease  6.  Hypothyroidism  7.  Renal insufficiency  8.  Hard of hearing  9.  Metabolic encephalopathy  10.  Mild to moderate MR  11.  Dementia  12.  DNR         Assessment/Plan:     No acute events overnight  No further cardiac testing planned  We will sign off and remain available if needed.  She will follow-up with cardiologist in 1 to 2 weeks after discharge or sooner if needed  Blood pressure is normal               []       High complexity decision making was performed in this patient at high risk for decompensation with multiple organ involvement.    Subjective:     Sylvie Siddiqi nonverbal.  Discussed with RN events overnight.     Review of Systems:   Negative except for as noted above.    Objective:      Physical Exam:    Last 24hrs VS reviewed since prior progress note. Most recent are:    /67   Pulse 62   Temp 98.4 °F (36.9 °C) (Oral)   Resp 18   Ht 1.6 m (5' 3\")   Wt 64.8 kg (142 lb 13.7 oz)   SpO2 96%   BMI 25.31 kg/m²     Intake/Output Summary (Last 24 hours) at 4/18/2024 0751  Last data filed at 4/17/2024 1110  Gross per 24 hour   Intake 1000 ml   Output --   Net 1000 ml          General Appearance: Alert; no acute distress.  Ears/Nose/Mouth/Throat: moist mucous membranes  Neck: Supple.  Chest: Lungs clear to auscultation bilaterally.  Cardiovascular: Regular rate and rhythm, S1S2 normal  Abdomen: Soft, non-tender, bowel sounds are  active.  Extremities: No edema bilaterally.  Skin: Warm and dry.      PMH/ reviewed - no change compared to H&P    Telemetry:     EKG:     No valid procedures specified.    []  No new EKG for review    Lab Data Personally Reviewed:    Recent Labs     04/16/24  1316 04/17/24  0536   WBC 9.6 9.5   HGB 11.6 10.4*   HCT 34.5* 30.7*    313       No results for input(s): \"INR\", \"APTT\" in the last 72 hours.    Invalid input(s): \"PTP\"   Recent Labs     04/16/24  1316 04/16/24  1931 04/17/24  0536     --  143   K 4.7  --  3.9   *  --  114*   CO2 24  --  20*   BUN 35*  --  29*   MG 2.1 2.0  --        No results for input(s): \"CPK\" in the last 72 hours.    Invalid input(s): \"CPKMB\", \"CKNDX\", \"TROIQ\"  Lab Results   Component Value Date/Time    CHOL 216 04/16/2024 07:31 PM    HDL 42 04/16/2024 07:31 PM       Recent Labs     04/17/24  0536   GLOB 3.1       No results for input(s): \"PH\", \"PCO2\", \"PO2\" in the last 72 hours.    Medications Personally Reviewed:    Current Facility-Administered Medications   Medication Dose Route Frequency    albuterol (PROVENTIL) nebulizer solution 2.5 mg  2.5 mg Nebulization Q6H PRN    aspirin chewable tablet 81 mg  81 mg Oral Daily    atorvastatin (LIPITOR) tablet 40 mg  40 mg Oral Nightly    budesonide (PULMICORT) nebulizer suspension 500 mcg  500 mcg Nebulization BID    folic acid (FOLVITE) tablet 1,000 mcg  1,000 mcg Oral Daily    furosemide (LASIX) tablet 20 mg  20 mg Oral Daily    ipratropium 0.5 mg-albuterol 2.5 mg (DUONEB) nebulizer solution 1 Dose  1 Dose Inhalation Q6H PRN    levothyroxine (SYNTHROID) tablet 100 mcg  100 mcg Oral Daily    metoprolol succinate (TOPROL XL) extended release tablet 50 mg  50 mg Oral Daily    0.9 % sodium chloride infusion   IntraVENous Continuous    sodium chloride flush 0.9 % injection 5-40 mL  5-40 mL IntraVENous 2 times per day    sodium chloride flush 0.9 % injection 5-40 mL  5-40 mL IntraVENous PRN    0.9 % sodium chloride infusion

## 2024-04-18 NOTE — DISCHARGE INSTRUCTIONS
Discharge Instructions       PATIENT ID: Sylvie Siddiqi  MRN: [id number]   YOB: 1930    DATE OF ADMISSION: 4/16/2024  DATE OF DISCHARGE: 4/18/2024    PRIMARY CARE PROVIDER: [unfilled]     ATTENDING PHYSICIAN: Beatris Mejía MD   DISCHARGING PROVIDER: Beatris Mejía MD    To contact this individual call [phone] and ask the  to page.   If unavailable, ask to be transferred the Adult Hospitalist Department.    DISCHARGE DIAGNOSES tremors    CONSULTATIONS: [unfilled]      PROCEDURES/SURGERIES: * No surgery found *    PENDING TEST RESULTS:   At the time of discharge the following test results are still pending: None    FOLLOW UP APPOINTMENTS:   Beatris Mejía MD  [facility]   [facility] [address] [phone]    Schedule an appointment as soon as possible for a visit in 1 week(s)         ADDITIONAL CARE RECOMMENDATIONS: Follow-up with PCP    DIET: Resume previous diet      ACTIVITY: Activity as tolerated    Wound care: {Discharge Wound Care Instructions:62201}    EQUIPMENT needed: ***      DISCHARGE MEDICATIONS:   See Medication Reconciliation Form    It is important that you take the medication exactly as they are prescribed.   Keep your medication in the bottles provided by the pharmacist and keep a list of the medication names, dosages, and times to be taken in your wallet.   Do not take other medications without consulting your doctor.       NOTIFY YOUR PHYSICIAN FOR ANY OF THE FOLLOWING:   Fever over 101 degrees for 24 hours.   Chest pain, shortness of breath, fever, chills, nausea, vomiting, diarrhea, change in mentation, falling, weakness, bleeding. Severe pain or pain not relieved by medications.  Or, any other signs or symptoms that you may have questions about.      DISPOSITION:    Home With:   OT  PT  HH  RN       SNF/Inpatient Rehab/LTAC    Independent/assisted living    Hospice    Other:         PROBLEM LIST Updated:  Yes ***       Signed:   Beatris

## 2024-04-18 NOTE — CARE COORDINATION
04/18/24 1432   Service Assessment   Patient Orientation Alert and Oriented   Cognition Dementia / Early Alzheimer's   History Provided By Child/Family   Primary Caregiver Private caregiver   Support Systems Children   Patient's Healthcare Decision Maker is: Legal Next of Kin   PCP Verified by CM Yes   Last Visit to PCP Within last 3 months   Prior Functional Level Assistance with the following:;Bathing;Toileting;Cooking;Housework;Shopping;Mobility   Current Functional Level Assistance with the following:;Bathing;Toileting;Cooking;Housework;Shopping;Mobility   Can patient return to prior living arrangement Yes   Ability to make needs known: Poor   Family able to assist with home care needs: Yes   Would you like for me to discuss the discharge plan with any other family members/significant others, and if so, who? Yes   Financial Resources Medicare   Social/Functional History   Lives With Alone   Type of Home House   Home Equipment Walker, rolling   ADL Assistance Needs assistance   Toileting Needs assistance   Homemaking Assistance Needs assistance   Ambulation Assistance Needs assistance   Transfer Assistance Needs assistance   Active  No   Occupation Retired   Discharge Planning   Type of Residence House   Type of Home Care Services Aide Services   Patient expects to be discharged to: House   Services At/After Discharge   Services At/After Discharge Aide services   Mode of Transport at Discharge Other (see comment)   Confirm Follow Up Transport Family     CM spoke with patient's daughter Perla Rosen. Patient lives in her own home with 24 hour caregivers. Family pays 4 caretakers to rotate around the clock. Denies any additional needs at this time. Patient will return home with caregivers. Daughter aware of discharge today.    Advance Care Planning     General Advance Care Planning (ACP) Conversation    Date of Conversation: 4/18/2024  Conducted with: Patient with Decision Making Capacity    [facility]

## 2024-04-18 NOTE — DISCHARGE SUMMARY
recommended this time patient discharge back home and follow-up with PCP 1 to 2 weeks medication reconciliation done    Signed:   Beatris Mejía MD  4/18/2024  1:40 PM

## 2024-04-18 NOTE — PROGRESS NOTES
PT consult received and acknowledged. PT eval attempted. However, pt off the floor - CVL. PT will reattempt for PT eval at a later time. Thanks.

## 2024-04-18 NOTE — PROGRESS NOTES
IP WOUND CONSULT    Sylvie Siddiqi  MEDICAL RECORD NUMBER:  [id number]  AGE: 89 y.o.   GENDER: female  : 1930  TODAY'S DATE:  2024    GENERAL     [] Follow-up   [x] New Consult    Sylvie Siddiqi is a 89 y.o. female referred by:   [x] Physician  [] Nursing  [] Other:         PAST MEDICAL HISTORY    Past Medical History:   Diagnosis Date    Anemia     Chronic obstructive pulmonary disease (HCC)     Dementia (HCC)     Heart attack (HCC)     Hypertension     Thyroid disease     hypothyroidism        PAST SURGICAL HISTORY    Past Surgical History:   Procedure Laterality Date    ORTHOPEDIC SURGERY  2018    total hip replacement    PACEMAKER         FAMILY HISTORY    Family History   Problem Relation Age of Onset    Stroke Mother     Heart Disease Mother     Stroke Father     Heart Disease Father          ALLERGIES    No Known Allergies    MEDICATIONS    No current facility-administered medications on file prior to encounter.     Current Outpatient Medications on File Prior to Encounter   Medication Sig Dispense Refill    folic acid (FOLVITE) 1 MG tablet Take 1 tablet by mouth daily      levothyroxine (SYNTHROID) 100 MCG tablet Take 1 tablet by mouth daily      metoprolol succinate (TOPROL XL) 50 MG extended release tablet Take 1 tablet by mouth daily            BP (!) 147/47   Pulse 62   Temp 98.1 °F (36.7 °C) (Oral)   Resp 20   Ht 1.6 m (5' 3\")   Wt 64.8 kg (142 lb 13.7 oz)   SpO2 98%   BMI 25.31 kg/m²     Blood glucose: 87 on 24  Lab Results   Component Value Date/Time    WBC 9.5 2024 05:36 AM    LABA1C 5.4 2022 01:14 AM    POCGLU 154 (H) 11/15/2022 11:05 AM    POCGLU 137 (H) 11/15/2022 08:18 AM    HGB 10.4 (L) 2024 05:36 AM    HCT 30.7 (L) 2024 05:36 AM     2024 05:36 AM     ADULT DIET; Regular; Low Fat/Low Chol/High Fiber/2 gm Na         ASSESSMENT     Wound Identification & Type: MASD with friction skin injury and blanchable erythema  Dressing change:  applied zinc paste and sacral foam    Contributing Factors: decreased mobility, shear force, incontinence of stool, and incontinence of urine    Wound 04/17/24 Buttocks (Active)   Wound Image   04/18/24 1432   Wound Etiology Other 04/18/24 1432   Dressing Status Clean;Dry;Intact 04/18/24 0734   Wound Cleansed Other (Comment) 04/18/24 1432   Dressing/Treatment Foam;Zinc paste 04/18/24 1432   Dressing Change Due 04/19/24 04/18/24 1432   Wound Assessment Denuded;Pink/red 04/18/24 1432   Drainage Amount None (dry) 04/18/24 1432   Odor None 04/18/24 1432   Estephania-wound Assessment Blanchable erythema 04/18/24 1432   Margins Attached edges 04/18/24 1432   Wound Thickness Description not for Pressure Injury Partial thickness 04/18/24 1432   Number of days: 1        Assessment:   -A&O x: 0  -Verbally communicative (Y/N): limited  -Mobile (Y/N): N      -Assists in repositioning (Y/N): Y    -Able to turn independently (Y/N): Y      PLAN     Skin Care & Pressure Relief Recommendations  Minimize Layers of Linen, Turn/reposition approximately every 2 hours, Pillow / Wedges, Manage incontinence, Promote continence; Skin protective lotion/cream to buttocks and sacrum daily and as needed with incontinence care, and Floating heels    Jules: 16    Support Surface: Gel    Physician/Provider notified:   Recommendations:   -For buttocks: apply finger-tip amount of zinc paste to denuded areas only and cover with a sacral foam daily and prn for soiling. If soiled, remove top soiled layer only and reapply.  Use Remedy Phytoplex Barrier Cream wipes for gentle cleansing.  To completely remove, use Remedy Foaming Cleanser or soap and water.    Prevention:  Apply Optifoam Border Heel foam dressing to both heels every three days to redistribute pressure from bony prominence and prevent pressure and friction injury to skin.  Rn is to gently peel back foam dressing for shift integumentary assessment and re-secure.  Maintain the the external

## 2024-04-19 NOTE — PROGRESS NOTES
Physician Progress Note      PATIENT:               [address]  [facility] #:                  [id number]  :                       1930  ADMIT DATE:       2024 12:50 PM  DISCH DATE:        2024 7:06 PM  RESPONDING  PROVIDER #:        Beatris Mejía MD        QUERY TEXT:    Stage of Chronic Kidney Disease: Please provide further specificity, if known.    Clinical indicators include: chronic renal insufficiency, brain natriuretic   peptide, pro-bnp, bun, creatinine, hronic renal insufficiency  Options provided:  -- Chronic kidney disease stage 1  -- Chronic kidney disease stage 2  -- Chronic kidney disease stage 3  -- Chronic kidney disease stage 3a  -- Chronic kidney disease stage 3b  -- Chronic kidney disease stage 4  -- Chronic kidney disease stage 5  -- Chronic kidney disease stage 5, requiring dialysis  -- End stage renal disease  -- Other - I will add my own diagnosis  -- Disagree - Not applicable / Not valid  -- Disagree - Clinically Unable to determine / Unknown        PROVIDER RESPONSE TEXT:    The patient has chronic kidney disease stage 3b.          QUERY TEXT:    Pt admitted with arm tremors.  Noted documentation of Metabolic Encephalopathy   in  Cardiology consult note.  If possible, please document in progress   notes and discharge summary:      The medical record reflects the following:  Risk Factors: 89 year old female, hx dementia,  Clinical Indicators:  Cardiology consult - metabolic encephalopathy   ER provider note -  Mental Status: She is alert. Mental status is at   baseline.  Per RN flowsheet, pt is alert to person, alert to place  Creatinine: 1.57-1.31  Treatment: serial labs, PT/OT      Please email [email or web address] with any questions  Options provided:  -- metabolic encephalopathy confirmed present on admission  -- metabolic encephalopathy ruled out  -- Other - I will add my own diagnosis  -- Disagree - Not applicable / Not valid  -- Disagree -  Clinically unable to determine / Unknown  -- Refer to Clinical Documentation Reviewer    PROVIDER RESPONSE TEXT:    The diagnosis of metabolic encephalopathy was ruled out.    Query created by: Luba Washburn on 4/18/2024 10:13 AM      Electronically signed by:  Beatris Mejía MD 4/18/2024 10:29 PM

## 2024-05-20 ENCOUNTER — APPOINTMENT (OUTPATIENT)
Facility: HOSPITAL | Age: 89
DRG: 640 | End: 2024-05-20
Payer: MEDICARE

## 2024-05-20 ENCOUNTER — HOSPITAL ENCOUNTER (INPATIENT)
Facility: HOSPITAL | Age: 89
LOS: 1 days | Discharge: HOSPICE | DRG: 640 | End: 2024-05-22
Attending: STUDENT IN AN ORGANIZED HEALTH CARE EDUCATION/TRAINING PROGRAM | Admitting: FAMILY MEDICINE
Payer: MEDICARE

## 2024-05-20 DIAGNOSIS — R41.82 ALTERED MENTAL STATUS, UNSPECIFIED ALTERED MENTAL STATUS TYPE: ICD-10-CM

## 2024-05-20 DIAGNOSIS — F03.90 DEMENTIA, UNSPECIFIED DEMENTIA SEVERITY, UNSPECIFIED DEMENTIA TYPE, UNSPECIFIED WHETHER BEHAVIORAL, PSYCHOTIC, OR MOOD DISTURBANCE OR ANXIETY (HCC): Primary | ICD-10-CM

## 2024-05-20 DIAGNOSIS — R79.89 ELEVATED TROPONIN: ICD-10-CM

## 2024-05-20 LAB
ALBUMIN SERPL-MCNC: 3.4 G/DL (ref 3.5–5)
ALBUMIN/GLOB SERPL: 0.9 (ref 1.1–2.2)
ALP SERPL-CCNC: 83 U/L (ref 45–117)
ALT SERPL-CCNC: 15 U/L (ref 12–78)
ANION GAP SERPL CALC-SCNC: 13 MMOL/L (ref 5–15)
AST SERPL W P-5'-P-CCNC: 19 U/L (ref 15–37)
BASOPHILS # BLD: 0 K/UL (ref 0–0.1)
BASOPHILS NFR BLD: 0 % (ref 0–1)
BILIRUB SERPL-MCNC: 0.7 MG/DL (ref 0.2–1)
BUN SERPL-MCNC: 21 MG/DL (ref 6–20)
BUN/CREAT SERPL: 14 (ref 12–20)
CA-I BLD-MCNC: 9.6 MG/DL (ref 8.5–10.1)
CHLORIDE SERPL-SCNC: 108 MMOL/L (ref 97–108)
CO2 SERPL-SCNC: 21 MMOL/L (ref 21–32)
CREAT SERPL-MCNC: 1.51 MG/DL (ref 0.55–1.02)
DIFFERENTIAL METHOD BLD: NORMAL
EOSINOPHIL # BLD: 0.4 K/UL (ref 0–0.4)
EOSINOPHIL NFR BLD: 4 % (ref 0–7)
ERYTHROCYTE [DISTWIDTH] IN BLOOD BY AUTOMATED COUNT: 12.6 % (ref 11.5–14.5)
GLOBULIN SER CALC-MCNC: 3.6 G/DL (ref 2–4)
GLUCOSE SERPL-MCNC: 97 MG/DL (ref 65–100)
HCT VFR BLD AUTO: 39.3 % (ref 35–47)
HGB BLD-MCNC: 13 G/DL (ref 11.5–16)
IMM GRANULOCYTES # BLD AUTO: 0 K/UL (ref 0–0.04)
IMM GRANULOCYTES NFR BLD AUTO: 0 % (ref 0–0.5)
LYMPHOCYTES # BLD: 3 K/UL (ref 0.8–3.5)
LYMPHOCYTES NFR BLD: 34 % (ref 12–49)
MAGNESIUM SERPL-MCNC: 2 MG/DL (ref 1.6–2.4)
MCH RBC QN AUTO: 30 PG (ref 26–34)
MCHC RBC AUTO-ENTMCNC: 33.1 G/DL (ref 30–36.5)
MCV RBC AUTO: 90.8 FL (ref 80–99)
MONOCYTES # BLD: 0.6 K/UL (ref 0–1)
MONOCYTES NFR BLD: 7 % (ref 5–13)
NEUTS SEG # BLD: 4.9 K/UL (ref 1.8–8)
NEUTS SEG NFR BLD: 55 % (ref 32–75)
NRBC # BLD: 0 K/UL (ref 0–0.01)
NRBC BLD-RTO: 0 PER 100 WBC
PLATELET # BLD AUTO: 350 K/UL (ref 150–400)
PMV BLD AUTO: 10.5 FL (ref 8.9–12.9)
POTASSIUM SERPL-SCNC: 4 MMOL/L (ref 3.5–5.1)
PROT SERPL-MCNC: 7 G/DL (ref 6.4–8.2)
RBC # BLD AUTO: 4.33 M/UL (ref 3.8–5.2)
SODIUM SERPL-SCNC: 142 MMOL/L (ref 136–145)
WBC # BLD AUTO: 8.9 K/UL (ref 3.6–11)

## 2024-05-20 PROCEDURE — 93005 ELECTROCARDIOGRAM TRACING: CPT | Performed by: STUDENT IN AN ORGANIZED HEALTH CARE EDUCATION/TRAINING PROGRAM

## 2024-05-20 PROCEDURE — 84484 ASSAY OF TROPONIN QUANT: CPT

## 2024-05-20 PROCEDURE — 80053 COMPREHEN METABOLIC PANEL: CPT

## 2024-05-20 PROCEDURE — 84443 ASSAY THYROID STIM HORMONE: CPT

## 2024-05-20 PROCEDURE — 70450 CT HEAD/BRAIN W/O DYE: CPT

## 2024-05-20 PROCEDURE — 2580000003 HC RX 258: Performed by: STUDENT IN AN ORGANIZED HEALTH CARE EDUCATION/TRAINING PROGRAM

## 2024-05-20 PROCEDURE — 85025 COMPLETE CBC W/AUTO DIFF WBC: CPT

## 2024-05-20 PROCEDURE — 99285 EMERGENCY DEPT VISIT HI MDM: CPT

## 2024-05-20 PROCEDURE — 71045 X-RAY EXAM CHEST 1 VIEW: CPT

## 2024-05-20 PROCEDURE — 83735 ASSAY OF MAGNESIUM: CPT

## 2024-05-20 PROCEDURE — 36415 COLL VENOUS BLD VENIPUNCTURE: CPT

## 2024-05-20 RX ORDER — 0.9 % SODIUM CHLORIDE 0.9 %
1000 INTRAVENOUS SOLUTION INTRAVENOUS ONCE
Status: COMPLETED | OUTPATIENT
Start: 2024-05-20 | End: 2024-05-21

## 2024-05-20 RX ADMIN — SODIUM CHLORIDE 1000 ML: 9 INJECTION, SOLUTION INTRAVENOUS at 23:41

## 2024-05-20 ASSESSMENT — PAIN SCALES - PAIN ASSESSMENT IN ADVANCED DEMENTIA (PAINAD)
BODYLANGUAGE: TENSE, DISTRESSED PACING, FIDGETING
NEGVOCALIZATION: OCCASIONAL MOAN/GROAN, LOW SPEECH, NEGATIVE/DISAPPROVING QUALITY
TOTALSCORE: 4
FACIALEXPRESSION: SAD, FRIGHTENED, FROWN
BREATHING: OCCASIONAL LABORED BREATHING, SHORT PERIOD OF HYPERVENTILATION
CONSOLABILITY: NO NEED TO CONSOLE

## 2024-05-20 ASSESSMENT — LIFESTYLE VARIABLES
HOW MANY STANDARD DRINKS CONTAINING ALCOHOL DO YOU HAVE ON A TYPICAL DAY: PATIENT UNABLE TO ANSWER
HOW OFTEN DO YOU HAVE A DRINK CONTAINING ALCOHOL: PATIENT UNABLE TO ANSWER

## 2024-05-21 PROBLEM — R62.51 FAILURE TO THRIVE (CHILD): Status: ACTIVE | Noted: 2024-05-21

## 2024-05-21 PROBLEM — R62.7 FAILURE TO THRIVE IN ADULT: Status: ACTIVE | Noted: 2024-05-21

## 2024-05-21 LAB
APPEARANCE UR: CLEAR
BACTERIA URNS QL MICRO: NEGATIVE /HPF
BILIRUB UR QL: NEGATIVE
COLOR UR: ABNORMAL
EPITH CASTS URNS QL MICRO: ABNORMAL /LPF
GLUCOSE BLD STRIP.AUTO-MCNC: 105 MG/DL (ref 65–100)
GLUCOSE UR STRIP.AUTO-MCNC: NEGATIVE MG/DL
HGB UR QL STRIP: NEGATIVE
KETONES UR QL STRIP.AUTO: 20 MG/DL
LEUKOCYTE ESTERASE UR QL STRIP.AUTO: NEGATIVE
MUCOUS THREADS URNS QL MICRO: ABNORMAL /LPF
NITRITE UR QL STRIP.AUTO: NEGATIVE
PERFORMED BY:: ABNORMAL
PH UR STRIP: 5 (ref 5–8)
PROT UR STRIP-MCNC: NEGATIVE MG/DL
RBC #/AREA URNS HPF: ABNORMAL /HPF (ref 0–5)
SP GR UR REFRACTOMETRY: 1.01 (ref 1–1.03)
TROPONIN I SERPL HS-MCNC: 138 NG/L (ref 0–51)
TROPONIN I SERPL HS-MCNC: 154 NG/L (ref 0–51)
TSH SERPL DL<=0.05 MIU/L-ACNC: 0.43 UIU/ML (ref 0.36–3.74)
URINE CULTURE IF INDICATED: ABNORMAL
UROBILINOGEN UR QL STRIP.AUTO: 0.1 EU/DL (ref 0.1–1)
WBC URNS QL MICRO: ABNORMAL /HPF (ref 0–4)

## 2024-05-21 PROCEDURE — 51701 INSERT BLADDER CATHETER: CPT

## 2024-05-21 PROCEDURE — 84484 ASSAY OF TROPONIN QUANT: CPT

## 2024-05-21 PROCEDURE — 81001 URINALYSIS AUTO W/SCOPE: CPT

## 2024-05-21 PROCEDURE — 82962 GLUCOSE BLOOD TEST: CPT

## 2024-05-21 PROCEDURE — 6360000002 HC RX W HCPCS: Performed by: FAMILY MEDICINE

## 2024-05-21 PROCEDURE — 94640 AIRWAY INHALATION TREATMENT: CPT

## 2024-05-21 PROCEDURE — 2580000003 HC RX 258: Performed by: STUDENT IN AN ORGANIZED HEALTH CARE EDUCATION/TRAINING PROGRAM

## 2024-05-21 PROCEDURE — 1100000000 HC RM PRIVATE

## 2024-05-21 PROCEDURE — 2580000003 HC RX 258: Performed by: FAMILY MEDICINE

## 2024-05-21 RX ORDER — ACETAMINOPHEN 325 MG/1
650 TABLET ORAL EVERY 6 HOURS PRN
Status: DISCONTINUED | OUTPATIENT
Start: 2024-05-21 | End: 2024-05-22 | Stop reason: HOSPADM

## 2024-05-21 RX ORDER — ACETAMINOPHEN 650 MG/1
650 SUPPOSITORY RECTAL EVERY 6 HOURS PRN
Status: DISCONTINUED | OUTPATIENT
Start: 2024-05-21 | End: 2024-05-22 | Stop reason: HOSPADM

## 2024-05-21 RX ORDER — ONDANSETRON 4 MG/1
4 TABLET, ORALLY DISINTEGRATING ORAL EVERY 8 HOURS PRN
Status: DISCONTINUED | OUTPATIENT
Start: 2024-05-21 | End: 2024-05-22 | Stop reason: HOSPADM

## 2024-05-21 RX ORDER — ONDANSETRON 2 MG/ML
4 INJECTION INTRAMUSCULAR; INTRAVENOUS EVERY 6 HOURS PRN
Status: DISCONTINUED | OUTPATIENT
Start: 2024-05-21 | End: 2024-05-22 | Stop reason: HOSPADM

## 2024-05-21 RX ORDER — SODIUM CHLORIDE 0.9 % (FLUSH) 0.9 %
5-40 SYRINGE (ML) INJECTION EVERY 12 HOURS SCHEDULED
Status: DISCONTINUED | OUTPATIENT
Start: 2024-05-21 | End: 2024-05-22 | Stop reason: HOSPADM

## 2024-05-21 RX ORDER — ASPIRIN 81 MG/1
81 TABLET, CHEWABLE ORAL DAILY
Status: DISCONTINUED | OUTPATIENT
Start: 2024-05-21 | End: 2024-05-22 | Stop reason: HOSPADM

## 2024-05-21 RX ORDER — FOLIC ACID 1 MG/1
1000 TABLET ORAL DAILY
Status: DISCONTINUED | OUTPATIENT
Start: 2024-05-21 | End: 2024-05-22 | Stop reason: HOSPADM

## 2024-05-21 RX ORDER — SODIUM CHLORIDE 9 MG/ML
INJECTION, SOLUTION INTRAVENOUS PRN
Status: DISCONTINUED | OUTPATIENT
Start: 2024-05-21 | End: 2024-05-22 | Stop reason: HOSPADM

## 2024-05-21 RX ORDER — 0.9 % SODIUM CHLORIDE 0.9 %
1000 INTRAVENOUS SOLUTION INTRAVENOUS ONCE
Status: COMPLETED | OUTPATIENT
Start: 2024-05-21 | End: 2024-05-21

## 2024-05-21 RX ORDER — BUDESONIDE 0.5 MG/2ML
500 INHALANT ORAL 2 TIMES DAILY
Status: DISCONTINUED | OUTPATIENT
Start: 2024-05-21 | End: 2024-05-21

## 2024-05-21 RX ORDER — BUDESONIDE 0.5 MG/2ML
500 INHALANT ORAL
Status: DISCONTINUED | OUTPATIENT
Start: 2024-05-21 | End: 2024-05-22 | Stop reason: HOSPADM

## 2024-05-21 RX ORDER — LEVOTHYROXINE SODIUM 0.1 MG/1
100 TABLET ORAL DAILY
Status: DISCONTINUED | OUTPATIENT
Start: 2024-05-21 | End: 2024-05-22 | Stop reason: HOSPADM

## 2024-05-21 RX ORDER — DEXTROSE AND SODIUM CHLORIDE 5; .45 G/100ML; G/100ML
INJECTION, SOLUTION INTRAVENOUS CONTINUOUS
Status: DISCONTINUED | OUTPATIENT
Start: 2024-05-21 | End: 2024-05-22 | Stop reason: HOSPADM

## 2024-05-21 RX ORDER — ATORVASTATIN CALCIUM 40 MG/1
40 TABLET, FILM COATED ORAL NIGHTLY
Status: DISCONTINUED | OUTPATIENT
Start: 2024-05-21 | End: 2024-05-22 | Stop reason: HOSPADM

## 2024-05-21 RX ORDER — ENOXAPARIN SODIUM 100 MG/ML
30 INJECTION SUBCUTANEOUS DAILY
Status: DISCONTINUED | OUTPATIENT
Start: 2024-05-21 | End: 2024-05-22 | Stop reason: HOSPADM

## 2024-05-21 RX ORDER — METOPROLOL SUCCINATE 50 MG/1
50 TABLET, EXTENDED RELEASE ORAL DAILY
Status: DISCONTINUED | OUTPATIENT
Start: 2024-05-21 | End: 2024-05-22 | Stop reason: HOSPADM

## 2024-05-21 RX ORDER — POLYETHYLENE GLYCOL 3350 17 G/17G
17 POWDER, FOR SOLUTION ORAL DAILY PRN
Status: DISCONTINUED | OUTPATIENT
Start: 2024-05-21 | End: 2024-05-22 | Stop reason: HOSPADM

## 2024-05-21 RX ORDER — HYDROXYZINE HYDROCHLORIDE 25 MG/ML
25 INJECTION, SOLUTION INTRAMUSCULAR ONCE
Status: COMPLETED | OUTPATIENT
Start: 2024-05-21 | End: 2024-05-21

## 2024-05-21 RX ORDER — SODIUM CHLORIDE 0.9 % (FLUSH) 0.9 %
5-40 SYRINGE (ML) INJECTION PRN
Status: DISCONTINUED | OUTPATIENT
Start: 2024-05-21 | End: 2024-05-22 | Stop reason: HOSPADM

## 2024-05-21 RX ORDER — HYDROXYZINE HYDROCHLORIDE 25 MG/ML
25 INJECTION, SOLUTION INTRAMUSCULAR EVERY 6 HOURS PRN
Status: DISCONTINUED | OUTPATIENT
Start: 2024-05-21 | End: 2024-05-21

## 2024-05-21 RX ADMIN — ENOXAPARIN SODIUM 30 MG: 100 INJECTION SUBCUTANEOUS at 10:41

## 2024-05-21 RX ADMIN — HYDROXYZINE HYDROCHLORIDE 25 MG: 25 INJECTION, SOLUTION INTRAMUSCULAR at 10:33

## 2024-05-21 RX ADMIN — BUDESONIDE 500 MCG: 0.5 SUSPENSION RESPIRATORY (INHALATION) at 21:08

## 2024-05-21 RX ADMIN — SODIUM CHLORIDE, PRESERVATIVE FREE 10 ML: 5 INJECTION INTRAVENOUS at 22:30

## 2024-05-21 RX ADMIN — DEXTROSE AND SODIUM CHLORIDE: 5; 450 INJECTION, SOLUTION INTRAVENOUS at 12:42

## 2024-05-21 RX ADMIN — SODIUM CHLORIDE 1000 ML: 9 INJECTION, SOLUTION INTRAVENOUS at 01:47

## 2024-05-21 ASSESSMENT — HEART SCORE
ECG: NON-SPECIFC REPOLARIZATION DISTURBANCE/LBTB/PM
ECG: NON-SPECIFC REPOLARIZATION DISTURBANCE/LBTB/PM

## 2024-05-21 ASSESSMENT — PAIN SCALES - PAIN ASSESSMENT IN ADVANCED DEMENTIA (PAINAD)
CONSOLABILITY: NO NEED TO CONSOLE
BREATHING: NORMAL
FACIALEXPRESSION: SMILING OR INEXPRESSIVE
BREATHING: NORMAL
BODYLANGUAGE: RELAXED
TOTALSCORE: 2
FACIALEXPRESSION: SMILING OR INEXPRESSIVE
NEGVOCALIZATION: OCCASIONAL MOAN/GROAN, LOW SPEECH, NEGATIVE/DISAPPROVING QUALITY
BODYLANGUAGE: TENSE, DISTRESSED PACING, FIDGETING
NEGVOCALIZATION: OCCASIONAL MOAN/GROAN, LOW SPEECH, NEGATIVE/DISAPPROVING QUALITY
TOTALSCORE: 2
CONSOLABILITY: NO NEED TO CONSOLE
FACIALEXPRESSION: SMILING OR INEXPRESSIVE
TOTALSCORE: 0
BREATHING: NORMAL
CONSOLABILITY: DISTRACTED OR REASSURED BY VOICE/TOUCH
BODYLANGUAGE: RELAXED

## 2024-05-21 ASSESSMENT — PAIN SCALES - GENERAL: PAINLEVEL_OUTOF10: 0

## 2024-05-21 NOTE — PROGRESS NOTES
0969 Attempted bedside swallow evaluation. Patient accepted single sip of thin via straw without coughing. She refused further trials and is not cooperative. Unable to participate at this time. Discussed w/ RN and MD.   6052: Per chart review, patient family does not wish for NGT or PEG and requesting hospice. Rec provide PO when requested by patient. Will d/c ST at this time.

## 2024-05-21 NOTE — WOUND CARE
Wound Care Note:      Wound Care into see patient because of Erythema to sacrum    Patient resting in bed, offers no complaints at this time. Patient is able to reposition with assistance.     Sacrum intact with slight blanchable erythema and hyperpigmentation that is blanchable from possible previous injury or chronic pressure to buttocks. Recommend applying sacral foam dressing for protection.       Recommend applying sacral foam dressing to sacrum for protection. Dressing to be changed every 3 days and PRN for soiling, nurse to peel back dressing daily for skin assessment.     Float heels on pillow at all times while in bed, place pillow long ways under patient leg so knee is supported and heel is hanging off edge of pillow.    Boot are to be applied daily and kept on at all times while in bed for offloading and to prevent pressure injuries.      Use foam body alignment wedge to turn patient q2h at 30 degree angle to offload sacrum to prevent pressure related skin injury.  Do not position directly on greater trochanter.             Please re-consult for any changes in skin condition.

## 2024-05-21 NOTE — ED TRIAGE NOTES
Patient presents to ED via EMS. Patient lives at home with assistance from a caregiver. Caregiver states patient has not been eating/drinking & laying in bed for long period of time not wanting to get up. Hx: dementia

## 2024-05-21 NOTE — H&P
MD Beatris    acetaminophen (TYLENOL) tablet 650 mg, 650 mg, Oral, Q6H PRN **OR** acetaminophen (TYLENOL) suppository 650 mg, 650 mg, Rectal, Q6H PRN, Beatris Mejía MD    dextrose 5 % and 0.45 % sodium chloride infusion, , IntraVENous, Continuous, Beatris Mejía MD     Cardiology consult elevated troponin PT OT speech consult    Tried to call the patient family no answer  ________________________________________________________________________    Signed: Beatris Mejía MD

## 2024-05-21 NOTE — ED NOTES
ED TO INPATIENT SBAR HANDOFF    Patient Name: Sylvie Siddiqi   Preferred Name: Sylvie  : 1930  89 y.o.   Family/Caregiver Present: no   Code Status Order: DNR  PO Status: NPO:No  Telemetry Order:   C-SSRS: Risk of Suicide: No Risk  Sitter no   Restraints:   Sepsis Risk Score Sepsis Risk Score: 1.04    Situation  Chief Complaint   Patient presents with    Failure To Thrive     Brief Description of Patient's Condition: Patient presents to ED via EMS. Patient lives at home with assistance from a caregiver. Caregiver states patient has not been eating/drinking & laying in bed for long period of time not wanting to get up. Hx: dementia   Mental Status: disoriented and alert  Arrived from:Home  Imaging:   CT HEAD WO CONTRAST   Final Result      No acute intracranial abnormality.            XR CHEST PORTABLE   Final Result      No acute process.           Abnormal labs:   Abnormal Labs Reviewed   COMPREHENSIVE METABOLIC PANEL - Abnormal; Notable for the following components:       Result Value    BUN 21 (*)     Creatinine 1.51 (*)     Est, Glom Filt Rate 32 (*)     Albumin 3.4 (*)     Albumin/Globulin Ratio 0.9 (*)     All other components within normal limits   TROPONIN - Abnormal; Notable for the following components:    Troponin, High Sensitivity 154 (*)     All other components within normal limits   TROPONIN - Abnormal; Notable for the following components:    Troponin, High Sensitivity 138 (*)     All other components within normal limits       Background  Allergies: No Known Allergies  History:   Past Medical History:   Diagnosis Date    Anemia     Chronic obstructive pulmonary disease (HCC)     Dementia (HCC)     Heart attack (HCC)     Hypertension     Thyroid disease     hypothyroidism       Assessment  Vitals:    Level of Consciousness: Alert (0)   Vitals:    24 0030 24 0130 24 0140 24 0200   BP: (!) 143/47 (!) 128/40 (!) 154/50 (!) 168/46   Pulse: 75 66 73 69   Resp: 20 22 20 18

## 2024-05-21 NOTE — PROGRESS NOTES
4 Eyes Skin Assessment     NAME:  Sylvie Siddiqi  YOB: 1930  MEDICAL RECORD NUMBER:  [id number]    The patient is being assessed for  Admission    I agree that at least one RN has performed a thorough Head to Toe Skin Assessment on the patient. ALL assessment sites listed below have been assessed.      Areas assessed by both nurses:    Head, Face, Ears, Shoulders, Back, Chest, Arms, Elbows, Hands, Sacrum. Buttock, Coccyx, Ischium, Legs. Feet and Heels, and Under Medical Devices         Does the Patient have a Wound? No noted wound(s)       Jules Prevention initiated by RN: Yes  Wound Care Orders initiated by RN: Yes    Pressure Injury (Stage 3,4, Unstageable, DTI, NWPT, and Complex wounds) if present, place Wound referral order by RN under : Yes    New Ostomies, if present place, Ostomy referral order under : No     Nurse 1 eSignature: Electronically signed by Carlos Eduardo Becker RN on 5/21/24 at 5:23 AM EDT    **SHARE this note so that the co-signing nurse can place an eSignature**    Nurse 2 eSignature: {Esignature:[id number]}

## 2024-05-21 NOTE — CARE COORDINATION
05/21/24 1452   Service Assessment   Patient Orientation Unable to Assess   Cognition Dementia / Early Alzheimer's   History Provided By Other (see comment)  (Caregiver and Daughter)   Primary Caregiver Private caregiver   Accompanied By/Relationship Caregiver   Support Systems Family Members;Other (Comment)  (Caregivers)   Patient's Healthcare Decision Maker is: Legal Next of Kin   PCP Verified by CM Yes   Last Visit to PCP Within last 3 months   Prior Functional Level Assistance with the following:;Bathing;Feeding;Mobility   Current Functional Level Assistance with the following:;Bathing;Feeding;Mobility   Can patient return to prior living arrangement Yes   Ability to make needs known: Poor   Family able to assist with home care needs: Yes   Would you like for me to discuss the discharge plan with any other family members/significant others, and if so, who? No   Financial Resources Medicare   Community Resources Other (Comment)  (PCAs)   CM/SW Referral Grieving process  (Hospice Consult)   Social/Functional History   Lives With Home care staff   Type of Home House     Caregiver at bedside. Information provided by caregiver and confirmed by daughter, Perla. Patient has around the clock PCAs (4- 12hr). DME walker. Uses [facility] in [place]. Perla stated wants hospice at home with agency associate with PCP. Referral sent to [facility]. Current Disp: home with hospice.    Advance Care Planning     General Advance Care Planning (ACP) Conversation    Date of Conversation: 5/21/2024  Conducted with: Primary Decision Maker Perla Rosen  Other persons present: None    Healthcare Decision Maker:   Primary Decision Maker: Perla Roesn - Child - [phone]  Click here to complete Healthcare Decision Makers including selection of the Healthcare Decision Maker Relationship (ie \"Primary\").       Content/Action Overview:  Has ACP document(s) on file - reflects the patient's care preferences  Reviewed

## 2024-05-21 NOTE — PROGRESS NOTES
Upon entering room for bedside shift report it was noted that patient had removed coban from arm and removed peripheral IV.     Day shift RN at bedside and notified.

## 2024-05-21 NOTE — PROGRESS NOTES
Call Placed to daughter Perla Rosen  to give a update on pt condition. Daughter made aware that pt is not eating or consuming liquids, and aggressive behaviors toward staff. Daughter does not want pt to have NG tube for enteral feeding or medication administration, risk vs benefits explained.  Pt daughter verbalized wanting hospice care for mother (patient). Case management and Provider Kym  notified.

## 2024-05-21 NOTE — ED PROVIDER NOTES
CONSULTS: See ED Course/MDM for further details.  IP CONSULT TO PODIATRY  IP WOUND CARE NURSE CONSULT TO EVAL  IP CONSULT TO CARDIOLOGY     Social Determinants affecting Diagnosis/Treatment: None    Smoking Cessation: Not Applicable    PROCEDURES   Unless otherwise noted above, none  Procedures      CRITICAL CARE TIME   Patient does not meet Critical Care Time, 0 minutes    ED IMPRESSION     1. Dementia, unspecified dementia severity, unspecified dementia type, unspecified whether behavioral, psychotic, or mood disturbance or anxiety (HCC)    2. Altered mental status, unspecified altered mental status type    3. Elevated troponin          DISPOSITION/PLAN   DISPOSITION Admitted 05/21/2024 02:17:24 AM    Admit Note: Pt is being admitted by [facility] medicine. The results of their tests and reason(s) for their admission have been discussed with pt and/or available family. They convey agreement and understanding for the need to be admitted and for the admission diagnosis.     PATIENT REFERRED TO:  No follow-up provider specified.      DISCHARGE MEDICATIONS:     Medication List        ASK your doctor about these medications      albuterol (5 MG/ML) 0.5% nebulizer solution  Commonly known as: PROVENTIL  Take 0.5 mLs by nebulization every 6 hours as needed for Shortness of Breath     aspirin 81 MG chewable tablet  Take 1 tablet by mouth daily     atorvastatin 40 MG tablet  Commonly known as: LIPITOR  Take 1 tablet by mouth nightly     budesonide 0.5 MG/2ML nebulizer suspension  Commonly known as: PULMICORT  Take 2 mLs by nebulization 2 times daily     folic acid 1 MG tablet  Commonly known as: FOLVITE     furosemide 20 MG tablet  Commonly known as: LASIX  Take 1 tablet by mouth daily     levothyroxine 100 MCG tablet  Commonly known as: SYNTHROID     metoprolol succinate 50 MG extended release tablet  Commonly known as: TOPROL XL                DISCONTINUED MEDICATIONS:  Current Discharge Medication List          I am

## 2024-05-21 NOTE — PROGRESS NOTES
Phlebotomy in at this time to obtain blood specimens. Pt is very combative with staff on attempted, swinging at staff with a closed fist. Pt refused morning medications. Provider Kym present on unit and notified of pt behaviors. Order given for Vistrail 25mg IM one time only.      1003- Called received from Pt daughter Perla and daughter made aware of pt behaviors, daughter stated pt was displaying the same behavior at home, reasoning for send to ER.     1235- green, gold and purple top tube sent to lab.

## 2024-05-21 NOTE — CONSULTS
Cardiology Consult    NAME: Sylvie Siddiqi   :  1930   MRN:  [id number]     Date/Time:  2024 11:16 AM    Patient PCP: Beatris Mejía MD  ________________________________________________________________________     Assessment:   Troponin leak-flat  History of nonobstructive CAD  LBBB  Nonischemic  Moderate aortic insufficiency  Moderate mitral regurgitation  Type II NSTEMI  Dementia  COPD  Type 2 diabetes  DNR      Plan:   Conservative medical management planned  Guideline directed medical therapy for CAD  Echo reviewed from 2024 and showed low normal EF  I would not pursue additional cardiac testing at this time      []        High complexity decision making was performed        Subjective:   CHIEF COMPLAINT: FTT      REASON FOR CONSULT: Troponin leak      HISTORY OF PRESENT ILLNESS:     Sylvie Siddiqi is a 89 y.o. White (non-) female who has a history of CHF, dementia, asthma, COPD, hypertension, type 2 diabetes.  She lives at home with caregivers and was brought in to the ED yesterday due to failure to thrive.  Patient apparently was not eating and was laying in bed all the time for 2-3 days.    Caregiver says about 5 days ago, she started wetting the bed and refusing to take her medications.  She also did not want to get out of bed and just seem to be getting more combative.  No fevers or chills, she also was eating poorly.    She was admitted 2024 and TTE showed EF 55% with RVSP 23 mmHg    Cardiology consult requested due to troponin of 154, patient also noted to have DAYO with creatinine 1.51        Past Medical History:   Diagnosis Date    Anemia     Chronic obstructive pulmonary disease (HCC)     Dementia (HCC)     Heart attack (HCC)     Hypertension     Thyroid disease     hypothyroidism      Past Surgical History:   Procedure Laterality Date    ORTHOPEDIC SURGERY  2018    total hip replacement    PACEMAKER       No Known Allergies   Meds:  See below  Social

## 2024-05-21 NOTE — PROGRESS NOTES
Patient arrived to unit, patient unable or unwilling to answer admission questions, refusing to open eyes when requested. Patient responds to pain of bilateral feet due to ingrown toe nails. Podiatry consult placed.     YOMI patient's history, attempted to contact patient's daughter Perla Rosen by phone multiple times with no success.     Unable to complete admission details.     0503: Envelope delivered to RN by registration staff with request to provide to family member or caregiver. Advised registration staff that no caregiver was at bedside, verified contact information for patient's daughter was correct and accepted envelope to provide to caregiver or day shift RN.

## 2024-05-22 VITALS
HEIGHT: 63 IN | WEIGHT: 153.3 LBS | BODY MASS INDEX: 27.16 KG/M2 | TEMPERATURE: 98.4 F | RESPIRATION RATE: 18 BRPM | SYSTOLIC BLOOD PRESSURE: 154 MMHG | DIASTOLIC BLOOD PRESSURE: 50 MMHG | OXYGEN SATURATION: 99 % | HEART RATE: 82 BPM

## 2024-05-22 LAB
EKG ATRIAL RATE: 57 BPM
EKG DIAGNOSIS: NORMAL
EKG P AXIS: 89 DEGREES
EKG P-R INTERVAL: 104 MS
EKG Q-T INTERVAL: 488 MS
EKG QRS DURATION: 128 MS
EKG QTC CALCULATION (BAZETT): 474 MS
EKG R AXIS: 3 DEGREES
EKG T AXIS: 109 DEGREES
EKG VENTRICULAR RATE: 57 BPM

## 2024-05-22 PROCEDURE — 6370000000 HC RX 637 (ALT 250 FOR IP): Performed by: FAMILY MEDICINE

## 2024-05-22 RX ORDER — CLONIDINE 0.3 MG/24H
1 PATCH, EXTENDED RELEASE TRANSDERMAL WEEKLY
Status: DISCONTINUED | OUTPATIENT
Start: 2024-05-22 | End: 2024-05-22 | Stop reason: HOSPADM

## 2024-05-22 RX ORDER — CLONIDINE 0.3 MG/24H
1 PATCH, EXTENDED RELEASE TRANSDERMAL WEEKLY
Qty: 4 PATCH | Refills: 1 | Status: SHIPPED | OUTPATIENT
Start: 2024-05-22

## 2024-05-22 ASSESSMENT — PAIN SCALES - GENERAL: PAINLEVEL_OUTOF10: 0

## 2024-05-22 NOTE — CARE COORDINATION
CM noted discharge order. CM going home with [facility]. Patient has 24/7 PCAs. Daughter is agreeable with discharge plan.    Transition of Care Plan:    RUR: 14%  Prior Level of Functioning: Needs Assistance  Disposition: Home with Hospice  If SNF or IPR: Date FOC offered: 5/21/2024  Date FOC received: 5/21/2024  Accepting facility: [facility]  Date authorization started with reference number: n/a  Date authorization received and expires: n/a  Follow up appointments: Per MD  DME needed: n/a  Transportation at discharge: [facility]  IM/IMM Medicare/ letter given: yes  Is patient a [place] and connected with [facility]? N/a   If yes, was [place] transfer form completed and [facility] notified?   Caregiver Contact: Perla Rsoen  Discharge Caregiver contacted prior to discharge? yes  Care Conference needed? N/a  Barriers to discharge: none

## 2024-05-22 NOTE — PROGRESS NOTES
Nutrition Assessment     Type and Reason for Visit: Initial (TF)    Nutrition Recommendations/Plan:   Continue diet for pleasure as appropriate.  Dc TF regimen.    Please consult nutrition services as clinically indicated.      Nutrition Assessment:  Admitted for FTT. Pt screened for TF orders. Noted family declining PEG and NGT- NGT npt placed; plan to dc home on hospice w/ hospice care. Per SLP note, Pt may receive pleasure feeds; now signed off. Continue pleasure feeds. Per [facility] hospice policy, RD to sign off.    Current Nutrition Therapies:    ADULT DIET; Regular  ADULT TUBE FEEDING; Nasogastric; Standard with Fiber; Continuous; 30; No; 100; Q 3 hours    Anthropometric Measures:  Height: 160 cm (5' 3\")  Current Body Wt: 69.5 kg (153 lb 3.5 oz)   BMI: 27.1  Wt Readings from Last 3 Encounters:   05/20/24 69.5 kg (153 lb 4.8 oz)   04/18/24 64.8 kg (142 lb 13.7 oz)   12/08/20 68 kg (150 lb)     Nutrition Interventions:   Food and/or Nutrient Delivery: Continue Current Diet, Continue Current Tube Feeding  Nutrition Education/Counseling: No recommendation at this time  Coordination of Nutrition Care: No recommendation at this time  Plan of Care discussed with: RN    Nutrition Monitoring and Evaluation:   Behavioral-Environmental Outcomes: None Identified  Food/Nutrient Intake Outcomes: None Identified  Physical Signs/Symptoms Outcomes: None Identified    Discharge Planning:    No discharge needs at this time     Bhumika Nicole RD  Contact: ext. [phone].

## 2024-05-22 NOTE — PROGRESS NOTES
PT consult received and acknowledged. PT eval attempted. However, pt getting discharged today to home with hospice care, d/c orders in place. Per CM, PT/OT not needed, therefore will complete PT orders.  Pls reorder PT if any change in pt's status and PT is indicated. Thanks

## 2024-05-22 NOTE — DISCHARGE SUMMARY
Discharge Summary       PATIENT ID: Sylvie Siddiqi  MRN: [id number]   YOB: 1930    DATE OF ADMISSION: 5/20/2024   DATE OF DISCHARGE:   PRIMARY CARE PROVIDER: [unfilled]      ATTENDING PHYSICIAN: Beatris Mejía  DISCHARGING PROVIDER: Beatris Mejía      CONSULTATIONS: IP CONSULT TO PODIATRY  IP WOUND CARE NURSE CONSULT TO EVAL  IP CONSULT TO CARDIOLOGY  IP CONSULT TO HOSPICE    PROCEDURES/SURGERIES: * No surgery found *    ADMITTING DIAGNOSES:    Patient Active Problem List    Diagnosis Date Noted    Failure to thrive in adult 05/21/2024    Failure to thrive (child) 05/21/2024    LBBB (left bundle branch block) 04/16/2024    Tremor 04/16/2024    Acute congestive heart failure ([facility]) 11/10/2022    CHF (congestive heart failure) ([facility]) 11/10/2022    AMS (altered mental status) 01/04/2022    Lethargy 01/04/2022    NSTEMI (non-ST elevated myocardial infarction) ([facility]) 09/20/2021    SOB (shortness of breath) 09/20/2021    COPD (chronic obstructive pulmonary disease) ([facility]) 09/20/2021    COPD exacerbation ([facility]) 09/20/2021    Onychomycosis 12/08/2020       DISCHARGE DIAGNOSES / PLAN:      Failure to thrive  Elevated troponin  Dementia  Systolic heart failure diastolic heart failure  COPD  Hypothyroid        DISCHARGE MEDICATIONS:     Medication List        START taking these medications      cloNIDine 0.3 MG/24HR Ptwk  Commonly known as: CATAPRES  Place 1 patch onto the skin once a week            CONTINUE taking these medications      albuterol (5 MG/ML) 0.5% nebulizer solution  Commonly known as: PROVENTIL  Take 0.5 mLs by nebulization every 6 hours as needed for Shortness of Breath     aspirin 81 MG chewable tablet  Take 1 tablet by mouth daily     atorvastatin 40 MG tablet  Commonly known as: LIPITOR  Take 1 tablet by mouth nightly     budesonide 0.5 MG/2ML nebulizer suspension  Commonly known as: PULMICORT  Take 2 mLs by nebulization 2 times daily     folic acid 1 MG tablet  Commonly known as:

## 2024-05-22 NOTE — PLAN OF CARE
Problem: Discharge Planning  Goal: Discharge to home or other facility with appropriate resources  5/22/2024 1450 by Quan Fairbanks RN  Outcome: Adequate for Discharge  Flowsheets (Taken 5/22/2024 0800)  Discharge to home or other facility with appropriate resources: Identify barriers to discharge with patient and caregiver  5/22/2024 0254 by Leonarda Mendoza RN  Outcome: Progressing  Flowsheets (Taken 5/21/2024 1952 by Isabelle Higgins, RN)  Discharge to home or other facility with appropriate resources:   Identify barriers to discharge with patient and caregiver   Arrange for needed discharge resources and transportation as appropriate   Identify discharge learning needs (meds, wound care, etc)     Problem: Pain  Goal: Verbalizes/displays adequate comfort level or baseline comfort level  5/22/2024 1450 by Quan Fairbanks RN  Outcome: Adequate for Discharge  5/22/2024 0254 by Leonarda Mendoza RN  Outcome: Progressing     Problem: Chronic Conditions and Co-morbidities  Goal: Patient's chronic conditions and co-morbidity symptoms are monitored and maintained or improved  5/22/2024 1450 by Quan Fairbanks RN  Outcome: Adequate for Discharge  Flowsheets (Taken 5/22/2024 0800)  Care Plan - Patient's Chronic Conditions and Co-Morbidity Symptoms are Monitored and Maintained or Improved: Monitor and assess patient's chronic conditions and comorbid symptoms for stability, deterioration, or improvement  5/22/2024 0254 by Leonarda Mendoza RN  Outcome: Progressing  Flowsheets (Taken 5/21/2024 1952 by Isabelle Higgins, RN)  Care Plan - Patient's Chronic Conditions and Co-Morbidity Symptoms are Monitored and Maintained or Improved:   Monitor and assess patient's chronic conditions and comorbid symptoms for stability, deterioration, or improvement   Collaborate with multidisciplinary team to address chronic and comorbid conditions and prevent exacerbation or deterioration   Update acute care 
  Problem: Discharge Planning  Goal: Discharge to home or other facility with appropriate resources  Outcome: Not Progressing  Flowsheets (Taken 5/21/2024 0337)  Discharge to home or other facility with appropriate resources: Identify barriers to discharge with patient and caregiver     Problem: Pain  Goal: Verbalizes/displays adequate comfort level or baseline comfort level  Outcome: Not Progressing     Problem: Chronic Conditions and Co-morbidities  Goal: Patient's chronic conditions and co-morbidity symptoms are monitored and maintained or improved  Outcome: Not Progressing  Flowsheets (Taken 5/21/2024 0337)  Care Plan - Patient's Chronic Conditions and Co-Morbidity Symptoms are Monitored and Maintained or Improved: Monitor and assess patient's chronic conditions and comorbid symptoms for stability, deterioration, or improvement    Patient unable or unwilling to respond to questions or assist in assessment at this time. Patient's daughter contacted with no response at this time.      
  Problem: Discharge Planning  Goal: Discharge to home or other facility with appropriate resources  Outcome: Progressing  Flowsheets (Taken 5/21/2024 1952 by Isabelle Higgins, RN)  Discharge to home or other facility with appropriate resources:   Identify barriers to discharge with patient and caregiver   Arrange for needed discharge resources and transportation as appropriate   Identify discharge learning needs (meds, wound care, etc)     Problem: Pain  Goal: Verbalizes/displays adequate comfort level or baseline comfort level  Outcome: Progressing     Problem: Chronic Conditions and Co-morbidities  Goal: Patient's chronic conditions and co-morbidity symptoms are monitored and maintained or improved  Outcome: Progressing  Flowsheets (Taken 5/21/2024 1952 by Isabelle Higgins, RN)  Care Plan - Patient's Chronic Conditions and Co-Morbidity Symptoms are Monitored and Maintained or Improved:   Monitor and assess patient's chronic conditions and comorbid symptoms for stability, deterioration, or improvement   Collaborate with multidisciplinary team to address chronic and comorbid conditions and prevent exacerbation or deterioration   Update acute care plan with appropriate goals if chronic or comorbid symptoms are exacerbated and prevent overall improvement and discharge     Problem: Safety - Adult  Goal: Free from fall injury  Outcome: Progressing     Problem: Skin/Tissue Integrity  Goal: Absence of new skin breakdown  Description: 1.  Monitor for areas of redness and/or skin breakdown  2.  Assess vascular access sites hourly  3.  Every 4-6 hours minimum:  Change oxygen saturation probe site  4.  Every 4-6 hours:  If on nasal continuous positive airway pressure, respiratory therapy assess nares and determine need for appliance change or resting period.  Outcome: Progressing     
0337)  Discharge to home or other facility with appropriate resources: Identify barriers to discharge with patient and caregiver     Problem: Pain  Goal: Verbalizes/displays adequate comfort level or baseline comfort level  5/21/2024 1054 by Damián Malone RN  Outcome: Progressing  5/21/2024 0506 by Carlos Eduardo Reddy RN  Outcome: Not Progressing     Problem: Chronic Conditions and Co-morbidities  Goal: Patient's chronic conditions and co-morbidity symptoms are monitored and maintained or improved  5/21/2024 1054 by Damián Malone RN  Outcome: Progressing  5/21/2024 0506 by Carlos Eduardo Reddy RN  Outcome: Not Progressing  Flowsheets (Taken 5/21/2024 0337)  Care Plan - Patient's Chronic Conditions and Co-Morbidity Symptoms are Monitored and Maintained or Improved: Monitor and assess patient's chronic conditions and comorbid symptoms for stability, deterioration, or improvement     Problem: Safety - Adult  Goal: Free from fall injury  Outcome: Progressing     Problem: Skin/Tissue Integrity  Goal: Absence of new skin breakdown  Description: 1.  Monitor for areas of redness and/or skin breakdown  2.  Assess vascular access sites hourly  3.  Every 4-6 hours minimum:  Change oxygen saturation probe site  4.  Every 4-6 hours:  If on nasal continuous positive airway pressure, respiratory therapy assess nares and determine need for appliance change or resting period.  Outcome: Progressing     Problem: Discharge Planning  Goal: Discharge to home or other facility with appropriate resources  5/21/2024 1054 by Damián Malone RN  Outcome: Progressing  5/21/2024 0506 by Carlos Eduardo Reddy RN  Outcome: Not Progressing  Flowsheets (Taken 5/21/2024 0337)  Discharge to home or other facility with appropriate resources: Identify barriers to discharge with patient and caregiver     Problem: Pain  Goal: Verbalizes/displays adequate comfort level or baseline comfort level  5/21/2024 1054 by Damián Malone

## 2024-05-22 NOTE — PROGRESS NOTES
OT eval order received and acknowledged however pt going hospice care. OT will discontinue orders at this time. Please reorder if needs arise. Thank you.

## 2024-05-22 NOTE — PROGRESS NOTES
Progress Note      5/22/2024 1:45 PM  NAME: Sylvie Siddiqi   MRN:  [id number]   Admit Diagnosis: Failure to thrive in adult [R62.7]  Failure to thrive (child) [R62.51]      Problem List:   History of nonobstructive CAD  Type II non-Q wave MI  LBBB  Nonischemic CM  Moderate aortic insufficiency  Moderate mitral regurgitation  Type II NSTEMI  Dementia  COPD  Type 2 diabetes  DNR     Assessment/Plan:   No need for additional cardiac testing  Conservative management of CAD  Echo reviewed from 4/18/2024 and shows low normal EF  Cards team will follow as needed         []       High complexity decision making was performed in this patient at high risk for decompensation with multiple organ involvement.    Subjective:     Sylvie Siddiqi confused.   Discussed with RN events overnight.     Review of Systems:   Negative except for as noted above.    Objective:      Physical Exam:    Last 24hrs VS reviewed since prior progress note. Most recent are:    BP (!) 179/74   Pulse 84   Temp 98 °F (36.7 °C) (Axillary)   Resp 18   Ht 1.6 m (5' 3\")   Wt 69.5 kg (153 lb 4.8 oz)   SpO2 100%   BMI 27.16 kg/m²     Intake/Output Summary (Last 24 hours) at 5/22/2024 1345  Last data filed at 5/22/2024 0956  Gross per 24 hour   Intake 120 ml   Output 850 ml   Net -730 ml        General Appearance: Alert; confused  Ears/Nose/Mouth/Throat: moist mucous membranes  Neck: Supple.  Chest: Lungs clear to auscultation bilaterally.  Cardiovascular: Regular rate and rhythm, S1S2 normal  Abdomen: Soft, non-tender, bowel sounds are active.  Extremities: No edema bilaterally.  Skin: Warm and dry.      PMH/SH reviewed - no change compared to H&P    Telemetry: Sinus rhythm    EKG:     No valid procedures specified.    No results found for this or any previous visit.    []  No new EKG for review    Lab Data Personally Reviewed:    Recent Labs     05/20/24  2324   WBC 8.9   HGB 13.0   HCT 39.3        No results for input(s): \"INR\",

## 2024-05-22 NOTE — DISCHARGE INSTRUCTIONS
Discharge Instructions       PATIENT ID: Sylvie Siddiqi  MRN: [id number]   YOB: 1930    DATE OF ADMISSION: 5/20/2024  DATE OF DISCHARGE: 5/22/2024    PRIMARY CARE PROVIDER: [unfilled]     ATTENDING PHYSICIAN: Beatris Mejía MD   DISCHARGING PROVIDER: Beatris Mejía MD    To contact this individual call [phone] and ask the  to page.   If unavailable, ask to be transferred the Adult Hospitalist Department.    DISCHARGE DIAGNOSES failure to thrive    CONSULTATIONS: [unfilled]      PROCEDURES/SURGERIES: * No surgery found *    PENDING TEST RESULTS:   At the time of discharge the following test results are still pending: None    FOLLOW UP APPOINTMENTS:   Beatris Mejía MD  [facility]   [facility] [id number] [phone]    Schedule an appointment as soon as possible for a visit in 1 week(s)         ADDITIONAL CARE RECOMMENDATIONS: Discharge with hospice    DIET: regular diet      ACTIVITY: activity as tolerated    Wound care: None Needed    EQUIPMENT needed:       DISCHARGE MEDICATIONS:   See Medication Reconciliation Form    It is important that you take the medication exactly as they are prescribed.   Keep your medication in the bottles provided by the pharmacist and keep a list of the medication names, dosages, and times to be taken in your wallet.   Do not take other medications without consulting your doctor.       NOTIFY YOUR PHYSICIAN FOR ANY OF THE FOLLOWING:   Fever over 101 degrees for 24 hours.   Chest pain, shortness of breath, fever, chills, nausea, vomiting, diarrhea, change in mentation, falling, weakness, bleeding. Severe pain or pain not relieved by medications.  Or, any other signs or symptoms that you may have questions about.      DISPOSITION:    Home With:   OT  PT  HH  RN       SNF/Inpatient Rehab/LTAC    Independent/assisted living    Hospice    Other:         PROBLEM LIST Updated:      Signed:   Beatris Mejía MD  5/22/2024  9:48 AM